# Patient Record
Sex: MALE | Race: BLACK OR AFRICAN AMERICAN | Employment: OTHER | URBAN - METROPOLITAN AREA
[De-identification: names, ages, dates, MRNs, and addresses within clinical notes are randomized per-mention and may not be internally consistent; named-entity substitution may affect disease eponyms.]

---

## 2017-03-30 ENCOUNTER — HOSPITAL ENCOUNTER (OUTPATIENT)
Dept: OTHER | Age: 62
Discharge: HOME | End: 2017-03-30
Attending: INTERNAL MEDICINE | Admitting: INTERNAL MEDICINE

## 2017-03-30 LAB
24HR URINE VOLUME (ML): 1550 ML
CREAT SERPL-MCNC: 2.1 MG/DL (ref 0.8–1.3)
CREATININE 24 HOUR URINE: 1.7 G/24HR (ref 0.6–2.5)
CREATININE CLEARANCE: 44 ML/MIN (ref 100–140)
Lab: 24 HR
PROTEIN 24 HOUR URINE: 2.39 G/24HR

## 2017-04-11 ENCOUNTER — HOSPITAL ENCOUNTER (OUTPATIENT)
Dept: OTHER | Age: 62
Discharge: HOME | End: 2017-04-11
Attending: INTERNAL MEDICINE | Admitting: INTERNAL MEDICINE

## 2017-04-11 LAB
ALBUMIN SERPL-MCNC: 3.7 G/DL (ref 3.4–5)
ANION GAP SERPL CALCULATED.3IONS-SCNC: 13 MMOL/L (ref 3–16)
BUN BLDV-MCNC: 24 MG/DL (ref 7–20)
C3 COMPLEMENT: 111 MG/DL (ref 90–180)
C4 COMPLEMENT: 28.1 MG/DL (ref 10–40)
CALCIUM SERPL-MCNC: 9 MG/DL (ref 8.3–10.6)
CHLORIDE BLD-SCNC: 105 MMOL/L (ref 99–110)
CO2: 25 MMOL/L (ref 21–32)
CREAT SERPL-MCNC: 1.9 MG/DL (ref 0.8–1.3)
CREATININE URINE: 88.2 MG/DL (ref 39–259)
FERRITIN: 225.3 NG/ML (ref 30–400)
GFR AFRICAN AMERICAN: 43
GFR NON-AFRICAN AMERICAN: 36
GLUCOSE BLD-MCNC: 133 MG/DL (ref 70–99)
HBV SURFACE AB TITR SER: 23.17 MUL/ML
HCT VFR BLD CALC: 32.5 % (ref 40.5–52.5)
HEMOGLOBIN: 10.4 G/DL (ref 13.5–17.5)
HEPATITIS C ANTIBODY INTERPRETATION: NORMAL
IRON SATURATION: 36 % (ref 20–50)
IRON: 104 UG/DL (ref 59–158)
MCH RBC QN AUTO: 29.7 PG (ref 26–34)
MCHC RBC AUTO-ENTMCNC: 31.8 G/DL (ref 31–36)
MCV RBC AUTO: 93.3 FL (ref 80–100)
PARATHYROID HORMONE INTACT: 165.1 PG/ML (ref 14–72)
PDW BLD-RTO: 13.2 % (ref 12.4–15.4)
PHOSPHORUS: 2.7 MG/DL (ref 2.5–4.9)
PLATELET # BLD: 246 K/UL (ref 135–450)
PMV BLD AUTO: 8.1 FL (ref 5–10.5)
POTASSIUM SERPL-SCNC: 4.4 MMOL/L (ref 3.5–5.1)
PROTEIN PROTEIN: 0.15 G/DL
PROTEIN PROTEIN: 148 MG/DL
RBC # BLD: 3.49 M/UL (ref 4.2–5.9)
SODIUM BLD-SCNC: 143 MMOL/L (ref 136–145)
TOTAL IRON BINDING CAPACITY: 286 UG/DL (ref 260–445)
VITAMIN D 25-HYDROXY: 11.9 NG/ML
WBC # BLD: 7.8 K/UL (ref 4–11)

## 2017-04-12 LAB
ALBUMIN SERPL-MCNC: 2.9 G/DL (ref 3.1–4.9)
ALPHA-1-GLOBULIN: 0.3 G/DL (ref 0.2–0.4)
ALPHA-2-GLOBULIN: 0.8 G/DL (ref 0.4–1.1)
ANA INTERPRETATION: NORMAL
ANTI-NUCLEAR ANTIBODY (ANA): NEGATIVE
BETA GLOBULIN: 1.3 G/DL (ref 0.9–1.6)
GAMMA GLOBULIN: 1.1 G/DL (ref 0.6–1.8)
HIV-1 AND HIV-2 ANTIBODIES: NORMAL
RPR: NORMAL
TOTAL PROTEIN: 6.3 G/DL (ref 6.4–8.2)

## 2017-04-13 LAB
ANCA IFA: NORMAL
BETA GLOBULIN: 0 AU/ML (ref 0–19)
SPE/IFE INTERPRETATION: NORMAL

## 2017-04-14 LAB
KAPPA, FREE LIGHT CHAINS, SERUM: 42.16 MG/L (ref 3.3–19.4)
KAPPA/LAMBDA RATIO: 1.77 (ref 0.26–1.65)
KAPPA/LAMBDA TEST COMMENT: ABNORMAL
LAMBDA, FREE LIGHT CHAINS, SERUM: 23.83 MG/L (ref 5.71–26.3)

## 2017-04-17 LAB — URINE ELECTROPHORESIS INTERP: NORMAL

## 2017-05-01 ENCOUNTER — HOSPITAL ENCOUNTER (OUTPATIENT)
Dept: OTHER | Age: 62
Discharge: HOME | End: 2017-05-01
Attending: INTERNAL MEDICINE | Admitting: INTERNAL MEDICINE

## 2017-05-01 LAB
ANION GAP SERPL CALCULATED.3IONS-SCNC: 12 MMOL/L (ref 3–16)
BUN BLDV-MCNC: 25 MG/DL (ref 7–20)
CALCIUM SERPL-MCNC: 9.1 MG/DL (ref 8.3–10.6)
CHLORIDE BLD-SCNC: 104 MMOL/L (ref 99–110)
CO2: 25 MMOL/L (ref 21–32)
CREAT SERPL-MCNC: 2.2 MG/DL (ref 0.8–1.3)
GFR AFRICAN AMERICAN: 37
GFR NON-AFRICAN AMERICAN: 30
GLUCOSE BLD-MCNC: 141 MG/DL (ref 70–99)
POTASSIUM SERPL-SCNC: 4.7 MMOL/L (ref 3.5–5.1)
SODIUM BLD-SCNC: 141 MMOL/L (ref 136–145)

## 2017-07-06 ENCOUNTER — HOSPITAL ENCOUNTER (OUTPATIENT)
Dept: OTHER | Age: 62
Discharge: HOME | End: 2017-07-06
Attending: INTERNAL MEDICINE | Admitting: INTERNAL MEDICINE

## 2017-07-06 LAB
ALBUMIN SERPL-MCNC: 3.6 G/DL (ref 3.4–5)
ANION GAP SERPL CALCULATED.3IONS-SCNC: 12 MMOL/L (ref 3–16)
BUN BLDV-MCNC: 32 MG/DL (ref 7–20)
CALCIUM SERPL-MCNC: 9.1 MG/DL (ref 8.3–10.6)
CHLORIDE BLD-SCNC: 106 MMOL/L (ref 99–110)
CO2: 25 MMOL/L (ref 21–32)
CREAT SERPL-MCNC: 2.4 MG/DL (ref 0.8–1.3)
CREATININE URINE: 115.3 MG/DL (ref 39–259)
GFR AFRICAN AMERICAN: 33
GFR NON-AFRICAN AMERICAN: 27
GLUCOSE BLD-MCNC: 136 MG/DL (ref 70–99)
HCT VFR BLD CALC: 33.5 % (ref 40.5–52.5)
HEMOGLOBIN: 10.8 G/DL (ref 13.5–17.5)
MCH RBC QN AUTO: 30.1 PG (ref 26–34)
MCHC RBC AUTO-ENTMCNC: 32.4 G/DL (ref 31–36)
MCV RBC AUTO: 93 FL (ref 80–100)
MICROALBUMIN UR-MCNC: 106.3 MG/DL
MICROALBUMIN/CREAT UR-RTO: 921.9 MG/G (ref 0–30)
PARATHYROID HORMONE INTACT: 244.4 PG/ML (ref 14–72)
PDW BLD-RTO: 13.1 % (ref 12.4–15.4)
PHOSPHORUS: 3.5 MG/DL (ref 2.5–4.9)
PLATELET # BLD: 246 K/UL (ref 135–450)
PMV BLD AUTO: 7.9 FL (ref 5–10.5)
POTASSIUM SERPL-SCNC: 4.6 MMOL/L (ref 3.5–5.1)
PROTEIN PROTEIN: 173 MG/DL
RBC # BLD: 3.6 M/UL (ref 4.2–5.9)
SODIUM BLD-SCNC: 143 MMOL/L (ref 136–145)
VITAMIN D 25-HYDROXY: 10.3 NG/ML
WBC # BLD: 8.2 K/UL (ref 4–11)

## 2017-07-21 ENCOUNTER — HOSPITAL ENCOUNTER (OUTPATIENT)
Dept: NON INVASIVE DIAGNOSTICS | Age: 62
Discharge: HOME | End: 2017-07-21
Attending: FAMILY MEDICINE | Admitting: FAMILY MEDICINE

## 2017-07-21 DIAGNOSIS — R01.1 CARDIAC MURMUR: ICD-10-CM

## 2017-07-21 LAB
LV EF: 55 %
LVEF MODALITY: NORMAL

## 2017-10-20 ENCOUNTER — HOSPITAL ENCOUNTER (OUTPATIENT)
Dept: OTHER | Age: 62
Discharge: HOME | End: 2017-10-20
Attending: INTERNAL MEDICINE | Admitting: INTERNAL MEDICINE

## 2017-10-20 ENCOUNTER — HOSPITAL ENCOUNTER (OUTPATIENT)
Dept: OTHER | Age: 62
Discharge: HOME | End: 2017-10-20
Attending: FAMILY MEDICINE | Admitting: FAMILY MEDICINE

## 2017-10-20 LAB
ANION GAP SERPL CALCULATED.3IONS-SCNC: 14 MMOL/L (ref 3–16)
BASOPHILS ABSOLUTE: 0.1 K/UL (ref 0–0.2)
BASOPHILS RELATIVE PERCENT: 0.7 %
BUN BLDV-MCNC: 24 MG/DL (ref 7–20)
CALCIUM SERPL-MCNC: 9 MG/DL (ref 8.3–10.6)
CHLORIDE BLD-SCNC: 101 MMOL/L (ref 99–110)
CHOLESTEROL, TOTAL: 117 MG/DL (ref 0–199)
CO2: 25 MMOL/L (ref 21–32)
CREAT SERPL-MCNC: 2.2 MG/DL (ref 0.8–1.3)
CREATININE URINE: 105.4 MG/DL (ref 39–259)
EOSINOPHILS ABSOLUTE: 0.3 K/UL (ref 0–0.6)
EOSINOPHILS RELATIVE PERCENT: 3.8 %
FERRITIN: 264.1 NG/ML (ref 30–400)
GFR AFRICAN AMERICAN: 37
GFR NON-AFRICAN AMERICAN: 30
GLUCOSE BLD-MCNC: 179 MG/DL (ref 70–99)
HCT VFR BLD CALC: 31.9 % (ref 40.5–52.5)
HDLC SERPL-MCNC: 31 MG/DL (ref 40–60)
HEMOGLOBIN: 10.2 G/DL (ref 13.5–17.5)
IRON SATURATION: 24 % (ref 20–50)
IRON: 64 UG/DL (ref 59–158)
LDL CHOLESTEROL CALCULATED: 63 MG/DL
LYMPHOCYTES ABSOLUTE: 2.1 K/UL (ref 1–5.1)
LYMPHOCYTES RELATIVE PERCENT: 23.1 %
MAGNESIUM: 2.3 MG/DL (ref 1.8–2.4)
MCH RBC QN AUTO: 29.7 PG (ref 26–34)
MCHC RBC AUTO-ENTMCNC: 31.9 G/DL (ref 31–36)
MCV RBC AUTO: 93.2 FL (ref 80–100)
MICROALBUMIN UR-MCNC: 123.7 MG/DL
MICROALBUMIN/CREAT UR-RTO: 1173.6 MG/G (ref 0–30)
MONOCYTES ABSOLUTE: 0.6 K/UL (ref 0–1.3)
MONOCYTES RELATIVE PERCENT: 7.1 %
NEUTROPHILS ABSOLUTE: 5.8 K/UL (ref 1.7–7.7)
NEUTROPHILS RELATIVE PERCENT: 65.3 %
PARATHYROID HORMONE INTACT: 215.7 PG/ML (ref 14–72)
PDW BLD-RTO: 12.7 % (ref 12.4–15.4)
PHOSPHORUS: 2.9 MG/DL (ref 2.5–4.9)
PLATELET # BLD: 287 K/UL (ref 135–450)
PMV BLD AUTO: 8.2 FL (ref 5–10.5)
POTASSIUM SERPL-SCNC: 4.4 MMOL/L (ref 3.5–5.1)
PROTEIN PROTEIN: 187 MG/DL
RBC # BLD: 3.42 M/UL (ref 4.2–5.9)
SODIUM BLD-SCNC: 140 MMOL/L (ref 136–145)
T3 FREE: 2.4 PG/ML (ref 2.3–4.2)
T4 FREE: 1.1 NG/DL (ref 0.9–1.8)
TOTAL IRON BINDING CAPACITY: 267 UG/DL (ref 260–445)
TRIGL SERPL-MCNC: 117 MG/DL (ref 0–150)
TSH SERPL DL<=0.05 MIU/L-ACNC: 5.66 UIU/ML (ref 0.27–4.2)
VITAMIN D 25-HYDROXY: 17 NG/ML
VLDLC SERPL CALC-MCNC: 23 MG/DL
WBC # BLD: 8.9 K/UL (ref 4–11)

## 2017-11-03 ENCOUNTER — HOSPITAL ENCOUNTER (OUTPATIENT)
Dept: OTHER | Age: 62
Discharge: HOME | End: 2017-11-03
Attending: INTERNAL MEDICINE | Admitting: INTERNAL MEDICINE

## 2017-11-03 LAB
ANION GAP SERPL CALCULATED.3IONS-SCNC: 13 MMOL/L (ref 3–16)
BUN BLDV-MCNC: 28 MG/DL (ref 7–20)
CALCIUM SERPL-MCNC: 9.5 MG/DL (ref 8.3–10.6)
CHLORIDE BLD-SCNC: 104 MMOL/L (ref 99–110)
CO2: 26 MMOL/L (ref 21–32)
CREAT SERPL-MCNC: 2.9 MG/DL (ref 0.8–1.3)
GFR AFRICAN AMERICAN: 27
GFR NON-AFRICAN AMERICAN: 22
GLUCOSE BLD-MCNC: 148 MG/DL (ref 70–99)
POTASSIUM SERPL-SCNC: 5 MMOL/L (ref 3.5–5.1)
SODIUM BLD-SCNC: 143 MMOL/L (ref 136–145)

## 2017-11-10 ENCOUNTER — HOSPITAL ENCOUNTER (OUTPATIENT)
Dept: OTHER | Age: 62
Discharge: HOME | End: 2017-11-10
Attending: INTERNAL MEDICINE | Admitting: INTERNAL MEDICINE

## 2017-11-10 LAB
ANION GAP SERPL CALCULATED.3IONS-SCNC: 17 MMOL/L (ref 3–16)
BUN BLDV-MCNC: 22 MG/DL (ref 7–20)
CALCIUM SERPL-MCNC: 9.8 MG/DL (ref 8.3–10.6)
CHLORIDE BLD-SCNC: 103 MMOL/L (ref 99–110)
CO2: 24 MMOL/L (ref 21–32)
CREAT SERPL-MCNC: 2.3 MG/DL (ref 0.8–1.3)
GFR AFRICAN AMERICAN: 35
GFR NON-AFRICAN AMERICAN: 29
GLUCOSE BLD-MCNC: 194 MG/DL (ref 70–99)
POTASSIUM SERPL-SCNC: 5.3 MMOL/L (ref 3.5–5.1)
SODIUM BLD-SCNC: 144 MMOL/L (ref 136–145)

## 2017-11-13 ENCOUNTER — HOSPITAL ENCOUNTER (OUTPATIENT)
Dept: OTHER | Age: 62
Discharge: HOME | End: 2017-11-13
Attending: INTERNAL MEDICINE | Admitting: INTERNAL MEDICINE

## 2017-11-13 LAB
ANION GAP SERPL CALCULATED.3IONS-SCNC: 15 MMOL/L (ref 3–16)
BUN BLDV-MCNC: 22 MG/DL (ref 7–20)
CALCIUM SERPL-MCNC: 9.4 MG/DL (ref 8.3–10.6)
CHLORIDE BLD-SCNC: 103 MMOL/L (ref 99–110)
CO2: 25 MMOL/L (ref 21–32)
CREAT SERPL-MCNC: 2 MG/DL (ref 0.8–1.3)
CREATININE URINE: 127.7 MG/DL (ref 39–259)
GFR AFRICAN AMERICAN: 41
GFR NON-AFRICAN AMERICAN: 34
GLUCOSE BLD-MCNC: 206 MG/DL (ref 70–99)
HCT VFR BLD CALC: 33.3 % (ref 40.5–52.5)
HEMOGLOBIN: 10.6 G/DL (ref 13.5–17.5)
MCH RBC QN AUTO: 29.8 PG (ref 26–34)
MCHC RBC AUTO-ENTMCNC: 32 G/DL (ref 31–36)
MCV RBC AUTO: 93.2 FL (ref 80–100)
MICROALBUMIN UR-MCNC: 183.9 MG/DL
MICROALBUMIN/CREAT UR-RTO: 1440.1 MG/G (ref 0–30)
PDW BLD-RTO: 13 % (ref 12.4–15.4)
PHOSPHORUS: 3 MG/DL (ref 2.5–4.9)
PLATELET # BLD: 241 K/UL (ref 135–450)
PMV BLD AUTO: 8.3 FL (ref 5–10.5)
POTASSIUM SERPL-SCNC: 5 MMOL/L (ref 3.5–5.1)
PROTEIN PROTEIN: 296 MG/DL
RBC # BLD: 3.57 M/UL (ref 4.2–5.9)
SODIUM BLD-SCNC: 143 MMOL/L (ref 136–145)
WBC # BLD: 8.6 K/UL (ref 4–11)

## 2018-01-17 ENCOUNTER — HOSPITAL ENCOUNTER (OUTPATIENT)
Dept: OTHER | Age: 63
Discharge: HOME | End: 2018-01-17
Attending: INTERNAL MEDICINE | Admitting: INTERNAL MEDICINE

## 2018-01-17 LAB
ALBUMIN SERPL-MCNC: 3.4 G/DL (ref 3.4–5)
ANION GAP SERPL CALCULATED.3IONS-SCNC: 14 MMOL/L (ref 3–16)
BUN BLDV-MCNC: 33 MG/DL (ref 7–20)
CALCIUM SERPL-MCNC: 9.1 MG/DL (ref 8.3–10.6)
CHLORIDE BLD-SCNC: 104 MMOL/L (ref 99–110)
CO2: 23 MMOL/L (ref 21–32)
CREAT SERPL-MCNC: 2.7 MG/DL (ref 0.8–1.3)
CREATININE URINE: 62.3 MG/DL (ref 39–259)
GFR AFRICAN AMERICAN: 29
GFR NON-AFRICAN AMERICAN: 24
GLUCOSE BLD-MCNC: 150 MG/DL (ref 70–99)
HCT VFR BLD CALC: 32.4 % (ref 40.5–52.5)
HEMOGLOBIN: 10.5 G/DL (ref 13.5–17.5)
MCH RBC QN AUTO: 29.9 PG (ref 26–34)
MCHC RBC AUTO-ENTMCNC: 32.4 G/DL (ref 31–36)
MCV RBC AUTO: 92.3 FL (ref 80–100)
MICROALBUMIN UR-MCNC: 71.8 MG/DL
MICROALBUMIN/CREAT UR-RTO: 1152.5 MG/G (ref 0–30)
PDW BLD-RTO: 12.8 % (ref 12.4–15.4)
PHOSPHORUS: 3.1 MG/DL (ref 2.5–4.9)
PLATELET # BLD: 271 K/UL (ref 135–450)
PMV BLD AUTO: 7.9 FL (ref 5–10.5)
POTASSIUM SERPL-SCNC: 4.6 MMOL/L (ref 3.5–5.1)
PROTEIN PROTEIN: 113 MG/DL
RBC # BLD: 3.52 M/UL (ref 4.2–5.9)
SODIUM BLD-SCNC: 141 MMOL/L (ref 136–145)
WBC # BLD: 9 K/UL (ref 4–11)

## 2018-03-21 LAB
24HR URINE VOLUME (ML): 3100 ML
ALBUMIN SERPL-MCNC: 4 G/DL (ref 3.4–5)
ANION GAP SERPL CALCULATED.3IONS-SCNC: 12 MMOL/L (ref 3–16)
BUN BLDV-MCNC: 37 MG/DL (ref 7–20)
CALCIUM SERPL-MCNC: 9.3 MG/DL (ref 8.3–10.6)
CHLORIDE BLD-SCNC: 95 MMOL/L (ref 99–110)
CO2: 26 MMOL/L (ref 21–32)
CREAT SERPL-MCNC: 2.4 MG/DL (ref 0.8–1.3)
CREAT SERPL-MCNC: 2.5 MG/DL (ref 0.8–1.3)
CREATININE 24 HOUR URINE: 1.6 G/24HR (ref 0.6–2.5)
CREATININE CLEARANCE: 38 ML/MIN (ref 100–140)
CREATININE URINE: 92.4 MG/DL (ref 39–259)
FERRITIN: 303.7 NG/ML (ref 30–400)
GFR AFRICAN AMERICAN: 32
GFR NON-AFRICAN AMERICAN: 26
GLUCOSE BLD-MCNC: 307 MG/DL (ref 70–99)
HCT VFR BLD CALC: 34.5 % (ref 40.5–52.5)
HEMOGLOBIN: 11.4 G/DL (ref 13.5–17.5)
IRON SATURATION: 36 % (ref 20–50)
IRON: 108 UG/DL (ref 59–158)
Lab: 24 HR
MCH RBC QN AUTO: 29.9 PG (ref 26–34)
MCHC RBC AUTO-ENTMCNC: 33.1 G/DL (ref 31–36)
MCV RBC AUTO: 90.3 FL (ref 80–100)
MICROALBUMIN UR-MCNC: 171.4 MG/DL
MICROALBUMIN/CREAT UR-RTO: 1855 MG/G (ref 0–30)
PARATHYROID HORMONE INTACT: 226 PG/ML (ref 14–72)
PDW BLD-RTO: 12.6 % (ref 12.4–15.4)
PHOSPHORUS: 3.4 MG/DL (ref 2.5–4.9)
PLATELET # BLD: 283 K/UL (ref 135–450)
PMV BLD AUTO: 8.6 FL (ref 5–10.5)
POTASSIUM SERPL-SCNC: 4.7 MMOL/L (ref 3.5–5.1)
PROTEIN 24 HOUR URINE: 3.6 G/24HR
PROTEIN PROTEIN: 279 MG/DL
RBC # BLD: 3.82 M/UL (ref 4.2–5.9)
SODIUM 24 HOUR URINE: 316 MMOL/24 HR (ref 40–220)
SODIUM BLD-SCNC: 133 MMOL/L (ref 136–145)
TOTAL IRON BINDING CAPACITY: 300 UG/DL (ref 260–445)
VITAMIN D 25-HYDROXY: 33.4 NG/ML
WBC # BLD: 10 K/UL (ref 4–11)

## 2018-03-26 ENCOUNTER — HOSPITAL ENCOUNTER (OUTPATIENT)
Dept: OTHER | Age: 63
Discharge: HOME | End: 2018-03-26
Attending: INTERNAL MEDICINE | Admitting: INTERNAL MEDICINE

## 2018-04-12 ENCOUNTER — HOSPITAL ENCOUNTER (OUTPATIENT)
Dept: ULTRASOUND IMAGING | Age: 63
Discharge: HOME | End: 2018-04-12
Attending: INTERNAL MEDICINE | Admitting: INTERNAL MEDICINE

## 2018-04-12 DIAGNOSIS — N18.30 CHRONIC KIDNEY DISEASE, STAGE III (MODERATE) (HCC): ICD-10-CM

## 2018-04-12 DIAGNOSIS — N18.30 STAGE 3 CHRONIC KIDNEY DISEASE (HCC): ICD-10-CM

## 2019-05-05 ENCOUNTER — APPOINTMENT (OUTPATIENT)
Dept: GENERAL RADIOLOGY | Age: 64
DRG: 629 | End: 2019-05-05
Payer: MEDICARE

## 2019-05-05 ENCOUNTER — HOSPITAL ENCOUNTER (INPATIENT)
Age: 64
LOS: 9 days | Discharge: HOME HEALTH | DRG: 629 | End: 2019-05-14
Attending: EMERGENCY MEDICINE | Admitting: INTERNAL MEDICINE
Payer: MEDICARE

## 2019-05-05 DIAGNOSIS — M86.9 OSTEOMYELITIS OF LEFT FOOT, UNSPECIFIED TYPE (HCC): ICD-10-CM

## 2019-05-05 DIAGNOSIS — L03.119 CELLULITIS OF FOOT: Primary | ICD-10-CM

## 2019-05-05 PROBLEM — E11.628 DIABETIC FOOT INFECTION (HCC): Status: ACTIVE | Noted: 2019-05-05

## 2019-05-05 PROBLEM — L08.9 DIABETIC FOOT INFECTION (HCC): Status: ACTIVE | Noted: 2019-05-05

## 2019-05-05 LAB
A/G RATIO: 0.9 (ref 1.1–2.2)
ALBUMIN SERPL-MCNC: 3.4 G/DL (ref 3.4–5)
ALP BLD-CCNC: 103 U/L (ref 40–129)
ALT SERPL-CCNC: <5 U/L (ref 10–40)
ANION GAP SERPL CALCULATED.3IONS-SCNC: 14 MMOL/L (ref 3–16)
AST SERPL-CCNC: 13 U/L (ref 15–37)
BASOPHILS ABSOLUTE: 0.1 K/UL (ref 0–0.2)
BASOPHILS RELATIVE PERCENT: 0.7 %
BILIRUB SERPL-MCNC: 0.5 MG/DL (ref 0–1)
BUN BLDV-MCNC: 38 MG/DL (ref 7–20)
CALCIUM SERPL-MCNC: 8.9 MG/DL (ref 8.3–10.6)
CHLORIDE BLD-SCNC: 98 MMOL/L (ref 99–110)
CO2: 23 MMOL/L (ref 21–32)
CREAT SERPL-MCNC: 4.2 MG/DL (ref 0.8–1.3)
EOSINOPHILS ABSOLUTE: 0.1 K/UL (ref 0–0.6)
EOSINOPHILS RELATIVE PERCENT: 0.6 %
GFR AFRICAN AMERICAN: 17
GFR NON-AFRICAN AMERICAN: 14
GLOBULIN: 3.6 G/DL
GLUCOSE BLD-MCNC: 188 MG/DL (ref 70–99)
HCT VFR BLD CALC: 27.1 % (ref 40.5–52.5)
HEMOGLOBIN: 8.8 G/DL (ref 13.5–17.5)
INR BLD: 1.2 (ref 0.86–1.14)
LACTIC ACID, SEPSIS: 1 MMOL/L (ref 0.4–1.9)
LYMPHOCYTES ABSOLUTE: 1.7 K/UL (ref 1–5.1)
LYMPHOCYTES RELATIVE PERCENT: 15 %
MCH RBC QN AUTO: 29.9 PG (ref 26–34)
MCHC RBC AUTO-ENTMCNC: 32.6 G/DL (ref 31–36)
MCV RBC AUTO: 91.7 FL (ref 80–100)
MONOCYTES ABSOLUTE: 0.7 K/UL (ref 0–1.3)
MONOCYTES RELATIVE PERCENT: 6 %
NEUTROPHILS ABSOLUTE: 8.8 K/UL (ref 1.7–7.7)
NEUTROPHILS RELATIVE PERCENT: 77.7 %
PDW BLD-RTO: 13.3 % (ref 12.4–15.4)
PLATELET # BLD: 296 K/UL (ref 135–450)
PMV BLD AUTO: 7.2 FL (ref 5–10.5)
POTASSIUM SERPL-SCNC: 4.3 MMOL/L (ref 3.5–5.1)
PROTHROMBIN TIME: 13.7 SEC (ref 9.8–13)
RBC # BLD: 2.95 M/UL (ref 4.2–5.9)
SEDIMENTATION RATE, ERYTHROCYTE: >130 MM/HR (ref 0–20)
SODIUM BLD-SCNC: 135 MMOL/L (ref 136–145)
TOTAL PROTEIN: 7 G/DL (ref 6.4–8.2)
WBC # BLD: 11.3 K/UL (ref 4–11)

## 2019-05-05 PROCEDURE — 87040 BLOOD CULTURE FOR BACTERIA: CPT

## 2019-05-05 PROCEDURE — 73660 X-RAY EXAM OF TOE(S): CPT

## 2019-05-05 PROCEDURE — 85610 PROTHROMBIN TIME: CPT

## 2019-05-05 PROCEDURE — 96360 HYDRATION IV INFUSION INIT: CPT

## 2019-05-05 PROCEDURE — 85025 COMPLETE CBC W/AUTO DIFF WBC: CPT

## 2019-05-05 PROCEDURE — 99285 EMERGENCY DEPT VISIT HI MDM: CPT

## 2019-05-05 PROCEDURE — 90471 IMMUNIZATION ADMIN: CPT | Performed by: EMERGENCY MEDICINE

## 2019-05-05 PROCEDURE — 1200000000 HC SEMI PRIVATE

## 2019-05-05 PROCEDURE — 6360000002 HC RX W HCPCS: Performed by: EMERGENCY MEDICINE

## 2019-05-05 PROCEDURE — 96372 THER/PROPH/DIAG INJ SC/IM: CPT

## 2019-05-05 PROCEDURE — 90715 TDAP VACCINE 7 YRS/> IM: CPT | Performed by: EMERGENCY MEDICINE

## 2019-05-05 PROCEDURE — 85652 RBC SED RATE AUTOMATED: CPT

## 2019-05-05 PROCEDURE — 2580000003 HC RX 258: Performed by: NURSE PRACTITIONER

## 2019-05-05 PROCEDURE — 6360000002 HC RX W HCPCS: Performed by: NURSE PRACTITIONER

## 2019-05-05 PROCEDURE — 80053 COMPREHEN METABOLIC PANEL: CPT

## 2019-05-05 PROCEDURE — 83605 ASSAY OF LACTIC ACID: CPT

## 2019-05-05 PROCEDURE — 86140 C-REACTIVE PROTEIN: CPT

## 2019-05-05 RX ORDER — 0.9 % SODIUM CHLORIDE 0.9 %
1000 INTRAVENOUS SOLUTION INTRAVENOUS ONCE
Status: COMPLETED | OUTPATIENT
Start: 2019-05-05 | End: 2019-05-05

## 2019-05-05 RX ADMIN — TAZOBACTAM SODIUM AND PIPERACILLIN SODIUM 4.5 G: 500; 4 INJECTION, SOLUTION INTRAVENOUS at 20:49

## 2019-05-05 RX ADMIN — SODIUM CHLORIDE 1000 ML: 9 INJECTION, SOLUTION INTRAVENOUS at 20:49

## 2019-05-05 RX ADMIN — TETANUS TOXOID, REDUCED DIPHTHERIA TOXOID AND ACELLULAR PERTUSSIS VACCINE, ADSORBED 0.5 ML: 5; 2.5; 8; 8; 2.5 SUSPENSION INTRAMUSCULAR at 22:29

## 2019-05-05 ASSESSMENT — PAIN DESCRIPTION - PAIN TYPE: TYPE: ACUTE PAIN

## 2019-05-05 ASSESSMENT — PAIN DESCRIPTION - ORIENTATION: ORIENTATION: LEFT

## 2019-05-05 ASSESSMENT — PAIN DESCRIPTION - LOCATION: LOCATION: FOOT

## 2019-05-05 ASSESSMENT — PAIN SCALES - GENERAL
PAINLEVEL_OUTOF10: 3
PAINLEVEL_OUTOF10: 0

## 2019-05-06 ENCOUNTER — APPOINTMENT (OUTPATIENT)
Dept: MRI IMAGING | Age: 64
DRG: 629 | End: 2019-05-06
Payer: MEDICARE

## 2019-05-06 LAB
C-REACTIVE PROTEIN: 96.6 MG/L (ref 0–5.1)
GLUCOSE BLD-MCNC: 107 MG/DL (ref 70–99)
GLUCOSE BLD-MCNC: 115 MG/DL (ref 70–99)
GLUCOSE BLD-MCNC: 116 MG/DL (ref 70–99)
GLUCOSE BLD-MCNC: 162 MG/DL (ref 70–99)
GLUCOSE BLD-MCNC: 192 MG/DL (ref 70–99)
PERFORMED ON: ABNORMAL

## 2019-05-06 PROCEDURE — 6360000002 HC RX W HCPCS: Performed by: INTERNAL MEDICINE

## 2019-05-06 PROCEDURE — 6370000000 HC RX 637 (ALT 250 FOR IP): Performed by: INTERNAL MEDICINE

## 2019-05-06 PROCEDURE — 1200000000 HC SEMI PRIVATE

## 2019-05-06 PROCEDURE — 2580000003 HC RX 258: Performed by: INTERNAL MEDICINE

## 2019-05-06 RX ORDER — LISINOPRIL 20 MG/1
20 TABLET ORAL DAILY
Status: DISCONTINUED | OUTPATIENT
Start: 2019-05-06 | End: 2019-05-08

## 2019-05-06 RX ORDER — ATORVASTATIN CALCIUM 20 MG/1
20 TABLET, FILM COATED ORAL NIGHTLY
Status: DISCONTINUED | OUTPATIENT
Start: 2019-05-06 | End: 2019-05-14 | Stop reason: HOSPADM

## 2019-05-06 RX ORDER — SODIUM CHLORIDE 0.9 % (FLUSH) 0.9 %
10 SYRINGE (ML) INJECTION PRN
Status: DISCONTINUED | OUTPATIENT
Start: 2019-05-06 | End: 2019-05-14 | Stop reason: HOSPADM

## 2019-05-06 RX ORDER — LEVOTHYROXINE SODIUM 0.1 MG/1
100 TABLET ORAL DAILY
Status: DISCONTINUED | OUTPATIENT
Start: 2019-05-06 | End: 2019-05-14 | Stop reason: HOSPADM

## 2019-05-06 RX ORDER — LINEZOLID 600 MG/1
600 TABLET, FILM COATED ORAL EVERY 12 HOURS SCHEDULED
Status: DISCONTINUED | OUTPATIENT
Start: 2019-05-06 | End: 2019-05-13

## 2019-05-06 RX ORDER — DEXTROSE MONOHYDRATE 50 MG/ML
100 INJECTION, SOLUTION INTRAVENOUS PRN
Status: DISCONTINUED | OUTPATIENT
Start: 2019-05-06 | End: 2019-05-14 | Stop reason: HOSPADM

## 2019-05-06 RX ORDER — DEXTROSE MONOHYDRATE 25 G/50ML
12.5 INJECTION, SOLUTION INTRAVENOUS PRN
Status: DISCONTINUED | OUTPATIENT
Start: 2019-05-06 | End: 2019-05-14 | Stop reason: HOSPADM

## 2019-05-06 RX ORDER — NICOTINE POLACRILEX 4 MG
15 LOZENGE BUCCAL PRN
Status: DISCONTINUED | OUTPATIENT
Start: 2019-05-06 | End: 2019-05-14 | Stop reason: HOSPADM

## 2019-05-06 RX ORDER — SODIUM CHLORIDE 0.9 % (FLUSH) 0.9 %
10 SYRINGE (ML) INJECTION EVERY 12 HOURS SCHEDULED
Status: DISCONTINUED | OUTPATIENT
Start: 2019-05-06 | End: 2019-05-14 | Stop reason: HOSPADM

## 2019-05-06 RX ORDER — ONDANSETRON 2 MG/ML
4 INJECTION INTRAMUSCULAR; INTRAVENOUS EVERY 6 HOURS PRN
Status: DISCONTINUED | OUTPATIENT
Start: 2019-05-06 | End: 2019-05-14 | Stop reason: HOSPADM

## 2019-05-06 RX ADMIN — Medication 10 ML: at 22:47

## 2019-05-06 RX ADMIN — LEVOTHYROXINE SODIUM 100 MCG: 100 TABLET ORAL at 07:58

## 2019-05-06 RX ADMIN — INSULIN GLARGINE 20 UNITS: 100 INJECTION, SOLUTION SUBCUTANEOUS at 22:47

## 2019-05-06 RX ADMIN — ENOXAPARIN SODIUM 30 MG: 30 INJECTION SUBCUTANEOUS at 08:18

## 2019-05-06 RX ADMIN — INSULIN LISPRO 12 UNITS: 100 INJECTION, SOLUTION INTRAVENOUS; SUBCUTANEOUS at 18:11

## 2019-05-06 RX ADMIN — ATORVASTATIN CALCIUM 20 MG: 20 TABLET, FILM COATED ORAL at 22:46

## 2019-05-06 RX ADMIN — MEROPENEM 500 MG: 500 INJECTION, POWDER, FOR SOLUTION INTRAVENOUS at 08:18

## 2019-05-06 RX ADMIN — LISINOPRIL 20 MG: 20 TABLET ORAL at 08:18

## 2019-05-06 RX ADMIN — LINEZOLID 600 MG: 600 TABLET, FILM COATED ORAL at 08:18

## 2019-05-06 RX ADMIN — LINEZOLID 600 MG: 600 TABLET, FILM COATED ORAL at 22:46

## 2019-05-06 RX ADMIN — MEROPENEM 500 MG: 500 INJECTION, POWDER, FOR SOLUTION INTRAVENOUS at 22:46

## 2019-05-06 RX ADMIN — Medication 10 ML: at 08:19

## 2019-05-06 ASSESSMENT — PAIN SCALES - GENERAL
PAINLEVEL_OUTOF10: 0
PAINLEVEL_OUTOF10: 0
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
## 2019-05-06 NOTE — CARE COORDINATION
Discharge Planning Assessment  RN/SW discharge planner met with patient/(and family member) to discuss reason for admission, current living situation, and potential needs at the time of discharge    Demographics/Insurance verified Yes    Current type of dwelling:private home    Living arrangements: w/daughter    Level of function/Support: independent w/ADL's    PCP: [facility]    Last Visit to PCP: stated about 6 mos ago    DME:stated none    Active with any community resources/agencies/skilled home care: stated none    Medication compliance issues:stated no issues    Financial issues that could impact healthcare:stated no issues    Transportation at the time of discharge:family    Tentative discharge plan:Sw following for any antibiotic needs on d/c-otherwise no d/c needs.     Electronically signed by ZHEN Daniels on 5/6/2019 at 1:21 PM

## 2019-05-06 NOTE — ED NOTES
Pt from home, states that he stepped on a nail approximately 1 week ago. Pt states that he thinks he has an infection in the wound. Small wound noted to bottom of left foot, great toe, small amount of white/ yellow drainage noted. Pt states that he normally can not feel the bottom of his feet r/t diabetes.       Eric Stern RN  05/05/19 8576

## 2019-05-06 NOTE — PROGRESS NOTES
Message sent via [employer]: \"Pt. creatinine 4.2 on admission; CrCl 20ml/min; pt is not dialysis pt. Do you still want to give Vanc? Also do you want swab of wound area for culture? Thanks. \"  See orders. Will continue to monitor.

## 2019-05-06 NOTE — PROGRESS NOTES
Per Tri Roger NP: Can restart home antihypertensive meds for tomorrow. No intervention needed for tonight. Will continue to monitor.

## 2019-05-06 NOTE — ED PROVIDER NOTES
Emergency Department Encounter  Location: [address] ONCOLOGY    Patient: Stu Gao  MRN: [id number]  : 1955  Date of evaluation: 2019  ED Provider: Shasha Bryant MD    11:11 PM  Stu Gao was checked out to me by Dr. Sukhwinder Portillo. Please see his/her initial documentation for details of the patient's initial ED presentation, physical exam and completed studies. In brief, Stu Gao is a 75 y.o. male that presented to the emergency department for critical cellulitis. There were no acute findings on x-ray and patient has been started on antibiotics. Patient signed out with abdomen pending. RADIOLOGY:   Non-plain filmimages such as CT, Ultrasound and MRI are read by the radiologist. 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              LABS:  Results for orders placed or performed during the hospital encounter of 19   CBC Auto Differential   Result Value Ref Range    WBC 11.3 (H) 4.0 - 11.0 K/uL    RBC 2.95 (L) 4.20 - 5.90 M/uL    Hemoglobin 8.8 (L) 13.5 - 17.5 g/dL    Hematocrit 27.1 (L) 40.5 - 52.5 %    MCV 91.7 80.0 - 100.0 fL    MCH 29.9 26.0 - 34.0 pg    MCHC 32.6 31.0 - 36.0 g/dL    RDW 13.3 12.4 - 15.4 %    Platelets 049 857 - 808 K/uL    MPV 7.2 5.0 - 10.5 fL    Neutrophils % 77.7 %    Lymphocytes % 15.0 %    Monocytes % 6.0 %    Eosinophils % 0.6 %    Basophils % 0.7 %    Neutrophils # 8.8 (H) 1.7 - 7.7 K/uL    Lymphocytes # 1.7 1.0 - 5.1 K/uL    Monocytes # 0.7 0.0 - 1.3 K/uL    Eosinophils # 0.1 0.0 - 0.6 K/uL    Basophils # 0.1 0.0 - 0.2 K/uL   Comprehensive metabolic panel   Result Value Ref Range    Sodium 135 (L) 136 - 145 mmol/L    Potassium 4.3 3.5 - 5.1 mmol/L    Chloride 98 (L) 99 - 110 mmol/L    CO2 23 21 - 32 mmol/L    Anion Gap 14 3 - 16    Glucose 188 (H) 70 - 99 mg/dL    BUN 38 (H) 7 - 20 mg/dL    CREATININE 4.2 (H) 0.8 - 1.3 mg/dL    GFR Non-African American 14 (A) >60    GFR  17 (A) >60    Calcium 8.9 8.3 - 10.6 mg/dL    Total Protein 7.0 6.4 - 8.2 g/dL    Alb 3.4 3.4 - 5.0 g/dL    Albumin/Globulin Ratio 0.9 (L) 1.1 - 2.2    Total Bilirubin 0.5 0.0 - 1.0 mg/dL    Alkaline Phosphatase 103 40 - 129 U/L    ALT <5 (L) 10 - 40 U/L    AST 13 (L) 15 - 37 U/L    Globulin 3.6 g/dL   Lactate, Sepsis   Result Value Ref Range    Lactic Acid, Sepsis 1.0 0.4 - 1.9 mmol/L   Sedimentation Rate   Result Value Ref Range    Sed Rate >130 (H) 0 - 20 mm/Hr   Protime-INR   Result Value Ref Range    Protime 13.7 (H) 9.8 - 13.0 sec    INR 1.20 (H) 0.86 - 1.14     Xr Toe Left (min 2 Views)    Result Date: 5/5/2019  EXAMINATION: 2 XRAY VIEWS OF THE LEFT TOE 5/5/2019 8:51 pm COMPARISON: None. HISTORY: ORDERING SYSTEM PROVIDED HISTORY: osteomylitis TECHNOLOGIST PROVIDED HISTORY: Reason for exam:->osteomylitis Ordering Physician Provided Reason for Exam: Osteomyelitis. Acuity: Acute Type of Exam: Initial FINDINGS: No acute fracture or dislocation. Moderate to severe degenerative changes 1st MTP joint. 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        Final ED Course and MDM:  Tetanus has been updated, patient was started on Zosyn, and admitting to add vanco.    ED Medication Orders (From admission, onward)    Start Ordered     Status Ordering Provider    05/08/19 0900 05/06/19 0057  vancomycin (VANCOCIN) intermittent dosing (placeholder)  RX Placeholder      Acknowledged GENO [facility]    05/06/19 0900 05/06/19 0036  lisinopril (PRINIVIL;ZESTRIL) tablet 20 mg  DAILY      Acknowledged DINH [facility]OR[facility]    05/06/19 0900 05/06/19 0052  enoxaparin (LOVENOX) injection 30 mg  DAILY      Acknowledged DIN[facility]ORA    05/06/19 0800 05/06/19 0036  insulin lispro (HUMALOG) injection pen 12 Units  3 TIMES DAILY WITH MEALS      Acknowledged GENO [facility] 05/06/19 0800 05/06/19 0036  meropenem (MERREM) 500 mg IVPB minibag  EVERY 12 HOURS      Acknowledged [facility]    05/06/19 0700 05/06/19 0036  levothyroxine (SYNTHROID) tablet 100 mcg  DAILY      Acknowledged [facility]    05/06/19 0300 05/06/19 0057  vancomycin (VANCOCIN) 1,250 mg in dextrose 5 % 250 mL IVPB  ONCE      Acknowledged [facility]    05/06/19 0100 05/06/19 0036  atorvastatin (LIPITOR) tablet 20 mg  NIGHTLY      Acknowledged [facility]    05/06/19 0036 05/06/19 0036  insulin glargine (LANTUS) injection pen 30 Units  NIGHTLY      Acknowledged [facility]    05/06/19 0036 05/06/19 0036  sodium chloride flush 0.9 % injection 10 mL  2 times per day      Acknowledged [facility]    05/06/19 0036 05/06/19 0036  sodium chloride flush 0.9 % injection 10 mL  PRN      Acknowledged [facility]    05/06/19 0036 05/06/19 0036  ondansetron (ZOFRAN) injection 4 mg  EVERY 6 HOURS PRN      Acknowledged [facility]    05/05/19 2200 05/05/19 2159  Tetanus-Diphth-Acell Pertussis (BOOSTRIX) injection 0.5 mL  ONCE      Last MAR action:  Given - by Reddy Montana on 05/05/19 at [facility]    05/05/19 2030 05/05/19 2025  piperacillin-tazobactam (ZOSYN) 4.5 g in dextrose 100 mL IVPB (premix)  ONCE      Last MAR action:  Stopped - by RUI SCHAEFFER on 05/05/19 at 2119 Leana Burgos    05/05/19 2030 05/05/19 2025  0.9 % sodium chloride bolus  ONCE      Last MAR action:  Stopped - by Kena SCHAEFFER on 05/05/19 at 2149 Leana Burgos          Final Impression      1.  Cellulitis of foot        DISPOSITION       (Please note that portions of this note may have been completed with a voice recognition program. Efforts were made to edit the dictations but occasionally words are mis-transcribed.)    MD Emmanuel Vail MD  05/06/19 6846

## 2019-05-06 NOTE — H&P
and swelling, and per the history of present  illness. All other systems have been reviewed and are negative except  for the history of present illness. PHYSICAL EXAMINATION:  VITAL SIGNS:  Temperature 98.5, respiratory rate 20, pulse 101, blood  pressure 158/94, and saturating 98%. CNS:  Alert, awake, and oriented to time, place, and person. PSYCHIATRIC:  Cooperative and pleasant, answering questions  appropriately. EYES:  Pupils are reactive to light. ENT:  Extraocular muscle movements are intact. RESPIRATORY SYSTEM:  No rales, rubs, or rhonchi. CVS:  S1 and S2 are heard. No murmurs, gallops, or rubs. ABDOMEN:  Soft. No guarding, rigidity, or rebound. MUSCULOSKELETAL:  The patient has got left great toe swelling with  cellulitis and induration extending in to the dorsum of the left foot. The patient has got an area over the plantar aspect of the left great  toe that looks like a puncture wound with yellowish crust over the area. SKIN:  Significant for the cellulitis changes over the left lower  extremity as described above. DIAGNOSTIC DATA:  X-ray of the left great toe shows first MTP  osteoarthrosis. Sodium 135, BUN 38, creatinine 4.2, chloride 98. GFR  is 17. INR 1.20. Lactic acid 1.0.  CBC showed a white count of 11.3,  hemoglobin 8.8, hematocrit 27.1, platelets of 259. Erythrocyte  sedimentation rate is greater than 130. REVIEW OF OLD RECORDS:  Shows:  1. An echo from 2017 that shows an LVEF of 55%. 2.  The patient's previous BUN and creatinine are 37 and 2.4  respectively on 03/21/2018. CONSULTATIONS:  I have requested a Podiatry consultation. ASSESSMENT:  1. Acute left great toe/foot diabetic infection with cellulitis. 2.  Acute-on-chronic kidney disease stage 3.  3.  Uncontrolled type 2 diabetes mellitus. 4.  Hypertension. 5.  Hypothyroidism. PLAN OF CARE:  The patient has been admitted to the Internal Medicine  Service.   We will continue the patient on oral Zyvox

## 2019-05-06 NOTE — PROGRESS NOTES
[facility] PROGRESS NOTE    5/6/2019 3:10 PM        Name: Stu Gao . Admitted: 5/5/2019  Primary Care Provider: Janie Farias DO (Tel: [phone])      Subjective:  No new Complaint. Still has pain in his left Toe        Reviewed interval ancillary notes    Current Medications    atorvastatin (LIPITOR) tablet 20 mg Nightly   insulin glargine (LANTUS) injection pen 30 Units Nightly   insulin lispro (HUMALOG) injection pen 12 Units TID WC   levothyroxine (SYNTHROID) tablet 100 mcg Daily   lisinopril (PRINIVIL;ZESTRIL) tablet 20 mg Daily   sodium chloride flush 0.9 % injection 10 mL 2 times per day   sodium chloride flush 0.9 % injection 10 mL PRN   ondansetron (ZOFRAN) injection 4 mg Q6H PRN   meropenem (MERREM) 500 mg IVPB minibag Q12H   enoxaparin (LOVENOX) injection 30 mg Daily   linezolid (ZYVOX) tablet 600 mg 2 times per day   glucose (GLUTOSE) 40 % oral gel 15 g PRN   dextrose 50 % solution 12.5 g PRN   glucagon (rDNA) injection 1 mg PRN   dextrose 5 % solution PRN       Objective:  BP (!) 168/87   Pulse 88   Temp 97.8 °F (36.6 °C) (Oral)   Resp 16   Ht 5' 10\" (1.778 m)   Wt 198 lb 3.2 oz (89.9 kg)   SpO2 95%   BMI 28.44 kg/m²     Intake/Output Summary (Last 24 hours) at 5/6/2019 1510  Last data filed at 5/5/2019 2149  Gross per 24 hour   Intake 1100 ml   Output --   Net 1100 ml    Wt Readings from Last 3 Encounters:   05/06/19 198 lb 3.2 oz (89.9 kg)   02/15/16 224 lb 9.6 oz (101.9 kg)   01/15/16 221 lb (100.2 kg)       General appearance:  Appears comfortable  Eyes: Sclera clear. Pupils equal.  ENT: Moist oral mucosa. Trachea midline, no adenopathy. Cardiovascular: Regular rhythm, normal S1, S2. No murmur. No edema in lower extremities  Respiratory: Not using accessory muscles. Good inspiratory effort. Clear to auscultation bilaterally, no wheeze or crackles.    GI: Abdomen soft, no

## 2019-05-06 NOTE — PROGRESS NOTES
Message sent via [employer]: \"Pt. /96. Pt. does not c/o headache or blurred vision. Other VSS. Pt. states he does take medication for BP at home. Please advise. Thanks! \"  See orders. Will continue to monitor.

## 2019-05-06 NOTE — ED PROVIDER NOTES
This patient was seen by the Mid-Level Provider. I have seen and examined the patient, agree with the workup, evaluation, management and diagnosis. Care plan has been discussed. My assessment reveals a 58-year-old male with a left great toe infection. This is a 58-year-old male diabetic who presents with a wound to his left great toe. On examination he has some erythema up to the base of the foot. An x-ray was obtained that shows no acute findings pending radiology review. There are no foreign objects noted. His workup is consistent with a diabetic infection of his left great toe and cellulitis. He will be admitted for further care and antibiotics. Examination: Cellulitis of the left great toe. Small ulcer on the plantar aspect. Disposition: The patient be admitted for further care.     Results for orders placed or performed during the hospital encounter of 05/05/19   CBC Auto Differential   Result Value Ref Range    WBC 11.3 (H) 4.0 - 11.0 K/uL    RBC 2.95 (L) 4.20 - 5.90 M/uL    Hemoglobin 8.8 (L) 13.5 - 17.5 g/dL    Hematocrit 27.1 (L) 40.5 - 52.5 %    MCV 91.7 80.0 - 100.0 fL    MCH 29.9 26.0 - 34.0 pg    MCHC 32.6 31.0 - 36.0 g/dL    RDW 13.3 12.4 - 15.4 %    Platelets 762 717 - 760 K/uL    MPV 7.2 5.0 - 10.5 fL    Neutrophils % 77.7 %    Lymphocytes % 15.0 %    Monocytes % 6.0 %    Eosinophils % 0.6 %    Basophils % 0.7 %    Neutrophils # 8.8 (H) 1.7 - 7.7 K/uL    Lymphocytes # 1.7 1.0 - 5.1 K/uL    Monocytes # 0.7 0.0 - 1.3 K/uL    Eosinophils # 0.1 0.0 - 0.6 K/uL    Basophils # 0.1 0.0 - 0.2 K/uL   Comprehensive metabolic panel   Result Value Ref Range    Sodium 135 (L) 136 - 145 mmol/L    Potassium 4.3 3.5 - 5.1 mmol/L    Chloride 98 (L) 99 - 110 mmol/L    CO2 23 21 - 32 mmol/L    Anion Gap 14 3 - 16    Glucose 188 (H) 70 - 99 mg/dL    BUN 38 (H) 7 - 20 mg/dL    CREATININE 4.2 (H) 0.8 - 1.3 mg/dL    GFR Non-African American 14 (A) >60    GFR  17 (A) >60    Calcium 8.9 8.3 - 10.6 mg/dL    Total Protein 7.0 6.4 - 8.2 g/dL    Alb 3.4 3.4 - 5.0 g/dL    Albumin/Globulin Ratio 0.9 (L) 1.1 - 2.2    Total Bilirubin 0.5 0.0 - 1.0 mg/dL    Alkaline Phosphatase 103 40 - 129 U/L    ALT <5 (L) 10 - 40 U/L    AST 13 (L) 15 - 37 U/L    Globulin 3.6 g/dL   Lactate, Sepsis   Result Value Ref Range    Lactic Acid, Sepsis 1.0 0.4 - 1.9 mmol/L   Protime-INR   Result Value Ref Range    Protime 13.7 (H) 9.8 - 13.0 sec    INR 1.20 (H) 0.86 - 1.14     Xr Toe Left (min 2 Views)    Result Date: 5/5/2019  EXAMINATION: 2 XRAY VIEWS OF THE LEFT TOE 5/5/2019 8:51 pm COMPARISON: None. HISTORY: ORDERING SYSTEM PROVIDED HISTORY: osteomylitis TECHNOLOGIST PROVIDED HISTORY: Reason for exam:->osteomylitis Ordering Physician Provided Reason for Exam: Osteomyelitis. Acuity: Acute Type of Exam: Initial FINDINGS: No acute fracture or dislocation. Moderate to severe degenerative changes 1st MTP joint. 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             Shahla Villarreal MD  05/05/19 0462

## 2019-05-06 NOTE — PROGRESS NOTES
H/p dictation id [id number]. Carol Blancas Date of service 05/05/2019. Left great toe diabetic foot infection. Acute on ckd III. Uncontrolled dm II. Htn. Hypothyroidism.

## 2019-05-06 NOTE — PROGRESS NOTES
Message sent via [employer] again to Aaron Valencia MD: \"Pt. creatinine 4.2 on admission; CrCl 20ml/min; pt is not dialysis pt. Do you still want to give Vanc? Also do you want swab of wound area for culture? Thanks. \"  See orders. Will continue to monitor.

## 2019-05-06 NOTE — PROGRESS NOTES
Pt. Oriented to room and call light. Pt. Welcome packet given. Pt. Admission completed - see flowsheets. Pt. Denies other needs at this time. Call light/table in reach. Will continue to monitor.

## 2019-05-06 NOTE — ED NOTES
Report called to MILAD Pickard. All questions answered, no concerns, agreeable to transfer at this time. Pt transported in wheelchair, with belongings, by RN. No distress noted.       Dawson Corbett RN  05/06/19 1985

## 2019-05-06 NOTE — PROGRESS NOTES
Per Daisy VICENTE: D/C Vancomycin; start Zyvox PO 600mg Twice a day; do not order swab of wound for culture. See orders. Will continue to monitor. If you are a smoker, it is important for your health to stop smoking. Please be aware that second hand smoke is also harmful.

## 2019-05-07 LAB
GLUCOSE BLD-MCNC: 125 MG/DL (ref 70–99)
GLUCOSE BLD-MCNC: 73 MG/DL (ref 70–99)
GLUCOSE BLD-MCNC: 78 MG/DL (ref 70–99)
GLUCOSE BLD-MCNC: 91 MG/DL (ref 70–99)
PERFORMED ON: ABNORMAL
PERFORMED ON: NORMAL
REASON FOR REJECTION: NORMAL
REJECTED TEST: NORMAL

## 2019-05-07 PROCEDURE — 99223 1ST HOSP IP/OBS HIGH 75: CPT | Performed by: INTERNAL MEDICINE

## 2019-05-07 PROCEDURE — 87641 MR-STAPH DNA AMP PROBE: CPT

## 2019-05-07 PROCEDURE — 1200000000 HC SEMI PRIVATE

## 2019-05-07 PROCEDURE — 2580000003 HC RX 258: Performed by: INTERNAL MEDICINE

## 2019-05-07 PROCEDURE — 6360000002 HC RX W HCPCS: Performed by: INTERNAL MEDICINE

## 2019-05-07 PROCEDURE — 6370000000 HC RX 637 (ALT 250 FOR IP): Performed by: INTERNAL MEDICINE

## 2019-05-07 RX ADMIN — ATORVASTATIN CALCIUM 20 MG: 20 TABLET, FILM COATED ORAL at 20:51

## 2019-05-07 RX ADMIN — MEROPENEM 500 MG: 500 INJECTION, POWDER, FOR SOLUTION INTRAVENOUS at 08:39

## 2019-05-07 RX ADMIN — LISINOPRIL 20 MG: 20 TABLET ORAL at 08:38

## 2019-05-07 RX ADMIN — INSULIN LISPRO 12 UNITS: 100 INJECTION, SOLUTION INTRAVENOUS; SUBCUTANEOUS at 09:01

## 2019-05-07 RX ADMIN — ENOXAPARIN SODIUM 30 MG: 30 INJECTION SUBCUTANEOUS at 08:38

## 2019-05-07 RX ADMIN — LEVOTHYROXINE SODIUM 100 MCG: 100 TABLET ORAL at 04:44

## 2019-05-07 RX ADMIN — Medication 10 ML: at 08:39

## 2019-05-07 RX ADMIN — LINEZOLID 600 MG: 600 TABLET, FILM COATED ORAL at 20:51

## 2019-05-07 RX ADMIN — INSULIN LISPRO 12 UNITS: 100 INJECTION, SOLUTION INTRAVENOUS; SUBCUTANEOUS at 12:49

## 2019-05-07 RX ADMIN — LINEZOLID 600 MG: 600 TABLET, FILM COATED ORAL at 08:38

## 2019-05-07 RX ADMIN — CEFEPIME HYDROCHLORIDE 1 G: 1 INJECTION, POWDER, FOR SOLUTION INTRAMUSCULAR; INTRAVENOUS at 16:27

## 2019-05-07 RX ADMIN — Medication 10 ML: at 20:51

## 2019-05-07 RX ADMIN — INSULIN LISPRO 12 UNITS: 100 INJECTION, SOLUTION INTRAVENOUS; SUBCUTANEOUS at 17:15

## 2019-05-07 ASSESSMENT — ENCOUNTER SYMPTOMS
EYE REDNESS: 0
NAUSEA: 0
TROUBLE SWALLOWING: 0
RHINORRHEA: 0
COUGH: 0
DIARRHEA: 0
BACK PAIN: 0
WHEEZING: 0
CONSTIPATION: 0
ABDOMINAL PAIN: 0
SHORTNESS OF BREATH: 0
SORE THROAT: 0
EYE DISCHARGE: 0
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
## 2019-05-07 NOTE — PROGRESS NOTES
Nutrition Assessment    Type and Reason for Visit: Positive Nutrition Screen(Pressure ulcer/wound)    Nutrition Recommendations:   1. Encourage protein intake with each meal  2. Monitor need for oral nutrition supplement     Nutrition Assessment: Pt is adequately nourished upon admission as evidenced by pt report of good appetite and PO intake PTA as well as PO intake % of meals during admission. Pt with diabetic foot infection with possible osteomyelitis; will need I&D or possible amputation per podiatry. Discussed importance of protein for wound healing; pt currently consumes protein with every meal. Will monitor need for oral nutrition supplement for additional protein based on PO intake going forward and plan of care per podiatry      Malnutrition Assessment:  · Malnutrition Status: No malnutrition    Nutrition Risk Level:  Moderate    Nutrient Needs:  · Estimated Daily Total Kcal: 0931-0291   · Estimated Daily Protein (g):  grams   · Estimated Daily Total Fluid (ml/day): 1 mL per kcal     Nutrition Diagnosis:   · Problem: Increased nutrient needs  · Etiology: related to Increased demand for energy/nutrients     Signs and symptoms:  as evidenced by Presence of wounds    Objective Information:  · Nutrition-Focused Physical Findings: +1 LLE edema   · Wound Type: Diabetic Ulcer  · Current Nutrition Therapies:  · Oral Diet Orders: Carb Control 4 Carbs/Meal   · Oral Diet intake: %  · Oral Nutrition Supplement (ONS) Orders: None  · Anthropometric Measures:  · Ht: 5' 10\" (177.8 cm)   · Current Body Wt: 198 lb (89.8 kg)  · Ideal Body Wt: 166 lb (75.3 kg)   · BMI Classification: BMI 25.0 - 29.9 Overweight    Nutrition Interventions:   Continue current diet  Continued Inpatient Monitoring, Education Not Indicated    Nutrition Evaluation:   · Evaluation: Goals set   · Goals: Pt will continue to consume at least 75% of meals     · Monitoring: Meal Intake, Diet Tolerance, Skin Integrity, Wound Healing, Pertinent Labs      Electronically signed by Natali Gomez RD, LD on 5/7/19 at 2:15 PM    Contact Number: [phone]

## 2019-05-07 NOTE — PROGRESS NOTES
supple  Neurology: CN 2-12 grossly intact. No speech or motor deficits  Psych: Normal affect. Alert and oriented in time, place and person  Skin: Warm, dry, normal turgor  Extremity exam shows Redness of the left toe    Labs and Tests:  CBC:   Recent Labs     05/05/19 2042   WBC 11.3*   HGB 8.8*        BMP:  Recent Labs     05/05/19 2042   *   K 4.3   CL 98*   CO2 23   BUN 38*   CREATININE 4.2*   GLUCOSE 188*     Hepatic: Recent Labs     05/05/19 2042   AST 13*   ALT <5*   BILITOT 0.5   ALKPHOS 103     XR TOE LEFT (MIN 2 VIEWS)   Final Result   1st MTP osteoarthrosis. NM WBC SPECT    (Results Pending)   NM BONE SCAN 3 PHASE    (Results Pending)         Problem List  Active Problems:    Diabetic foot infection ([address]  Resolved Problems:    * No resolved hospital problems. *       Assessment & Plan:   1.  Acute left great toe/foot diabetic infection with cellulitis. oral Zyvox and IV meropenem. Podiatry Consulted. Patient will most likely need incision and drainage or possible amputation depending on MRI results. MRI can not be done. Bone scan             2.  Acute-on-chronic kidney disease stage 3. Labs in am  3.  controlled type 2 diabetes mellitus. On lantus and sliding scale. 4.  Hypertension. Controlled  5.  Hypothyroidism. stable.              Diet: DIET CARB CONTROL; Carb Control: 4 carb choices (60 gms)/meal  Code:Full Code  DVT PPX Lovenox  Disposition Amputation of the caryl on Tuesday      Rose Begum MD   5/7/2019 1:52 PM

## 2019-05-07 NOTE — PROGRESS NOTES
VSS. Pt resting in bed. Call light within reach. Fresh ice water provided. Pt denies other needs at this time. Will continue to monitor.

## 2019-05-07 NOTE — PROGRESS NOTES
Spoke with RN Valorie Green about order for SPECT/CT. RN to confirm with Dr. Lilly Arshad if he is wanting a Bone SPECT/CT or Indium-111 WBC SPECT/CT.     Ferna Mortimer, [facility], RT(MR)  [phone]

## 2019-05-08 ENCOUNTER — APPOINTMENT (OUTPATIENT)
Dept: NUCLEAR MEDICINE | Age: 64
DRG: 629 | End: 2019-05-08
Payer: MEDICARE

## 2019-05-08 ENCOUNTER — APPOINTMENT (OUTPATIENT)
Dept: ULTRASOUND IMAGING | Age: 64
DRG: 629 | End: 2019-05-08
Payer: MEDICARE

## 2019-05-08 LAB
ANION GAP SERPL CALCULATED.3IONS-SCNC: 13 MMOL/L (ref 3–16)
BASOPHILS ABSOLUTE: 0.1 K/UL (ref 0–0.2)
BASOPHILS RELATIVE PERCENT: 0.6 %
BUN BLDV-MCNC: 35 MG/DL (ref 7–20)
CALCIUM SERPL-MCNC: 8.5 MG/DL (ref 8.3–10.6)
CHLORIDE BLD-SCNC: 105 MMOL/L (ref 99–110)
CO2: 22 MMOL/L (ref 21–32)
CREAT SERPL-MCNC: 3.6 MG/DL (ref 0.8–1.3)
EOSINOPHILS ABSOLUTE: 0.3 K/UL (ref 0–0.6)
EOSINOPHILS RELATIVE PERCENT: 3.8 %
GFR AFRICAN AMERICAN: 21
GFR NON-AFRICAN AMERICAN: 17
GLUCOSE BLD-MCNC: 106 MG/DL (ref 70–99)
GLUCOSE BLD-MCNC: 109 MG/DL (ref 70–99)
GLUCOSE BLD-MCNC: 116 MG/DL (ref 70–99)
GLUCOSE BLD-MCNC: 52 MG/DL (ref 70–99)
GLUCOSE BLD-MCNC: 57 MG/DL (ref 70–99)
GLUCOSE BLD-MCNC: 57 MG/DL (ref 70–99)
GLUCOSE BLD-MCNC: 58 MG/DL (ref 70–99)
GLUCOSE BLD-MCNC: 75 MG/DL (ref 70–99)
GLUCOSE BLD-MCNC: 83 MG/DL (ref 70–99)
GLUCOSE BLD-MCNC: 90 MG/DL (ref 70–99)
GLUCOSE BLD-MCNC: 91 MG/DL (ref 70–99)
HCT VFR BLD CALC: 25.1 % (ref 40.5–52.5)
HEMOGLOBIN: 8.1 G/DL (ref 13.5–17.5)
LYMPHOCYTES ABSOLUTE: 1.9 K/UL (ref 1–5.1)
LYMPHOCYTES RELATIVE PERCENT: 21 %
MCH RBC QN AUTO: 29.6 PG (ref 26–34)
MCHC RBC AUTO-ENTMCNC: 32.1 G/DL (ref 31–36)
MCV RBC AUTO: 92.2 FL (ref 80–100)
MONOCYTES ABSOLUTE: 0.7 K/UL (ref 0–1.3)
MONOCYTES RELATIVE PERCENT: 7.6 %
MRSA SCREEN RT-PCR: NORMAL
NEUTROPHILS ABSOLUTE: 6 K/UL (ref 1.7–7.7)
NEUTROPHILS RELATIVE PERCENT: 67 %
PDW BLD-RTO: 13.5 % (ref 12.4–15.4)
PERFORMED ON: ABNORMAL
PERFORMED ON: NORMAL
PLATELET # BLD: 303 K/UL (ref 135–450)
PMV BLD AUTO: 7 FL (ref 5–10.5)
POTASSIUM SERPL-SCNC: 3.7 MMOL/L (ref 3.5–5.1)
RBC # BLD: 2.72 M/UL (ref 4.2–5.9)
SODIUM BLD-SCNC: 140 MMOL/L (ref 136–145)
WBC # BLD: 8.9 K/UL (ref 4–11)

## 2019-05-08 PROCEDURE — 6370000000 HC RX 637 (ALT 250 FOR IP): Performed by: INTERNAL MEDICINE

## 2019-05-08 PROCEDURE — 85025 COMPLETE CBC W/AUTO DIFF WBC: CPT

## 2019-05-08 PROCEDURE — 6360000002 HC RX W HCPCS: Performed by: INTERNAL MEDICINE

## 2019-05-08 PROCEDURE — 2580000003 HC RX 258: Performed by: INTERNAL MEDICINE

## 2019-05-08 PROCEDURE — 1200000000 HC SEMI PRIVATE

## 2019-05-08 PROCEDURE — A9503 TC99M MEDRONATE: HCPCS | Performed by: PODIATRIST

## 2019-05-08 PROCEDURE — 78320 NM BONE SPECT: CPT

## 2019-05-08 PROCEDURE — 36415 COLL VENOUS BLD VENIPUNCTURE: CPT

## 2019-05-08 PROCEDURE — 80048 BASIC METABOLIC PNL TOTAL CA: CPT

## 2019-05-08 PROCEDURE — 78805 NM INFLAMMATORY WBC LIMITED W INDIUM 111: CPT

## 2019-05-08 PROCEDURE — 3430000000 HC RX DIAGNOSTIC RADIOPHARMACEUTICAL: Performed by: PODIATRIST

## 2019-05-08 PROCEDURE — 99233 SBSQ HOSP IP/OBS HIGH 50: CPT | Performed by: INTERNAL MEDICINE

## 2019-05-08 PROCEDURE — 78315 BONE IMAGING 3 PHASE: CPT

## 2019-05-08 PROCEDURE — 2580000003 HC RX 258: Performed by: PODIATRIST

## 2019-05-08 PROCEDURE — A9570 INDIUM IN-111 AUTO WBC: HCPCS | Performed by: PODIATRIST

## 2019-05-08 RX ORDER — SODIUM CHLORIDE 0.9 % (FLUSH) 0.9 %
10 SYRINGE (ML) INJECTION PRN
Status: DISCONTINUED | OUTPATIENT
Start: 2019-05-08 | End: 2019-05-14 | Stop reason: HOSPADM

## 2019-05-08 RX ORDER — AMLODIPINE BESYLATE 5 MG/1
10 TABLET ORAL DAILY
Status: DISCONTINUED | OUTPATIENT
Start: 2019-05-08 | End: 2019-05-14 | Stop reason: HOSPADM

## 2019-05-08 RX ORDER — TC 99M MEDRONATE 20 MG/10ML
21.1 INJECTION, POWDER, LYOPHILIZED, FOR SOLUTION INTRAVENOUS
Status: COMPLETED | OUTPATIENT
Start: 2019-05-08 | End: 2019-05-08

## 2019-05-08 RX ADMIN — Medication 10 ML: at 12:53

## 2019-05-08 RX ADMIN — ATORVASTATIN CALCIUM 20 MG: 20 TABLET, FILM COATED ORAL at 21:27

## 2019-05-08 RX ADMIN — Medication 21.1 MILLICURIE: at 12:52

## 2019-05-08 RX ADMIN — LEVOTHYROXINE SODIUM 100 MCG: 100 TABLET ORAL at 05:57

## 2019-05-08 RX ADMIN — Medication 476.3 MICRO CURIE: at 12:54

## 2019-05-08 RX ADMIN — Medication 10 ML: at 09:47

## 2019-05-08 RX ADMIN — LINEZOLID 600 MG: 600 TABLET, FILM COATED ORAL at 09:31

## 2019-05-08 RX ADMIN — LINEZOLID 600 MG: 600 TABLET, FILM COATED ORAL at 21:27

## 2019-05-08 RX ADMIN — LISINOPRIL 20 MG: 20 TABLET ORAL at 09:31

## 2019-05-08 RX ADMIN — Medication 10 ML: at 12:54

## 2019-05-08 RX ADMIN — DEXTROSE MONOHYDRATE 100 ML/HR: 50 INJECTION, SOLUTION INTRAVENOUS at 22:07

## 2019-05-08 RX ADMIN — DEXTROSE 15 G: 15 GEL ORAL at 22:00

## 2019-05-08 RX ADMIN — CEFEPIME HYDROCHLORIDE 2 G: 2 INJECTION, POWDER, FOR SOLUTION INTRAVENOUS at 14:40

## 2019-05-08 RX ADMIN — Medication 10 ML: at 21:28

## 2019-05-08 RX ADMIN — ENOXAPARIN SODIUM 30 MG: 30 INJECTION SUBCUTANEOUS at 09:31

## 2019-05-08 RX ADMIN — INSULIN LISPRO 12 UNITS: 100 INJECTION, SOLUTION INTRAVENOUS; SUBCUTANEOUS at 09:31

## 2019-05-08 RX ADMIN — AMLODIPINE BESYLATE 10 MG: 5 TABLET ORAL at 13:21

## 2019-05-08 RX ADMIN — INSULIN LISPRO 12 UNITS: 100 INJECTION, SOLUTION INTRAVENOUS; SUBCUTANEOUS at 13:22

## 2019-05-08 RX ADMIN — INSULIN LISPRO 12 UNITS: 100 INJECTION, SOLUTION INTRAVENOUS; SUBCUTANEOUS at 18:37

## 2019-05-08 ASSESSMENT — ENCOUNTER SYMPTOMS
COUGH: 0
ABDOMINAL PAIN: 0
WHEEZING: 0
CHEST TIGHTNESS: 0
BACK PAIN: 0
VOMITING: 0
NAUSEA: 0
EYE PAIN: 0
CONSTIPATION: 0
DIARRHEA: 0
TROUBLE SWALLOWING: 0
EYE REDNESS: 0
SHORTNESS OF BREATH: 0
RHINORRHEA: 0
SINUS PAIN: 0
EYE DISCHARGE: 0
COLOR CHANGE: 1
SORE THROAT: 0
ABDOMINAL DISTENTION: 0

## 2019-05-08 ASSESSMENT — PAIN SCALES - GENERAL: PAINLEVEL_OUTOF10: 0
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
## 2019-05-08 NOTE — PROGRESS NOTES
Shift assessment completed. VS taken, BP elevated. Scheduled BP medication given. Denies pain or needs. Will continue to monitor.

## 2019-05-08 NOTE — ED PROVIDER NOTES
[address]        Pt Name: Maite Johnson  MRN: [id number]  [place] 1955  Date of evaluation: 5/5/2019  Provider: TEE De La Rosa CNP  PCP: [id number] [place], DO    This patient was seen and evaluated by the attending physician Mohit Pruett, [address] [facility]       Chief Complaint   Patient presents with    Foot Injury     Pt from home, states he stepped on a nail x1 week ago, increasingly painful, drainage, hot, swollen. Pt is diabetic. PMS. HISTORY OF PRESENT ILLNESS   (Location/Symptom, Timing/Onset, Context/Setting, Quality, Duration, Modifying Factors, Severity)  Note limiting factors. Maite Johnson is a 75 y.o. male presents to the emergency department with discoloration, pain, swelling to the left great toe. Patient stepped on a nail about a week ago and has been walking on this nail in his shoe unknowingly. He is a diabetic with limited sensory. He does have an ulceration to the bottom of the foot and market swelling with erythema and lymphangitic streaking present. Denies fever but states that he has had body aches and chills. He has history of right great toe amputation from diabetic complications. Denies any headache, lightheadedness, dizziness, visual disturbances. No chest pain or pressure. No neck or back pain. No shortness of breath, cough, or congestion. No abdominal pain, nausea, vomiting, diarrhea, constipation, or dysuria. No rash. Nursing Notes were all reviewed and agreed with or any disagreements were addressed  in the HPI. REVIEW OF SYSTEMS    (2-9 systems for level 4, 10 or more for level 5)     Review of Systems   Constitutional: Negative for activity change, chills and fever. Respiratory: Negative for chest tightness and shortness of breath. Cardiovascular: Negative for chest pain. Gastrointestinal: Negative for abdominal pain, diarrhea, nausea and vomiting.    Genitourinary: MINI PEN NEEDLES 31G X 5 MM MISC USE FOUR TIMES DAILY AS DIRECTED. Qty: 100 each, Refills: 2      insulin glargine (LANTUS) 100 UNIT/ML injection pen Inject 30 Units into the skin nightly  Qty: 5 Pen, Refills: 1    Comments: Will  Need a follow up appointment for further refills      folic acid-pyridoxine-cyancobalamin (FOLBIC) 2.5-25-2 MG TABS Take 1 tablet by mouth 2 times daily  Qty: 60 tablet, Refills: 5      !! ACCU-CHEK VEE PLUS strip 1 each by In Vitro route 5 times daily As needed. Qty: 450 each, Refills: 3      !! ACCU-CHEK FASTCLIX LANCETS MISC Test blood sugars 4 times daily  Qty: 200 each, Refills: 3      !! ONE TOUCH LANCETS MISC 1 each by Does not apply route 5 times daily. Qty: 200 each, Refills: 5      !! glucose blood VI test strips (ONE TOUCH ULTRA TEST) strip 1 each by Does not apply route 5 times daily. 4 times daily. Qty: 150 each, Refills: 11       !! - Potential duplicate medications found. Please discuss with provider. ALLERGIES     Patient has no known allergies.     FAMILYHISTORY       Family History   Problem Relation Age of Onset    Cancer Mother     Diabetes Sister     Cancer Brother     Diabetes Brother     Diabetes Brother     Heart Disease Neg Hx     Stroke Neg Hx     Thyroid Disease Neg Hx           SOCIAL HISTORY       Social History     Socioeconomic History    Marital status:      Spouse name: Not on file    Number of children: Not on file    Years of education: Not on file    Highest education level: Not on file   Occupational History    Occupation: retired, was a    Social Needs    Financial resource strain: Not on file    Food insecurity:     Worry: Not on file     Inability: Not on file   [employer] needs:     Medical: Not on file     Non-medical: Not on file   Tobacco Use    Smoking status: Never Smoker    Smokeless tobacco: Never Used   Substance and Sexual Activity    Alcohol use: No    Drug use: No    Sexual activity: Not on file   Lifestyle    Physical activity:     Days per week: Not on file     Minutes per session: Not on file    Stress: Not on file   Relationships    Social connections:     Talks on phone: Not on file     Gets together: Not on file     Attends Christianity service: Not on file     Active member of club or organization: Not on file     Attends meetings of clubs or organizations: Not on file     Relationship status: Not on file    Intimate partner violence:     Fear of current or ex partner: Not on file     Emotionally abused: Not on file     Physically abused: Not on file     Forced sexual activity: Not on file   Other Topics Concern    Not on file   Social History Narrative    Not on file       SCREENINGS    Sebec Coma Scale  Eye Opening: Spontaneous  Best Verbal Response: Oriented  Best Motor Response: Obeys commands  Angelo Coma Scale Score: 15        PHYSICAL EXAM    (up to 7 for level 4, 8 or more for level 5)     ED Triage Vitals [05/05/19 2016]   BP Temp Temp Source Pulse Resp SpO2 Height Weight   (!) 158/94 98.5 °F (36.9 °C) Oral 101 20 98 % 5' 10\" (1.778 m) 193 lb (87.5 kg)       Physical Exam   Constitutional: He is oriented to person, place, and time. He appears well-developed and well-nourished. HENT:   Head: Normocephalic and atraumatic. Right Ear: External ear normal.   Left Ear: External ear normal.   Eyes: Right eye exhibits no discharge. Left eye exhibits no discharge. Neck: Normal range of motion. Neck supple. No JVD present. Cardiovascular: Normal rate and regular rhythm. Exam reveals no friction rub. No murmur heard. Pulmonary/Chest: Effort normal and breath sounds normal. No respiratory distress. Abdominal: Soft. He exhibits no mass. There is no tenderness. Musculoskeletal: Normal range of motion. Feet:   Left Foot:   Skin Integrity: Positive for ulcer, skin breakdown and erythema. Neurological: He is alert and oriented to person, place, and time.    Skin: Narrative:     Performed at:  [facility] [address] [facility], [address]   Phone [phone]    Narrative:     Cristian Tracey  SFF5T tel. [phone],  Rejected Test Name/Called to: Sherrie Hendrix, 05/07/2019 17:28, by Josie Gutierres  Performed at:  [facility] [address], [place], [address]   Phone [phone]   POCT GLUCOSE    Narrative:     Performed at:  [facility] [address], [place], [address] [employer]   Phone [phone]   POCT GLUCOSE    Narrative:     Performed at:  [facility] [address], [place], [address]   Phone [phone]   POCT GLUCOSE    Narrative:     Performed at:  [facility] [address], [place], [facility] [address]   Phone [phone]   POCT GLUCOSE    Narrative:     Performed at:  [facility] [address], [place], [address]   Phone [phone]   POCT GLUCOSE   POCT GLUCOSE   POCT GLUCOSE   POCT GLUCOSE   POCT GLUCOSE   POCT GLUCOSE   POCT GLUCOSE   POCT GLUCOSE   POCT GLUCOSE   POCT GLUCOSE       All other labs were within normal range or not returned as of this dictation. EKG: All EKG's are interpreted by the Emergency Department Physician who either signs orCo-signs this chart in the absence of a cardiologist.  Please see their note for interpretation of EKG. RADIOLOGY:   Non-plain film images such as CT, Ultrasound and MRI are read by the radiologist. 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          NM WBC SPECT    (Results Pending)   NM BONE SCAN 3 PHASE    (Results Pending)   NM INFLAMMATORY WBC LIMITED W INDIUM 111    (Results Pending)     No results found.        PROCEDURES   Unless otherwise noted below, none     Procedures    CRITICAL CARE TIME   N/A    CONSULTS:  IP CONSULT TO HOSPITALIST  IP CONSULT TO PODIATRY  IP CONSULT TO SOCIAL WORK  IP CONSULT TO INFECTIOUS DISEASES      EMERGENCY DEPARTMENT COURSE and DIFFERENTIALDIAGNOSIS/MDM:   Vitals:    Vitals:    05/07/19 2042 05/07/19 2351 05/08/19 0556 05/08/19 0935   BP: (!) 185/94 (!) 186/92 139/77 (!) 177/92   Pulse: 92 98 81 89   Resp: 16 16 16 16   Temp: 98.8 °F (37.1 °C) 98.5 °F (36.9 °C) 98.5 °F (36.9 °C) 98.6 °F (37 °C)   TempSrc: Oral Oral Oral Oral   SpO2: 96% 98% 95% 98%   Weight:       Height:           Patient was given thefollowing medications:  Medications   atorvastatin (LIPITOR) tablet 20 mg (20 mg Oral Given 5/7/19 2051)   insulin lispro (HUMALOG) injection pen 12 Units (12 Units Subcutaneous Given 5/8/19 0931)   levothyroxine (SYNTHROID) tablet 100 mcg (100 mcg Oral Given 5/8/19 0557)   lisinopril (PRINIVIL;ZESTRIL) tablet 20 mg (20 mg Oral Given 5/8/19 0931)   sodium chloride flush 0.9 % injection 10 mL (10 mLs Intravenous Given 5/8/19 0947)   sodium chloride flush 0.9 % injection 10 mL (has no administration in time range)   ondansetron (ZOFRAN) injection 4 mg (has no administration in time range)   enoxaparin (LOVENOX) injection 30 mg (30 mg Subcutaneous Given 5/8/19 0931)   linezolid (ZYVOX) tablet 600 mg (600 mg Oral Given 5/8/19 0931)   glucose (GLUTOSE) 40 % oral gel 15 g (has no administration in time range)   dextrose 50 % solution 12.5 g (has no administration in time range)   glucagon (rDNA) injection 1 mg (has no administration in time range)   dextrose 5 % solution (has no administration in time range)   insulin glargine (LANTUS) injection pen 13 Units (13 Units Subcutaneous Given 5/7/19 2051)   cefepime (MAXIPIME) 1 g IVPB minibag (0 g Intravenous Stopped 5/7/19 1656)   piperacillin-tazobactam (ZOSYN) 4.5 g in dextrose 100 mL IVPB (premix) (0 g Intravenous Stopped 5/5/19 2119)   0.9 % sodium chloride bolus (0 mLs Intravenous Stopped 5/5/19 2149)   Tetanus-Diphth-Acell Pertussis (BOOSTRIX) injection 0.5 mL (0.5 mLs Intramuscular Given 5/5/19 2229)       Briefly, this is a 75year old male that presents to the emergency department with discoloration, pain, swelling to the left great toe. Patient stepped on a nail about a week ago and has been walking on this nail in his shoe unknowingly. He is a diabetic with limited sensory. He does have an ulceration to the bottom of the foot and market swelling with erythema and lymphangitic streaking present. Denies fever but states that he has had body aches and chills. He has history of right great toe amputation from diabetic complications. CBC shows a leukocytosis white count of 11,300. Hemoglobin 8.8. Platelets 463. CMP shows BUN of 38, creatinine 4.2 and GFR 14. ESR >130. CRP 96.6. INR 1.20. 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 Patient was given fluids and zosyn in the ER. Plan to admit to medicine for management once everything is resulted. Patient is agreeable with plan of care. FINAL IMPRESSION      1.  Cellulitis of foot          DISPOSITION/PLAN   DISPOSITION Admitted 05/05/2019 11:52:10 PM      PATIENT REFERREDTO:  [id number],   [address]  [phone]            DISCHARGE MEDICATIONS:  Current Discharge Medication List          DISCONTINUED MEDICATIONS:  Current Discharge Medication List                 (Please note that portions ofthis note were completed with a voice recognition program.  Efforts were made to edit the dictations but occasionally words are mis-transcribed.)    TEE Pollard CNP (electronically signed)           TEE Pollard CNP  05/08/19 8700

## 2019-05-08 NOTE — CONSULTS
Full consult done. Note labeled as a progress note    . See progress note from today for the full ID consult note.       Whitley Morrison MD, MPH  5/7/2019, 9:59 PM  [facility] Infectious Disease   Office: [phone]  Fax: [phone]  Tuesday AM clinic:   [address] [address]  Thursday AM clinic: [address] [facility]

## 2019-05-08 NOTE — PROGRESS NOTES
[facility] PROGRESS NOTE    5/8/2019 12:52 PM        Name: Aurea Garcia . Admitted: 5/5/2019  Primary Care Provider: Isacc Guadalupe DO (Tel: [phone])      Subjective: Continues to have pain in the Left Great Toe. Scheduled for WBC scan today. Reviewed interval ancillary notes    Current Medications    amLODIPine (NORVASC) tablet 10 mg Daily   cefepime (MAXIPIME) 2 g IVPB minibag Q24H   technetium medronate (Tc-MDP) injection 22.7 millicurie ONCE PRN   sodium chloride flush 0.9 % injection 10 mL PRN   insulin glargine (LANTUS) injection pen 13 Units Nightly   atorvastatin (LIPITOR) tablet 20 mg Nightly   insulin lispro (HUMALOG) injection pen 12 Units TID WC   levothyroxine (SYNTHROID) tablet 100 mcg Daily   sodium chloride flush 0.9 % injection 10 mL 2 times per day   sodium chloride flush 0.9 % injection 10 mL PRN   ondansetron (ZOFRAN) injection 4 mg Q6H PRN   enoxaparin (LOVENOX) injection 30 mg Daily   linezolid (ZYVOX) tablet 600 mg 2 times per day   glucose (GLUTOSE) 40 % oral gel 15 g PRN   dextrose 50 % solution 12.5 g PRN   glucagon (rDNA) injection 1 mg PRN   dextrose 5 % solution PRN       Objective:  BP (!) 177/92   Pulse 89   Temp 98.6 °F (37 °C) (Oral)   Resp 16   Ht 5' 10\" (1.778 m)   Wt 198 lb 3.2 oz (89.9 kg)   SpO2 98%   BMI 28.44 kg/m²     Intake/Output Summary (Last 24 hours) at 5/8/2019 1252  Last data filed at 5/8/2019 0939  Gross per 24 hour   Intake 480 ml   Output --   Net 480 ml    Wt Readings from Last 3 Encounters:   05/06/19 198 lb 3.2 oz (89.9 kg)   02/15/16 224 lb 9.6 oz (101.9 kg)   01/15/16 221 lb (100.2 kg)       General appearance:  Appears comfortable  Eyes: Sclera clear. Pupils equal.  ENT: Moist oral mucosa. Trachea midline, no adenopathy. Cardiovascular: Regular rhythm, normal S1, S2. No murmur.  No edema in lower extremities  Respiratory: Not using accessory muscles. Good inspiratory effort. Clear to auscultation bilaterally, no wheeze or crackles. GI: Abdomen soft, no tenderness, not distended, normal bowel sounds  Musculoskeletal: No cyanosis in digits, neck supple  Neurology: CN 2-12 grossly intact. No speech or motor deficits  Psych: Normal affect. 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 NM WBC SPECT    (Results Pending)   NM BONE SCAN 3 PHASE    (Results Pending)   NM INFLAMMATORY WBC LIMITED W INDIUM 111    (Results Pending)       Problem List  Active Problems:    Type 2 diabetes mellitus with left diabetic foot infection ([facility] [address]    Diabetic foot infection (HCC)    Cellulitis of foot    Acute hematogenous osteomyelitis of left foot (HCC)    Elevated sed rate    Elevated C-reactive protein (CRP)    Acute kidney injury superimposed on chronic kidney disease (HCC)    Overweight    Diabetic polyneuropathy associated with type 2 diabetes mellitus (HCC)    History of methicillin resistant staphylococcus aureus (MRSA)    Essential hypertension  Resolved Problems:    * No resolved hospital problems. *       Assessment & Plan:   .  Acute left great toe/foot diabetic infection with cellulitis. oral Zyvox and IV meropenem. Podiatry Consulted. Patient will most likely need incision and drainage or possible amputation on Friday MRI can not be done. WBC Bone scan today ,.appreciate ID evlautaion . Imipenem stopped and Cefepeme started.  On Zyvox              2.  Acute-on-chronic kidney disease stage 3. Labs in am . Creatinine trending down. Creat 3.5. Baseline Creatinine Not known. [facility] kidney last year was Normal. Will Repeat  3.  controlled type 2 diabetes mellitus. On lantus and sliding scale.   4.  Hypertension. Controlled  5.  Hypothyroidism. stable. Diet: DIET CARB CONTROL; Carb Control: 4 carb choices (60 gms)/meal  Code:Full Code  DVT PPX Lovenox  Disposition Pending podiatry evaluation.        Sally Kendall MD   5/8/2019 12:52 PM
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
## 2019-05-09 ENCOUNTER — APPOINTMENT (OUTPATIENT)
Dept: NUCLEAR MEDICINE | Age: 64
DRG: 629 | End: 2019-05-09
Payer: MEDICARE

## 2019-05-09 ENCOUNTER — APPOINTMENT (OUTPATIENT)
Dept: ULTRASOUND IMAGING | Age: 64
DRG: 629 | End: 2019-05-09
Payer: MEDICARE

## 2019-05-09 LAB
A/G RATIO: 0.7 (ref 1.1–2.2)
ALBUMIN SERPL-MCNC: 2.9 G/DL (ref 3.4–5)
ALP BLD-CCNC: 87 U/L (ref 40–129)
ALT SERPL-CCNC: 6 U/L (ref 10–40)
ANION GAP SERPL CALCULATED.3IONS-SCNC: 11 MMOL/L (ref 3–16)
ANION GAP SERPL CALCULATED.3IONS-SCNC: 12 MMOL/L (ref 3–16)
AST SERPL-CCNC: 12 U/L (ref 15–37)
BASOPHILS ABSOLUTE: 0 K/UL (ref 0–0.2)
BASOPHILS RELATIVE PERCENT: 0.5 %
BILIRUB SERPL-MCNC: 0.5 MG/DL (ref 0–1)
BILIRUBIN URINE: NEGATIVE
BLOOD, URINE: ABNORMAL
BUN BLDV-MCNC: 33 MG/DL (ref 7–20)
BUN BLDV-MCNC: 34 MG/DL (ref 7–20)
CALCIUM SERPL-MCNC: 8.8 MG/DL (ref 8.3–10.6)
CALCIUM SERPL-MCNC: 9.1 MG/DL (ref 8.3–10.6)
CHLORIDE BLD-SCNC: 101 MMOL/L (ref 99–110)
CHLORIDE BLD-SCNC: 103 MMOL/L (ref 99–110)
CLARITY: CLEAR
CO2: 19 MMOL/L (ref 21–32)
CO2: 24 MMOL/L (ref 21–32)
COLOR: YELLOW
CREAT SERPL-MCNC: 3.1 MG/DL (ref 0.8–1.3)
CREAT SERPL-MCNC: 3.4 MG/DL (ref 0.8–1.3)
CREATININE URINE: 100 MG/DL (ref 39–259)
EOSINOPHILS ABSOLUTE: 0.4 K/UL (ref 0–0.6)
EOSINOPHILS RELATIVE PERCENT: 5.1 %
EPITHELIAL CELLS, UA: 0 /HPF (ref 0–5)
GFR AFRICAN AMERICAN: 22
GFR AFRICAN AMERICAN: 24
GFR NON-AFRICAN AMERICAN: 18
GFR NON-AFRICAN AMERICAN: 20
GLOBULIN: 4 G/DL
GLUCOSE BLD-MCNC: 109 MG/DL (ref 70–99)
GLUCOSE BLD-MCNC: 112 MG/DL (ref 70–99)
GLUCOSE BLD-MCNC: 163 MG/DL (ref 70–99)
GLUCOSE BLD-MCNC: 175 MG/DL (ref 70–99)
GLUCOSE BLD-MCNC: 183 MG/DL (ref 70–99)
GLUCOSE BLD-MCNC: 60 MG/DL (ref 70–99)
GLUCOSE BLD-MCNC: 70 MG/DL (ref 70–99)
GLUCOSE URINE: 100 MG/DL
HCT VFR BLD CALC: 25.2 % (ref 40.5–52.5)
HEMOGLOBIN: 8.1 G/DL (ref 13.5–17.5)
HYALINE CASTS: 0 /LPF (ref 0–8)
KETONES, URINE: NEGATIVE MG/DL
LEUKOCYTE ESTERASE, URINE: NEGATIVE
LYMPHOCYTES ABSOLUTE: 1.6 K/UL (ref 1–5.1)
LYMPHOCYTES RELATIVE PERCENT: 19.9 %
MCH RBC QN AUTO: 29.4 PG (ref 26–34)
MCHC RBC AUTO-ENTMCNC: 32 G/DL (ref 31–36)
MCV RBC AUTO: 91.9 FL (ref 80–100)
MICROSCOPIC EXAMINATION: YES
MONOCYTES ABSOLUTE: 0.6 K/UL (ref 0–1.3)
MONOCYTES RELATIVE PERCENT: 7.2 %
NEUTROPHILS ABSOLUTE: 5.5 K/UL (ref 1.7–7.7)
NEUTROPHILS RELATIVE PERCENT: 67.3 %
NITRITE, URINE: NEGATIVE
PDW BLD-RTO: 14.1 % (ref 12.4–15.4)
PERFORMED ON: ABNORMAL
PERFORMED ON: NORMAL
PH UA: 6 (ref 5–8)
PLATELET # BLD: 320 K/UL (ref 135–450)
PMV BLD AUTO: 6.7 FL (ref 5–10.5)
POTASSIUM SERPL-SCNC: 3.9 MMOL/L (ref 3.5–5.1)
POTASSIUM SERPL-SCNC: 4.1 MMOL/L (ref 3.5–5.1)
PROTEIN PROTEIN: 258 MG/DL
PROTEIN UA: >=300 MG/DL
RBC # BLD: 2.74 M/UL (ref 4.2–5.9)
RBC UA: 2 /HPF (ref 0–4)
SODIUM BLD-SCNC: 134 MMOL/L (ref 136–145)
SODIUM BLD-SCNC: 136 MMOL/L (ref 136–145)
SPECIFIC GRAVITY UA: 1.01 (ref 1–1.03)
TOTAL PROTEIN: 6.9 G/DL (ref 6.4–8.2)
URINE TYPE: ABNORMAL
UROBILINOGEN, URINE: 1 E.U./DL
WBC # BLD: 8.2 K/UL (ref 4–11)
WBC UA: 1 /HPF (ref 0–5)

## 2019-05-09 PROCEDURE — 2580000003 HC RX 258: Performed by: INTERNAL MEDICINE

## 2019-05-09 PROCEDURE — 80053 COMPREHEN METABOLIC PANEL: CPT

## 2019-05-09 PROCEDURE — 99232 SBSQ HOSP IP/OBS MODERATE 35: CPT | Performed by: INTERNAL MEDICINE

## 2019-05-09 PROCEDURE — 6370000000 HC RX 637 (ALT 250 FOR IP): Performed by: INTERNAL MEDICINE

## 2019-05-09 PROCEDURE — 81001 URINALYSIS AUTO W/SCOPE: CPT

## 2019-05-09 PROCEDURE — 6360000002 HC RX W HCPCS: Performed by: INTERNAL MEDICINE

## 2019-05-09 PROCEDURE — 85025 COMPLETE CBC W/AUTO DIFF WBC: CPT

## 2019-05-09 PROCEDURE — 82570 ASSAY OF URINE CREATININE: CPT

## 2019-05-09 PROCEDURE — 76770 US EXAM ABDO BACK WALL COMP: CPT

## 2019-05-09 PROCEDURE — 1200000000 HC SEMI PRIVATE

## 2019-05-09 PROCEDURE — 84156 ASSAY OF PROTEIN URINE: CPT

## 2019-05-09 PROCEDURE — 78807: CPT

## 2019-05-09 PROCEDURE — 36415 COLL VENOUS BLD VENIPUNCTURE: CPT

## 2019-05-09 RX ADMIN — Medication 10 ML: at 20:57

## 2019-05-09 RX ADMIN — ENOXAPARIN SODIUM 30 MG: 30 INJECTION SUBCUTANEOUS at 08:26

## 2019-05-09 RX ADMIN — INSULIN LISPRO 12 UNITS: 100 INJECTION, SOLUTION INTRAVENOUS; SUBCUTANEOUS at 09:20

## 2019-05-09 RX ADMIN — LEVOTHYROXINE SODIUM 100 MCG: 100 TABLET ORAL at 06:04

## 2019-05-09 RX ADMIN — AMLODIPINE BESYLATE 10 MG: 5 TABLET ORAL at 08:26

## 2019-05-09 RX ADMIN — Medication 10 ML: at 08:27

## 2019-05-09 RX ADMIN — LINEZOLID 600 MG: 600 TABLET, FILM COATED ORAL at 09:03

## 2019-05-09 RX ADMIN — CEFEPIME HYDROCHLORIDE 2 G: 2 INJECTION, POWDER, FOR SOLUTION INTRAVENOUS at 14:32

## 2019-05-09 RX ADMIN — ATORVASTATIN CALCIUM 20 MG: 20 TABLET, FILM COATED ORAL at 20:57

## 2019-05-09 RX ADMIN — INSULIN LISPRO 12 UNITS: 100 INJECTION, SOLUTION INTRAVENOUS; SUBCUTANEOUS at 12:41

## 2019-05-09 RX ADMIN — LINEZOLID 600 MG: 600 TABLET, FILM COATED ORAL at 20:57

## 2019-05-09 ASSESSMENT — ENCOUNTER SYMPTOMS
TROUBLE SWALLOWING: 0
EYE REDNESS: 0
WHEEZING: 0
EYE DISCHARGE: 0
CONSTIPATION: 0
SHORTNESS OF BREATH: 0
ABDOMINAL PAIN: 0
DIARRHEA: 0
SORE THROAT: 0
NAUSEA: 0
COUGH: 0
RHINORRHEA: 0
BACK PAIN: 0
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
## 2019-05-09 NOTE — PROGRESS NOTES
1630  Last data filed at 5/8/2019 2121  Gross per 24 hour   Intake --   Output 380 ml   Net -380 ml       LOWER EXTREMITY:  Vascular: Vascular status Impaired  palpable pedal pulses, right DP2/4 and PT2/4, left DP2/4 and PT2/4. CFT 3 seconds digits 1 to 5 bilateral.  Hair growthAbsent  both lower extremities and feet. Skin temperature is warm to warm from pretibial area to distal digits bilateral.  Exam is negative for rubor, pallor, cyanosis or signs of acute vascular compromise bilaterally. Exam is positive for edema bilateral lower extremity. Varicosities Absent  bilateral lower extremity. Neuro: Neurologic status diminished bilateral with epicritic Absent  , proprioceptive Absent , vibratory sensation Absent  and protopathic Absent . DTRs Present bilateral Achilles. There were no reproducible neuritic symptoms on exam bilateral feet/ankles. Derm: Ulceration to left great toe sub-first MTPJ with white purulence. Wound probes 1.5 cm. Wound measures 0.4 cm x 0.3 cm  Ecchymosis Absent  bilateral feet/foot. Musculoskeletal: No pain with palpation or probing of the wound. 5/5 muscle strength in/eversion and dorsi/plantarflexion bilateral feet. No gross instability noted. Assessment:     Active Problems:    Poorly controlled type 2 diabetes mellitus (HCC)    Diabetic foot infection (HCC)    Cellulitis of foot    Acute hematogenous osteomyelitis of left foot (HCC)    Elevated sed rate    Elevated C-reactive protein (CRP)    Acute kidney injury superimposed on chronic kidney disease (HCC)    Overweight    Diabetic polyneuropathy associated with type 2 diabetes mellitus (HCC)    History of methicillin resistant staphylococcus aureus (MRSA)    Essential hypertension  Resolved Problems:    * No resolved hospital problems.  *      Plan:   -  Dressing : May leave open  -  WBAT  -  Continue antibiotics Per infectious disease  - SPEC  indium scan  positive for osteomyelitis plan for surgical I&D tomorrow 12:30 p.m., discussed long-term osteomyelitis treatment including IV antibiotics and hyperbaric oxygen treatment. -  He will need I&D later this week. - Discussed with pt that diabetic foot infections are a staged process of surgical procedures that allows the tissues to demarcate and then assess the extent of healthy tissues vs. Devitalized tissues. Explained that the Proximal phalanx of the halluxAnd soft tissues are suggestive of infectious involvement via X-ray and SPECT CT and that its not certain until surgical intervention commences to truly assess the extent of the infectious process. Explained to the patient that it is the goal of His primary service is to save and salvage as much of His foot as possible while still providing a functional/stable pedal structure post surgical.  - All potential risks, benefits, and complications were discussed with the patient prior to the scheduling of surgery. No guarantees or promises where made to what the outcomes will be. The patient wished to proceed with surgery, and informed written consent was obtained.      Disposition plan for I&D tomorrow, nothing by mouth after midnight    Katarina Hardin DPM  Cell:[phone]

## 2019-05-09 NOTE — PROGRESS NOTES
[facility] PROGRESS NOTE    5/9/2019 1:43 PM        Name: Gennaro Rae . Admitted: 5/5/2019  Primary Care Provider: Beto Keenan DO (Tel: [phone])      Subjective:  No pain in the toes. Had wbc scan today. No Other Complaint. Tentative surgery Today      Reviewed interval ancillary notes    Current Medications    amLODIPine (NORVASC) tablet 10 mg Daily   cefepime (MAXIPIME) 2 g IVPB minibag Q24H   sodium chloride flush 0.9 % injection 10 mL PRN   sodium chloride flush 0.9 % injection 10 mL PRN   insulin glargine (LANTUS) injection pen 13 Units Nightly   atorvastatin (LIPITOR) tablet 20 mg Nightly   insulin lispro (HUMALOG) injection pen 12 Units TID WC   levothyroxine (SYNTHROID) tablet 100 mcg Daily   sodium chloride flush 0.9 % injection 10 mL 2 times per day   sodium chloride flush 0.9 % injection 10 mL PRN   ondansetron (ZOFRAN) injection 4 mg Q6H PRN   enoxaparin (LOVENOX) injection 30 mg Daily   linezolid (ZYVOX) tablet 600 mg 2 times per day   glucose (GLUTOSE) 40 % oral gel 15 g PRN   dextrose 50 % solution 12.5 g PRN   glucagon (rDNA) injection 1 mg PRN   dextrose 5 % solution PRN       Objective:  BP (!) 155/81   Pulse 78   Temp 97.7 °F (36.5 °C) (Oral)   Resp 16   Ht 5' 10\" (1.778 m)   Wt 198 lb 3.2 oz (89.9 kg)   SpO2 99%   BMI 28.44 kg/m²     Intake/Output Summary (Last 24 hours) at 5/9/2019 1343  Last data filed at 5/8/2019 2121  Gross per 24 hour   Intake 480 ml   Output 380 ml   Net 100 ml    Wt Readings from Last 3 Encounters:   05/06/19 198 lb 3.2 oz (89.9 kg)   02/15/16 224 lb 9.6 oz (101.9 kg)   01/15/16 221 lb (100.2 kg)       General appearance:  Appears comfortable  Eyes: Sclera clear. Pupils equal.  ENT: Moist oral mucosa. Trachea midline, no adenopathy. Cardiovascular: Regular rhythm, normal S1, S2. No murmur.  No edema in lower extremities  Respiratory: Not using accessory muscles. Good inspiratory effort. Clear to auscultation bilaterally, no wheeze or crackles. GI: Abdomen soft, no tenderness, not distended, normal bowel sounds  Musculoskeletal: No cyanosis in digits, neck supple  Neurology: CN 2-12 grossly intact. No speech or motor deficits  Psych: Normal affect. Alert and oriented in time, place and person  Skin: Warm, dry, normal turgor  Extremity exam shows brisk capillary refill. Left Great Toe  swollen    Labs and Tests:  CBC:   Recent Labs     05/08/19  0545 05/09/19  0625   WBC 8.9 8.2   HGB 8.1* 8.1*    320     BMP:  Recent Labs     05/08/19  0545 05/09/19  0625    136   K 3.7 3.9    101   CO2 22 24   BUN 35* 34*   CREATININE 3.6* 3.1*   GLUCOSE 83 175*     Hepatic: No results for input(s): AST, ALT, ALB, BILITOT, ALKPHOS in the last 72 hours. XR TOE LEFT (MIN 2 VIEWS)   Final Result   1st MTP osteoarthrosis. NM WBC SPECT    (Results Pending)   NM BONE SCAN 3 PHASE    (Results Pending)   NM INFLAMMATORY WBC LIMITED W INDIUM 111    (Results Pending)   US RENAL COMPLETE    (Results Pending)   NM BONE SPECT    (Results Pending)     Problem List  Active Problems:    Poorly controlled type 2 diabetes mellitus (HCC)    Diabetic foot infection ([facility] [address]    Cellulitis of foot    Acute hematogenous osteomyelitis of left foot (HCC)    Elevated sed rate    Elevated C-reactive protein (CRP)    Acute kidney injury superimposed on chronic kidney disease (HCC)    Overweight    Diabetic polyneuropathy associated with type 2 diabetes mellitus (HCC)    History of methicillin resistant staphylococcus aureus (MRSA)    Essential hypertension  Resolved Problems:    * No resolved hospital problems. *            Assessment & Plan:   Acute left great toe/foot diabetic infection with cellulitis. oral Zyvox and IV cefepeme. Podiatry Consulted. Patient will most likely need incision and drainage or possible amputation on Friday MRI can not be done.  WBC Bone

## 2019-05-09 NOTE — PROGRESS NOTES
Pt's FSBS 58. A total of 16 oz of orange juice given, 8 oz of milk, 1 tube of oral glutose and pt started on D5 fluids. After a series of rechecks pt's FSBS 90 at this time. Scheduled 13 units of Lantus held at this time per pt request. Team page, awaiting response. Will continue to monitor.

## 2019-05-09 NOTE — PROGRESS NOTES
Nephrology Progress Note  [phone]  [phone]   [email or web address]    Patient:  Sander Vargas   : 1955    CC: DILIP on CKD    Brief HPI    The patient is a 75 y.o.male with significant past medical history of CKD III,  Hypertension, Hyperlipidemia, DM II, Diabetic retinopathy, PVD,  Hypothyroidism is admitted for left diabetic foot infection and is on antibiotics per ID. He was seen by Podiatry and there is a possibility of I&D vs amputation. We are consulted for DILIP on CKD  He is followed by Dr. Dariana Velásquez in the office. He was last seen in 2018. His CKD is secondary to biopsy proven Diabetic Nephropathy. No recent base-line available, but his creatinine in 2018 was in low 2s. His creatinine on admission was 4.2 and has improved to 3.6 today.       Subjective:  Pt seen and examined  Creatinine continuing to improve  Awaiting WBC scan today  No complaints  HTN uncontrolled      Review of Systems   Pain left toe    SHx:  No visitors at the bed-side    Meds:  Scheduled Meds:   amLODIPine  10 mg Oral Daily    cefepime  2 g Intravenous Q24H    insulin glargine  13 Units Subcutaneous Nightly    atorvastatin  20 mg Oral Nightly    insulin lispro  12 Units Subcutaneous TID WC    levothyroxine  100 mcg Oral Daily    sodium chloride flush  10 mL Intravenous 2 times per day    enoxaparin  30 mg Subcutaneous Daily    linezolid  600 mg Oral 2 times per day     Continuous Infusions:   dextrose 100 mL/hr (19 2207)     PRN Meds:.sodium chloride flush, sodium chloride flush, sodium chloride flush, ondansetron, glucose, dextrose, glucagon (rDNA), dextrose      Vitals:  BP (!) 155/81   Pulse 78   Temp 97.7 °F (36.5 °C) (Oral)   Resp 16   Ht 5' 10\" (1.778 m)   Wt 198 lb 3.2 oz (89.9 kg)   SpO2 99%   BMI 28.44 kg/m²       Physical Exam  General : AAOx3, not in pain or respiratory distress, resting in bed  HEENT : mucosa moist. PERRL  CVS: S1 S2 normal, regular rhythm, no murmurs or rubs.  Lungs: Clear, no wheezing or crackles. Abd: Soft, bowel sounds normal, non-tender. Ext: No edema, no cyanosis  Skin: Warm. No rashes appreciated. : bladder non-distended, no tenderness over the bladder  Skin : left toe with erythema, ulceration      Labs:  CBC with Differential:    Lab Results   Component Value Date    WBC 8.2 05/09/2019    RBC 2.74 05/09/2019    HGB 8.1 05/09/2019    HCT 25.2 05/09/2019     05/09/2019    MCV 91.9 05/09/2019    MCH 29.4 05/09/2019    MCHC 32.0 05/09/2019    RDW 14.1 05/09/2019    SEGSPCT 79.3 07/06/2012    LYMPHOPCT 19.9 05/09/2019    MONOPCT 7.2 05/09/2019    EOSPCT 1.2 11/04/2011    BASOPCT 0.5 05/09/2019    MONOSABS 0.6 05/09/2019    LYMPHSABS 1.6 05/09/2019    EOSABS 0.4 05/09/2019    BASOSABS 0.0 05/09/2019    DIFFTYPE Auto 07/06/2012     BMP:    Lab Results   Component Value Date     05/09/2019    K 3.9 05/09/2019     05/09/2019    CO2 24 05/09/2019    BUN 34 05/09/2019    LABALBU 3.4 05/05/2019    CREATININE 3.1 05/09/2019    CALCIUM 8.8 05/09/2019    GFRAA 24 05/09/2019    GFRAA 53 07/06/2012    LABGLOM 20 05/09/2019    GLUCOSE 175 05/09/2019     Ionized Calcium:  No results found for: IONCA  Magnesium:    Lab Results   Component Value Date    MG 2.30 10/20/2017     Phosphorus:    Lab Results   Component Value Date    PHOS 3.4 03/21/2018       Assessment/Plan:    1. DILIP on CKD III. Improving, likely pre-renal,  no recent base-line available. Hold lisinopril until acute issues resolve. No recent base-line available, but his creatinine in arch 2018 was in low 2s.   2. Diabetic foot infection left. Podiatry on board, Abx per ID  1. Hypertension not very well controlled, agree with starting Amlodipine, hold  lisinopril for now. Otherwise, Needs LUIS ALFREDO blockade in the long term for proteinuria. 2. CKD III biopsy proven Diabetic nephropathy. Other risk factors include vascular disease, age and NSAID usage per history.   3. Proteinuria secondary to diabetic nephropathy. UPC 2.5  4. Anemia in CKD/chronic disease. Check Iron studies  5. CKD-MBD check iPTH, Vit D, phos  6. DM II HBA1Cs upto 14 in the past.       Lili Browne MD.  5/9/2019  Office Phone : [phone]    Thank you for allowing us to participate in the care of this pt. I willcontinue to follow along. Please call with questions or concerns.

## 2019-05-10 ENCOUNTER — APPOINTMENT (OUTPATIENT)
Dept: GENERAL RADIOLOGY | Age: 64
DRG: 629 | End: 2019-05-10
Payer: MEDICARE

## 2019-05-10 ENCOUNTER — ANESTHESIA (OUTPATIENT)
Dept: OPERATING ROOM | Age: 64
DRG: 629 | End: 2019-05-10
Payer: MEDICARE

## 2019-05-10 ENCOUNTER — ANESTHESIA EVENT (OUTPATIENT)
Dept: OPERATING ROOM | Age: 64
DRG: 629 | End: 2019-05-10
Payer: MEDICARE

## 2019-05-10 VITALS
RESPIRATION RATE: 2 BRPM | OXYGEN SATURATION: 98 % | SYSTOLIC BLOOD PRESSURE: 109 MMHG | DIASTOLIC BLOOD PRESSURE: 61 MMHG

## 2019-05-10 LAB
ANAEROBIC CULTURE: NORMAL
ANAEROBIC CULTURE: NORMAL
ANION GAP SERPL CALCULATED.3IONS-SCNC: 12 MMOL/L (ref 3–16)
BASOPHILS ABSOLUTE: 0 K/UL (ref 0–0.2)
BASOPHILS RELATIVE PERCENT: 0.7 %
BLOOD CULTURE, ROUTINE: NORMAL
BUN BLDV-MCNC: 36 MG/DL (ref 7–20)
C3 COMPLEMENT: 121.5 MG/DL (ref 90–180)
C4 COMPLEMENT: 25.4 MG/DL (ref 10–40)
CALCIUM SERPL-MCNC: 8.8 MG/DL (ref 8.3–10.6)
CHLORIDE BLD-SCNC: 104 MMOL/L (ref 99–110)
CO2: 23 MMOL/L (ref 21–32)
CREAT SERPL-MCNC: 3.6 MG/DL (ref 0.8–1.3)
CULTURE, BLOOD 2: NORMAL
EOSINOPHILS ABSOLUTE: 0.4 K/UL (ref 0–0.6)
EOSINOPHILS RELATIVE PERCENT: 6 %
FERRITIN: 450.2 NG/ML (ref 30–400)
GFR AFRICAN AMERICAN: 21
GFR NON-AFRICAN AMERICAN: 17
GLUCOSE BLD-MCNC: 103 MG/DL (ref 70–99)
GLUCOSE BLD-MCNC: 147 MG/DL (ref 70–99)
GLUCOSE BLD-MCNC: 166 MG/DL (ref 70–99)
GLUCOSE BLD-MCNC: 219 MG/DL (ref 70–99)
GLUCOSE BLD-MCNC: 89 MG/DL (ref 70–99)
GLUCOSE BLD-MCNC: 92 MG/DL (ref 70–99)
GLUCOSE BLD-MCNC: 93 MG/DL (ref 70–99)
HCT VFR BLD CALC: 24.9 % (ref 40.5–52.5)
HEMOGLOBIN: 7.9 G/DL (ref 13.5–17.5)
IRON SATURATION: 36 % (ref 20–50)
IRON: 67 UG/DL (ref 59–158)
LYMPHOCYTES ABSOLUTE: 1.4 K/UL (ref 1–5.1)
LYMPHOCYTES RELATIVE PERCENT: 20.7 %
MCH RBC QN AUTO: 29.1 PG (ref 26–34)
MCHC RBC AUTO-ENTMCNC: 31.8 G/DL (ref 31–36)
MCV RBC AUTO: 91.5 FL (ref 80–100)
MONOCYTES ABSOLUTE: 0.5 K/UL (ref 0–1.3)
MONOCYTES RELATIVE PERCENT: 7.8 %
NEUTROPHILS ABSOLUTE: 4.4 K/UL (ref 1.7–7.7)
NEUTROPHILS RELATIVE PERCENT: 64.8 %
PARATHYROID HORMONE INTACT: 338.4 PG/ML (ref 14–72)
PDW BLD-RTO: 13.6 % (ref 12.4–15.4)
PERFORMED ON: ABNORMAL
PERFORMED ON: NORMAL
PHOSPHORUS: 3.8 MG/DL (ref 2.5–4.9)
PLATELET # BLD: 339 K/UL (ref 135–450)
PMV BLD AUTO: 6.4 FL (ref 5–10.5)
POTASSIUM SERPL-SCNC: 4.1 MMOL/L (ref 3.5–5.1)
RBC # BLD: 2.72 M/UL (ref 4.2–5.9)
SODIUM BLD-SCNC: 139 MMOL/L (ref 136–145)
TOTAL IRON BINDING CAPACITY: 184 UG/DL (ref 260–445)
VITAMIN D 25-HYDROXY: 13.2 NG/ML
WBC # BLD: 6.7 K/UL (ref 4–11)
WOUND/ABSCESS: NORMAL
WOUND/ABSCESS: NORMAL

## 2019-05-10 PROCEDURE — 87205 SMEAR GRAM STAIN: CPT

## 2019-05-10 PROCEDURE — 7100000000 HC PACU RECOVERY - FIRST 15 MIN: Performed by: PODIATRIST

## 2019-05-10 PROCEDURE — 99232 SBSQ HOSP IP/OBS MODERATE 35: CPT | Performed by: INTERNAL MEDICINE

## 2019-05-10 PROCEDURE — 83970 ASSAY OF PARATHORMONE: CPT

## 2019-05-10 PROCEDURE — 3700000000 HC ANESTHESIA ATTENDED CARE: Performed by: PODIATRIST

## 2019-05-10 PROCEDURE — 7100000001 HC PACU RECOVERY - ADDTL 15 MIN: Performed by: PODIATRIST

## 2019-05-10 PROCEDURE — 6370000000 HC RX 637 (ALT 250 FOR IP): Performed by: PODIATRIST

## 2019-05-10 PROCEDURE — 3700000001 HC ADD 15 MINUTES (ANESTHESIA): Performed by: PODIATRIST

## 2019-05-10 PROCEDURE — 83550 IRON BINDING TEST: CPT

## 2019-05-10 PROCEDURE — 36415 COLL VENOUS BLD VENIPUNCTURE: CPT

## 2019-05-10 PROCEDURE — 6360000002 HC RX W HCPCS: Performed by: NURSE ANESTHETIST, CERTIFIED REGISTERED

## 2019-05-10 PROCEDURE — 73630 X-RAY EXAM OF FOOT: CPT

## 2019-05-10 PROCEDURE — 87186 SC STD MICRODIL/AGAR DIL: CPT

## 2019-05-10 PROCEDURE — 86160 COMPLEMENT ANTIGEN: CPT

## 2019-05-10 PROCEDURE — 2580000003 HC RX 258: Performed by: INTERNAL MEDICINE

## 2019-05-10 PROCEDURE — 3600000012 HC SURGERY LEVEL 2 ADDTL 15MIN: Performed by: PODIATRIST

## 2019-05-10 PROCEDURE — 1200000000 HC SEMI PRIVATE

## 2019-05-10 PROCEDURE — 2709999900 HC NON-CHARGEABLE SUPPLY: Performed by: PODIATRIST

## 2019-05-10 PROCEDURE — 84100 ASSAY OF PHOSPHORUS: CPT

## 2019-05-10 PROCEDURE — 87015 SPECIMEN INFECT AGNT CONCNTJ: CPT

## 2019-05-10 PROCEDURE — 87206 SMEAR FLUORESCENT/ACID STAI: CPT

## 2019-05-10 PROCEDURE — 83540 ASSAY OF IRON: CPT

## 2019-05-10 PROCEDURE — 85025 COMPLETE CBC W/AUTO DIFF WBC: CPT

## 2019-05-10 PROCEDURE — 2500000003 HC RX 250 WO HCPCS: Performed by: NURSE ANESTHETIST, CERTIFIED REGISTERED

## 2019-05-10 PROCEDURE — 0QBR0ZZ EXCISION OF LEFT TOE PHALANX, OPEN APPROACH: ICD-10-PCS | Performed by: PODIATRIST

## 2019-05-10 PROCEDURE — 6370000000 HC RX 637 (ALT 250 FOR IP): Performed by: INTERNAL MEDICINE

## 2019-05-10 PROCEDURE — 87075 CULTR BACTERIA EXCEPT BLOOD: CPT

## 2019-05-10 PROCEDURE — 2580000003 HC RX 258: Performed by: PODIATRIST

## 2019-05-10 PROCEDURE — 87077 CULTURE AEROBIC IDENTIFY: CPT

## 2019-05-10 PROCEDURE — 87070 CULTURE OTHR SPECIMN AEROBIC: CPT

## 2019-05-10 PROCEDURE — 3600000002 HC SURGERY LEVEL 2 BASE: Performed by: PODIATRIST

## 2019-05-10 PROCEDURE — 82306 VITAMIN D 25 HYDROXY: CPT

## 2019-05-10 PROCEDURE — 87116 MYCOBACTERIA CULTURE: CPT

## 2019-05-10 PROCEDURE — 80048 BASIC METABOLIC PNL TOTAL CA: CPT

## 2019-05-10 PROCEDURE — 2500000003 HC RX 250 WO HCPCS: Performed by: PODIATRIST

## 2019-05-10 PROCEDURE — 82728 ASSAY OF FERRITIN: CPT

## 2019-05-10 PROCEDURE — 2580000003 HC RX 258: Performed by: ANESTHESIOLOGY

## 2019-05-10 PROCEDURE — 87102 FUNGUS ISOLATION CULTURE: CPT

## 2019-05-10 RX ORDER — PROPOFOL 10 MG/ML
INJECTION, EMULSION INTRAVENOUS CONTINUOUS PRN
Status: DISCONTINUED | OUTPATIENT
Start: 2019-05-10 | End: 2019-05-10 | Stop reason: SDUPTHER

## 2019-05-10 RX ORDER — SODIUM CHLORIDE 9 MG/ML
INJECTION, SOLUTION INTRAVENOUS CONTINUOUS
Status: DISCONTINUED | OUTPATIENT
Start: 2019-05-10 | End: 2019-05-10

## 2019-05-10 RX ORDER — HYDROMORPHONE HCL 110MG/55ML
0.5 PATIENT CONTROLLED ANALGESIA SYRINGE INTRAVENOUS EVERY 5 MIN PRN
Status: DISCONTINUED | OUTPATIENT
Start: 2019-05-10 | End: 2019-05-10

## 2019-05-10 RX ORDER — HYDROMORPHONE HCL 110MG/55ML
0.25 PATIENT CONTROLLED ANALGESIA SYRINGE INTRAVENOUS EVERY 5 MIN PRN
Status: DISCONTINUED | OUTPATIENT
Start: 2019-05-10 | End: 2019-05-10

## 2019-05-10 RX ORDER — HYDROCODONE BITARTRATE AND ACETAMINOPHEN 5; 325 MG/1; MG/1
1 TABLET ORAL
Status: DISCONTINUED | OUTPATIENT
Start: 2019-05-10 | End: 2019-05-10

## 2019-05-10 RX ORDER — CARVEDILOL 6.25 MG/1
6.25 TABLET ORAL 2 TIMES DAILY WITH MEALS
Status: DISCONTINUED | OUTPATIENT
Start: 2019-05-10 | End: 2019-05-12

## 2019-05-10 RX ORDER — ONDANSETRON 2 MG/ML
4 INJECTION INTRAMUSCULAR; INTRAVENOUS
Status: DISCONTINUED | OUTPATIENT
Start: 2019-05-10 | End: 2019-05-10

## 2019-05-10 RX ORDER — SODIUM CHLORIDE 9 MG/ML
INJECTION, SOLUTION INTRAVENOUS CONTINUOUS
Status: DISCONTINUED | OUTPATIENT
Start: 2019-05-10 | End: 2019-05-12

## 2019-05-10 RX ORDER — LIDOCAINE HYDROCHLORIDE 10 MG/ML
1 INJECTION, SOLUTION EPIDURAL; INFILTRATION; INTRACAUDAL; PERINEURAL
Status: DISCONTINUED | OUTPATIENT
Start: 2019-05-10 | End: 2019-05-10

## 2019-05-10 RX ORDER — CIPROFLOXACIN 500 MG/1
500 TABLET, FILM COATED ORAL
Status: DISCONTINUED | OUTPATIENT
Start: 2019-05-10 | End: 2019-05-13

## 2019-05-10 RX ORDER — PROPOFOL 10 MG/ML
INJECTION, EMULSION INTRAVENOUS PRN
Status: DISCONTINUED | OUTPATIENT
Start: 2019-05-10 | End: 2019-05-10 | Stop reason: SDUPTHER

## 2019-05-10 RX ORDER — LIDOCAINE HYDROCHLORIDE 20 MG/ML
INJECTION, SOLUTION EPIDURAL; INFILTRATION; INTRACAUDAL; PERINEURAL PRN
Status: DISCONTINUED | OUTPATIENT
Start: 2019-05-10 | End: 2019-05-10 | Stop reason: SDUPTHER

## 2019-05-10 RX ADMIN — SODIUM CHLORIDE: 9 INJECTION, SOLUTION INTRAVENOUS at 15:16

## 2019-05-10 RX ADMIN — Medication 10 ML: at 21:45

## 2019-05-10 RX ADMIN — Medication 10 ML: at 08:42

## 2019-05-10 RX ADMIN — LIDOCAINE HYDROCHLORIDE 60 MG: 20 INJECTION, SOLUTION EPIDURAL; INFILTRATION; INTRACAUDAL; PERINEURAL at 12:51

## 2019-05-10 RX ADMIN — CARVEDILOL 6.25 MG: 6.25 TABLET, FILM COATED ORAL at 14:06

## 2019-05-10 RX ADMIN — ATORVASTATIN CALCIUM 20 MG: 20 TABLET, FILM COATED ORAL at 21:45

## 2019-05-10 RX ADMIN — PROPOFOL 120 MG: 10 INJECTION, EMULSION INTRAVENOUS at 12:51

## 2019-05-10 RX ADMIN — LINEZOLID 600 MG: 600 TABLET, FILM COATED ORAL at 21:45

## 2019-05-10 RX ADMIN — PROPOFOL 75 MCG/KG/MIN: 10 INJECTION, EMULSION INTRAVENOUS at 12:51

## 2019-05-10 RX ADMIN — CIPROFLOXACIN HYDROCHLORIDE 500 MG: 500 TABLET, FILM COATED ORAL at 14:06

## 2019-05-10 RX ADMIN — SODIUM CHLORIDE: 9 INJECTION, SOLUTION INTRAVENOUS at 12:34

## 2019-05-10 RX ADMIN — INSULIN LISPRO 12 UNITS: 100 INJECTION, SOLUTION INTRAVENOUS; SUBCUTANEOUS at 17:10

## 2019-05-10 RX ADMIN — CARVEDILOL 6.25 MG: 6.25 TABLET, FILM COATED ORAL at 17:10

## 2019-05-10 ASSESSMENT — ENCOUNTER SYMPTOMS
SORE THROAT: 0
DIARRHEA: 0
SHORTNESS OF BREATH: 0
EYE REDNESS: 0
ABDOMINAL PAIN: 0
EYE DISCHARGE: 0
NAUSEA: 0
TROUBLE SWALLOWING: 0
COUGH: 0
BACK PAIN: 0
WHEEZING: 0
RHINORRHEA: 0
CONSTIPATION: 0

## 2019-05-10 ASSESSMENT — PULMONARY FUNCTION TESTS
PIF_VALUE: 1
PIF_VALUE: 1
PIF_VALUE: 0
PIF_VALUE: 1
PIF_VALUE: 0
PIF_VALUE: 1
PIF_VALUE: 1
PIF_VALUE: 16
PIF_VALUE: 1
PIF_VALUE: 0
PIF_VALUE: 1
PIF_VALUE: 2
PIF_VALUE: 1
PIF_VALUE: 1
PIF_VALUE: 15
PIF_VALUE: 1
PIF_VALUE: 27
PIF_VALUE: 1

## 2019-05-10 ASSESSMENT — PAIN - FUNCTIONAL ASSESSMENT: PAIN_FUNCTIONAL_ASSESSMENT: 0-10

## 2019-05-10 ASSESSMENT — LIFESTYLE VARIABLES: SMOKING_STATUS: 0

## 2019-05-10 NOTE — ANESTHESIA PRE PROCEDURE
Department of Anesthesiology  Preprocedure Note       Name:  Jm Arnold   Age:  75 y.o.  :  1955                                          MRN:  [id number]         Date:  5/10/2019      Surgeon: Willy Peace):  Severiano Pears, DPM    Procedure: LEFT FOOT INCISION AND DRAINAGE (Left )    Medications prior to admission:   Prior to Admission medications    Medication Sig Start Date End Date Taking? Authorizing Provider   levothyroxine (SYNTHROID) 100 MCG tablet Take 1 tablet by mouth Daily 16  Yes TEE Mcmullen CNP   insulin lispro (HUMALOG) 100 UNIT/ML pen Inject 12 Units into the skin 3 times daily (with meals) Plus correction 1:25 BS>140 for daily total of 72 units 16  Yes TEE Mcmullen CNP   atorvastatin (LIPITOR) 20 MG tablet Take 1 tablet by mouth daily 16  Yes TEE Mcmullen CNP   lisinopril (PRINIVIL;ZESTRIL) 20 MG tablet TAKE ONE TABLET BY MOUTH DAILY 16  Yes TEE Mcmullen CNP   vitamin D (ERGOCALCIFEROL) 28945 UNITS CAPS capsule Take 2 capsules PO weekly 2/15/16  Yes TEE Mcmullen CNP   furosemide (LASIX) 20 MG tablet TAKE ONE TABLET BY MOUTH DAILY 1/15/16  Yes Michelle Rosas MD   HYDROcodone-acetaminophen (NORCO) 5-325 MG per tablet Take 1 tablet by mouth every 6 hours as needed for Pain   Yes Historical Provider, MD LARA UF III MINI PEN NEEDLES 31G X 5 MM MISC USE FOUR TIMES DAILY AS DIRECTED. 16   TEE Mcmullen CNP   insulin glargine (LANTUS) 100 UNIT/ML injection pen Inject 30 Units into the skin nightly 16   TEE Mcmullen CNP   folic acid-pyridoxine-cyancobalamin (FOLBIC) 2.5-25-2 MG TABS Take 1 tablet by mouth 2 times daily 9/8/16 10/8/16  TEE Mcmullen CNP   ACCU-CHEK VEE PLUS strip 1 each by In Vitro route 5 times daily As needed.  3/31/16   TEE Mcmullen CNP   ACCU-CHEK FASTCLIX LANCETS MISC Test blood sugars 4 times daily 16   Kam Jaquez APRN - CNP   [facility] [address]    Anesthesia Evaluation  Patient summary reviewed and Nursing notes reviewed no history of anesthetic complications:   Airway: Mallampati: II  TM distance: >3 FB   Neck ROM: full  Mouth opening: > = 3 FB Dental:    (+) partials and caps      Pulmonary:Negative Pulmonary ROS and normal exam  breath sounds clear to auscultation      (-) COPD, asthma, sleep apnea and not a current smoker                           Cardiovascular:    (+) hypertension:, hyperlipidemia    (-) past MI, CAD, CABG/stent, dysrhythmias,  angina and  CHF (echo 2017 EF 55, no RWMA)    ECG reviewed  Rhythm: regular  Rate: normal                    Neuro/Psych:   (+) neuromuscular disease (diabetic neuropathy):,    (-) seizures, TIA and CVA           GI/Hepatic/Renal:   (+) renal disease: CRI,      (-) GERD and liver disease       Endo/Other:    (+) DiabetesType II DM, poorly controlled, , hypothyroidism::., .                 Abdominal:           Vascular:                                        Anesthesia Plan      MAC     ASA 3       Induction: intravenous. Anesthetic plan and risks discussed with patient. Plan discussed with CRNA.                   Ray Us MD   5/10/2019
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
## 2019-05-10 NOTE — CARE COORDINATION
Aware that pt may need long course of IV antibiotics. Left message for [facility] for benefits check.   Electronically signed by ZHEN Hanley on 5/10/2019 at 4:37 PM

## 2019-05-10 NOTE — BRIEF OP NOTE
Brief Postoperative Note  ______________________________________________________________    Patient: Cristian Reid  YOB: 1955  MRN: [id number]  Date of Procedure: 5/10/2019    Pre-Op Diagnosis: DIABETIC FOOT INFECTION WITH OSTEOMYELITIS    Post-Op Diagnosis: Same       Procedure(s):  LEFT FOOT INCISION AND DRAINAGE    Anesthesia: Monitor Anesthesia Care    Surgeon(s):  Katarina Hardin DPM    Assistant:     Estimated Blood Loss (mL): less than 50     Complications: None    Specimens:   * No specimens in log *  Bone and swabs to culture  Implants:  * No implants in log *      Drains: * No LDAs found *    Findings: Hard proximal phalanx    Katarina Hardin DPM  Date: 5/10/2019  Time: 1:18 PM

## 2019-05-10 NOTE — ANESTHESIA POSTPROCEDURE EVALUATION
Department of Anesthesiology  Postprocedure Note    Patient: Paulo Snider  MRN: [id number]  YOB: 1955  Date of evaluation: 5/10/2019  Time:  3:54 PM     Procedure Summary     Date:  05/10/19 Room / Location:  [facility] ASC OR [address] [place] [address] ASC OR    Anesthesia Start:  1243 Anesthesia Stop:  6721    Procedure:  LEFT FOOT INCISION AND DRAINAGE (Left ) Diagnosis:  (DIABETIC FOOT INFECTION WITH OSTEOMYELITIS)    Surgeon:  Yong Ramos DPM Responsible Provider:  Kevin Pratt MD    Anesthesia Type:  MAC ASA Status:  3          Anesthesia Type: MAC    David Phase I: David Score: 9    David Phase II:      Last vitals: Reviewed and per EMR flowsheets.        Anesthesia Post Evaluation    Patient location during evaluation: PACU  Patient participation: complete - patient participated  Level of consciousness: awake  Airway patency: patent  Nausea & Vomiting: no vomiting  Complications: no  Cardiovascular status: hemodynamically stable  Respiratory status: acceptable  Hydration status: euvolemic

## 2019-05-10 NOTE — OP NOTE
Patient: Stu Gao  YOB: 1955  MRN: [id number]  Date of Procedure: 5/10/2019    Pre-Op Diagnosis: DIABETIC FOOT INFECTION WITH OSTEOMYELITIS    Post-Op Diagnosis: Same       Procedure(s):  LEFT FOOT INCISION AND DRAINAGE    Anesthesia: Monitor Anesthesia Care    Surgeon(s):  Olamide Lennon DPM    Assistant:     Estimated Blood Loss (mL): less than 50     Complications: None    Specimens:   * No specimens in log *  Bone and swabs to culture  Implants:  * No implants in log *      Drains: * No LDAs found *    Findings: Hard proximal phalanx  Indication for Procedure: This is a 75year old Male here for an non-elective procedure due to a foot infection of the left foot. The anticipated procedure, expected post-operative course and all benefits, risks, and complications were explained to the patient in detail. The patient's questions were answered to their satisfaction. Informed and written consent were obtained and placed in the chart. Procedure:  Under mild sedation the patient was brought back to the OR and placed on the operating table in the supine position. Following the induction of IV anesthesia a local anesthetic block was then infiltrated proximal to and circumferentially around the planned operative area. The left extremity was then scrubbed prepped and draped. Details of procedure #1: Incision and drainage to sites left foot  At this time, attention was directed to the plantar aspect of the patients left foot. Using a #15 blade, a 4 cm incision was made at the site of the puncture wound. Using sharp and blunt dissection the incision was deepened through the subcutaneous layer with care being taken to identify and retract all vital neurovascular structures. All venous tributaries were electrocoagulated as encountered. Sharp and blunt dissection was continued in to the infected tissue. Proximally 1 mL of white purulence was noted in the deep tissues.   The site probe to bone.  Next using a Jamshidi biopsy needle the bone was biopsied and sent for culture for evaluation of aerobic anaerobic acid-fast and fungal elements. Following this attention was directed to the dorsal aspect of the great toe. An incision was made 4 cm in length. Using sharp and blunt dissection the incision was deepened down through the subcutaneous layer with care being taken to identify and retract all vital neurovascular structures all venous tributaries were electrocoagulated as encountered. The dissection was continued down to the IPJ joint and the metatarsal phalangeal joint. These were inspected and noted to be free of infection. The bone of the proximal phalanx was noted to be hard and all areas tested. The sites were then pulse lavaged and packed open with iodoform packing. Next the site was dressed with 4 x 4's, Kerlix and Ace bandage. The patient tolerated the procedure and anesthesia well and was transported from the operating room to the PACU with vital signs stable and vascular status intact to all digits  of the patient's left lower extremity. Following a short period of post-operative monitoring, the patient will be readmitted to the hospital.  He will need delayed primary closure before he is discharged from the hospital early next week.

## 2019-05-10 NOTE — PROGRESS NOTES
Nephrology Progress Note  [phone]  [phone]   [email or web address]    Patient:  Mariama Murray   : 1955    CC: DILIP on CKD    Brief HPI    The patient is a 75 y.o.male with significant past medical history of CKD III,  Hypertension, Hyperlipidemia, DM II, Diabetic retinopathy, PVD,  Hypothyroidism is admitted for left diabetic foot infection and is on antibiotics per ID. He was seen by Podiatry and there is a possibility of I&D vs amputation. We are consulted for DILIP on CKD  He is followed by Dr. Swetha Montemayor in the office. He was last seen in 2018. His CKD is secondary to biopsy proven Diabetic Nephropathy. No recent base-line available, but his creatinine in 2018 was in low 2s. His creatinine on admission was 4.2 and has improved to 3.6 today.       Subjective:  -pt seen and examined  -PMSHx and meds reviewed  -No family at bedside    BP is elevated  Scr trending up  UO  recorded  S/p I&D     Review of Systems   Pain left toe    SHx:  No visitors at the bed-side    Meds:  Scheduled Meds:   [START ON 2019] enoxaparin  30 mg Subcutaneous Daily    amLODIPine  10 mg Oral Daily    cefepime  2 g Intravenous Q24H    insulin glargine  13 Units Subcutaneous Nightly    atorvastatin  20 mg Oral Nightly    insulin lispro  12 Units Subcutaneous TID WC    levothyroxine  100 mcg Oral Daily    sodium chloride flush  10 mL Intravenous 2 times per day    linezolid  600 mg Oral 2 times per day     Continuous Infusions:   dextrose 100 mL/hr (19 2207)     PRN Meds:.sodium chloride flush, sodium chloride flush, sodium chloride flush, ondansetron, glucose, dextrose, glucagon (rDNA), dextrose      Vitals:  BP (!) 153/78   Pulse 82   Temp 98 °F (36.7 °C) (Oral)   Resp 16   Ht 5' 10\" (1.778 m)   Wt 198 lb 3.2 oz (89.9 kg)   SpO2 96%   BMI 28.44 kg/m²       Physical Exam  General : AAOx3, not in pain or respiratory distress, resting in bed  HEENT : mucosa moist. PERRL  CVS: S1 S2 normal, regular rhythm, no murmurs or rubs. Lungs: Clear, no wheezing or crackles. Abd: Soft, bowel sounds normal, non-tender. Ext: No edema RLE, LLE wrapped in ACE, no cyanosis  Skin: Warm. No rashes appreciated.   : bladder non-distended, no tenderness over the bladder        Labs:  CBC with Differential:    Lab Results   Component Value Date    WBC 6.7 05/10/2019    RBC 2.72 05/10/2019    HGB 7.9 05/10/2019    HCT 24.9 05/10/2019     05/10/2019    MCV 91.5 05/10/2019    MCH 29.1 05/10/2019    MCHC 31.8 05/10/2019    RDW 13.6 05/10/2019    SEGSPCT 79.3 07/06/2012    LYMPHOPCT 20.7 05/10/2019    MONOPCT 7.8 05/10/2019    EOSPCT 1.2 11/04/2011    BASOPCT 0.7 05/10/2019    MONOSABS 0.5 05/10/2019    LYMPHSABS 1.4 05/10/2019    EOSABS 0.4 05/10/2019    BASOSABS 0.0 05/10/2019    DIFFTYPE Auto 07/06/2012     BMP:    Lab Results   Component Value Date     05/10/2019    K 4.1 05/10/2019     05/10/2019    CO2 23 05/10/2019    BUN 36 05/10/2019    LABALBU 2.9 05/09/2019    CREATININE 3.6 05/10/2019    CALCIUM 8.8 05/10/2019    GFRAA 21 05/10/2019    GFRAA 53 07/06/2012    LABGLOM 17 05/10/2019    GLUCOSE 103 05/10/2019     Ionized Calcium:  No results found for: IONCA  Magnesium:    Lab Results   Component Value Date    MG 2.30 10/20/2017     Phosphorus:    Lab Results   Component Value Date    PHOS 3.4 03/21/2018         The right kidney measures 9.6 x 6.1 x 5.6 cm with cortical thickness 1.6 cm.       Left kidney measures 10.8 x 5.5 x 4 cm with cortical thickness 1.3 cm.       Evaluation the kidneys is slightly limited due to body habitus.  An area of   scarring is again seen within the left kidney.  Kidneys demonstrate normal   cortical echogenicity.  No evidence of hydronephrosis or intrarenal stones.           Bladder:       Unremarkable appearance of the bladder.  No significant post void residual.           Assessment/Plan:    DILIP on CKD 3b/4 vs progression of CKD: baseline Scr 2017/2018 low 2 range, adm SCr 4.2-Scr trending back up after initial improvement no recent base-line available. Hold lisinopril until acute issues resolve. Renal US is negative for HN. UA does not suggest PGIN or AIN, proteinuria is likely due to DN-in the past TAI/ANCA negative, Hep B immuned, Hep C negative.    -check bladder scan-SC if >250  -continue BP control  -will start gentle IVH for 24hrs     Diabetic foot infection left. Podiatry on board, Abx per ID- on linexlaid and cefepime    HTN: add carvedilol, continue amlodipine, hold ACEI    CKD III biopsy proven Diabetic nephropathy. Other risk factors include vascular disease, age and NSAID usage per history. Proteinuria secondary to diabetic nephropathy. UPC 2.5    Anemia in CKD/chronic disease. Check Iron studies    CKD-MBD check iPTH, Vit D, phos    DM II HBA1Cs upto 14 in the past.       Bj Young MD.  5/10/2019  Office Phone : [phone]  .

## 2019-05-10 NOTE — PROGRESS NOTES
murmur. No edema in lower extremities  Respiratory: Not using accessory muscles. Good inspiratory effort. Clear to auscultation bilaterally, no wheeze or crackles. GI: Abdomen soft, no tenderness, not distended, normal bowel sounds  Musculoskeletal: No cyanosis in digits, neck supple  Neurology: CN 2-12 grossly intact. No speech or motor deficits  Psych: Normal affect. Alert and oriented in time, place and person  Skin: Warm, dry, normal turgor  Extremity exam shows Dressing on the left toe    Labs and Tests:  CBC:   Recent Labs     05/08/19  0545 05/09/19  0625 05/10/19  0607   WBC 8.9 8.2 6.7   HGB 8.1* 8.1* 7.9*    320 339     BMP:  Recent Labs     05/09/19  0625 05/09/19  1258 05/10/19  0607    134* 139   K 3.9 4.1 4.1    103 104   CO2 24 19* 23   BUN 34* 33* 36*   CREATININE 3.1* 3.4* 3.6*   GLUCOSE 175* 60* 103*     Hepatic: Recent Labs     05/09/19  1258   AST 12*   ALT 6*   BILITOT 0.5   ALKPHOS 87     US RENAL COMPLETE   Final Result   Unremarkable ultrasound of the kidneys and urinary bladder. NM WBC SPECT   Final Result   Three-phase bone scan activity is seen at the left great toe, with active   inflammation and concordant white blood cell aggregation, concerning for   osteomyelitis given history of wound at the site. NM INFLAMMATORY WBC LIMITED W INDIUM 111   Final Result   Three-phase bone scan activity is seen at the left great toe, with active   inflammation and concordant white blood cell aggregation, concerning for   osteomyelitis given history of wound at the site. NM BONE SCAN 3 PHASE   Final Result   Three-phase bone scan activity is seen at the left great toe, with active   inflammation and concordant white blood cell aggregation, concerning for   osteomyelitis given history of wound at the site.          NM BONE SPECT   Final Result   Three-phase bone scan activity is seen at the left great toe, with active   inflammation and concordant white blood cell aggregation, concerning for   osteomyelitis given history of wound at the site. XR TOE LEFT (MIN 2 VIEWS)   Final Result   1st MTP osteoarthrosis. XR FOOT LEFT (MIN 3 VIEWS)    (Results Pending)         Problem List  Active Problems:    Poorly controlled type 2 diabetes mellitus (HCC)    Diabetic foot infection ([address]    Cellulitis of foot    Acute hematogenous osteomyelitis of left foot (HCC)    Elevated sed rate    Elevated C-reactive protein (CRP)    Acute kidney injury superimposed on chronic kidney disease (HCC)    Overweight    Diabetic polyneuropathy associated with type 2 diabetes mellitus (HCC)    History of methicillin resistant staphylococcus aureus (MRSA)    Essential hypertension  Resolved Problems:    * No resolved hospital problems. *       Assessment & Plan:   Acute left great toe/foot diabetic infection with cellulitis. oral Zyvox and IV cefepeme. Podiatry Consulted. Patient had I and d of the left Great Toe. MRI can not be done. WBC Bone scan  consistent with Osteomyelitis ,.appreciate ID evlautaion . Needs Prolonged antibiotics. Conservative Treatment. No plans fof amputation               2.  Acute-on-chronic kidney disease stage 3. Labs in am . Creatinine trending down. Creat 3.6. Baseline Creatinine Not known. USG kidney last year was Normal. Will Repeat  3.  controlled type 2 diabetes mellitus. On lantus and sliding scale.   4.  Hypertension. Controlled  5.  Hypothyroidism. stable. May need PICC line if Prolonged antibiotics are planned.    Diet: DIET CARB CONTROL; Carb Control: 3 carb choices (45 gms)/meal  Code:Full Code  DVT PPX Lovenox  Disposition Home in 1-2 days      Windy Holter, MD   5/10/2019 2:05 PM

## 2019-05-11 LAB
ANION GAP SERPL CALCULATED.3IONS-SCNC: 9 MMOL/L (ref 3–16)
BASOPHILS ABSOLUTE: 0.1 K/UL (ref 0–0.2)
BASOPHILS RELATIVE PERCENT: 0.7 %
BUN BLDV-MCNC: 40 MG/DL (ref 7–20)
CALCIUM SERPL-MCNC: 8.8 MG/DL (ref 8.3–10.6)
CHLORIDE BLD-SCNC: 104 MMOL/L (ref 99–110)
CO2: 25 MMOL/L (ref 21–32)
CREAT SERPL-MCNC: 3.5 MG/DL (ref 0.8–1.3)
EOSINOPHILS ABSOLUTE: 0.3 K/UL (ref 0–0.6)
EOSINOPHILS RELATIVE PERCENT: 4.6 %
GFR AFRICAN AMERICAN: 21
GFR NON-AFRICAN AMERICAN: 18
GLUCOSE BLD-MCNC: 111 MG/DL (ref 70–99)
GLUCOSE BLD-MCNC: 119 MG/DL (ref 70–99)
GLUCOSE BLD-MCNC: 120 MG/DL (ref 70–99)
GLUCOSE BLD-MCNC: 171 MG/DL (ref 70–99)
GLUCOSE BLD-MCNC: 79 MG/DL (ref 70–99)
GLUCOSE BLD-MCNC: 82 MG/DL (ref 70–99)
GLUCOSE BLD-MCNC: 84 MG/DL (ref 70–99)
HCT VFR BLD CALC: 24.5 % (ref 40.5–52.5)
HEMOGLOBIN: 7.8 G/DL (ref 13.5–17.5)
LYMPHOCYTES ABSOLUTE: 1.7 K/UL (ref 1–5.1)
LYMPHOCYTES RELATIVE PERCENT: 22.8 %
MCH RBC QN AUTO: 29.3 PG (ref 26–34)
MCHC RBC AUTO-ENTMCNC: 31.9 G/DL (ref 31–36)
MCV RBC AUTO: 91.9 FL (ref 80–100)
MONOCYTES ABSOLUTE: 0.5 K/UL (ref 0–1.3)
MONOCYTES RELATIVE PERCENT: 6 %
NEUTROPHILS ABSOLUTE: 5 K/UL (ref 1.7–7.7)
NEUTROPHILS RELATIVE PERCENT: 65.9 %
PDW BLD-RTO: 13.8 % (ref 12.4–15.4)
PERFORMED ON: ABNORMAL
PERFORMED ON: NORMAL
PLATELET # BLD: 339 K/UL (ref 135–450)
PMV BLD AUTO: 6.5 FL (ref 5–10.5)
POTASSIUM SERPL-SCNC: 4.4 MMOL/L (ref 3.5–5.1)
RBC # BLD: 2.66 M/UL (ref 4.2–5.9)
SODIUM BLD-SCNC: 138 MMOL/L (ref 136–145)
WBC # BLD: 7.6 K/UL (ref 4–11)

## 2019-05-11 PROCEDURE — 6370000000 HC RX 637 (ALT 250 FOR IP): Performed by: PODIATRIST

## 2019-05-11 PROCEDURE — 6370000000 HC RX 637 (ALT 250 FOR IP): Performed by: INTERNAL MEDICINE

## 2019-05-11 PROCEDURE — 80048 BASIC METABOLIC PNL TOTAL CA: CPT

## 2019-05-11 PROCEDURE — 36415 COLL VENOUS BLD VENIPUNCTURE: CPT

## 2019-05-11 PROCEDURE — 1200000000 HC SEMI PRIVATE

## 2019-05-11 PROCEDURE — 2580000003 HC RX 258: Performed by: PODIATRIST

## 2019-05-11 PROCEDURE — 51798 US URINE CAPACITY MEASURE: CPT

## 2019-05-11 PROCEDURE — 99232 SBSQ HOSP IP/OBS MODERATE 35: CPT | Performed by: INTERNAL MEDICINE

## 2019-05-11 PROCEDURE — 85025 COMPLETE CBC W/AUTO DIFF WBC: CPT

## 2019-05-11 PROCEDURE — 6360000002 HC RX W HCPCS: Performed by: PODIATRIST

## 2019-05-11 RX ADMIN — ENOXAPARIN SODIUM 30 MG: 30 INJECTION SUBCUTANEOUS at 07:25

## 2019-05-11 RX ADMIN — Medication 10 ML: at 07:26

## 2019-05-11 RX ADMIN — Medication 10 ML: at 21:54

## 2019-05-11 RX ADMIN — LEVOTHYROXINE SODIUM 100 MCG: 100 TABLET ORAL at 05:33

## 2019-05-11 RX ADMIN — LINEZOLID 600 MG: 600 TABLET, FILM COATED ORAL at 21:54

## 2019-05-11 RX ADMIN — CARVEDILOL 6.25 MG: 6.25 TABLET, FILM COATED ORAL at 07:26

## 2019-05-11 RX ADMIN — AMLODIPINE BESYLATE 10 MG: 5 TABLET ORAL at 07:25

## 2019-05-11 RX ADMIN — LINEZOLID 600 MG: 600 TABLET, FILM COATED ORAL at 07:26

## 2019-05-11 RX ADMIN — CIPROFLOXACIN HYDROCHLORIDE 500 MG: 500 TABLET, FILM COATED ORAL at 07:25

## 2019-05-11 RX ADMIN — VITAMIN D, TAB 1000IU (100/BT) 2000 UNITS: 25 TAB at 16:52

## 2019-05-11 RX ADMIN — INSULIN LISPRO 12 UNITS: 100 INJECTION, SOLUTION INTRAVENOUS; SUBCUTANEOUS at 13:01

## 2019-05-11 RX ADMIN — INSULIN LISPRO 12 UNITS: 100 INJECTION, SOLUTION INTRAVENOUS; SUBCUTANEOUS at 17:22

## 2019-05-11 RX ADMIN — ONDANSETRON 4 MG: 2 INJECTION INTRAMUSCULAR; INTRAVENOUS at 09:03

## 2019-05-11 RX ADMIN — ATORVASTATIN CALCIUM 20 MG: 20 TABLET, FILM COATED ORAL at 21:54

## 2019-05-11 RX ADMIN — CARVEDILOL 6.25 MG: 6.25 TABLET, FILM COATED ORAL at 16:52

## 2019-05-11 ASSESSMENT — PAIN SCALES - GENERAL: PAINLEVEL_OUTOF10: 0

## 2019-05-11 NOTE — PROGRESS NOTES
mucosa moist. PERRL  CVS: S1 S2 normal, regular rhythm, no murmurs or rubs. Lungs: Clear, no wheezing or crackles. Abd: Soft, bowel sounds normal, non-tender. Ext: No edema RLE, LLE wrapped in ACE, no cyanosis  Skin: Warm. No rashes appreciated.   : bladder non-distended, no tenderness over the bladder        Labs:  CBC with Differential:    Lab Results   Component Value Date    WBC 7.6 05/11/2019    RBC 2.66 05/11/2019    HGB 7.8 05/11/2019    HCT 24.5 05/11/2019     05/11/2019    MCV 91.9 05/11/2019    MCH 29.3 05/11/2019    MCHC 31.9 05/11/2019    RDW 13.8 05/11/2019    SEGSPCT 79.3 07/06/2012    LYMPHOPCT 22.8 05/11/2019    MONOPCT 6.0 05/11/2019    EOSPCT 1.2 11/04/2011    BASOPCT 0.7 05/11/2019    MONOSABS 0.5 05/11/2019    LYMPHSABS 1.7 05/11/2019    EOSABS 0.3 05/11/2019    BASOSABS 0.1 05/11/2019    DIFFTYPE Auto 07/06/2012     BMP:    Lab Results   Component Value Date     05/11/2019    K 4.4 05/11/2019     05/11/2019    CO2 25 05/11/2019    BUN 40 05/11/2019    LABALBU 2.9 05/09/2019    CREATININE 3.5 05/11/2019    CALCIUM 8.8 05/11/2019    GFRAA 21 05/11/2019    GFRAA 53 07/06/2012    LABGLOM 18 05/11/2019    GLUCOSE 111 05/11/2019     Ionized Calcium:  No results found for: IONCA  Magnesium:    Lab Results   Component Value Date    MG 2.30 10/20/2017     Phosphorus:    Lab Results   Component Value Date    PHOS 3.8 05/10/2019         The right kidney measures 9.6 x 6.1 x 5.6 cm with cortical thickness 1.6 cm.       Left kidney measures 10.8 x 5.5 x 4 cm with cortical thickness 1.3 cm.       Evaluation the kidneys is slightly limited due to body habitus.  An area of   scarring is again seen within the left kidney.  Kidneys demonstrate normal   cortical echogenicity.  No evidence of hydronephrosis or intrarenal stones.           Bladder:       Unremarkable appearance of the bladder.  No significant post void residual.           Assessment/Plan:    DILIP on CKD 3b/4 vs progression of CKD: baseline Scr 2017/2018 low 2 range, adm SCr 4.2-Scr trending back up after initial improvement no recent base-line available. Hold lisinopril until acute issues resolve. Renal US is negative for HN. UA does not suggest PGIN or AIN, proteinuria is likely due to DN-in the past TAI/ANCA negative, Hep B immuned, Hep C negative.    -continue BP control  -continue IVF  - I suspect that there may have been some progression and this maybe baseline     Diabetic foot infection left. Podiatry on board, Abx per ID- on linezolid and cefepime    HTN: add carvedilol, continue amlodipine, hold ACEI    CKD III biopsy proven Diabetic nephropathy. Other risk factors include vascular disease, age and NSAID usage per history. Proteinuria secondary to diabetic nephropathy. UPC 2.5    Anemia in CKD/chronic disease. Iron stores adequate    CKD-MBD ipth elevated with low D25. Supplement     DM II HBA1Cs upto 14 in the past.       Ernestine Goodrich MD.  5/11/2019  Office Phone : [phone]  .

## 2019-05-11 NOTE — PROGRESS NOTES
Patient called this RN into room. Per patient, feels like BS is low. BS 84. Orange jucie provided. C/o nausea, prn zofran given. Will monitor.

## 2019-05-11 NOTE — PROGRESS NOTES
[facility] PROGRESS NOTE    5/11/2019 4:23 PM        Name: Joo Garcia . Admitted: 5/5/2019  Primary Care Provider: Petros Coats DO (Tel: [phone])      Subjective:   Hsd I and d of the left great Toe today. Had One episode of vomiting. No pain in the foot. Plan for delayed primary closure Monday evening .  Oral antibiotics initiated            Reviewed interval ancillary notes    Current Medications    vitamin D (CHOLECALCIFEROL) tablet 2,000 Units Daily   carvedilol (COREG) tablet 6.25 mg BID WC   ciprofloxacin (CIPRO) tablet 500 mg Daily   0.9 % sodium chloride infusion Continuous   enoxaparin (LOVENOX) injection 30 mg Daily   amLODIPine (NORVASC) tablet 10 mg Daily   sodium chloride flush 0.9 % injection 10 mL PRN   sodium chloride flush 0.9 % injection 10 mL PRN   insulin glargine (LANTUS) injection pen 13 Units Nightly   atorvastatin (LIPITOR) tablet 20 mg Nightly   insulin lispro (HUMALOG) injection pen 12 Units TID WC   levothyroxine (SYNTHROID) tablet 100 mcg Daily   sodium chloride flush 0.9 % injection 10 mL 2 times per day   sodium chloride flush 0.9 % injection 10 mL PRN   ondansetron (ZOFRAN) injection 4 mg Q6H PRN   linezolid (ZYVOX) tablet 600 mg 2 times per day   glucose (GLUTOSE) 40 % oral gel 15 g PRN   dextrose 50 % solution 12.5 g PRN   glucagon (rDNA) injection 1 mg PRN   dextrose 5 % solution PRN       Objective:  /70   Pulse 79   Temp 98.2 °F (36.8 °C) (Oral)   Resp 18   Ht 5' 10\" (1.778 m)   Wt 198 lb 3.2 oz (89.9 kg)   SpO2 96%   BMI 28.44 kg/m²     Intake/Output Summary (Last 24 hours) at 5/11/2019 1623  Last data filed at 5/11/2019 0921  Gross per 24 hour   Intake 805 ml   Output --   Net 805 ml    Wt Readings from Last 3 Encounters:   05/06/19 198 lb 3.2 oz (89.9 kg)   02/15/16 224 lb 9.6 oz (101.9 kg)   01/15/16 221 lb (100.2 kg)       General appearance: Appears comfortable  Eyes: Sclera clear. Pupils equal.  ENT: Moist oral mucosa. Trachea midline, no adenopathy. Cardiovascular: Regular rhythm, normal S1, S2. No murmur. No edema in lower extremities  Respiratory: Not using accessory muscles. Good inspiratory effort. Clear to auscultation bilaterally, no wheeze or crackles. GI: Abdomen soft, no tenderness, not distended, normal bowel sounds  Musculoskeletal: No cyanosis in digits, neck supple  Neurology: CN 2-12 grossly intact. No speech or motor deficits  Psych: Normal affect. Alert and oriented in time, place and person  Skin: Warm, dry, normal turgor  Extremity exam Dressing On the left Toe  Labs and Tests:  CBC:   Recent Labs     05/09/19  0625 05/10/19  0607 05/11/19  0608   WBC 8.2 6.7 7.6   HGB 8.1* 7.9* 7.8*    339 339     BMP:  Recent Labs     05/09/19  1258 05/10/19  0607 05/11/19  0608   * 139 138   K 4.1 4.1 4.4    104 104   CO2 19* 23 25   BUN 33* 36* 40*   CREATININE 3.4* 3.6* 3.5*   GLUCOSE 60* 103* 111*     Hepatic: Recent Labs     05/09/19  1258   AST 12*   ALT 6*   BILITOT 0.5   ALKPHOS 87     XR FOOT LEFT (MIN 3 VIEWS)   Final Result   No evidence of postoperative complication. No evidence of osteomyelitis. US RENAL COMPLETE   Final Result   Unremarkable ultrasound of the kidneys and urinary bladder. NM WBC SPECT   Final Result   Three-phase bone scan activity is seen at the left great toe, with active   inflammation and concordant white blood cell aggregation, concerning for   osteomyelitis given history of wound at the site. NM INFLAMMATORY WBC LIMITED W INDIUM 111   Final Result   Three-phase bone scan activity is seen at the left great toe, with active   inflammation and concordant white blood cell aggregation, concerning for   osteomyelitis given history of wound at the site.          NM BONE SCAN 3 PHASE   Final Result   Three-phase bone scan activity is seen at the left great toe, with active Naty Baldwin MD   5/11/2019 4:23 PM
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
## 2019-05-11 NOTE — PROGRESS NOTES
Pt assessment completed. Pt VS taken and are WNL except for elevated BP of 144/72; will continue to monitor. Pt denies pain. Pt given scheduled medications per order. Pt blood sugar 147 and Lantus given per order; will continue to monitor. Pt given fresh ice water. No further needs at this time. Call light within reach.   .Electronically signed by Donna Willard RN on 5/10/2019 at 11:46 PM

## 2019-05-11 NOTE — PROGRESS NOTES
Shift assessment completed. VSS. Denies pain. Scheduled medications given. Lungs clear. Bowel sounds active. Dressing to RLE, clean dry and intact. Toes warm and appropriate color. Denies additional needs. Will continue to monitor.

## 2019-05-11 NOTE — PROGRESS NOTES
Podiatric Surgery      Subjective:     Patient seen at bedside this morning. He has no pain. Admits some resent nausea after getting medication.        Objective:   Scheduled Meds:   carvedilol  6.25 mg Oral BID WC    ciprofloxacin  500 mg Oral Daily    enoxaparin  30 mg Subcutaneous Daily    amLODIPine  10 mg Oral Daily    insulin glargine  13 Units Subcutaneous Nightly    atorvastatin  20 mg Oral Nightly    insulin lispro  12 Units Subcutaneous TID WC    levothyroxine  100 mcg Oral Daily    sodium chloride flush  10 mL Intravenous 2 times per day    linezolid  600 mg Oral 2 times per day     Continuous Infusions:   sodium chloride 50 mL/hr at 05/10/19 1516    dextrose 100 mL/hr (05/08/19 2207)     PRN Meds:.sodium chloride flush, sodium chloride flush, sodium chloride flush, ondansetron, glucose, dextrose, glucagon (rDNA), dextrose    CBC with Differential:    Lab Results   Component Value Date    WBC 7.6 05/11/2019    RBC 2.66 05/11/2019    HGB 7.8 05/11/2019    HCT 24.5 05/11/2019     05/11/2019    MCV 91.9 05/11/2019    MCH 29.3 05/11/2019    MCHC 31.9 05/11/2019    RDW 13.8 05/11/2019    SEGSPCT 79.3 07/06/2012    LYMPHOPCT 22.8 05/11/2019    MONOPCT 6.0 05/11/2019    EOSPCT 1.2 11/04/2011    BASOPCT 0.7 05/11/2019    MONOSABS 0.5 05/11/2019    LYMPHSABS 1.7 05/11/2019    EOSABS 0.3 05/11/2019    BASOSABS 0.1 05/11/2019    DIFFTYPE Auto 07/06/2012       VITALS:  BP (!) 155/81   Pulse 86   Temp 98.3 °F (36.8 °C) (Oral)   Resp 18   Ht 5' 10\" (1.778 m)   Wt 198 lb 3.2 oz (89.9 kg)   SpO2 96%   BMI 28.44 kg/m²   24HR INTAKE/OUTPUT:    Intake/Output Summary (Last 24 hours) at 5/11/2019 0912  Last data filed at 5/10/2019 2150  Gross per 24 hour   Intake 725 ml   Output --   Net 725 ml       Bloody drainage inner dressing, no purulence, sites appear clean      Assessment:     Active Problems:    Poorly controlled type 2 diabetes mellitus ([address]    Diabetic foot infection (HCC)    Cellulitis of foot    Acute hematogenous osteomyelitis of left foot (HCC)    Elevated sed rate    Elevated C-reactive protein (CRP)    Acute kidney injury superimposed on chronic kidney disease (HCC)    Overweight    Diabetic polyneuropathy associated with type 2 diabetes mellitus (HCC)    History of methicillin resistant staphylococcus aureus (MRSA)    Essential hypertension  Resolved Problems:    * No resolved hospital problems. *      Plan:   -  Dressing : Iodoform packing and DSD  -  PWB heel only left foot  -  Continue antibiotics per ID, He will need 6 weeks  - Discussed with pt that diabetic foot infections are a staged process of surgical procedures that allows the tissues to demarcate and then assess the extent of healthy tissues vs. Devitalized tissues. Explained that the proximal phalanx is suggestive of infectious involvement via spect WCB scan. Explained to the patient that it is the goal of His primary service is to save and salvage as much of His foot as possible while still providing a functional/stable pedal structure post surgical.  - All potential risks, benefits, and complications were discussed with the patient prior to the scheduling of surgery. No guarantees or promises where made to what the outcomes will be. The patient wished to proceed with surgery on Monday.  NPO monday  -  Plan for delayed primary closure Monday evening         Megan Vivas DPM  Cell:[phone]

## 2019-05-12 LAB
ANION GAP SERPL CALCULATED.3IONS-SCNC: 10 MMOL/L (ref 3–16)
BASOPHILS ABSOLUTE: 0 K/UL (ref 0–0.2)
BASOPHILS RELATIVE PERCENT: 0.7 %
BUN BLDV-MCNC: 40 MG/DL (ref 7–20)
CALCIUM SERPL-MCNC: 8.8 MG/DL (ref 8.3–10.6)
CHLORIDE BLD-SCNC: 106 MMOL/L (ref 99–110)
CO2: 24 MMOL/L (ref 21–32)
CREAT SERPL-MCNC: 3.7 MG/DL (ref 0.8–1.3)
EOSINOPHILS ABSOLUTE: 0.5 K/UL (ref 0–0.6)
EOSINOPHILS RELATIVE PERCENT: 7 %
GFR AFRICAN AMERICAN: 20
GFR NON-AFRICAN AMERICAN: 17
GLUCOSE BLD-MCNC: 125 MG/DL (ref 70–99)
GLUCOSE BLD-MCNC: 140 MG/DL (ref 70–99)
GLUCOSE BLD-MCNC: 189 MG/DL (ref 70–99)
GLUCOSE BLD-MCNC: 69 MG/DL (ref 70–99)
GLUCOSE BLD-MCNC: 83 MG/DL (ref 70–99)
GLUCOSE BLD-MCNC: 85 MG/DL (ref 70–99)
GLUCOSE BLD-MCNC: 89 MG/DL (ref 70–99)
HCT VFR BLD CALC: 23.1 % (ref 40.5–52.5)
HEMOGLOBIN: 7.4 G/DL (ref 13.5–17.5)
LYMPHOCYTES ABSOLUTE: 1.8 K/UL (ref 1–5.1)
LYMPHOCYTES RELATIVE PERCENT: 27.1 %
MCH RBC QN AUTO: 29.8 PG (ref 26–34)
MCHC RBC AUTO-ENTMCNC: 32.1 G/DL (ref 31–36)
MCV RBC AUTO: 92.9 FL (ref 80–100)
MONOCYTES ABSOLUTE: 0.4 K/UL (ref 0–1.3)
MONOCYTES RELATIVE PERCENT: 6.5 %
NEUTROPHILS ABSOLUTE: 3.9 K/UL (ref 1.7–7.7)
NEUTROPHILS RELATIVE PERCENT: 58.7 %
PDW BLD-RTO: 13.8 % (ref 12.4–15.4)
PERFORMED ON: ABNORMAL
PERFORMED ON: NORMAL
PLATELET # BLD: 308 K/UL (ref 135–450)
PMV BLD AUTO: 6.6 FL (ref 5–10.5)
POTASSIUM SERPL-SCNC: 4.4 MMOL/L (ref 3.5–5.1)
RBC # BLD: 2.48 M/UL (ref 4.2–5.9)
SODIUM BLD-SCNC: 140 MMOL/L (ref 136–145)
WBC # BLD: 6.6 K/UL (ref 4–11)

## 2019-05-12 PROCEDURE — 2580000003 HC RX 258: Performed by: INTERNAL MEDICINE

## 2019-05-12 PROCEDURE — 6370000000 HC RX 637 (ALT 250 FOR IP): Performed by: INTERNAL MEDICINE

## 2019-05-12 PROCEDURE — 6360000002 HC RX W HCPCS: Performed by: PODIATRIST

## 2019-05-12 PROCEDURE — 80048 BASIC METABOLIC PNL TOTAL CA: CPT

## 2019-05-12 PROCEDURE — 6370000000 HC RX 637 (ALT 250 FOR IP): Performed by: PODIATRIST

## 2019-05-12 PROCEDURE — 1200000000 HC SEMI PRIVATE

## 2019-05-12 PROCEDURE — 36415 COLL VENOUS BLD VENIPUNCTURE: CPT

## 2019-05-12 PROCEDURE — 2580000003 HC RX 258: Performed by: PODIATRIST

## 2019-05-12 PROCEDURE — 85025 COMPLETE CBC W/AUTO DIFF WBC: CPT

## 2019-05-12 RX ORDER — CARVEDILOL 6.25 MG/1
12.5 TABLET ORAL 2 TIMES DAILY WITH MEALS
Status: DISCONTINUED | OUTPATIENT
Start: 2019-05-12 | End: 2019-05-14 | Stop reason: HOSPADM

## 2019-05-12 RX ORDER — SODIUM CHLORIDE 9 MG/ML
INJECTION, SOLUTION INTRAVENOUS CONTINUOUS
Status: DISCONTINUED | OUTPATIENT
Start: 2019-05-12 | End: 2019-05-14

## 2019-05-12 RX ADMIN — ENOXAPARIN SODIUM 30 MG: 30 INJECTION SUBCUTANEOUS at 08:31

## 2019-05-12 RX ADMIN — LINEZOLID 600 MG: 600 TABLET, FILM COATED ORAL at 23:49

## 2019-05-12 RX ADMIN — INSULIN LISPRO 12 UNITS: 100 INJECTION, SOLUTION INTRAVENOUS; SUBCUTANEOUS at 13:12

## 2019-05-12 RX ADMIN — ONDANSETRON 4 MG: 2 INJECTION INTRAMUSCULAR; INTRAVENOUS at 08:31

## 2019-05-12 RX ADMIN — VITAMIN D, TAB 1000IU (100/BT) 2000 UNITS: 25 TAB at 08:31

## 2019-05-12 RX ADMIN — CARVEDILOL 12.5 MG: 6.25 TABLET, FILM COATED ORAL at 17:51

## 2019-05-12 RX ADMIN — SODIUM CHLORIDE: 9 INJECTION, SOLUTION INTRAVENOUS at 09:18

## 2019-05-12 RX ADMIN — Medication 10 ML: at 08:35

## 2019-05-12 RX ADMIN — ATORVASTATIN CALCIUM 20 MG: 20 TABLET, FILM COATED ORAL at 23:49

## 2019-05-12 RX ADMIN — Medication 10 ML: at 23:50

## 2019-05-12 RX ADMIN — AMLODIPINE BESYLATE 10 MG: 5 TABLET ORAL at 08:31

## 2019-05-12 RX ADMIN — INSULIN LISPRO 12 UNITS: 100 INJECTION, SOLUTION INTRAVENOUS; SUBCUTANEOUS at 17:51

## 2019-05-12 RX ADMIN — INSULIN LISPRO 12 UNITS: 100 INJECTION, SOLUTION INTRAVENOUS; SUBCUTANEOUS at 08:31

## 2019-05-12 RX ADMIN — SODIUM CHLORIDE: 9 INJECTION, SOLUTION INTRAVENOUS at 23:50

## 2019-05-12 RX ADMIN — CARVEDILOL 6.25 MG: 6.25 TABLET, FILM COATED ORAL at 08:31

## 2019-05-12 RX ADMIN — CIPROFLOXACIN HYDROCHLORIDE 500 MG: 500 TABLET, FILM COATED ORAL at 08:31

## 2019-05-12 RX ADMIN — LINEZOLID 600 MG: 600 TABLET, FILM COATED ORAL at 08:31

## 2019-05-12 RX ADMIN — LEVOTHYROXINE SODIUM 100 MCG: 100 TABLET ORAL at 06:39

## 2019-05-12 ASSESSMENT — PAIN SCALES - GENERAL: PAINLEVEL_OUTOF10: 0

## 2019-05-12 NOTE — PROGRESS NOTES
Pt assessment completed. Pt VS taken and are WNL except for elevated BP of 165/87; will continue to monitor. Pt denies pain. Pt given scheduled medications per order. Pt blood sugar 79; pt given snack. Message to hospitalist regarding Lantus. No further needs at this time. Call light within reach.   .Electronically signed by Joy Armendariz RN on 5/11/2019 at 11:18 PM

## 2019-05-12 NOTE — PROGRESS NOTES
Bedside report received from Nikki Kim, [facility]. Pt sitting up in chair with no s/s pain or distress. No needs at this time. Call light within reach.   .Electronically signed by Nataliia Bear RN on 5/11/2019 at 9:26 PM

## 2019-05-12 NOTE — PROGRESS NOTES
PERRL  CVS: S1 S2 normal, regular rhythm, no murmurs or rubs. Lungs: Clear, no wheezing or crackles. Abd: Soft, bowel sounds normal, non-tender. Ext: No edema RLE, LLE wrapped in ACE, no cyanosis  Skin: Warm. No rashes appreciated.   : bladder non-distended, no tenderness over the bladder        Labs:  CBC with Differential:    Lab Results   Component Value Date    WBC 6.6 05/12/2019    RBC 2.48 05/12/2019    HGB 7.4 05/12/2019    HCT 23.1 05/12/2019     05/12/2019    MCV 92.9 05/12/2019    MCH 29.8 05/12/2019    MCHC 32.1 05/12/2019    RDW 13.8 05/12/2019    SEGSPCT 79.3 07/06/2012    LYMPHOPCT 27.1 05/12/2019    MONOPCT 6.5 05/12/2019    EOSPCT 1.2 11/04/2011    BASOPCT 0.7 05/12/2019    MONOSABS 0.4 05/12/2019    LYMPHSABS 1.8 05/12/2019    EOSABS 0.5 05/12/2019    BASOSABS 0.0 05/12/2019    DIFFTYPE Auto 07/06/2012     BMP:    Lab Results   Component Value Date     05/12/2019    K 4.4 05/12/2019     05/12/2019    CO2 24 05/12/2019    BUN 40 05/12/2019    LABALBU 2.9 05/09/2019    CREATININE 3.7 05/12/2019    CALCIUM 8.8 05/12/2019    GFRAA 20 05/12/2019    GFRAA 53 07/06/2012    LABGLOM 17 05/12/2019    GLUCOSE 140 05/12/2019     Ionized Calcium:  No results found for: IONCA  Magnesium:    Lab Results   Component Value Date    MG 2.30 10/20/2017     Phosphorus:    Lab Results   Component Value Date    PHOS 3.8 05/10/2019         The right kidney measures 9.6 x 6.1 x 5.6 cm with cortical thickness 1.6 cm.       Left kidney measures 10.8 x 5.5 x 4 cm with cortical thickness 1.3 cm.       Evaluation the kidneys is slightly limited due to body habitus.  An area of   scarring is again seen within the left kidney.  Kidneys demonstrate normal   cortical echogenicity.  No evidence of hydronephrosis or intrarenal stones.           Bladder:       Unremarkable appearance of the bladder.  No significant post void residual.           Assessment/Plan:    DILIP on CKD 3b/4 vs progression of CKD: baseline Scr 2017/2018 low 2 range, adm SCr 4.2-Scr trending back up after initial improvement no recent base-line available. Hold lisinopril until acute issues resolve. Renal US is negative for HN. UA does not suggest PGIN or AIN, proteinuria is likely due to DN-in the past TAI/ANCA negative, Hep B immuned, Hep C negative. - I suspect that there may have been some progression and this maybe baseline   -restart IVF tonight with NPO status     Diabetic foot infection left. Podiatry on board, Abx per ID- on linezolid and cefepime    HTN: add carvedilol, continue amlodipine, hold ACEI. Increase Coreg    CKD III biopsy proven Diabetic nephropathy. Other risk factors include vascular disease, age and NSAID usage per history. Proteinuria secondary to diabetic nephropathy. UPC 2.5    Anemia in CKD/chronic disease. Iron stores adequate    CKD-MBD ipth elevated with low D25. Supplemented     DM II HBA1Cs upto 14 in the past.       Orlando Hendrix MD.  5/12/2019  Office Phone : [phone]  .

## 2019-05-12 NOTE — PROGRESS NOTES
Message sent to hospitalist:  Pt blood sugar this evening is 79. He is scheduled to get 13 units of Lantus. His blood sugar last night was 147, he got 13 units last night and was 82 in the am.  Is it ok to hold the Lantus? Thank you. Freya Marquez Electronically signed by Krystin Fleming RN on 5/11/2019 at 10:21 PM

## 2019-05-12 NOTE — PROGRESS NOTES
[facility] PROGRESS NOTE    5/12/2019 2:18 PM        Name: Josie Lucero . Admitted: 5/5/2019  Primary Care Provider: Danita Clement DO (Tel: [phone])      Subjective:  Feels Much better. No vomiting. Tolerating diet. No pain in the legs        Reviewed interval ancillary notes    Current Medications    vitamin D (CHOLECALCIFEROL) tablet 2,000 Units Daily   carvedilol (COREG) tablet 6.25 mg BID WC   ciprofloxacin (CIPRO) tablet 500 mg Daily   enoxaparin (LOVENOX) injection 30 mg Daily   amLODIPine (NORVASC) tablet 10 mg Daily   sodium chloride flush 0.9 % injection 10 mL PRN   sodium chloride flush 0.9 % injection 10 mL PRN   insulin glargine (LANTUS) injection pen 13 Units Nightly   atorvastatin (LIPITOR) tablet 20 mg Nightly   insulin lispro (HUMALOG) injection pen 12 Units TID WC   levothyroxine (SYNTHROID) tablet 100 mcg Daily   sodium chloride flush 0.9 % injection 10 mL 2 times per day   sodium chloride flush 0.9 % injection 10 mL PRN   ondansetron (ZOFRAN) injection 4 mg Q6H PRN   linezolid (ZYVOX) tablet 600 mg 2 times per day   glucose (GLUTOSE) 40 % oral gel 15 g PRN   dextrose 50 % solution 12.5 g PRN   glucagon (rDNA) injection 1 mg PRN   dextrose 5 % solution PRN       Objective:  BP (!) 143/74   Pulse 82   Temp 98.2 °F (36.8 °C) (Oral)   Resp 16   Ht 5' 10\" (1.778 m)   Wt 198 lb 3.2 oz (89.9 kg)   SpO2 96%   BMI 28.44 kg/m²     Intake/Output Summary (Last 24 hours) at 5/12/2019 1418  Last data filed at 5/11/2019 1825  Gross per 24 hour   Intake 1380 ml   Output --   Net 1380 ml    Wt Readings from Last 3 Encounters:   05/06/19 198 lb 3.2 oz (89.9 kg)   02/15/16 224 lb 9.6 oz (101.9 kg)   01/15/16 221 lb (100.2 kg)       General appearance:  Appears comfortable  Eyes: Sclera clear. Pupils equal.  ENT: Moist oral mucosa. Trachea midline, no adenopathy.   Cardiovascular: Regular rhythm, normal S1, S2. No murmur. No edema in lower extremities  Respiratory: Not using accessory muscles. Good inspiratory effort. Clear to auscultation bilaterally, no wheeze or crackles. GI: Abdomen soft, no tenderness, not distended, normal bowel sounds  Musculoskeletal: No cyanosis in digits, neck supple  Neurology: CN 2-12 grossly intact. No speech or motor deficits  Psych: Normal affect. Alert and oriented in time, place and person  Skin: Warm, dry, normal turgor  Extremity exam Dressing On the left Foot    Labs and Tests:  CBC:   Recent Labs     05/10/19  0607 05/11/19  0608 05/12/19  0629   WBC 6.7 7.6 6.6   HGB 7.9* 7.8* 7.4*    339 308     BMP:  Recent Labs     05/10/19  0607 05/11/19  0608 05/12/19  0629    138 140   K 4.1 4.4 4.4    104 106   CO2 23 25 24   BUN 36* 40* 40*   CREATININE 3.6* 3.5* 3.7*   GLUCOSE 103* 111* 140*     Hepatic: No results for input(s): AST, ALT, ALB, BILITOT, ALKPHOS in the last 72 hours. XR FOOT LEFT (MIN 3 VIEWS)   Final Result   No evidence of postoperative complication. No evidence of osteomyelitis. US RENAL COMPLETE   Final Result   Unremarkable ultrasound of the kidneys and urinary bladder. NM WBC SPECT   Final Result   Three-phase bone scan activity is seen at the left great toe, with active   inflammation and concordant white blood cell aggregation, concerning for   osteomyelitis given history of wound at the site. NM INFLAMMATORY WBC LIMITED W INDIUM 111   Final Result   Three-phase bone scan activity is seen at the left great toe, with active   inflammation and concordant white blood cell aggregation, concerning for   osteomyelitis given history of wound at the site. NM BONE SCAN 3 PHASE   Final Result   Three-phase bone scan activity is seen at the left great toe, with active   inflammation and concordant white blood cell aggregation, concerning for   osteomyelitis given history of wound at the site.          NM BONE SPECT   Final Result   Three-phase bone scan activity is seen at the left great toe, with active   inflammation and concordant white blood cell aggregation, concerning for   osteomyelitis given history of wound at the site. XR TOE LEFT (MIN 2 VIEWS)   Final Result   1st MTP osteoarthrosis. Problem List  Active Problems:    Poorly controlled type 2 diabetes mellitus (HCC)    Diabetic foot infection ([address]    Cellulitis of foot    Acute hematogenous osteomyelitis of left foot (HCC)    Elevated sed rate    Elevated C-reactive protein (CRP)    Acute kidney injury superimposed on chronic kidney disease (HCC)    Overweight    Diabetic polyneuropathy associated with type 2 diabetes mellitus (HCC)    History of methicillin resistant staphylococcus aureus (MRSA)    Essential hypertension  Resolved Problems:    * No resolved hospital problems. *       Assessment & Plan:     Acute left great toe/foot diabetic infection with cellulitis. oral Zyvox and IV cefepeme. Podiatry Consulted. Patient had I and d of the left Great Toe. MRI can not be done. WBC Bone scan  consistent with Osteomyelitis ,.appreciate ID evlautaion . Needs Prolonged antibiotics. Conservative Treatment. No plans fof amputation . Plan for delayed primary closure Monday evening . Oral antibiotics initiated. Patient started on zyvox and Ciprofloxacin              2.  Acute-on-chronic kidney disease stage 3. Labs in am . Creatinine trending down. Creat 3.7. USG kidney last year was Normal.   3.  controlled type 2 diabetes mellitus. On lantus and sliding scale.   4.  Hypertension. NotControlled. Stop IV fluids  5.  Hypothyroidism. stable     Appreciate ID evaluation    Diet: DIET CARB CONTROL; Carb Control: 3 carb choices (45 gms)/meal  Code:Full Code  DVT PPX Lovenox  Disposition Home I 1-2 days      Karla Dunbar MD   5/12/2019 2:18 PM

## 2019-05-12 NOTE — PROGRESS NOTES
Shift assessment completed. VSS. Scheduled medications given. Dressing changed to Left foot at this time. Denies pain or additional needs.

## 2019-05-13 ENCOUNTER — ANESTHESIA (OUTPATIENT)
Dept: OPERATING ROOM | Age: 64
DRG: 629 | End: 2019-05-13
Payer: MEDICARE

## 2019-05-13 ENCOUNTER — ANESTHESIA EVENT (OUTPATIENT)
Dept: OPERATING ROOM | Age: 64
DRG: 629 | End: 2019-05-13
Payer: MEDICARE

## 2019-05-13 VITALS
DIASTOLIC BLOOD PRESSURE: 77 MMHG | OXYGEN SATURATION: 100 % | RESPIRATION RATE: 1 BRPM | SYSTOLIC BLOOD PRESSURE: 139 MMHG

## 2019-05-13 LAB
ANION GAP SERPL CALCULATED.3IONS-SCNC: 12 MMOL/L (ref 3–16)
BASOPHILS ABSOLUTE: 0.1 K/UL (ref 0–0.2)
BASOPHILS RELATIVE PERCENT: 0.8 %
BUN BLDV-MCNC: 37 MG/DL (ref 7–20)
CALCIUM SERPL-MCNC: 8.9 MG/DL (ref 8.3–10.6)
CHLORIDE BLD-SCNC: 104 MMOL/L (ref 99–110)
CO2: 23 MMOL/L (ref 21–32)
CREAT SERPL-MCNC: 3.7 MG/DL (ref 0.8–1.3)
EOSINOPHILS ABSOLUTE: 0.4 K/UL (ref 0–0.6)
EOSINOPHILS RELATIVE PERCENT: 5.3 %
GFR AFRICAN AMERICAN: 20
GFR NON-AFRICAN AMERICAN: 17
GLUCOSE BLD-MCNC: 101 MG/DL (ref 70–99)
GLUCOSE BLD-MCNC: 111 MG/DL (ref 70–99)
GLUCOSE BLD-MCNC: 116 MG/DL (ref 70–99)
GLUCOSE BLD-MCNC: 129 MG/DL (ref 70–99)
GLUCOSE BLD-MCNC: 135 MG/DL (ref 70–99)
GLUCOSE BLD-MCNC: 244 MG/DL (ref 70–99)
HCT VFR BLD CALC: 24.8 % (ref 40.5–52.5)
HEMOGLOBIN: 8 G/DL (ref 13.5–17.5)
LYMPHOCYTES ABSOLUTE: 1.9 K/UL (ref 1–5.1)
LYMPHOCYTES RELATIVE PERCENT: 26.5 %
MCH RBC QN AUTO: 29.8 PG (ref 26–34)
MCHC RBC AUTO-ENTMCNC: 32.2 G/DL (ref 31–36)
MCV RBC AUTO: 92.6 FL (ref 80–100)
MONOCYTES ABSOLUTE: 0.4 K/UL (ref 0–1.3)
MONOCYTES RELATIVE PERCENT: 5.5 %
NEUTROPHILS ABSOLUTE: 4.3 K/UL (ref 1.7–7.7)
NEUTROPHILS RELATIVE PERCENT: 61.9 %
PDW BLD-RTO: 13.7 % (ref 12.4–15.4)
PERFORMED ON: ABNORMAL
PLATELET # BLD: 316 K/UL (ref 135–450)
PMV BLD AUTO: 6.2 FL (ref 5–10.5)
POTASSIUM SERPL-SCNC: 4.4 MMOL/L (ref 3.5–5.1)
RBC # BLD: 2.68 M/UL (ref 4.2–5.9)
SODIUM BLD-SCNC: 139 MMOL/L (ref 136–145)
WBC # BLD: 7 K/UL (ref 4–11)

## 2019-05-13 PROCEDURE — 3700000000 HC ANESTHESIA ATTENDED CARE: Performed by: PODIATRIST

## 2019-05-13 PROCEDURE — 80048 BASIC METABOLIC PNL TOTAL CA: CPT

## 2019-05-13 PROCEDURE — 36415 COLL VENOUS BLD VENIPUNCTURE: CPT

## 2019-05-13 PROCEDURE — 6370000000 HC RX 637 (ALT 250 FOR IP): Performed by: PODIATRIST

## 2019-05-13 PROCEDURE — 3600000012 HC SURGERY LEVEL 2 ADDTL 15MIN: Performed by: PODIATRIST

## 2019-05-13 PROCEDURE — 87102 FUNGUS ISOLATION CULTURE: CPT

## 2019-05-13 PROCEDURE — 6360000002 HC RX W HCPCS: Performed by: PODIATRIST

## 2019-05-13 PROCEDURE — 3600000002 HC SURGERY LEVEL 2 BASE: Performed by: PODIATRIST

## 2019-05-13 PROCEDURE — 6370000000 HC RX 637 (ALT 250 FOR IP): Performed by: INTERNAL MEDICINE

## 2019-05-13 PROCEDURE — 6360000002 HC RX W HCPCS: Performed by: NURSE ANESTHETIST, CERTIFIED REGISTERED

## 2019-05-13 PROCEDURE — 0JH63XZ INSERTION OF TUNNELED VASCULAR ACCESS DEVICE INTO CHEST SUBCUTANEOUS TISSUE AND FASCIA, PERCUTANEOUS APPROACH: ICD-10-PCS | Performed by: INTERNAL MEDICINE

## 2019-05-13 PROCEDURE — 6360000002 HC RX W HCPCS: Performed by: INTERNAL MEDICINE

## 2019-05-13 PROCEDURE — 87206 SMEAR FLUORESCENT/ACID STAI: CPT

## 2019-05-13 PROCEDURE — 2580000003 HC RX 258: Performed by: NURSE ANESTHETIST, CERTIFIED REGISTERED

## 2019-05-13 PROCEDURE — 2580000003 HC RX 258: Performed by: PODIATRIST

## 2019-05-13 PROCEDURE — 7100000001 HC PACU RECOVERY - ADDTL 15 MIN: Performed by: PODIATRIST

## 2019-05-13 PROCEDURE — 85025 COMPLETE CBC W/AUTO DIFF WBC: CPT

## 2019-05-13 PROCEDURE — 2500000003 HC RX 250 WO HCPCS: Performed by: PODIATRIST

## 2019-05-13 PROCEDURE — 87075 CULTR BACTERIA EXCEPT BLOOD: CPT

## 2019-05-13 PROCEDURE — 2709999900 HC NON-CHARGEABLE SUPPLY: Performed by: PODIATRIST

## 2019-05-13 PROCEDURE — 3700000001 HC ADD 15 MINUTES (ANESTHESIA): Performed by: PODIATRIST

## 2019-05-13 PROCEDURE — 1200000000 HC SEMI PRIVATE

## 2019-05-13 PROCEDURE — 87070 CULTURE OTHR SPECIMN AEROBIC: CPT

## 2019-05-13 PROCEDURE — 87116 MYCOBACTERIA CULTURE: CPT

## 2019-05-13 PROCEDURE — 87015 SPECIMEN INFECT AGNT CONCNTJ: CPT

## 2019-05-13 PROCEDURE — 7100000000 HC PACU RECOVERY - FIRST 15 MIN: Performed by: PODIATRIST

## 2019-05-13 PROCEDURE — 99233 SBSQ HOSP IP/OBS HIGH 50: CPT | Performed by: INTERNAL MEDICINE

## 2019-05-13 PROCEDURE — 87205 SMEAR GRAM STAIN: CPT

## 2019-05-13 RX ORDER — SODIUM CHLORIDE 0.9 % (FLUSH) 0.9 %
10 SYRINGE (ML) INJECTION PRN
Status: DISCONTINUED | OUTPATIENT
Start: 2019-05-13 | End: 2019-05-14 | Stop reason: HOSPADM

## 2019-05-13 RX ORDER — BUPIVACAINE HYDROCHLORIDE 5 MG/ML
INJECTION, SOLUTION EPIDURAL; INTRACAUDAL
Status: COMPLETED | OUTPATIENT
Start: 2019-05-13 | End: 2019-05-13

## 2019-05-13 RX ORDER — PROPOFOL 10 MG/ML
INJECTION, EMULSION INTRAVENOUS CONTINUOUS PRN
Status: DISCONTINUED | OUTPATIENT
Start: 2019-05-13 | End: 2019-05-13 | Stop reason: SDUPTHER

## 2019-05-13 RX ORDER — LIDOCAINE HYDROCHLORIDE 10 MG/ML
INJECTION, SOLUTION EPIDURAL; INFILTRATION; INTRACAUDAL; PERINEURAL
Status: COMPLETED | OUTPATIENT
Start: 2019-05-13 | End: 2019-05-13

## 2019-05-13 RX ORDER — SODIUM CHLORIDE 0.9 % (FLUSH) 0.9 %
10 SYRINGE (ML) INJECTION EVERY 12 HOURS SCHEDULED
Status: DISCONTINUED | OUTPATIENT
Start: 2019-05-13 | End: 2019-05-14 | Stop reason: HOSPADM

## 2019-05-13 RX ORDER — PROPOFOL 10 MG/ML
INJECTION, EMULSION INTRAVENOUS PRN
Status: DISCONTINUED | OUTPATIENT
Start: 2019-05-13 | End: 2019-05-13 | Stop reason: SDUPTHER

## 2019-05-13 RX ORDER — SODIUM CHLORIDE 9 MG/ML
INJECTION, SOLUTION INTRAVENOUS CONTINUOUS PRN
Status: DISCONTINUED | OUTPATIENT
Start: 2019-05-13 | End: 2019-05-13 | Stop reason: SDUPTHER

## 2019-05-13 RX ORDER — CEFAZOLIN SODIUM 2 G/100ML
2 INJECTION, SOLUTION INTRAVENOUS EVERY 8 HOURS
Status: DISCONTINUED | OUTPATIENT
Start: 2019-05-13 | End: 2019-05-14 | Stop reason: HOSPADM

## 2019-05-13 RX ORDER — LIDOCAINE HYDROCHLORIDE 10 MG/ML
5 INJECTION, SOLUTION EPIDURAL; INFILTRATION; INTRACAUDAL; PERINEURAL ONCE
Status: DISCONTINUED | OUTPATIENT
Start: 2019-05-13 | End: 2019-05-14 | Stop reason: HOSPADM

## 2019-05-13 RX ORDER — MAGNESIUM HYDROXIDE 1200 MG/15ML
LIQUID ORAL CONTINUOUS PRN
Status: COMPLETED | OUTPATIENT
Start: 2019-05-13 | End: 2019-05-13

## 2019-05-13 RX ADMIN — PROPOFOL 100 MCG/KG/MIN: 10 INJECTION, EMULSION INTRAVENOUS at 17:27

## 2019-05-13 RX ADMIN — AMLODIPINE BESYLATE 10 MG: 5 TABLET ORAL at 08:59

## 2019-05-13 RX ADMIN — LINEZOLID 600 MG: 600 TABLET, FILM COATED ORAL at 08:59

## 2019-05-13 RX ADMIN — CIPROFLOXACIN HYDROCHLORIDE 500 MG: 500 TABLET, FILM COATED ORAL at 08:59

## 2019-05-13 RX ADMIN — CEFAZOLIN SODIUM 2 G: 2 INJECTION, SOLUTION INTRAVENOUS at 12:52

## 2019-05-13 RX ADMIN — CARVEDILOL 12.5 MG: 6.25 TABLET, FILM COATED ORAL at 18:44

## 2019-05-13 RX ADMIN — CARVEDILOL 12.5 MG: 6.25 TABLET, FILM COATED ORAL at 08:59

## 2019-05-13 RX ADMIN — PROPOFOL 40 MG: 10 INJECTION, EMULSION INTRAVENOUS at 17:27

## 2019-05-13 RX ADMIN — PROPOFOL 20 MG: 10 INJECTION, EMULSION INTRAVENOUS at 17:30

## 2019-05-13 RX ADMIN — ATORVASTATIN CALCIUM 20 MG: 20 TABLET, FILM COATED ORAL at 21:54

## 2019-05-13 RX ADMIN — SODIUM CHLORIDE: 9 INJECTION, SOLUTION INTRAVENOUS at 17:24

## 2019-05-13 RX ADMIN — Medication 10 ML: at 09:00

## 2019-05-13 RX ADMIN — CEFAZOLIN SODIUM 2 G: 2 INJECTION, SOLUTION INTRAVENOUS at 21:54

## 2019-05-13 ASSESSMENT — PULMONARY FUNCTION TESTS
PIF_VALUE: 1
PIF_VALUE: 0
PIF_VALUE: 1
PIF_VALUE: 0
PIF_VALUE: 1
PIF_VALUE: 0
PIF_VALUE: 1
PIF_VALUE: 0
PIF_VALUE: 1

## 2019-05-13 ASSESSMENT — ENCOUNTER SYMPTOMS
CONSTIPATION: 0
TROUBLE SWALLOWING: 0
DIARRHEA: 0
COUGH: 0
BACK PAIN: 0
EYE REDNESS: 0
SORE THROAT: 0
ABDOMINAL PAIN: 0
WHEEZING: 0
SHORTNESS OF BREATH: 0
EYE DISCHARGE: 0
NAUSEA: 0
RHINORRHEA: 0

## 2019-05-13 ASSESSMENT — PAIN SCALES - GENERAL: PAINLEVEL_OUTOF10: 0

## 2019-05-13 ASSESSMENT — LIFESTYLE VARIABLES: SMOKING_STATUS: 0

## 2019-05-13 NOTE — PROGRESS NOTES
New orders from Kaylyn Silva NP to hold lantus this evening. Will continue to monitor.   Lanette Cole
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
## 2019-05-13 NOTE — PROGRESS NOTES
Received a call from  to request iv benefits for iv home infusion thru [employer],however not able to run benefits due to patient only on oral abt at this time. Discharge planner notified.

## 2019-05-13 NOTE — PROGRESS NOTES
Nutrition Assessment (Low Risk)    Type and Reason for Visit: Reassess    Nutrition Recommendations:   Continue to encourage protein intake with each meal     Nutrition Assessment:  Patient's nutrition status is stable as evidenced by continued PO intake % of meals on [facility] diet. Pt s/p L foot I&D 5/10; to have delayed primary closure this evening. Pt continues to consume protein at every meal to promote wound healing. Deemed to be at low risk at this time. Will continue to monitor for changes in status.      Malnutrition Assessment:  · Malnutrition Status: No malnutrition    Nutrition Risk Level   Risk Level: Low    Nutrition Diagnosis:   · Problem: Increased nutrient needs  · Etiology: Increased demand for energy/nutrients    Signs and symptoms: Presence of wounds    Nutrition Intervention:  Food and/or Delivery: Continue current diet  Nutrition Education/Counseling/Coordination of Care:  Continued Inpatient Monitoring, Education Not Indicated      Electronically signed by Rukhsana Haque RD, LD on 5/13/19 at 11:47 AM    Contact Number: [phone]

## 2019-05-13 NOTE — PROGRESS NOTES
Perfect serve message sent to Dr. Pernell Vila to ask about pt's Lantus. Will continue to monitor.   Nettie Greenwoodar

## 2019-05-13 NOTE — PLAN OF CARE
Problem: Falls - Risk of:  Goal: Will remain free from falls  Description  Will remain free from falls  Outcome: Ongoing  Goal: Absence of physical injury  Description  Absence of physical injury  Outcome: Ongoing     Problem: Nutrition  Goal: Optimal nutrition therapy  Outcome: Ongoing

## 2019-05-13 NOTE — PROGRESS NOTES
[facility] PROGRESS NOTE    5/13/2019 11:11 AM        Name: Paulo Snider . Admitted: 5/5/2019  Primary Care Provider: Cristhian Piña DO (Tel: [phone])      Subjective: No new Complaint. Tolerating diet. No Vomiting. Reviewed interval ancillary notes    Current Medications    carvedilol (COREG) tablet 12.5 mg BID WC   0.9 % sodium chloride infusion Continuous   vitamin D (CHOLECALCIFEROL) tablet 2,000 Units Daily   ciprofloxacin (CIPRO) tablet 500 mg Daily   enoxaparin (LOVENOX) injection 30 mg Daily   amLODIPine (NORVASC) tablet 10 mg Daily   sodium chloride flush 0.9 % injection 10 mL PRN   sodium chloride flush 0.9 % injection 10 mL PRN   insulin glargine (LANTUS) injection pen 13 Units Nightly   atorvastatin (LIPITOR) tablet 20 mg Nightly   insulin lispro (HUMALOG) injection pen 12 Units TID WC   levothyroxine (SYNTHROID) tablet 100 mcg Daily   sodium chloride flush 0.9 % injection 10 mL 2 times per day   sodium chloride flush 0.9 % injection 10 mL PRN   ondansetron (ZOFRAN) injection 4 mg Q6H PRN   linezolid (ZYVOX) tablet 600 mg 2 times per day   glucose (GLUTOSE) 40 % oral gel 15 g PRN   dextrose 50 % solution 12.5 g PRN   glucagon (rDNA) injection 1 mg PRN   dextrose 5 % solution PRN       Objective:  /62   Pulse 82   Temp 98 °F (36.7 °C) (Oral)   Resp 14   Ht 5' 10\" (1.778 m)   Wt 198 lb 3.2 oz (89.9 kg)   SpO2 95%   BMI 28.44 kg/m²     Intake/Output Summary (Last 24 hours) at 5/13/2019 1111  Last data filed at 5/13/2019 0858  Gross per 24 hour   Intake 463 ml   Output --   Net 463 ml    Wt Readings from Last 3 Encounters:   05/06/19 198 lb 3.2 oz (89.9 kg)   02/15/16 224 lb 9.6 oz (101.9 kg)   01/15/16 221 lb (100.2 kg)       General appearance:  Appears comfortable  Eyes: Sclera clear. Pupils equal.  ENT: Moist oral mucosa. Trachea midline, no adenopathy.   Cardiovascular: NM BONE SPECT   Final Result   Three-phase bone scan activity is seen at the left great toe, with active   inflammation and concordant white blood cell aggregation, concerning for   osteomyelitis given history of wound at the site. XR TOE LEFT (MIN 2 VIEWS)   Final Result   1st MTP osteoarthrosis. Problem List  Active Problems:    Poorly controlled type 2 diabetes mellitus (HCC)    Diabetic foot infection ([address]    Cellulitis of foot    Acute hematogenous osteomyelitis of left foot (HCC)    Elevated sed rate    Elevated C-reactive protein (CRP)    Acute kidney injury superimposed on chronic kidney disease (HCC)    Overweight    Diabetic polyneuropathy associated with type 2 diabetes mellitus (HCC)    History of methicillin resistant staphylococcus aureus (MRSA)    Essential hypertension  Resolved Problems:    * No resolved hospital problems. *       Assessment & Plan:     1 Acute left great toe/foot diabetic infection with cellulitis. oral Zyvox and IV cefepeme. Podiatry Consulted. Patient had I and d of the left Great Toe. MRI can not be done. WBC Bone scan  consistent with Osteomyelitis ,.appreciate ID evlautaion . Needs Prolonged antibiotics. Conservative Treatment. No plans fof amputation . Plan for delayed primary closure Monday evening . Oral antibiotics initiated. Patient started on zyvox and Ciprofloxacin. Will need For 6 weeks               2.  Acute-on-chronic kidney disease stage 3. Labs in am . Creatinine trending down. Creat 3.7. USG kidney last year was Normal.     3.  controlled type 2 diabetes mellitus. On lantus and sliding scale.     4.  Hypertension. Controlled. IV fluids     5.  Hypothyroidism. stable         Diet: Diet NPO, After Midnight  Code:Full Code  DVT PPX Lovenox  Disposition  Home [address], [place]   5/13/2019 11:11 AM

## 2019-05-13 NOTE — PROGRESS NOTES
Observed Danny's note regarding conversation with nephrology and preference for a tunneled IJ CVC ( 5/13 @0851).  PICC referral completed

## 2019-05-13 NOTE — PROGRESS NOTES
Upon reviewing chart it is noted that Chele Franks has asked for Nephrology Clearance for PICC placement, as per [facility] protocol. Please call PICC team when clearance and consent are obtained.

## 2019-05-13 NOTE — PROGRESS NOTES
Nephrology Progress Note  [phone]  [phone]   [email or web address]    Patient:  Mariama Murray   : 1955    CC: DILIP on CKD    Brief HPI    The patient is a 75 y.o.male with significant past medical history of CKD III,  Hypertension, Hyperlipidemia, DM II, Diabetic retinopathy, PVD,  Hypothyroidism is admitted for left diabetic foot infection and is on antibiotics per ID. He was seen by Podiatry and there is a possibility of I&D vs amputation. We are consulted for DILIP on CKD  He is followed by Dr. Swetha Montemayor in the office. He was last seen in 2018. His CKD is secondary to biopsy proven Diabetic Nephropathy. No recent base-line available, but his creatinine in 2018 was in low 2s. His creatinine on admission was 4.2 and has improved to 3.6 today.       Subjective:  -pt seen and examined  Creatinine stable  Awaiting surgery today  No fevers/chills      Review of Systems   Pain left toe persistent    SHx:  No Visitors at the bed-side    Meds:  Scheduled Meds:   carvedilol  12.5 mg Oral BID WC    vitamin D  2,000 Units Oral Daily    ciprofloxacin  500 mg Oral Daily    enoxaparin  30 mg Subcutaneous Daily    amLODIPine  10 mg Oral Daily    insulin glargine  13 Units Subcutaneous Nightly    atorvastatin  20 mg Oral Nightly    insulin lispro  12 Units Subcutaneous TID WC    levothyroxine  100 mcg Oral Daily    sodium chloride flush  10 mL Intravenous 2 times per day    linezolid  600 mg Oral 2 times per day     Continuous Infusions:   sodium chloride 50 mL/hr at 19 2350    dextrose 100 mL/hr (19 2207)     PRN Meds:.sodium chloride flush, sodium chloride flush, sodium chloride flush, ondansetron, glucose, dextrose, glucagon (rDNA), dextrose      Vitals:  /62   Pulse 82   Temp 98 °F (36.7 °C) (Oral)   Resp 14   Ht 5' 10\" (1.778 m)   Wt 198 lb 3.2 oz (89.9 kg)   SpO2 95%   BMI 28.44 kg/m²       Physical Exam  General : AAOx3, not in pain or respiratory distress, resting in bed  HEENT : mucosa moist. PERRL  CVS: S1 S2 normal, regular rhythm, no murmurs or rubs. Lungs: Clear, no wheezing or crackles. Abd: Soft, bowel sounds normal, non-tender. Ext: No edema RLE, LLE wrapped in ACE, no cyanosis  Skin: Warm. No rashes appreciated.       Labs:  CBC with Differential:    Lab Results   Component Value Date    WBC 7.0 05/13/2019    RBC 2.68 05/13/2019    HGB 8.0 05/13/2019    HCT 24.8 05/13/2019     05/13/2019    MCV 92.6 05/13/2019    MCH 29.8 05/13/2019    MCHC 32.2 05/13/2019    RDW 13.7 05/13/2019    SEGSPCT 79.3 07/06/2012    LYMPHOPCT 26.5 05/13/2019    MONOPCT 5.5 05/13/2019    EOSPCT 1.2 11/04/2011    BASOPCT 0.8 05/13/2019    MONOSABS 0.4 05/13/2019    LYMPHSABS 1.9 05/13/2019    EOSABS 0.4 05/13/2019    BASOSABS 0.1 05/13/2019    DIFFTYPE Auto 07/06/2012     BMP:    Lab Results   Component Value Date     05/13/2019    K 4.4 05/13/2019     05/13/2019    CO2 23 05/13/2019    BUN 37 05/13/2019    LABALBU 2.9 05/09/2019    CREATININE 3.7 05/13/2019    CALCIUM 8.9 05/13/2019    GFRAA 20 05/13/2019    GFRAA 53 07/06/2012    LABGLOM 17 05/13/2019    GLUCOSE 129 05/13/2019     Ionized Calcium:  No results found for: IONCA  Magnesium:    Lab Results   Component Value Date    MG 2.30 10/20/2017     Phosphorus:    Lab Results   Component Value Date    PHOS 3.8 05/10/2019         The right kidney measures 9.6 x 6.1 x 5.6 cm with cortical thickness 1.6 cm.       Left kidney measures 10.8 x 5.5 x 4 cm with cortical thickness 1.3 cm.       Evaluation the kidneys is slightly limited due to body habitus.  An area of   scarring is again seen within the left kidney.  Kidneys demonstrate normal   cortical echogenicity.  No evidence of hydronephrosis or intrarenal stones.           Bladder:       Unremarkable appearance of the bladder.  No significant post void residual.           Assessment/Plan:    DILIP on CKD 3b/4 vs progression of CKD: baseline Scr 2017/2018 low 2 range, adm SCr 4.2-Scr trending back up after initial improvement no recent base-line available. Hold lisinopril until acute issues resolve. Renal US is negative for HN. UA does not suggest PGIN or AIN, proteinuria is likely due to DN-in the past TAI/ANCA negative, Hep B immuned, Hep C negative. - I suspect that there may have been some progression and this maybe baseline   Continue IVF while NPO    Diabetic foot infection left. Podiatry on board, Abx per ID- on linezolid and cefepime  Would prefer to have a tunneled PICC in his IJ by interventional radiology    HTN better controlled  Continue same meds    CKD III biopsy proven Diabetic nephropathy. Other risk factors include vascular disease, age and NSAID usage per history. Proteinuria secondary to diabetic nephropathy. UPC 2.5    Anemia in CKD/chronic disease. Iron stores adequate    CKD-MBD ipth elevated with low D25. Supplemented     DM II HBA1Cs upto 14 in the past.       Manjinder Antonio MD.  5/13/2019  Office Phone : [phone]  .

## 2019-05-13 NOTE — BRIEF OP NOTE
Brief Postoperative Note  ______________________________________________________________    Patient: Joe Hall  YOB: 1955  MRN: [id number]  Date of Procedure: 5/13/2019    Pre-Op Diagnosis: Diabetic Foot Infection of Left Foot    Post-Op Diagnosis: Same       Procedure(s):  INCISION, DRAINAGE, AND DEBRIDEMENT OF LEFT FOOT WITH DELAYED PRIMARY CLOSURE    Anesthesia: Monitor Anesthesia Care    Surgeon(s):  Jaclyn Kendall DPM    Assistant:     Estimated Blood Loss (mL): less than 10     Complications: None    Specimens:   ID Type Source Tests Collected by Time Destination   1 : 1) LEFT FOOT AEROBIC, ANAEROBIC, ACID FAST, FUNGAL, GRAM STAIN Tissue Tissue FUNGUS CULTURE, TISSUE CULTURE, ACID FAST CULTURE WITH SMEAR Terese Stallworth 5/13/2019 1743        Implants:  * No implants in log *      Drains: * No LDAs found *    Findings: Surgical sites clean no purulence    Jaclyn Kendall DPM  Date: 5/13/2019  Time: 5:52 PM

## 2019-05-13 NOTE — PROGRESS NOTES
Pt arrived back to 5561 awake and alert. VSS except BP slightly elevated. Scheduled coreg administered. Dietary in to take dinner order. Continuous fluids restarted. Pt ambulated to bathroom and back to bed without difficulty. Dressing to left foot C/D/I. Pt states no current needs. Call light in reach.

## 2019-05-13 NOTE — PROGRESS NOTES
No response from Dr. Eric Singh about pt's Lantus. Perfect serve message sent to Bridget Haern NP to review case. Will continue to monitor.   Contreras Damico

## 2019-05-13 NOTE — PROGRESS NOTES
Shift assessment completed, see doc flowsheets. Pt currently resting in bed, asking for snack before midnight. Pt has been hypoglycemic and will be NPO after midnight. Will check with MD before administering Lantus for this evening. Call light within reach, will continue to monitor.   Zach Hoskins

## 2019-05-13 NOTE — PROGRESS NOTES
Pt admitted to PACU, VSS, pt awake and oriented, L foot drnsg CD&I, pt denies pain, weaned to RA, will monitor

## 2019-05-13 NOTE — PROGRESS NOTES
Bedside handoff complete with Garrett Gruber RN. Whiteboard updated. Pt resting comfortably in bed. No current needs noted. Call light in reach.
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
## 2019-05-13 NOTE — PROGRESS NOTES
Spoke to Dr. Jennie Mustafa with nephrology. She would prefer pt have a tunneled PICC line in the jugular vein placed by IR.  Will notify Dr. Ada Mcmahon.

## 2019-05-13 NOTE — PROGRESS NOTES
Shift assessment complete. VSS. Scheduled meds administered with small sip of water. POC for shift reviewed and agreed on. Pt states no current needs. Call light in reach.

## 2019-05-13 NOTE — OP NOTE
Patient: Atif Wolfe  YOB: 1955  MRN: [id number]  Date of Procedure: 5/13/2019    Pre-Op Diagnosis: Diabetic Foot Infection of Left Foot    Post-Op Diagnosis: Same       Procedure(s):  INCISION, DRAINAGE, AND DEBRIDEMENT OF LEFT FOOT WITH DELAYED PRIMARY CLOSURE    Anesthesia: Monitor Anesthesia Care    Surgeon(s):  Paulo Esquivel DPM    Assistant:     Estimated Blood Loss (mL): less than 10     Complications: None    Specimens:   ID Type Source Tests Collected by Time Destination   1 : 1) LEFT FOOT AEROBIC, ANAEROBIC, ACID FAST, FUNGAL, GRAM STAIN Tissue Tissue FUNGUS CULTURE, TISSUE CULTURE, ACID FAST CULTURE WITH SMEAR Hue Subramanian 26 5/13/2019 1743        Implants:  * No implants in log *      Drains: * No LDAs found *    Findings: Surgical sites clean no purulence    Indication for Procedure: This is a 75year old Male here for an non-elective procedure due to a foot infection of the left foot. The anticipated procedure, expected post-operative course and all benefits, risks, and complications were explained to the patient in detail. The patient's questions were answered to their satisfaction. Informed and written consent were obtained and placed in the chart. Procedure:  Under mild sedation the patient was brought back to the OR and placed on the operating table in the supine position. Following the induction of IV anesthesia a local anesthetic block was then infiltrated proximal to and circumferentially around the planned operative area. The left extremity was then scrubbed prepped and draped. Details of procedure #1: Debridement of wound with delayed primary closure left foot 2 locations  At this time, attention was directed to the previous surgical sites both dorsal and plantar at the level of the distal medial left foot. At this time the surgical sites were inspected and any nonviable tissue was identified and sharply debrided using a 15 blade.   The dorsal site was free of any purulence or necrosis. The plantar surgical site had approximately 50% granular tissue 10% necrotic tissue and 40% fibrous tissue. The fibrous and necrotic tissue was excisionally debrided. Following this the sites were pulse lavaged using 3000 mL of normal saline. Next this sites were assessed for delayed primary closure. Both sides appear to be able to adequately coapted. Using 2-0 and 3-0 nylon the sites were closed in a simple and double simple fashion. The central area of the plantar wound was still open due to tissue loss. Next the sites were dressed with Betadine ointment 4 x 4's, Kerlix, ABDs and Ace bandage. The patient tolerated the procedure and anesthesia well and was transported from the operating room to the PACU with vital signs stable and vascular status intact to all digits  of the patient's left lower extremity. Following a short period of post-operative monitoring, the patient will be readmitted to the hospital. The patient is to follow-up with  Dr. Carlos Adrian in the wound care center within 5-7 days after discharge. The patient is to keep dressing clean, dry and intact at all times. Should the patient develop any post-op complications or have any questions prior to the first post-operative visit they can contact  at the number provided in the chart.

## 2019-05-13 NOTE — CARE COORDINATION
Aware that ID plan is for home on orals. [facility] cancelling referral for benefits check.   Electronically signed by ZHEN Maria on 5/13/2019 at 11:29 AM

## 2019-05-14 ENCOUNTER — APPOINTMENT (OUTPATIENT)
Dept: INTERVENTIONAL RADIOLOGY/VASCULAR | Age: 64
DRG: 629 | End: 2019-05-14
Payer: MEDICARE

## 2019-05-14 VITALS
BODY MASS INDEX: 28.37 KG/M2 | OXYGEN SATURATION: 96 % | HEART RATE: 104 BPM | HEIGHT: 70 IN | SYSTOLIC BLOOD PRESSURE: 135 MMHG | DIASTOLIC BLOOD PRESSURE: 72 MMHG | WEIGHT: 198.2 LBS | TEMPERATURE: 97.8 F | RESPIRATION RATE: 16 BRPM

## 2019-05-14 LAB
ANION GAP SERPL CALCULATED.3IONS-SCNC: 9 MMOL/L (ref 3–16)
APTT: 32.4 SEC (ref 26–36)
BASOPHILS ABSOLUTE: 0.1 K/UL (ref 0–0.2)
BASOPHILS RELATIVE PERCENT: 0.8 %
BUN BLDV-MCNC: 35 MG/DL (ref 7–20)
CALCIUM SERPL-MCNC: 8.9 MG/DL (ref 8.3–10.6)
CHLORIDE BLD-SCNC: 105 MMOL/L (ref 99–110)
CO2: 24 MMOL/L (ref 21–32)
CREAT SERPL-MCNC: 3.7 MG/DL (ref 0.8–1.3)
EOSINOPHILS ABSOLUTE: 0.4 K/UL (ref 0–0.6)
EOSINOPHILS RELATIVE PERCENT: 6.3 %
GFR AFRICAN AMERICAN: 20
GFR NON-AFRICAN AMERICAN: 17
GLUCOSE BLD-MCNC: 111 MG/DL (ref 70–99)
GLUCOSE BLD-MCNC: 116 MG/DL (ref 70–99)
GLUCOSE BLD-MCNC: 84 MG/DL (ref 70–99)
GLUCOSE BLD-MCNC: 93 MG/DL (ref 70–99)
HCT VFR BLD CALC: 23.6 % (ref 40.5–52.5)
HEMOGLOBIN: 7.5 G/DL (ref 13.5–17.5)
INR BLD: 1.18 (ref 0.86–1.14)
LYMPHOCYTES ABSOLUTE: 1.6 K/UL (ref 1–5.1)
LYMPHOCYTES RELATIVE PERCENT: 24.8 %
MCH RBC QN AUTO: 29.1 PG (ref 26–34)
MCHC RBC AUTO-ENTMCNC: 31.7 G/DL (ref 31–36)
MCV RBC AUTO: 92 FL (ref 80–100)
MONOCYTES ABSOLUTE: 0.5 K/UL (ref 0–1.3)
MONOCYTES RELATIVE PERCENT: 7.1 %
NEUTROPHILS ABSOLUTE: 3.9 K/UL (ref 1.7–7.7)
NEUTROPHILS RELATIVE PERCENT: 61 %
PDW BLD-RTO: 13.9 % (ref 12.4–15.4)
PERFORMED ON: ABNORMAL
PERFORMED ON: NORMAL
PERFORMED ON: NORMAL
PLATELET # BLD: 305 K/UL (ref 135–450)
PMV BLD AUTO: 6.7 FL (ref 5–10.5)
POTASSIUM SERPL-SCNC: 4.9 MMOL/L (ref 3.5–5.1)
PROTHROMBIN TIME: 13.4 SEC (ref 9.8–13)
RBC # BLD: 2.57 M/UL (ref 4.2–5.9)
SODIUM BLD-SCNC: 138 MMOL/L (ref 136–145)
WBC # BLD: 6.4 K/UL (ref 4–11)

## 2019-05-14 PROCEDURE — 2500000003 HC RX 250 WO HCPCS: Performed by: FAMILY MEDICINE

## 2019-05-14 PROCEDURE — 6360000002 HC RX W HCPCS: Performed by: PODIATRIST

## 2019-05-14 PROCEDURE — 99232 SBSQ HOSP IP/OBS MODERATE 35: CPT | Performed by: INTERNAL MEDICINE

## 2019-05-14 PROCEDURE — 80048 BASIC METABOLIC PNL TOTAL CA: CPT

## 2019-05-14 PROCEDURE — 6360000002 HC RX W HCPCS: Performed by: FAMILY MEDICINE

## 2019-05-14 PROCEDURE — 6370000000 HC RX 637 (ALT 250 FOR IP): Performed by: INTERNAL MEDICINE

## 2019-05-14 PROCEDURE — 6370000000 HC RX 637 (ALT 250 FOR IP): Performed by: PODIATRIST

## 2019-05-14 PROCEDURE — 76937 US GUIDE VASCULAR ACCESS: CPT

## 2019-05-14 PROCEDURE — 85730 THROMBOPLASTIN TIME PARTIAL: CPT

## 2019-05-14 PROCEDURE — 77001 FLUOROGUIDE FOR VEIN DEVICE: CPT

## 2019-05-14 PROCEDURE — 2580000003 HC RX 258: Performed by: INTERNAL MEDICINE

## 2019-05-14 PROCEDURE — 6360000002 HC RX W HCPCS: Performed by: INTERNAL MEDICINE

## 2019-05-14 PROCEDURE — 36558 INSERT TUNNELED CV CATH: CPT

## 2019-05-14 PROCEDURE — 6370000000 HC RX 637 (ALT 250 FOR IP): Performed by: FAMILY MEDICINE

## 2019-05-14 PROCEDURE — 85025 COMPLETE CBC W/AUTO DIFF WBC: CPT

## 2019-05-14 PROCEDURE — C1751 CATH, INF, PER/CENT/MIDLINE: HCPCS

## 2019-05-14 PROCEDURE — 36415 COLL VENOUS BLD VENIPUNCTURE: CPT

## 2019-05-14 PROCEDURE — 85610 PROTHROMBIN TIME: CPT

## 2019-05-14 RX ORDER — LIDOCAINE HYDROCHLORIDE AND EPINEPHRINE BITARTRATE 20; .01 MG/ML; MG/ML
20 INJECTION, SOLUTION SUBCUTANEOUS ONCE
Status: COMPLETED | OUTPATIENT
Start: 2019-05-14 | End: 2019-05-14

## 2019-05-14 RX ORDER — AMLODIPINE BESYLATE 10 MG/1
10 TABLET ORAL DAILY
Qty: 30 TABLET | Refills: 0 | Status: SHIPPED | OUTPATIENT
Start: 2019-05-15 | End: 2019-12-18 | Stop reason: ALTCHOICE

## 2019-05-14 RX ORDER — CARVEDILOL 12.5 MG/1
12.5 TABLET ORAL 2 TIMES DAILY WITH MEALS
Qty: 60 TABLET | Refills: 0 | Status: SHIPPED | OUTPATIENT
Start: 2019-05-14 | End: 2019-12-18 | Stop reason: ALTCHOICE

## 2019-05-14 RX ORDER — HEPARIN SODIUM 200 [USP'U]/100ML
30 INJECTION, SOLUTION INTRAVENOUS CONTINUOUS
Status: ACTIVE | OUTPATIENT
Start: 2019-05-14 | End: 2019-05-14

## 2019-05-14 RX ORDER — LIDOCAINE HYDROCHLORIDE 10 MG/ML
10 INJECTION, SOLUTION EPIDURAL; INFILTRATION; INTRACAUDAL; PERINEURAL ONCE
Status: COMPLETED | OUTPATIENT
Start: 2019-05-14 | End: 2019-05-14

## 2019-05-14 RX ADMIN — HEPARIN SODIUM 30 ML/HR: 200 INJECTION, SOLUTION INTRAVENOUS at 09:48

## 2019-05-14 RX ADMIN — CEFAZOLIN SODIUM 2 G: 2 INJECTION, SOLUTION INTRAVENOUS at 05:44

## 2019-05-14 RX ADMIN — ONDANSETRON 4 MG: 2 INJECTION INTRAMUSCULAR; INTRAVENOUS at 08:27

## 2019-05-14 RX ADMIN — Medication: at 09:49

## 2019-05-14 RX ADMIN — AMLODIPINE BESYLATE 10 MG: 5 TABLET ORAL at 13:39

## 2019-05-14 RX ADMIN — CARVEDILOL 12.5 MG: 6.25 TABLET, FILM COATED ORAL at 17:56

## 2019-05-14 RX ADMIN — INSULIN LISPRO 12 UNITS: 100 INJECTION, SOLUTION INTRAVENOUS; SUBCUTANEOUS at 13:39

## 2019-05-14 RX ADMIN — LIDOCAINE HYDROCHLORIDE 10 ML: 10 INJECTION, SOLUTION EPIDURAL; INFILTRATION; INTRACAUDAL; PERINEURAL at 09:49

## 2019-05-14 RX ADMIN — Medication 10 ML: at 08:27

## 2019-05-14 RX ADMIN — CEFAZOLIN SODIUM 2 G: 2 INJECTION, SOLUTION INTRAVENOUS at 13:39

## 2019-05-14 RX ADMIN — INSULIN LISPRO 12 UNITS: 100 INJECTION, SOLUTION INTRAVENOUS; SUBCUTANEOUS at 17:56

## 2019-05-14 RX ADMIN — LIDOCAINE HYDROCHLORIDE AND EPINEPHRINE 10 ML: 20; 10 INJECTION, SOLUTION INFILTRATION; PERINEURAL at 09:48

## 2019-05-14 RX ADMIN — LEVOTHYROXINE SODIUM 100 MCG: 100 TABLET ORAL at 05:44

## 2019-05-14 ASSESSMENT — ENCOUNTER SYMPTOMS
BACK PAIN: 0
EYE DISCHARGE: 0
CONSTIPATION: 0
EYE REDNESS: 0
SHORTNESS OF BREATH: 0
ABDOMINAL PAIN: 0
WHEEZING: 0
RHINORRHEA: 0
NAUSEA: 0
TROUBLE SWALLOWING: 0
COUGH: 0
SORE THROAT: 0
DIARRHEA: 0

## 2019-05-14 ASSESSMENT — PAIN SCALES - GENERAL: PAINLEVEL_OUTOF10: 2

## 2019-05-14 NOTE — PROGRESS NOTES
Discharge instructions given to pt. All questions and concerns addressed. PIV removed. Will be taken to lobby via wheelchair with RN.

## 2019-05-14 NOTE — PROGRESS NOTES
Shift assessment complete. VSS, BP slightly high but normal per pt. Pt denies pain but c/o nausea, zofran given. Pt states he had a BM and it was loose, educated on s/e of antibiotic use. Holding PO medication for IR for tunneled CVC placed. Consent signed and in chart. IVFs held per IR, will resume once back to floor. Transport placed. No further needs at this time. Will monitor.

## 2019-05-14 NOTE — PROGRESS NOTES
Podiatric Surgery      Subjective:   Postop day #1  Patient seen at bedside this Afternoon. He has no pain complaints.       Objective:   Scheduled Meds:   ceFAZolin  2 g Intravenous Q8H    lidocaine 1 % injection  5 mL Intradermal Once    sodium chloride flush  10 mL Intravenous 2 times per day    carvedilol  12.5 mg Oral BID WC    vitamin D  2,000 Units Oral Daily    enoxaparin  30 mg Subcutaneous Daily    amLODIPine  10 mg Oral Daily    insulin glargine  13 Units Subcutaneous Nightly    atorvastatin  20 mg Oral Nightly    insulin lispro  12 Units Subcutaneous TID WC    levothyroxine  100 mcg Oral Daily    sodium chloride flush  10 mL Intravenous 2 times per day     Continuous Infusions:   dextrose 100 mL/hr (05/08/19 2207)     PRN Meds:.sodium chloride flush, sodium chloride flush, sodium chloride flush, sodium chloride flush, ondansetron, glucose, dextrose, glucagon (rDNA), dextrose    CBC with Differential:    Lab Results   Component Value Date    WBC 6.4 05/14/2019    RBC 2.57 05/14/2019    HGB 7.5 05/14/2019    HCT 23.6 05/14/2019     05/14/2019    MCV 92.0 05/14/2019    MCH 29.1 05/14/2019    MCHC 31.7 05/14/2019    RDW 13.9 05/14/2019    SEGSPCT 79.3 07/06/2012    LYMPHOPCT 24.8 05/14/2019    MONOPCT 7.1 05/14/2019    EOSPCT 1.2 11/04/2011    BASOPCT 0.8 05/14/2019    MONOSABS 0.5 05/14/2019    LYMPHSABS 1.6 05/14/2019    EOSABS 0.4 05/14/2019    BASOSABS 0.1 05/14/2019    DIFFTYPE Auto 07/06/2012       VITALS:  /71   Pulse 95   Temp 97.7 °F (36.5 °C) (Oral)   Resp 16   Ht 5' 10\" (1.778 m)   Wt 198 lb 3.2 oz (89.9 kg)   SpO2 97%   BMI 28.44 kg/m²   24HR INTAKE/OUTPUT:      Intake/Output Summary (Last 24 hours) at 5/14/2019 1357  Last data filed at 5/14/2019 0830  Gross per 24 hour   Intake 313 ml   Output 5 ml   Net 308 ml       Dressing left intact      Assessment:     Active Problems:    Poorly controlled type 2 diabetes mellitus ([address]    Diabetic foot infection ([address]

## 2019-05-14 NOTE — CARE COORDINATION
Aware that pt was switched from oral IV meds to IV infusions for antibiotics. Referral made to [facility] for home infusions. [facility] and [facility] are OON w/pt's ins. Referral sent to [facility] for home infusions. Met w/pt and he confirmed that he is comfortable going home w/IV infusions-lives w/daughter. Awaiting benefits check and home care agency assignment at this time. Pt confirmed demographics and phone number on file as correct.   Electronically signed by ZHEN Hinojosa on 5/14/2019 at 2:47 PM

## 2019-05-14 NOTE — DISCHARGE SUMMARY
Hospital Discharge Summary    Patient's PCP: [id number] Meyer, DO  Admit Date: 5/5/2019   Discharge Date: 5/14/2019    Admitting Physician: Dr. Raj Bryson MD  Discharge Physician: Dr. Iwona Esquivel:   IP CONSULT TO HOSPITALIST  IP CONSULT TO PODIATRY  IP CONSULT TO SOCIAL WORK  IP CONSULT TO INFECTIOUS DISEASES  IP CONSULT TO NEPHROLOGY  IP CONSULT TO HOME CARE NEEDS    Brief HPI:   The patient is a 57-year-old  -American male presenting to the hospital with chief complaints  of two to three days subacute onset of gradually progressive left lower  extremity throbbing pain and swelling that started in the left great toe  two to three days ago, associated with redness and increasing difficulty  in walking without any nausea or vomiting, fevers or chills, but with  feeling of tiredness. The patient notes that he did not have any pain  initially. He looked at his left foot and noted that he had what looks  like possibly stepped on a nail, but he could not feel any pain at that  point in time. He started feeling pain two or three days later.           Brief hospital course:     1 Acute left great toe/foot diabetic infection with cellulitis. oral Zyvox and IV cefepeme. Podiatry Consulted. Patient had I and d of the left Great Toe. MRI can not be done. WBC Bone scan  consistent with Osteomyelitis ,.appreciate ID evlautaion . Needs Prolonged antibiotics. Conservative Treatment. No plans fof amputation . HAd delayed primary closure Monday evening 5/13/19. Oral antibiotics initiated. Patient started on zyvox and Ciprofloxacin. Will need For 6 weeks               2.  Acute-on-chronic kidney disease stage 3. Labs in am . Creatinine trending down. Creat 3.7.  [facility] kidney last year was Normal.      3.  controlled type 2 diabetes mellitus. On lantus and sliding scale.      4.  Hypertension. Controlled. IV fluids      5.  Hypothyroidism. stable          Invasive procedures:  Patient: Dayana Bridges  YOB: 1955  MRN: [id number]  Date of Procedure: 5/13/2019     Pre-Op Diagnosis: Diabetic Foot Infection of Left Foot     Post-Op Diagnosis: Same       Procedure(s):  INCISION, DRAINAGE, AND DEBRIDEMENT OF LEFT FOOT WITH DELAYED PRIMARY CLOSURE     Anesthesia: Monitor Anesthesia Care     Surgeon(s):  Chio West DPM     Assistant:      Estimated Blood Loss (mL): less than 10      Complications: None     Specimens:   ID Type Source Tests Collected by Time Destination   1 : 1) LEFT FOOT AEROBIC, ANAEROBIC, ACID FAST, FUNGAL, GRAM STAIN Tissue Tissue FUNGUS CULTURE, TISSUE CULTURE, ACID FAST CULTURE WITH SMEAR [facility] 5/13/2019 1054           Implants:  * No implants in log *      Drains: * No LDAs found *     Findings: Surgical sites clean no purulence     Indication for Procedure: This is a 75year old Male here for an non-elective procedure due to a foot infection of the left foot. The anticipated procedure, expected post-operative course and all benefits, risks, and complications were explained to the patient in detail. The patient's questions were answered to their satisfaction. Informed and written consent were obtained and placed in the chart.     Procedure:  Under mild sedation the patient was brought back to the OR and placed on the operating table in the supine position. Following the induction of IV anesthesia a pneumatic tourniquet was placed around the patient's well padded left lower extremity. A local anesthetic block was then infiltrated proximal to and circumferentially around the planned operative area. The left extremity was then scrubbed prepped and draped.     Details of procedure #1:  At this time, attention was directed to the  aspect of the patients left foot. Using a #15 blade, a  cm curvilinear incision was made over the .   Using sharp and blunt dissection the incision was deepened through the subcutaneous layer with care being taken to identify and retract all vital neurovascular structures. All venous tributaries were electrocoagulated as encountered. Sharp and blunt dissection was continued in to the infected tissue. At this point all nonviable soft tissue  and bone were removed using       Details of procedure #2:  Details of procedure #3:     A rasp was utilized to smooth all bony prominences. The wound was then flushed with copious amounts of sterile normal saline and pulse lavaged. The wound was and the skin was closed in a  fashion using silk suture. The incision was then dressed with sterile Adaptic, gauze, Kristen, Kerlix, and Coban. The pneumatic ankle tourniquet was deflated with a prompt hyperemic response noted to all digits of the left foot.       The patient tolerated the procedure and anesthesia well and was transported from the operating room to the PACU with vital signs stable and vascular status intact to all digits  of the patient's left lower extremity. Following a short period of post-operative monitoring, the patient will be readmitted to the hospital. The patient is to follow-up with  Dr. Kolby Palma in their private office within 5-7 days after discharge. The patient is to keep dressing clean, dry and intact at all times.   Should the patient develop any post-op complications or have any questions prior to the first post-operative visit they can contact  at the number provided in the chart.         Patient: Kevin Lovelace  YOB: 1955  MRN: [id number]  Date of Procedure: 5/10/2019     Pre-Op Diagnosis: DIABETIC FOOT INFECTION WITH OSTEOMYELITIS     Post-Op Diagnosis: Same       Procedure(s):  LEFT FOOT INCISION AND DRAINAGE     Anesthesia: Monitor Anesthesia Care     Surgeon(s):  Orlando Hook DPM     Assistant:      Estimated Blood Loss (mL): less than 50      Complications: None     Specimens:   * No specimens in log *  Bone and swabs to culture  Implants:  * No implants in log *      Drains: * No LDAs found *     Findings: Hard proximal phalanx  Indication for Procedure: This is a 75year old Male here for an non-elective procedure due to a foot infection of the left foot. The anticipated procedure, expected post-operative course and all benefits, risks, and complications were explained to the patient in detail. The patient's questions were answered to their satisfaction. Informed and written consent were obtained and placed in the chart.     Procedure:  Under mild sedation the patient was brought back to the OR and placed on the operating table in the supine position. Following the induction of IV anesthesia a local anesthetic block was then infiltrated proximal to and circumferentially around the planned operative area. The left extremity was then scrubbed prepped and draped.     Details of procedure #1: Incision and drainage to sites left foot  At this time, attention was directed to the plantar aspect of the patients left foot. Using a #15 blade, a 4 cm incision was made at the site of the puncture wound. Using sharp and blunt dissection the incision was deepened through the subcutaneous layer with care being taken to identify and retract all vital neurovascular structures. All venous tributaries were electrocoagulated as encountered. Sharp and blunt dissection was continued in to the infected tissue. Proximally 1 mL of white purulence was noted in the deep tissues. The site probe to bone. Next using a Jamshidi biopsy needle the bone was biopsied and sent for culture for evaluation of aerobic anaerobic acid-fast and fungal elements. Following this attention was directed to the dorsal aspect of the great toe. An incision was made 4 cm in length.   Using sharp and blunt dissection the incision was deepened down through the subcutaneous layer with care being taken to identify and retract all vital neurovascular structures all venous tributaries were electrocoagulated as encountered. The dissection was continued down to the IPJ joint and the metatarsal phalangeal joint. These were inspected and noted to be free of infection. The bone of the proximal phalanx was noted to be hard and all areas tested. The sites were then pulse lavaged and packed open with iodoform packing. Next the site was dressed with 4 x 4's, Kerlix and Ace bandage.     The patient tolerated the procedure and anesthesia well and was transported from the operating room to the PACU with vital signs stable and vascular status intact to all digits  of the patient's left lower extremity. Following a short period of post-operative monitoring, the patient will be readmitted to the hospital.  He will need delayed primary closure before he is discharged from the hospital early next week. Discharge Diagnoses: Active Problems:    Poorly controlled type 2 diabetes mellitus (HCC)    Diabetic foot infection (HCC)    Cellulitis of foot    Acute hematogenous osteomyelitis of left foot (HCC)    Elevated sed rate    Elevated C-reactive protein (CRP)    Acute kidney injury superimposed on chronic kidney disease (HCC)    Overweight    Diabetic polyneuropathy associated with type 2 diabetes mellitus (HCC)    History of methicillin resistant staphylococcus aureus (MRSA)    Essential hypertension  Resolved Problems:    * No resolved hospital problems. *      Physical Exam: /71   Pulse 95   Temp 97.7 °F (36.5 °C) (Oral)   Resp 16   Ht 5' 10\" (1.778 m)   Wt 198 lb 3.2 oz (89.9 kg)   SpO2 97%   BMI 28.44 kg/m²   General appearance:  Appears comfortable  Eyes: Sclera clear. Pupils equal.  ENT: Moist oral mucosa. Trachea midline, no adenopathy. Cardiovascular: Regular rhythm, normal S1, S2. No murmur. No edema in lower extremities  Respiratory: Not using accessory muscles. Good inspiratory effort. Clear to auscultation bilaterally, no wheeze or crackles.    GI: Abdomen soft, no tenderness, not distended, normal bowel sounds  Musculoskeletal: No cyanosis in digits, neck supple  Neurology: CN 2-12 grossly intact. No speech or motor deficits  Psych: Normal affect. Alert and oriented in time, place and person  Skin: Warm, dry, normal turgor  Extremity bandage On the Left foot.                Significant diagnostic studies that may require follow up:  Xr Foot Left (min 3 Views)    Result Date: 5/10/2019  EXAMINATION: 3 XRAY VIEWS OF THE LEFT FOOT 5/10/2019 1:36 pm COMPARISON: None. HISTORY: ORDERING SYSTEM PROVIDED HISTORY: post op TECHNOLOGIST PROVIDED HISTORY: Reason for exam:->post op Ordering Physician Provided Reason for Exam: Post op Acuity: Unknown Type of Exam: Initial FINDINGS: Advanced degenerative joint disease changes of the 1st MTP joint are noted. No radiographic evidence of osteomyelitis. No fracture or dislocation. No evidence of postoperative complication. No evidence of osteomyelitis. Nm Bone Scan 3 Phase    Result Date: 5/9/2019  EXAMINATION: THREE PHASE BONE SCAN 5/8/2019 12:26 pm; 5/8/2019 2:54 pm; 5/8/2019 9:50 am; 5/9/2019 10:35 am TECHNIQUE: The patient was injected intravenously with 21.1 mCi of 99 mTc MDP. Initial blood flow and pool images of the feet bilaterally were acquired. After 3 hours, delayed bone images were acquired. 794.1 millicuries of indium 356 tagged white blood cells were administered. Routine planar imaging was performed followed by SPECT imaging. COMPARISON: Patient did not have previous imaging studies for comparison.  HISTORY: ORDERING SYSTEM PROVIDED HISTORY: OSTEOMYELITIS TECHNOLOGIST PROVIDED HISTORY: Ordering Physician Provided Reason for Exam: Osteomyelitis Acuity: Unknown Type of Exam: Ongoing; ORDERING SYSTEM PROVIDED HISTORY: Osteomyelitis of left foot, unspecified type [facility]) TECHNOLOGIST PROVIDED HISTORY: Ordering Physician Provided Reason for Exam: Osteomyelitis Acuity: Unknown Type of Exam: Unknown; ORDERING SYSTEM PROVIDED HISTORY: OSTEOMYELITIS, FOOT TECHNOLOGIST PROVIDED HISTORY: Ordering Physician Provided Reason for Exam: Osteomyelitis Acuity: Unknown Type of Exam: Ongoing; ORDERING SYSTEM PROVIDED HISTORY: L foot wound, L great toe TECHNOLOGIST PROVIDED HISTORY: Reason for exam:->L foot wound, L great toe Ordering Physician Provided Reason for Exam: Osteomylitis of left great toe Acuity: Unknown Type of Exam: Ongoing FINDINGS: Bone scan: In the flow phase, greater activity is demonstrated at the left lower leg and foot relative to the right. In the blood pool phase, asymmetric activity is demonstrated at the left great toe. In the delayed phase, focal asymmetric activity is demonstrated at the left 1st digit relative to the right. Mild activity is seen at the mid feet and ankles, left greater than right, typically arthropathic. Tagged white blood cell: 4 and 24 hour images were obtained demonstrating asymmetric focal activity at the left great toe. SPECT imaging demonstrates this activity at the left 1st proximal and to lesser extent distal phalanx. Moderate spurring is seen at the 1st MTP joint. Three-phase bone scan activity is seen at the left great toe, with active inflammation and concordant white blood cell aggregation, concerning for osteomyelitis given history of wound at the site. Us Renal Complete    Result Date: 5/9/2019  EXAMINATION: RETROPERITONEAL ULTRASOUND OF THE KIDNEYS AND URINARY BLADDER 5/9/2019 COMPARISON: None HISTORY: ORDERING SYSTEM PROVIDED HISTORY: DILIP FINDINGS: Kidneys: The right kidney measures 9.6 x 6.1 x 5.6 cm with cortical thickness 1.6 cm. Left kidney measures 10.8 x 5.5 x 4 cm with cortical thickness 1.3 cm. Evaluation the kidneys is slightly limited due to body habitus. An area of scarring is again seen within the left kidney. Kidneys demonstrate normal cortical echogenicity. No evidence of hydronephrosis or intrarenal stones. Bladder: Unremarkable appearance of the bladder.   No significant post void residual.     Unremarkable ultrasound of the kidneys and urinary bladder. Xr Toe Left (min 2 Views)    Result Date: 5/5/2019  EXAMINATION: 2 XRAY VIEWS OF THE LEFT TOE 5/5/2019 8:51 pm COMPARISON: None. HISTORY: ORDERING SYSTEM PROVIDED HISTORY: osteomylitis TECHNOLOGIST PROVIDED HISTORY: Reason for exam:->osteomylitis Ordering Physician Provided Reason for Exam: Osteomyelitis. Acuity: Acute Type of Exam: Initial FINDINGS: No acute fracture or dislocation. Moderate to severe degenerative changes 1st MTP joint. 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 Nm Inflammatory Wbc Limited W Indium 111    Result Date: 5/9/2019  EXAMINATION: THREE PHASE BONE SCAN 5/8/2019 12:26 pm; 5/8/2019 2:54 pm; 5/8/2019 9:50 am; 5/9/2019 10:35 am TECHNIQUE: The patient was injected intravenously with 21.1 mCi of 99 mTc MDP. Initial blood flow and pool images of the feet bilaterally were acquired. After 3 hours, delayed bone images were acquired. 438.2 millicuries of indium 898 tagged white blood cells were administered. Routine planar imaging was performed followed by SPECT imaging. COMPARISON: Patient did not have previous imaging studies for comparison.  HISTORY: ORDERING SYSTEM PROVIDED HISTORY: OSTEOMYELITIS TECHNOLOGIST PROVIDED HISTORY: Ordering Physician Provided Reason for Exam: Osteomyelitis Acuity: Unknown Type of Exam: Ongoing; ORDERING SYSTEM PROVIDED HISTORY: Osteomyelitis of left foot, unspecified type [facility]) TECHNOLOGIST PROVIDED HISTORY: Ordering Physician Provided Reason for Exam: Osteomyelitis Acuity: Unknown Type of Exam: Unknown; ORDERING SYSTEM PROVIDED HISTORY: OSTEOMYELITIS, FOOT TECHNOLOGIST PROVIDED HISTORY: Ordering Physician Provided Reason for Exam: Osteomyelitis Acuity: Unknown Type of Exam: Ongoing; ORDERING SYSTEM PROVIDED HISTORY: L foot wound, L great toe TECHNOLOGIST PROVIDED HISTORY: Reason for exam:->L foot wound, L great toe Ordering Physician Provided Reason for Exam: Osteomylitis of left great toe Acuity: Unknown Type of Exam: Ongoing FINDINGS: Bone scan: In the flow phase, greater activity is demonstrated at the left lower leg and foot relative to the right. In the blood pool phase, asymmetric activity is demonstrated at the left great toe. In the delayed phase, focal asymmetric activity is demonstrated at the left 1st digit relative to the right. Mild activity is seen at the mid feet and ankles, left greater than right, typically arthropathic. Tagged white blood cell: 4 and 24 hour images were obtained demonstrating asymmetric focal activity at the left great toe. SPECT imaging demonstrates this activity at the left 1st proximal and to lesser extent distal phalanx. Moderate spurring is seen at the 1st MTP joint. Three-phase bone scan activity is seen at the left great toe, with active inflammation and concordant white blood cell aggregation, concerning for osteomyelitis given history of wound at the site. Ir Tunneled Cvc Place Wo Sq Port/pump > 5 Years    Result Date: 5/14/2019  PROCEDURE: ULTRASOUND GUIDED VASCULAR ACCESS. FLUOROSCOPY GUIDED PLACEMENT OF A RIGHT IJ TUNNELED CATHETER. 5/14/2019. HISTORY: ORDERING SYSTEM PROVIDED HISTORY: IV antibiotics TECHNOLOGIST PROVIDED HISTORY: Reason for exam:->IV antibiotics How many lumens are being requested?->2 What side should this line be placed? ->Either What site is the preferred site?->Other (Comment) SEDATION: Local anesthesia with 1% lidocaine. FLUOROSCOPY DOSE AND TYPE OR TIME AND EXPOSURES: 0.8 minutes of fluoroscopy with 1 exposure. TECHNIQUE: Informed consent was obtained after a detailed explanation of the procedure including risks, benefits, and alternatives. Universal protocol was observed. The right neck and chest were prepped and draped in sterile fashion using maximum sterile barrier technique. Local anesthesia was achieved with lidocaine. A micropuncture needle was used to access the right internal jugular vein using ultrasound guidance. An ultrasound image demonstrating patency of the vein with needle tip located within it. An image was obtained and stored in PACs. A 0.035 guidewire was used to place a peel-away sheath. A subcutaneous tunnel was created to the infraclavicular region and a tunneled 6 Croatian 22.5 cm catheter was pulled through the subcutaneous tunnel to the venotomy site and advanced through the peel-away sheath under fluoroscopic guidance to the right atrium. The catheter flushed easily and there was a good blood return. The catheter was sutured to the skin. The catheter was locked with heparinized saline. The patient tolerated the procedure well and there were no immediate complications. FINDINGS: Fluoroscopic image demonstrates the tip of the catheter in the right atrium. Successful ultrasound and fluoroscopy guided 6 Croatian dual lumen tunneled central venous catheter placement via right IJ access, as described above. Nm Wbc Spect    Result Date: 5/9/2019  EXAMINATION: THREE PHASE BONE SCAN 5/8/2019 12:26 pm; 5/8/2019 2:54 pm; 5/8/2019 9:50 am; 5/9/2019 10:35 am TECHNIQUE: The patient was injected intravenously with 21.1 mCi of 99 mTc MDP. Initial blood flow and pool images of the feet bilaterally were acquired. After 3 hours, delayed bone images were acquired. 370.1 millicuries of indium 973 tagged white blood cells were administered. Routine planar imaging was performed followed by SPECT imaging. COMPARISON: Patient did not have previous imaging studies for comparison.  HISTORY: ORDERING SYSTEM PROVIDED HISTORY: OSTEOMYELITIS TECHNOLOGIST PROVIDED HISTORY: Ordering Physician Provided Reason for Exam: Osteomyelitis Acuity: Unknown Type of Exam: Ongoing; ORDERING SYSTEM PROVIDED HISTORY: Osteomyelitis of left foot, unspecified type [facility]) TECHNOLOGIST PROVIDED HISTORY: Ordering Physician Provided Reason for Exam: Osteomyelitis Acuity: Unknown Type of Exam: Unknown; ORDERING SYSTEM PROVIDED HISTORY: OSTEOMYELITIS, FOOT TECHNOLOGIST PROVIDED HISTORY: Ordering Physician Provided Reason for Exam: Osteomyelitis Acuity: Unknown Type of Exam: Ongoing; ORDERING SYSTEM PROVIDED HISTORY: L foot wound, L great toe TECHNOLOGIST PROVIDED HISTORY: Reason for exam:->L foot wound, L great toe Ordering Physician Provided Reason for Exam: Osteomylitis of left great toe Acuity: Unknown Type of Exam: Ongoing FINDINGS: Bone scan: In the flow phase, greater activity is demonstrated at the left lower leg and foot relative to the right. In the blood pool phase, asymmetric activity is demonstrated at the left great toe. In the delayed phase, focal asymmetric activity is demonstrated at the left 1st digit relative to the right. Mild activity is seen at the mid feet and ankles, left greater than right, typically arthropathic. Tagged white blood cell: 4 and 24 hour images were obtained demonstrating asymmetric focal activity at the left great toe. SPECT imaging demonstrates this activity at the left 1st proximal and to lesser extent distal phalanx. Moderate spurring is seen at the 1st MTP joint. Three-phase bone scan activity is seen at the left great toe, with active inflammation and concordant white blood cell aggregation, concerning for osteomyelitis given history of wound at the site. Nm Bone Spect    Result Date: 5/9/2019  EXAMINATION: THREE PHASE BONE SCAN 5/8/2019 12:26 pm; 5/8/2019 2:54 pm; 5/8/2019 9:50 am; 5/9/2019 10:35 am TECHNIQUE: The patient was injected intravenously with 21.1 mCi of 99 mTc MDP. Initial blood flow and pool images of the feet bilaterally were acquired. After 3 hours, delayed bone images were acquired. 819.5 millicuries of indium 695 tagged white blood cells were administered. Routine planar imaging was performed followed by SPECT imaging. COMPARISON: Patient did not have previous imaging studies for comparison.  HISTORY: ORDERING SYSTEM PROVIDED HISTORY: OSTEOMYELITIS TECHNOLOGIST PROVIDED HISTORY: Ordering Physician Provided Reason for Exam: Osteomyelitis Acuity: Unknown Type of Exam: Ongoing; ORDERING SYSTEM PROVIDED HISTORY: Osteomyelitis of left foot, unspecified type [facility]) TECHNOLOGIST PROVIDED HISTORY: Ordering Physician Provided Reason for Exam: Osteomyelitis Acuity: Unknown Type of Exam: Unknown; ORDERING SYSTEM PROVIDED HISTORY: OSTEOMYELITIS, FOOT TECHNOLOGIST PROVIDED HISTORY: Ordering Physician Provided Reason for Exam: Osteomyelitis Acuity: Unknown Type of Exam: Ongoing; ORDERING SYSTEM PROVIDED HISTORY: L foot wound, L great toe TECHNOLOGIST PROVIDED HISTORY: Reason for exam:->L foot wound, L great toe Ordering Physician Provided Reason for Exam: Osteomylitis of left great toe Acuity: Unknown Type of Exam: Ongoing FINDINGS: Bone scan: In the flow phase, greater activity is demonstrated at the left lower leg and foot relative to the right. In the blood pool phase, asymmetric activity is demonstrated at the left great toe. In the delayed phase, focal asymmetric activity is demonstrated at the left 1st digit relative to the right. Mild activity is seen at the mid feet and ankles, left greater than right, typically arthropathic. Tagged white blood cell: 4 and 24 hour images were obtained demonstrating asymmetric focal activity at the left great toe. SPECT imaging demonstrates this activity at the left 1st proximal and to lesser extent distal phalanx. Moderate spurring is seen at the 1st MTP joint. Three-phase bone scan activity is seen at the left great toe, with active inflammation and concordant white blood cell aggregation, concerning for osteomyelitis given history of wound at the site. Treatments: As above.       Discharge Medications:     Medication List      START taking these medications    amLODIPine 10 MG tablet  Commonly known as:  NORVASC  Take 1 tablet by mouth daily  Start taking on:  5/15/2019     carvedilol 12.5 MG tablet  Commonly known as:  COREG  Take 1 tablet by mouth 2 Medications      These medications were sent to [facility] , [address]    Hours:  24-hours Phone:  [phone]   · amLODIPine 10 MG tablet  · carvedilol 12.5 MG tablet  · insulin glargine 100 UNIT/ML injection pen  · vitamin D 1000 UNIT Tabs tablet         Activity: activity as tolerated  Diet: DIET CARB CONTROL;      Disposition: home  Discharged Condition: Stable  Follow Up:   [address] [place], [address] [facility] [address]  [phone]              Code status:  Full Code         Total time spent on discharge, finalizing medications, referrals and arranging outpatient follow up was more than 1 hour      Thank you  [address] [place],  for the opportunity to be involved in this patients care.
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
## 2019-05-14 NOTE — CARE COORDINATION
Patient discharged to home w/[facility] and [facility] for Rn for home IV infusions 05/14/19. All discharge needs met per case management.   Electronically signed by ZHEN Lutz on 5/14/2019 at 4:06 PM

## 2019-05-14 NOTE — PROGRESS NOTES
Assessment complete Denies pain or SOB Left foot dressing CD&I Call light in reach will continue to monitor

## 2019-05-15 LAB
ANAEROBIC CULTURE: ABNORMAL
ANAEROBIC CULTURE: ABNORMAL
CULTURE SURGICAL: ABNORMAL
GRAM STAIN RESULT: ABNORMAL
GRAM STAIN RESULT: ABNORMAL
ORGANISM: ABNORMAL
ORGANISM: ABNORMAL
WOUND/ABSCESS: ABNORMAL

## 2019-05-17 ENCOUNTER — HOSPITAL ENCOUNTER (OUTPATIENT)
Dept: WOUND CARE | Age: 64
Discharge: HOME | End: 2019-05-17
Payer: MEDICARE

## 2019-05-17 ENCOUNTER — HOSPITAL ENCOUNTER (OUTPATIENT)
Age: 64
Discharge: HOME | End: 2019-05-17
Payer: MEDICARE

## 2019-05-17 VITALS — HEART RATE: 97 BPM | SYSTOLIC BLOOD PRESSURE: 161 MMHG | DIASTOLIC BLOOD PRESSURE: 88 MMHG | RESPIRATION RATE: 16 BRPM

## 2019-05-17 DIAGNOSIS — L08.9 DIABETIC FOOT INFECTION (HCC): Primary | ICD-10-CM

## 2019-05-17 DIAGNOSIS — E11.628 DIABETIC FOOT INFECTION (HCC): ICD-10-CM

## 2019-05-17 DIAGNOSIS — E11.628 DIABETIC FOOT INFECTION (HCC): Primary | ICD-10-CM

## 2019-05-17 DIAGNOSIS — L08.9 DIABETIC FOOT INFECTION (HCC): ICD-10-CM

## 2019-05-17 PROCEDURE — 11043 DBRDMT MUSC&/FSCA 1ST 20/<: CPT

## 2019-05-17 PROCEDURE — 36415 COLL VENOUS BLD VENIPUNCTURE: CPT

## 2019-05-17 PROCEDURE — 99213 OFFICE O/P EST LOW 20 MIN: CPT

## 2019-05-17 PROCEDURE — 83036 HEMOGLOBIN GLYCOSYLATED A1C: CPT
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
## 2019-05-17 NOTE — PLAN OF CARE
Discharge instructions given. Patient verbalized understanding. Return to [facility] in 1 week.   Called/faxed orders to [employer] work up [employer]

## 2019-05-18 LAB
ANAEROBIC CULTURE: ABNORMAL
CULTURE SURGICAL: ABNORMAL
ESTIMATED AVERAGE GLUCOSE: 162.8 MG/DL
GRAM STAIN RESULT: ABNORMAL
HBA1C MFR BLD: 7.3 %
ORGANISM: ABNORMAL

## 2019-05-20 LAB
ALBUMIN SERPL-MCNC: 3 G/DL
ALP BLD-CCNC: 82 U/L
ALT SERPL-CCNC: 7 U/L
ANION GAP SERPL CALCULATED.3IONS-SCNC: 10 MMOL/L
AST SERPL-CCNC: 18 U/L
BILIRUB SERPL-MCNC: 0.2 MG/DL (ref 0.1–1.4)
BUN BLDV-MCNC: 32 MG/DL
CALCIUM SERPL-MCNC: 8.5 MG/DL
CHLORIDE BLD-SCNC: 104 MMOL/L
CO2: 25 MMOL/L
CREAT SERPL-MCNC: 3.79 MG/DL
GFR CALCULATED: 15
GLUCOSE BLD-MCNC: 122 MG/DL
POTASSIUM SERPL-SCNC: 4 MMOL/L
SODIUM BLD-SCNC: 139 MMOL/L
TOTAL PROTEIN: 6.2

## 2019-05-21 ENCOUNTER — TELEPHONE (OUTPATIENT)
Dept: INFECTIOUS DISEASES | Age: 64
End: 2019-05-21

## 2019-05-21 NOTE — TELEPHONE ENCOUNTER
This is not new. His creatinine has been high.       Please make sure the patient is following up with his nephrologist.  No changes in antibiotics needed

## 2019-05-24 ENCOUNTER — HOSPITAL ENCOUNTER (OUTPATIENT)
Dept: WOUND CARE | Age: 64
Discharge: HOME | End: 2019-05-24
Payer: MEDICARE

## 2019-05-24 VITALS
WEIGHT: 198 LBS | SYSTOLIC BLOOD PRESSURE: 163 MMHG | HEART RATE: 85 BPM | TEMPERATURE: 97.1 F | BODY MASS INDEX: 28.41 KG/M2 | DIASTOLIC BLOOD PRESSURE: 85 MMHG

## 2019-05-24 PROCEDURE — 11043 DBRDMT MUSC&/FSCA 1ST 20/<: CPT

## 2019-05-24 RX ORDER — LIDOCAINE 40 MG/G
CREAM TOPICAL PRN
Status: DISCONTINUED | OUTPATIENT
Start: 2019-05-24 | End: 2019-05-25 | Stop reason: HOSPADM
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
## 2019-05-28 LAB
ALBUMIN SERPL-MCNC: 2.9 G/DL
ALP BLD-CCNC: 81 U/L
ALT SERPL-CCNC: 10 U/L
ANION GAP SERPL CALCULATED.3IONS-SCNC: 11 MMOL/L
AST SERPL-CCNC: 14 U/L
BASOPHILS ABSOLUTE: 0.1 /ΜL
BASOPHILS RELATIVE PERCENT: 1 %
BILIRUB SERPL-MCNC: 0.4 MG/DL (ref 0.1–1.4)
BUN BLDV-MCNC: 27 MG/DL
C-REACTIVE PROTEIN: 2.2
CALCIUM SERPL-MCNC: 8.2 MG/DL
CHLORIDE BLD-SCNC: 101 MMOL/L
CO2: 25 MMOL/L
CREAT SERPL-MCNC: 3.32 MG/DL
EOSINOPHILS ABSOLUTE: 0.3 /ΜL
EOSINOPHILS RELATIVE PERCENT: 5 %
GFR CALCULATED: 18
GLUCOSE BLD-MCNC: 129 MG/DL
HCT VFR BLD CALC: 24.7 % (ref 41–53)
HEMOGLOBIN: 7.8 G/DL (ref 13.5–17.5)
LYMPHOCYTES ABSOLUTE: 2.1 /ΜL
LYMPHOCYTES RELATIVE PERCENT: 33 %
MCH RBC QN AUTO: 29.4 PG
MCHC RBC AUTO-ENTMCNC: 31.6 G/DL
MCV RBC AUTO: 93.3 FL
MONOCYTES ABSOLUTE: 0.4 /ΜL
MONOCYTES RELATIVE PERCENT: 7 %
NEUTROPHILS ABSOLUTE: 3.4 /ΜL
NEUTROPHILS RELATIVE PERCENT: ABNORMAL %
PLATELET # BLD: 391 K/ΜL
PMV BLD AUTO: 7.4 FL
POTASSIUM SERPL-SCNC: 3.7 MMOL/L
RBC # BLD: 2.65 10^6/ΜL
SEDIMENTATION RATE, ERYTHROCYTE: 61
SODIUM BLD-SCNC: 137 MMOL/L
TOTAL PROTEIN: 6.1
WBC # BLD: 6.3 10^3/ML

## 2019-05-31 ENCOUNTER — HOSPITAL ENCOUNTER (OUTPATIENT)
Dept: WOUND CARE | Age: 64
Discharge: HOME | End: 2019-05-31
Payer: MEDICARE

## 2019-05-31 VITALS — DIASTOLIC BLOOD PRESSURE: 94 MMHG | RESPIRATION RATE: 16 BRPM | SYSTOLIC BLOOD PRESSURE: 179 MMHG | HEART RATE: 82 BPM

## 2019-05-31 PROCEDURE — 11043 DBRDMT MUSC&/FSCA 1ST 20/<: CPT

## 2019-05-31 RX ORDER — LIDOCAINE 40 MG/G
CREAM TOPICAL PRN
Status: DISCONTINUED | OUTPATIENT
Start: 2019-05-31 | End: 2019-06-01 | Stop reason: HOSPADM
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
## 2019-06-03 LAB
ALBUMIN SERPL-MCNC: 3.1 G/DL
ALP BLD-CCNC: 82 U/L
ALT SERPL-CCNC: 4 U/L
ANION GAP SERPL CALCULATED.3IONS-SCNC: 9 MMOL/L
AST SERPL-CCNC: 16 U/L
BILIRUB SERPL-MCNC: 0.6 MG/DL (ref 0.1–1.4)
BUN BLDV-MCNC: 27 MG/DL
C-REACTIVE PROTEIN: 2.1
CALCIUM SERPL-MCNC: 8.6 MG/DL
CHLORIDE BLD-SCNC: 104 MMOL/L
CO2: 27 MMOL/L
CREAT SERPL-MCNC: 3.47 MG/DL
GFR CALCULATED: NORMAL
GLUCOSE BLD-MCNC: 118 MG/DL
HCT VFR BLD CALC: 27.4 % (ref 41–53)
HEMOGLOBIN: 8.7 G/DL (ref 13.5–17.5)
PLATELET # BLD: 333 K/ΜL
POTASSIUM SERPL-SCNC: 3.9 MMOL/L
SEDIMENTATION RATE, ERYTHROCYTE: 62
SODIUM BLD-SCNC: 140 MMOL/L
TOTAL PROTEIN: 6.5
WBC # BLD: 6.5 10^3/ML

## 2019-06-06 ENCOUNTER — HOSPITAL ENCOUNTER (OUTPATIENT)
Dept: WOUND CARE | Age: 64
Discharge: HOME | End: 2019-06-06
Payer: MEDICARE

## 2019-06-06 VITALS — RESPIRATION RATE: 16 BRPM | HEART RATE: 90 BPM | SYSTOLIC BLOOD PRESSURE: 156 MMHG | DIASTOLIC BLOOD PRESSURE: 81 MMHG

## 2019-06-06 PROCEDURE — 99213 OFFICE O/P EST LOW 20 MIN: CPT

## 2019-06-06 PROCEDURE — 99203 OFFICE O/P NEW LOW 30 MIN: CPT | Performed by: INTERNAL MEDICINE

## 2019-06-06 RX ORDER — LIDOCAINE 40 MG/G
CREAM TOPICAL ONCE
Status: DISCONTINUED | OUTPATIENT
Start: 2019-06-06 | End: 2019-06-07 | Stop reason: HOSPADM

## 2019-06-06 RX ORDER — OXYMETAZOLINE HYDROCHLORIDE 0.05 G/100ML
SPRAY NASAL
Qty: 1 BOTTLE | Refills: 0 | Status: SHIPPED | OUTPATIENT
Start: 2019-06-06 | End: 2019-07-06

## 2019-06-06 NOTE — PLAN OF CARE
Discharge instructions given. Patient verbalized understanding. Return to [facility] in 1 week. Cleared for HBO by Dr Brigid Zuniga 6/6/19 pending clearance from [facility] d/t to bleeding behind the eyes.   Will get HBO orientation today  Plan to start HBO 6/11/19 at 0900 if cleared by [facility]

## 2019-06-06 NOTE — PROGRESS NOTES
[facility])   Hyperbaric Oxygen Therapy Consultation  History and Physical    NAME: Riccardo Bermeo  MEDICAL RECORD NUMBER:  [id number]  AGE: 75 y.o. GENDER: male  : 1955  EPISODE DATE:  2019    Subjective:     Riccardo Bermeo is a 75 y.o. male who has a chief complaint of a diabetic wound/ulcer located on the LEFT LOWER EXTREMITY: foot without radiation. Dr. South Levin requested a consultation regarding the possible utility of hyperbaric oxygen therapy for his diagnosis of Left DFU 3 with refractory osteomyelitis . HISTORY of PRESENT ILLNESS HPI  History of Wound/Ulcer Context: Initial wound care visit with Dr. South Levin 19 following May hospitalization with I&D , IV antibiotics. Continued wound care. Right chest power PICC, ID following. Contributing Factors: diabetes  Wound/Ulcer Pain Timing/Severity: none  Quality of pain: N/A  Severity:  0 / 10   Modifying Factors: None  Associated Signs/Symptoms: edema and numbness      Additional wound(s)/ulcer(s) noted? No     Mr. Frank Starr has a past medical history of Diabetes mellitus ([address] Foot ulcer ([address] Hypercholesteremia, Hypertension, MRSA infection, Thyroid disease, and Type II or unspecified type diabetes mellitus with neurological manifestations, uncontrolled(250.62). He has a past surgical history that includes Cataract removal (); Abdomen surgery; Toe amputation (Right, 7/17/15); Foot Debridement (Left, 5/10/2019); and Foot Debridement (Left, 5/10/2019). His family history includes Cancer in his brother and mother; Diabetes in his brother, brother, and sister. Mr. Frank Starr reports that he has never smoked. He has never used smokeless tobacco. He reports that he does not drink alcohol or use drugs.     His current medication list consists of     Current Outpatient Medications on File Prior to Encounter   Medication Sig Dispense Refill    insulin glargine (LANTUS) 100 UNIT/ML injection pen Inject 13 Units into the skin Assessment:     Patient Active Problem List   Diagnosis Code    ARF (acute renal failure) ([facility]) N17.9    Diabetic foot ulcer ([facility] E11.621, L97.509    Poorly controlled type 2 diabetes mellitus ([facility] E11.65    Type 1 diabetes mellitus with stage 3 chronic kidney disease ([facility]) E10.22, N18.3    Mixed hyperlipidemia E78.2    Diabetic foot infection ([facility] E11.628, L08.9    Cellulitis of foot L03.119    Acute hematogenous osteomyelitis of left foot ([facility]) M86.072    Elevated sed rate R70.0    Elevated C-reactive protein (CRP) R79.82    Acute kidney injury superimposed on chronic kidney disease ([facility]) N17.9, N18.9    Overweight E66.3    Diabetic polyneuropathy associated with type 2 diabetes mellitus ([facility]) E11.42    History of methicillin resistant staphylococcus aureus (MRSA) Z86.14    Essential hypertension I10         We also discussed the inherent risks of HBOT, including confinement anxiety, otic-dental-sinus barotrauma, hypoglycemia in diabetes, seizure, progressive but usually reversible myopia, round-window or oval-window blowout during a vigorous Valsalva maneuver, ulnar nerve paresthesias, pulmonary oxygen toxicity if inter-treatment FIO2 is > 40%, gas embolism, respiratory arrest in CO2 retainers, pneumothorax, cardiac arrest, and fire & explosion. Plan:   I believe HBOT is indicated as an important adjunctive therapy in this case because of Mr. Green's refractory condition, to speed healing and to decrease the chance of serious complications. Due to the potential adverse effects of HBO therapy, I reviewed some particular aspects of Mr. Green's medical history.  There is no history of idiopathic epilepsy, secondary seizures, stroke, CNS surgery or bleeding, recent head trauma, frequent hypoglycemia, diabetic retinopathy or retinal detachment surgery, untreated cataracts, optic neuritis, bleomycin-associated pulmonary fibrosis or recent bleomycin use, recent pulmonary lobectomy
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
## 2019-06-06 NOTE — PROGRESS NOTES
communication system allows you to speak with the , family members or the physician. You will also be able to watch and listen to television during treatment. The monoplace chamber administers 100% oxygen at a physician prescribed pressure. Because you will be in an oxygen environment, a detailed list of safety precautions and guidelines will be strictly enforced for your safety. TREATMENT SCHEDULE  You will need to arrange for your own transportation. If there is difficulty, we will try to assist in making the necessary arrangements with an appropriate agency. Hyperbaric treatments are given daily, Monday through Friday. Each treatment will last almost two (2) hours. Be prepared to spend approximately three (3) hours at our facility. This allows time for preparing you for your treatment dive and the actual time in the chamber. It is essential that you try to make every scheduled treatment dive possible so that the effects of the hyperbaric oxygen will continue. Any long interruption could cause the healing enhancement to slow or even stop. If at any time you have chills, fever, nausea, vomiting, diarrhea or any problem with your glucose levels, please inform the physician or the nurse. You will be assessed to see if you should be in the chamber that day. You will receive a complete briefing by a staff member. Necessary forms and informed consents (permits) will need to be signed before treatments begin. You will also be given a tour of the area . VISITORS  Visitors are welcome and we encourage patients to bring their families. We ask that once your family has seen the facility that they remain in the waiting room to ensure your privacy, and the privacy of the other patients. Visitors are not allowed in the treatment area unless their presence is needed by the physician. Again, we welcome you to our facility.   Please let us know if there is anything that we can do to chamber. To help prevent pain or injury to your lungs, please make sure to notify a staff member if you have any upper respiratory infection and/or congestion. It is very important not to hold your breath during your treatment \"dive\", just breath normally. PATIENTS WITH DIABETES ONLY Yes  If you have diabetes your blood sugar level will be tested before each and every treatment. If your blood sugar level is too low, we will attempt to help you raise it by providing a diabetic nutritional shake. We will retest your blood sugar shortly thereafter. If your blood sugar level is still low, we may not be able to provide a treatment \"dive\" that day. If your blood sugar levels is too high, we also may not be able to provide a treatment \"dive\" that day. If your blood sugar level continues to be too high or too low, not allowing you to continue with the hyperbaric treatments, you will need to contact your primary physician to help manage your blood sugar more effectively. ABOUT YOUR TREATMENT INFORMATION REVIEWED: Yes   If you are feeling nervous or anxious about these treatments, please let a staff member know. We are here to help you. Before each and every treatment, please let us know of any changes regarding:    your medication, especially cancer medication  any possibility of being pregnant  any new implants or devices    Temporary vision changes may occur during hyperbaric oxygen therapy. Therefore, it is recommended that you not change eyeglass prescriptions during therapy. Changes that occur during therapy should return to pre-treatment baseline within several weeks of the conclusion of therapy.  In the rare instance that vision has not returned to the pre-treatment baseline; it is recommended that you schedule an eye exam with your Opthalmalogist.    It is very important to make the clinical staff aware if you have ever had a collapsed lung    ITEMS TO AVOID INFORMATION REVIEWED: Yes Smoking has a negative effect on treatment and may diminish the benefits you may receive. Smoking within 2 hours prior to your treatment poses additional risk and should be avoided completely. Drinking alcohol or using illicit drugs may also have a negative effect on your treatment and should be avoided. Drinking carbonated beverages such as soda pop within 1 hour of your treatment could cause pains in the stomach during the treatment. Caffeinated beverages or foods containing caffeine should be avoided before your treatment. Please let us know if we can assist you with seeking help to stop smoking, drinking or using recreational drugs. PROHIBITED ITEMS INFORMATION REVIEWED: Yes   No wigs, hair spray, hair oils, hair ornaments, creams, lotions, make-up, after shave, perfumes, colognes, chap stick, lip balms, mustache waxes, petroleum products (e.g. Vaseline), baby oil, deodorant or ointments  No nail polish    No synthetic clothing  No nylon hose, underwear, panty hose or bra  No food, gum or candy  No jewelry  No smoking materials such as lighter, cigarettes or matches  No reading material  No hearing aids, non-fixed dentures  No Hard (non-gas permeable) contact lenses  Most dressings are approved to wear into the hyperbaric chamber. Please advise the hyperbaric staff of any new dressings applied to your wound to ensure the new dressings are compatible with hyperbaric oxygen treatments. No alcohol preps  No heat packs  No street clothes or shoes  No cell phones or other electronics  If it was not a part of you when you were born into this world, if it was not provided by the chamber , then it cannot go into the chamber with you.         Electronically signed by João Martin LPN on 9/4/5162 at 42:62 AM

## 2019-06-07 ENCOUNTER — HOSPITAL ENCOUNTER (OUTPATIENT)
Dept: WOUND CARE | Age: 64
Discharge: HOME | End: 2019-06-07

## 2019-06-07 DIAGNOSIS — N18.30 ANEMIA DUE TO STAGE 3 CHRONIC KIDNEY DISEASE (HCC): ICD-10-CM

## 2019-06-07 DIAGNOSIS — N18.30 CHRONIC KIDNEY DISEASE, STAGE III (MODERATE) (HCC): ICD-10-CM

## 2019-06-07 DIAGNOSIS — D63.1 ANEMIA DUE TO STAGE 3 CHRONIC KIDNEY DISEASE (HCC): ICD-10-CM

## 2019-06-10 ENCOUNTER — TELEPHONE (OUTPATIENT)
Dept: INFECTIOUS DISEASES | Age: 64
End: 2019-06-10

## 2019-06-10 LAB
ALBUMIN SERPL-MCNC: 3.2 G/DL
ALP BLD-CCNC: 75 U/L
ALT SERPL-CCNC: 6 U/L
ANION GAP SERPL CALCULATED.3IONS-SCNC: 7 MMOL/L
AST SERPL-CCNC: 17 U/L
BILIRUB SERPL-MCNC: 0.5 MG/DL (ref 0.1–1.4)
BUN BLDV-MCNC: 34 MG/DL
C-REACTIVE PROTEIN: 1.9
CALCIUM SERPL-MCNC: 8.7 MG/DL
CHLORIDE BLD-SCNC: 105 MMOL/L
CO2: 25 MMOL/L
CREAT SERPL-MCNC: 3.54 MG/DL
FUNGUS (MYCOLOGY) CULTURE: NORMAL
FUNGUS (MYCOLOGY) CULTURE: NORMAL
FUNGUS STAIN: NORMAL
FUNGUS STAIN: NORMAL
GFR CALCULATED: NORMAL
GLUCOSE BLD-MCNC: 118 MG/DL
HCT VFR BLD CALC: 28.9 % (ref 41–53)
HEMOGLOBIN: 9.3 G/DL (ref 13.5–17.5)
PLATELET # BLD: 279 K/ΜL
POTASSIUM SERPL-SCNC: 3.7 MMOL/L
SODIUM BLD-SCNC: 137 MMOL/L
TOTAL PROTEIN: 6.8
WBC # BLD: 6.2 10^3/ML

## 2019-06-12 ENCOUNTER — HOSPITAL ENCOUNTER (OUTPATIENT)
Dept: WOUND CARE | Age: 64
Discharge: HOME | End: 2019-06-12
Payer: MEDICARE

## 2019-06-12 VITALS
WEIGHT: 198 LBS | DIASTOLIC BLOOD PRESSURE: 90 MMHG | TEMPERATURE: 97.5 F | BODY MASS INDEX: 28.41 KG/M2 | SYSTOLIC BLOOD PRESSURE: 159 MMHG | HEART RATE: 92 BPM

## 2019-06-12 PROCEDURE — 11043 DBRDMT MUSC&/FSCA 1ST 20/<: CPT

## 2019-06-12 NOTE — PLAN OF CARE
Discharge instructions given. Patient verbalized understanding. Return to [facility] in 1 week.   Awaiting clearance for ey doctor to start HBO

## 2019-06-12 NOTE — PROGRESS NOTES
HISTORY    Family History   Problem Relation Age of Onset    Cancer Mother     Diabetes Sister     Cancer Brother     Diabetes Brother     Diabetes Brother     Heart Disease Neg Hx     Stroke Neg Hx     Thyroid Disease Neg Hx        SOCIAL HISTORY    Social History     Tobacco Use    Smoking status: Never Smoker    Smokeless tobacco: Never Used   Substance Use Topics    Alcohol use: No    Drug use: No       ALLERGIES    No Known Allergies    MEDICATIONS    Current Outpatient Medications on File Prior to Encounter   Medication Sig Dispense Refill    oxymetazoline (AFRIN NASAL SPRAY) 0.05 % nasal spray Use 2 sprays in each nostril 30 minutes before treatment. Do not exceed 5 days of use. Obtain from pharmacy of choice and use as directed. 1 Bottle 0    insulin glargine (LANTUS) 100 UNIT/ML injection pen Inject 13 Units into the skin nightly 5 pen 1    carvedilol (COREG) 12.5 MG tablet Take 1 tablet by mouth 2 times daily (with meals) 60 tablet 0    amLODIPine (NORVASC) 10 MG tablet Take 1 tablet by mouth daily 30 tablet 0    vitamin D (CHOLECALCIFEROL) 1000 UNIT TABS tablet Take 2 tablets by mouth daily 60 tablet 0    B-D UF III MINI PEN NEEDLES 31G X 5 MM MISC USE FOUR TIMES DAILY AS DIRECTED. 100 each 2    levothyroxine (SYNTHROID) 100 MCG tablet Take 1 tablet by mouth Daily 30 tablet 1    insulin lispro (HUMALOG) 100 UNIT/ML pen Inject 12 Units into the skin 3 times daily (with meals) Plus correction 1:25 BS>140 for daily total of 72 units 10 Pen 1    atorvastatin (LIPITOR) 20 MG tablet Take 1 tablet by mouth daily 30 tablet 5    ACCU-CHEK VEE PLUS strip 1 each by In Vitro route 5 times daily As needed. 450 each 3    ACCU-CHEK FASTCLIX LANCETS MISC Test blood sugars 4 times daily 200 each 3    ONE TOUCH LANCETS MISC 1 each by Does not apply route 5 times daily. 200 each 5    glucose blood VI test strips (ONE TOUCH ULTRA TEST) strip 1 each by Does not apply route 5 times daily.  4 times daily. 150 each 11     No current facility-administered medications on file prior to encounter. REVIEW OF SYSTEMS    Pertinent items are noted in HPI. Objective:      BP (!) 159/90   Pulse 92   Temp 97.5 °F (36.4 °C) (Oral)   Wt 198 lb (89.8 kg)   BMI 28.41 kg/m²     PHYSICAL EXAM    Palpable pedal pulses 1/4 PT and DP bilaterally  Edema bilaterally +2  Epicritic and protopathic sensations absent bilaterally  Puncture wound still open at surgical site with mild maceration surgical sites well coapted. Central granulation noted. Still probes deep capsule. Wound with some slough      Assessment:     Patient Active Problem List   Diagnosis    [facility] failure) ([facility])    [facility]    Poorly controlled type 2 diabetes mellitus ([facility]    Type 1 diabetes mellitus with stage 3 chronic kidney disease ([facility]    Mixed hyperlipidemia    Diabetic foot infection ([facility]    Cellulitis of foot    Acute hematogenous osteomyelitis of left foot ([facility])    Elevated sed rate    Elevated C-reactive protein (CRP)    Acute kidney injury superimposed on chronic kidney disease ([facility]    Overweight    Diabetic polyneuropathy associated with type 2 diabetes mellitus ([address]    History of methicillin resistant staphylococcus aureus (MRSA)    Essential hypertension    Anemia due to stage 3 chronic kidney disease (HCC)    Chronic kidney disease, stage III (moderate) ([facility])       Procedure Note    Performed by: Prerna Fuentes DPM    Consent obtained: Yes    Time out taken:  Yes    Pain Control: Anesthetic  Anesthetic: Other (comment)(4% lidocaine ceam )     Debridement:Excisional Debridement    Using curette the wound was sharply debrided    down through and including the removal of epidermis, dermis, subcutaneous tissue and muscle/fascia.         Devitalized Tissue Debrided:  fibrin, slough and exudate    Pre Debridement Measurements:  Are located in the Wound Documentation Flow Sheet    Wound #: 1   Wound Care Documentation:  Diabetic Ulcer 07/16/15 Foot redness, swelling (Active)   Number of days: 1427       Wound 05/24/19 Toe (Comment  which one) Left;Plantar #2 Greater (Active)   Wound Diabetic 6/12/2019  1:49 PM   Dressing/Treatment Dry Dressing 5/24/2019 12:25 PM   Wound Cleansed Rinsed/Irrigated with saline 6/12/2019  1:49 PM   Wound Length (cm) 0.3 cm 6/12/2019  1:49 PM   Wound Width (cm) 0.4 cm 6/12/2019  1:49 PM   Wound Depth (cm) 0.2 cm 6/12/2019  1:49 PM   Wound Surface Area (cm^2) 0.12 cm^2 6/12/2019  1:49 PM   Change in Wound Size % (l*w) 50 6/12/2019  1:49 PM   Wound Volume (cm^3) 0.02 cm^3 6/12/2019  1:49 PM   Wound Healing % 86 6/12/2019  1:49 PM   Post-Procedure Length (cm) 0.3 cm 6/12/2019  2:15 PM   Post-Procedure Width (cm) 0.4 cm 6/12/2019  2:15 PM   Post-Procedure Depth (cm) 0.5 cm 6/12/2019  2:15 PM   Post-Procedure Surface Area (cm^2) 0.12 cm^2 6/12/2019  2:15 PM   Post-Procedure Volume (cm^3) 0.06 cm^3 6/12/2019  2:15 PM   Wound Assessment Bleeding 6/12/2019  2:15 PM   Drainage Amount None 6/12/2019  1:49 PM   Drainage Description Yellow 6/12/2019  1:49 PM   Odor None 6/12/2019  1:49 PM   Kinza-wound Assessment Calloused 6/12/2019  1:49 PM   [place]%Wound Bed 0 6/12/2019  1:49 PM   Red%Wound Bed 0 6/12/2019  1:49 PM   Yellow%Wound Bed 0 6/12/2019  1:49 PM   Black%Wound Bed 0 6/12/2019  1:49 PM   Purple%Wound Bed 0 6/12/2019  1:49 PM   Other%Wound Bed 100 6/12/2019  1:49 PM   Number of days: 19       Total Surface Area Debrided:  0.20 sq cm     Percentage of wound debrided 100%    Bleeding:  Minimal    Hemostasis Achieved:  by pressure    Procedural Pain:  0  / 10     Post Procedural Pain:  0 / 10     Response to treatment:  Well tolerated by patient.        Diabetic foot infection with osteomyelitis, peripheral vascular disease    Plan:   Patient examined and evaluated  Wound debrided without incident  Continue IV antibiotics for osteomyelitis per infectious disease  Patient would benefit from hyperbaric oxygen treatment for his chronic osteomyelitis. His insurance has approved the treatment however he is awaiting clearance from his ophthalmologist he has reported previous bleeding behind the eye. All questions answered the patient's satisfaction  Daily dressing changes with Betadine ointment  Follow-up in 1 week for debridement and reevaluation  The nature of the patient's condition was explained in depth.  The patient was informed that their compliance to the treatment plan is paramount to successful healing and prevention of further ulceration and/or infection     Discharge Treatment      Written Patient Discharge Instructions Given            Electronically signed by Ricco Nuno DPM on 6/12/2019 at 5:19 PM

## 2019-06-13 ENCOUNTER — OFFICE VISIT (OUTPATIENT)
Dept: INFECTIOUS DISEASES | Age: 64
End: 2019-06-13
Payer: MEDICARE

## 2019-06-13 VITALS
DIASTOLIC BLOOD PRESSURE: 94 MMHG | HEIGHT: 70 IN | SYSTOLIC BLOOD PRESSURE: 142 MMHG | HEART RATE: 68 BPM | TEMPERATURE: 98 F | WEIGHT: 198 LBS | RESPIRATION RATE: 16 BRPM | OXYGEN SATURATION: 98 % | BODY MASS INDEX: 28.35 KG/M2

## 2019-06-13 DIAGNOSIS — I10 ESSENTIAL HYPERTENSION: ICD-10-CM

## 2019-06-13 DIAGNOSIS — A49.1 STREPTOCOCCUS VIRIDANS INFECTION: ICD-10-CM

## 2019-06-13 DIAGNOSIS — Z45.2 PERIPHERALLY INSERTED CENTRAL CATHETER (PICC) IN PLACE: ICD-10-CM

## 2019-06-13 DIAGNOSIS — Z71.3 WEIGHT LOSS COUNSELING, ENCOUNTER FOR: ICD-10-CM

## 2019-06-13 DIAGNOSIS — M86.072 ACUTE HEMATOGENOUS OSTEOMYELITIS OF LEFT FOOT (HCC): Primary | ICD-10-CM

## 2019-06-13 DIAGNOSIS — E78.2 MIXED HYPERLIPIDEMIA: ICD-10-CM

## 2019-06-13 DIAGNOSIS — A49.01 MSSA (METHICILLIN SUSCEPTIBLE STAPHYLOCOCCUS AUREUS) INFECTION: ICD-10-CM

## 2019-06-13 DIAGNOSIS — Z71.89 DIABETES EDUCATION, ENCOUNTER FOR: ICD-10-CM

## 2019-06-13 DIAGNOSIS — Z79.2 RECEIVING INTRAVENOUS ANTIBIOTIC TREATMENT AS OUTPATIENT: ICD-10-CM

## 2019-06-13 DIAGNOSIS — N18.30 CHRONIC KIDNEY DISEASE, STAGE III (MODERATE) (HCC): ICD-10-CM

## 2019-06-13 DIAGNOSIS — E66.3 OVERWEIGHT: ICD-10-CM

## 2019-06-13 PROCEDURE — 99214 OFFICE O/P EST MOD 30 MIN: CPT | Performed by: INTERNAL MEDICINE

## 2019-06-13 ASSESSMENT — ENCOUNTER SYMPTOMS
NAUSEA: 0
EYE REDNESS: 0
ABDOMINAL PAIN: 0
WHEEZING: 0
DIARRHEA: 0
CONSTIPATION: 0
SHORTNESS OF BREATH: 0
EYE DISCHARGE: 0
SORE THROAT: 0
BACK PAIN: 0
RHINORRHEA: 0
TROUBLE SWALLOWING: 0
COUGH: 0
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
## 2019-06-16 LAB
GRAM STAIN RESULT: NORMAL
WOUND/ABSCESS: NORMAL

## 2019-06-17 ENCOUNTER — TELEPHONE (OUTPATIENT)
Dept: INFECTIOUS DISEASES | Age: 64
End: 2019-06-17

## 2019-06-17 LAB
ALBUMIN SERPL-MCNC: 3 G/DL (ref 3.5–5)
ALP BLD-CCNC: 79 IU/L (ref 35–135)
ALT SERPL-CCNC: <5 IU/L (ref 10–60)
ANION GAP SERPL CALCULATED.3IONS-SCNC: 8 MMOL/L (ref 6–18)
AST SERPL-CCNC: 14 IU/L (ref 10–40)
BASOPHILS ABSOLUTE: 0.1 THOU/MCL (ref 0–0.2)
BASOPHILS ABSOLUTE: 1 %
BILIRUB SERPL-MCNC: 0.4 MG/DL (ref 0–1.2)
BUN BLDV-MCNC: 27 MG/DL (ref 8–26)
C-REACTIVE PROTEIN WIDE RANGE: 2.3 MG/L
CALCIUM SERPL-MCNC: 8.4 MG/DL (ref 8.5–10.5)
CHLORIDE BLD-SCNC: 102 MEQ/L (ref 101–111)
CO2: 26 MMOL/L (ref 24–36)
CREAT SERPL-MCNC: 3.31 MG/DL (ref 0.64–1.27)
EOSINOPHILS ABSOLUTE: 0.3 THOU/MCL (ref 0.03–0.45)
EOSINOPHILS RELATIVE PERCENT: 5 %
FUNGUS (MYCOLOGY) CULTURE: NORMAL
FUNGUS STAIN: NORMAL
GFR AFRICAN AMERICAN: 21 ML/MIN/1.73 M2
GFR NON-AFRICAN AMERICAN: 18 ML/MIN/1.73 M2
GLUCOSE BLD-MCNC: 167 MG/DL (ref 70–99)
HCT VFR BLD CALC: 28.5 % (ref 40–50)
HEMOGLOBIN: 9.2 G/DL (ref 13.5–16.5)
LYMPHOCYTES ABSOLUTE: 2.2 THOU/MCL (ref 1–4)
LYMPHOCYTES RELATIVE PERCENT: 31 %
MCH RBC QN AUTO: 30.4 PG (ref 27–33)
MCHC RBC AUTO-ENTMCNC: 32.3 G/DL (ref 32–36)
MCV RBC AUTO: 94 FL (ref 82–97)
MONOCYTES # BLD: 6 %
MONOCYTES ABSOLUTE: 0.4 THOU/MCL (ref 0.2–0.9)
NEUTROPHILS ABSOLUTE: 4.1 THOU/MCL (ref 1.8–7.7)
PDW BLD-RTO: 14 %
PLATELET # BLD: 272 THOU/MCL (ref 140–375)
PMV BLD AUTO: 7.7 FL (ref 7.4–11.5)
POTASSIUM SERPL-SCNC: 3.9 MEQ/L (ref 3.6–5.1)
RBC # BLD: 3.03 MIL/MCL (ref 4.4–5.8)
SEDIMENTATION RATE, ERYTHROCYTE: 36 MM/HR (ref 0–20)
SEG NEUTROPHILS: 57 %
SODIUM BLD-SCNC: 136 MEQ/L (ref 135–145)
TOTAL PROTEIN: 6.1 G/DL (ref 6–8)
WBC # BLD: 7.1 THOU/MCL (ref 3.6–10.5)

## 2019-06-18 NOTE — TELEPHONE ENCOUNTER
Call placed to [facility], Dr Bela Salazar [phone], for clearance for HBO treatment.

## 2019-06-19 ENCOUNTER — HOSPITAL ENCOUNTER (OUTPATIENT)
Dept: WOUND CARE | Age: 64
Discharge: HOME | End: 2019-06-19
Payer: MEDICARE

## 2019-06-19 VITALS — DIASTOLIC BLOOD PRESSURE: 102 MMHG | HEART RATE: 88 BPM | RESPIRATION RATE: 16 BRPM | SYSTOLIC BLOOD PRESSURE: 174 MMHG

## 2019-06-19 PROCEDURE — 11043 DBRDMT MUSC&/FSCA 1ST 20/<: CPT

## 2019-06-19 RX ORDER — LIDOCAINE 40 MG/G
CREAM TOPICAL ONCE
Status: DISCONTINUED | OUTPATIENT
Start: 2019-06-19 | End: 2019-06-20 | Stop reason: HOSPADM

## 2019-06-19 NOTE — PLAN OF CARE
Discharge instructions given. Patient verbalized understanding. Return to [address] [place] [address],[address] in 1 week.   Waiting for approval for HBO from [facility]
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
## 2019-06-24 LAB
A/G RATIO: 1.2 (ref 1.1–2.2)
ALBUMIN SERPL-MCNC: 3.4 G/DL (ref 3.4–5)
ALP BLD-CCNC: 82 U/L (ref 40–129)
ALT SERPL-CCNC: <5 U/L (ref 10–40)
ANION GAP SERPL CALCULATED.3IONS-SCNC: 14 MMOL/L (ref 3–16)
AST SERPL-CCNC: 11 U/L (ref 15–37)
BASOPHILS ABSOLUTE: 0 K/UL (ref 0–0.2)
BASOPHILS RELATIVE PERCENT: 0.5 %
BILIRUB SERPL-MCNC: <0.2 MG/DL (ref 0–1)
BUN BLDV-MCNC: 30 MG/DL (ref 7–20)
C-REACTIVE PROTEIN: 1.4 MG/L (ref 0–5.1)
CALCIUM SERPL-MCNC: 8.7 MG/DL (ref 8.3–10.6)
CHLORIDE BLD-SCNC: 104 MMOL/L (ref 99–110)
CO2: 25 MMOL/L (ref 21–32)
CREAT SERPL-MCNC: 3.8 MG/DL (ref 0.8–1.3)
EOSINOPHILS ABSOLUTE: 0.3 K/UL (ref 0–0.6)
EOSINOPHILS RELATIVE PERCENT: 4.8 %
GFR AFRICAN AMERICAN: 19
GFR NON-AFRICAN AMERICAN: 16
GLOBULIN: 2.9 G/DL
GLUCOSE BLD-MCNC: 115 MG/DL (ref 70–99)
HCT VFR BLD CALC: 28.9 % (ref 40.5–52.5)
HEMOGLOBIN: 9.4 G/DL (ref 13.5–17.5)
LYMPHOCYTES ABSOLUTE: 1.9 K/UL (ref 1–5.1)
LYMPHOCYTES RELATIVE PERCENT: 32.1 %
MCH RBC QN AUTO: 30.9 PG (ref 26–34)
MCHC RBC AUTO-ENTMCNC: 32.5 G/DL (ref 31–36)
MCV RBC AUTO: 94.9 FL (ref 80–100)
MONOCYTES ABSOLUTE: 0.6 K/UL (ref 0–1.3)
MONOCYTES RELATIVE PERCENT: 9.4 %
NEUTROPHILS ABSOLUTE: 3.1 K/UL (ref 1.7–7.7)
NEUTROPHILS RELATIVE PERCENT: 53.2 %
PDW BLD-RTO: 14.6 % (ref 12.4–15.4)
PLATELET # BLD: 238 K/UL (ref 135–450)
PMV BLD AUTO: 7.8 FL (ref 5–10.5)
POTASSIUM SERPL-SCNC: 3.9 MMOL/L (ref 3.5–5.1)
RBC # BLD: 3.04 M/UL (ref 4.2–5.9)
SEDIMENTATION RATE, ERYTHROCYTE: 30 MM/HR (ref 0–20)
SODIUM BLD-SCNC: 143 MMOL/L (ref 136–145)
TOTAL PROTEIN: 6.3 G/DL (ref 6.4–8.2)
WBC # BLD: 5.9 K/UL (ref 4–11)

## 2019-06-24 NOTE — PROGRESS NOTES
Message left for Dr Vickie Delgado [facility]) regarding patient's eye condition and Hyperbarics.

## 2019-06-24 NOTE — PROGRESS NOTES
Spoke with Cathrine Siemens. From Dr Arneta Rinne office [facility]), who says that Dr Taco Choi does not recommend Hyperbarics at this time.

## 2019-06-25 LAB
AFB CULTURE (MYCOBACTERIA): NORMAL
AFB CULTURE (MYCOBACTERIA): NORMAL
AFB SMEAR: NORMAL
AFB SMEAR: NORMAL

## 2019-06-25 NOTE — PROGRESS NOTES
Spoke with another nurse from Dr Madalyn Alford office who stated that Dr Calin Berkowitz is giving approval for HBOT. This nurse asked for the office to fax a statement regarding his approval for HBOT. Waiting on response.

## 2019-06-26 ENCOUNTER — HOSPITAL ENCOUNTER (OUTPATIENT)
Dept: WOUND CARE | Age: 64
Discharge: HOME | End: 2019-06-26
Payer: MEDICARE

## 2019-06-26 VITALS
RESPIRATION RATE: 16 BRPM | DIASTOLIC BLOOD PRESSURE: 98 MMHG | SYSTOLIC BLOOD PRESSURE: 160 MMHG | TEMPERATURE: 98.2 F | HEART RATE: 83 BPM

## 2019-06-26 PROCEDURE — 11043 DBRDMT MUSC&/FSCA 1ST 20/<: CPT

## 2019-06-26 RX ORDER — LIDOCAINE 40 MG/G
CREAM TOPICAL PRN
Status: DISCONTINUED | OUTPATIENT
Start: 2019-06-26 | End: 2019-06-27 | Stop reason: HOSPADM

## 2019-06-26 NOTE — PLAN OF CARE
Discharge instructions given. Patient verbalized understanding. Return to [facility] in 1 week.   Awaiting OK from Eye doctor for HBO

## 2019-06-26 NOTE — PROGRESS NOTES
route 5 times daily. 200 each 5    glucose blood VI test strips (ONE TOUCH ULTRA TEST) strip 1 each by Does not apply route 5 times daily. 4 times daily. 150 each 11     No current facility-administered medications on file prior to encounter. REVIEW OF SYSTEMS    Pertinent items are noted in HPI. Objective:      BP (!) 160/98   Pulse 83   Temp 98.2 °F (36.8 °C) (Oral)   Resp 16     PHYSICAL EXAM    Palpable pedal pulses 1/4 PT and DP bilaterally  Edema bilaterally +2  Epicritic and protopathic sensations absent bilaterally  Puncture wound still open at surgical site with mild maceration surgical sites remains healed proximal and distal to the wound. Central granulation noted. Still probes deep capsule/bone. Wound with some slough      Assessment:     Patient Active Problem List   Diagnosis    [facility] failure) [facility])    Diabetic foot ulcer ([address]    Poorly controlled type 2 diabetes mellitus ([address]    Type 1 diabetes mellitus with stage 3 chronic kidney disease ([address]    Mixed hyperlipidemia    Diabetic foot infection ([address]    Cellulitis of foot    Acute hematogenous osteomyelitis of left foot ([facility])    Elevated sed rate    Elevated C-reactive protein (CRP)    Acute kidney injury superimposed on chronic kidney disease ([address]    Overweight    Diabetic polyneuropathy associated with type 2 diabetes mellitus ([address]    History of methicillin resistant staphylococcus aureus (MRSA)    Essential hypertension    Anemia due to stage 3 chronic kidney disease (HCC)    Chronic kidney disease, stage III (moderate) ([facility])       Procedure Note    Performed by: Chio West DPM    Consent obtained: Yes    Time out taken:  Yes    Pain Control: Anesthetic  Anesthetic: Other (comment)(4% lidocane)     Debridement:Excisional Debridement    Using curette the wound was sharply debrided    down through and including the removal of epidermis, dermis, subcutaneous tissue and muscle/fascia. Devitalized Tissue Debrided:  fibrin, slough and exudate    Pre Debridement Measurements:  Are located in the Wound Documentation Flow Sheet    Wound #: 1   Wound Care Documentation:  Diabetic Ulcer 07/16/15 Foot redness, swelling (Active)   Number of days: 1441       Wound 05/24/19 Toe (Comment  which one) Left;Plantar #2 Greater (Active)   Wound Diabetic 6/26/2019  3:02 PM   Dressing/Treatment Dry Dressing 5/24/2019 12:25 PM   Wound Cleansed Rinsed/Irrigated with saline 6/26/2019  3:02 PM   Wound Length (cm) 0 cm 6/26/2019  3:02 PM   Wound Width (cm) 0 cm 6/26/2019  3:02 PM   Wound Depth (cm) 0 cm 6/26/2019  3:02 PM   Wound Surface Area (cm^2) 0 cm^2 6/26/2019  3:02 PM   Change in Wound Size % (l*w) 100 6/26/2019  3:02 PM   Wound Volume (cm^3) 0 cm^3 6/26/2019  3:02 PM   Wound Healing % 100 6/26/2019  3:02 PM   Post-Procedure Length (cm) 0.3 cm 6/26/2019  3:24 PM   Post-Procedure Width (cm) 0.3 cm 6/26/2019  3:24 PM   Post-Procedure Depth (cm) 1 cm 6/26/2019  3:24 PM   Post-Procedure Surface Area (cm^2) 0.09 cm^2 6/26/2019  3:24 PM   Post-Procedure Volume (cm^3) 0.09 cm^3 6/26/2019  3:24 PM   Wound Assessment Bleeding 6/26/2019  3:24 PM   Drainage Amount Scant 6/26/2019  3:02 PM   Drainage Description Serosanguinous 6/26/2019  3:02 PM   Odor None 6/26/2019  3:02 PM   Kinza-wound Assessment Calloused 6/26/2019  3:02 PM   [place]%Wound Bed 0 6/26/2019  3:02 PM   Red%Wound Bed 0 6/26/2019  3:02 PM   Yellow%Wound Bed 0 6/26/2019  3:02 PM   Black%Wound Bed 0 6/26/2019  3:02 PM   Purple%Wound Bed 0 6/26/2019  3:02 PM   Other%Wound Bed 100 6/26/2019  3:02 PM   Number of days: 33           Total Surface Area Debrided:  0.09 sq cm     Percentage of wound debrided 100%    Bleeding:  Minimal    Hemostasis Achieved:  by pressure    Procedural Pain:  0  / 10     Post Procedural Pain:  0 / 10     Response to treatment:  Well tolerated by patient.        Diabetic foot infection with osteomyelitis, peripheral vascular disease    Plan:   Patient examined and evaluated  Wound debrided without incident  Continue IV antibiotics for osteomyelitis per infectious disease  Patient would benefit from hyperbaric oxygen treatment for his chronic osteomyelitis. His insurance has approved the treatment but he is still awaiting clearance from his ophthalmologist because of previous bleeding behind the eye. All questions answered the patient's satisfaction  Follow-up in 1 week for debridement and reevaluation  The nature of the patient's condition was explained in depth.  The patient was informed that their compliance to the treatment plan is paramount to successful healing and prevention of further ulceration and/or infection     Discharge Treatment  Daily dressing changes with Betadine ointment    Written Patient Discharge Instructions Given            Electronically signed by Paulo Esquivel DPM on 6/26/2019 at 5:10 PM

## 2019-07-01 ENCOUNTER — HOSPITAL ENCOUNTER (OUTPATIENT)
Age: 64
Setting detail: SPECIMEN
Discharge: HOME | End: 2019-07-01
Payer: MEDICARE

## 2019-07-01 LAB
A/G RATIO: 1.4 (ref 1.1–2.2)
ALBUMIN SERPL-MCNC: 3.7 G/DL (ref 3.4–5)
ALP BLD-CCNC: 84 U/L (ref 40–129)
ALT SERPL-CCNC: <5 U/L (ref 10–40)
ANION GAP SERPL CALCULATED.3IONS-SCNC: 13 MMOL/L (ref 3–16)
AST SERPL-CCNC: 14 U/L (ref 15–37)
BASOPHILS ABSOLUTE: 0.1 K/UL (ref 0–0.2)
BASOPHILS RELATIVE PERCENT: 1.1 %
BILIRUB SERPL-MCNC: <0.2 MG/DL (ref 0–1)
BUN BLDV-MCNC: 30 MG/DL (ref 7–20)
C-REACTIVE PROTEIN: 2 MG/L (ref 0–5.1)
CALCIUM SERPL-MCNC: 8.7 MG/DL (ref 8.3–10.6)
CHLORIDE BLD-SCNC: 102 MMOL/L (ref 99–110)
CO2: 24 MMOL/L (ref 21–32)
CREAT SERPL-MCNC: 3.8 MG/DL (ref 0.8–1.3)
EOSINOPHILS ABSOLUTE: 0.2 K/UL (ref 0–0.6)
EOSINOPHILS RELATIVE PERCENT: 3.5 %
GFR AFRICAN AMERICAN: 19
GFR NON-AFRICAN AMERICAN: 16
GLOBULIN: 2.7 G/DL
GLUCOSE BLD-MCNC: 121 MG/DL (ref 70–99)
HCT VFR BLD CALC: 31.2 % (ref 40.5–52.5)
HEMOGLOBIN: 9.9 G/DL (ref 13.5–17.5)
LYMPHOCYTES ABSOLUTE: 1.9 K/UL (ref 1–5.1)
LYMPHOCYTES RELATIVE PERCENT: 30.8 %
MCH RBC QN AUTO: 29.9 PG (ref 26–34)
MCHC RBC AUTO-ENTMCNC: 31.8 G/DL (ref 31–36)
MCV RBC AUTO: 94.2 FL (ref 80–100)
MONOCYTES ABSOLUTE: 0.4 K/UL (ref 0–1.3)
MONOCYTES RELATIVE PERCENT: 6.3 %
NEUTROPHILS ABSOLUTE: 3.6 K/UL (ref 1.7–7.7)
NEUTROPHILS RELATIVE PERCENT: 58.3 %
PDW BLD-RTO: 13.9 % (ref 12.4–15.4)
PLATELET # BLD: 282 K/UL (ref 135–450)
PMV BLD AUTO: 7.6 FL (ref 5–10.5)
POTASSIUM SERPL-SCNC: 4.2 MMOL/L (ref 3.5–5.1)
RBC # BLD: 3.31 M/UL (ref 4.2–5.9)
SEDIMENTATION RATE, ERYTHROCYTE: 124 MM/HR (ref 0–20)
SODIUM BLD-SCNC: 139 MMOL/L (ref 136–145)
TOTAL PROTEIN: 6.4 G/DL (ref 6.4–8.2)
WBC # BLD: 6.2 K/UL (ref 4–11)

## 2019-07-01 PROCEDURE — 85025 COMPLETE CBC W/AUTO DIFF WBC: CPT

## 2019-07-01 PROCEDURE — 80053 COMPREHEN METABOLIC PANEL: CPT

## 2019-07-01 PROCEDURE — 85652 RBC SED RATE AUTOMATED: CPT

## 2019-07-01 PROCEDURE — 36415 COLL VENOUS BLD VENIPUNCTURE: CPT

## 2019-07-01 PROCEDURE — 86140 C-REACTIVE PROTEIN: CPT

## 2019-07-02 DIAGNOSIS — A49.01 MSSA (METHICILLIN SUSCEPTIBLE STAPHYLOCOCCUS AUREUS) INFECTION: Primary | ICD-10-CM

## 2019-07-02 LAB
AFB CULTURE (MYCOBACTERIA): NORMAL
AFB SMEAR: NORMAL

## 2019-07-02 RX ORDER — DOXYCYCLINE HYCLATE 100 MG
100 TABLET ORAL 2 TIMES DAILY
Qty: 60 TABLET | Refills: 0 | Status: SHIPPED | OUTPATIENT
Start: 2019-07-02 | End: 2019-08-01

## 2019-07-03 ENCOUNTER — HOSPITAL ENCOUNTER (OUTPATIENT)
Dept: WOUND CARE | Age: 64
Discharge: HOME | End: 2019-07-03
Payer: MEDICARE

## 2019-07-03 DIAGNOSIS — E11.628 DIABETIC FOOT INFECTION (HCC): Primary | ICD-10-CM

## 2019-07-03 DIAGNOSIS — L08.9 DIABETIC FOOT INFECTION (HCC): Primary | ICD-10-CM

## 2019-07-03 PROCEDURE — 11043 DBRDMT MUSC&/FSCA 1ST 20/<: CPT

## 2019-07-03 RX ORDER — LIDOCAINE 50 MG/G
OINTMENT TOPICAL PRN
Status: DISCONTINUED | OUTPATIENT
Start: 2019-07-03 | End: 2019-07-04 | Stop reason: HOSPADM

## 2019-07-05 ENCOUNTER — TELEPHONE (OUTPATIENT)
Dept: INFECTIOUS DISEASES | Age: 64
End: 2019-07-05

## 2019-07-05 NOTE — TELEPHONE ENCOUNTER
Spoke with Bita Whitten, pharmacist at [facility]. Made him aware of pt appointment date to maintain flushes until tunneled line is removed.

## 2019-07-05 NOTE — TELEPHONE ENCOUNTER
Left a message with pt that he has an appointment at [facility] to get tunneled line removed. Appt is 7/9/19 at 8:45 a.m. - npo after midnight.
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
## 2019-07-08 ENCOUNTER — HOSPITAL ENCOUNTER (OUTPATIENT)
Age: 64
Setting detail: SPECIMEN
Discharge: HOME | End: 2019-07-08
Payer: MEDICARE

## 2019-07-08 LAB
A/G RATIO: 1.3 (ref 1.1–2.2)
ALBUMIN SERPL-MCNC: 3.7 G/DL (ref 3.4–5)
ALP BLD-CCNC: 86 U/L (ref 40–129)
ALT SERPL-CCNC: <5 U/L (ref 10–40)
ANION GAP SERPL CALCULATED.3IONS-SCNC: 13 MMOL/L (ref 3–16)
AST SERPL-CCNC: 15 U/L (ref 15–37)
BASOPHILS ABSOLUTE: 0.1 K/UL (ref 0–0.2)
BASOPHILS RELATIVE PERCENT: 1.2 %
BILIRUB SERPL-MCNC: <0.2 MG/DL (ref 0–1)
BUN BLDV-MCNC: 34 MG/DL (ref 7–20)
C-REACTIVE PROTEIN: 2.6 MG/L (ref 0–5.1)
CALCIUM SERPL-MCNC: 8.7 MG/DL (ref 8.3–10.6)
CHLORIDE BLD-SCNC: 105 MMOL/L (ref 99–110)
CO2: 24 MMOL/L (ref 21–32)
CREAT SERPL-MCNC: 4.2 MG/DL (ref 0.8–1.3)
EOSINOPHILS ABSOLUTE: 0.3 K/UL (ref 0–0.6)
EOSINOPHILS RELATIVE PERCENT: 4.4 %
GFR AFRICAN AMERICAN: 17
GFR NON-AFRICAN AMERICAN: 14
GLOBULIN: 2.8 G/DL
GLUCOSE BLD-MCNC: 140 MG/DL (ref 70–99)
HCT VFR BLD CALC: 32 % (ref 40.5–52.5)
HEMOGLOBIN: 10.1 G/DL (ref 13.5–17.5)
LYMPHOCYTES ABSOLUTE: 2.3 K/UL (ref 1–5.1)
LYMPHOCYTES RELATIVE PERCENT: 37.3 %
MCH RBC QN AUTO: 29.8 PG (ref 26–34)
MCHC RBC AUTO-ENTMCNC: 31.4 G/DL (ref 31–36)
MCV RBC AUTO: 94.7 FL (ref 80–100)
MONOCYTES ABSOLUTE: 0.4 K/UL (ref 0–1.3)
MONOCYTES RELATIVE PERCENT: 6.5 %
NEUTROPHILS ABSOLUTE: 3.1 K/UL (ref 1.7–7.7)
NEUTROPHILS RELATIVE PERCENT: 50.6 %
PDW BLD-RTO: 13.7 % (ref 12.4–15.4)
PLATELET # BLD: 248 K/UL (ref 135–450)
PMV BLD AUTO: 8.1 FL (ref 5–10.5)
POTASSIUM SERPL-SCNC: 4 MMOL/L (ref 3.5–5.1)
RBC # BLD: 3.38 M/UL (ref 4.2–5.9)
SEDIMENTATION RATE, ERYTHROCYTE: 36 MM/HR (ref 0–20)
SODIUM BLD-SCNC: 142 MMOL/L (ref 136–145)
TOTAL PROTEIN: 6.5 G/DL (ref 6.4–8.2)
WBC # BLD: 6.2 K/UL (ref 4–11)

## 2019-07-08 PROCEDURE — 85652 RBC SED RATE AUTOMATED: CPT

## 2019-07-08 PROCEDURE — 36415 COLL VENOUS BLD VENIPUNCTURE: CPT

## 2019-07-08 PROCEDURE — 80053 COMPREHEN METABOLIC PANEL: CPT

## 2019-07-08 PROCEDURE — 85025 COMPLETE CBC W/AUTO DIFF WBC: CPT

## 2019-07-08 PROCEDURE — 86140 C-REACTIVE PROTEIN: CPT

## 2019-07-09 ENCOUNTER — HOSPITAL ENCOUNTER (OUTPATIENT)
Age: 64
Discharge: HOME | End: 2019-07-09
Payer: MEDICARE

## 2019-07-09 ENCOUNTER — HOSPITAL ENCOUNTER (OUTPATIENT)
Dept: INTERVENTIONAL RADIOLOGY/VASCULAR | Age: 64
Discharge: HOME | End: 2019-07-09
Payer: MEDICARE

## 2019-07-09 ENCOUNTER — HOSPITAL ENCOUNTER (OUTPATIENT)
Dept: GENERAL RADIOLOGY | Age: 64
Discharge: HOME | End: 2019-07-09
Payer: MEDICARE

## 2019-07-09 DIAGNOSIS — L08.9 DIABETIC FOOT INFECTION (HCC): ICD-10-CM

## 2019-07-09 DIAGNOSIS — E11.628 DIABETIC FOOT INFECTION (HCC): ICD-10-CM

## 2019-07-09 DIAGNOSIS — B99.9 INFECTION: ICD-10-CM

## 2019-07-09 PROCEDURE — 73630 X-RAY EXAM OF FOOT: CPT

## 2019-07-09 PROCEDURE — 36589 REMOVAL TUNNELED CV CATH: CPT

## 2019-07-09 PROCEDURE — 77001 FLUOROGUIDE FOR VEIN DEVICE: CPT

## 2019-07-10 ENCOUNTER — HOSPITAL ENCOUNTER (OUTPATIENT)
Dept: WOUND CARE | Age: 64
Discharge: HOME | End: 2019-07-10
Payer: MEDICARE

## 2019-07-10 ENCOUNTER — HOSPITAL ENCOUNTER (OUTPATIENT)
Dept: HYPERBARIC MEDICINE | Age: 64
Discharge: HOME | End: 2019-07-10
Payer: MEDICARE

## 2019-07-10 VITALS
RESPIRATION RATE: 16 BRPM | HEART RATE: 73 BPM | TEMPERATURE: 97.8 F | SYSTOLIC BLOOD PRESSURE: 185 MMHG | DIASTOLIC BLOOD PRESSURE: 104 MMHG

## 2019-07-10 LAB
GLUCOSE BLD-MCNC: 112 MG/DL (ref 70–99)
GLUCOSE BLD-MCNC: 122 MG/DL (ref 70–99)
GLUCOSE BLD-MCNC: 170 MG/DL (ref 70–99)
PERFORMED ON: ABNORMAL

## 2019-07-10 PROCEDURE — G0277 HBOT, FULL BODY CHAMBER, 30M: HCPCS

## 2019-07-10 PROCEDURE — 99183 HYPERBARIC OXYGEN THERAPY: CPT | Performed by: SURGERY

## 2019-07-10 PROCEDURE — 11043 DBRDMT MUSC&/FSCA 1ST 20/<: CPT

## 2019-07-10 RX ORDER — LIDOCAINE 40 MG/G
CREAM TOPICAL PRN
Status: DISCONTINUED | OUTPATIENT
Start: 2019-07-10 | End: 2019-07-11 | Stop reason: HOSPADM

## 2019-07-10 NOTE — PROGRESS NOTES
05/24/19 Toe (Comment  which one) Left;Plantar #2 Greater (Active)   Wound Diabetic 7/10/2019  1:14 PM   Dressing/Treatment Dry Dressing; Ace Wrap 7/10/2019  2:21 PM   Wound Cleansed Rinsed/Irrigated with saline 7/10/2019  1:14 PM   Wound Length (cm) 0 cm 7/10/2019  1:14 PM   Wound Width (cm) 0 cm 7/10/2019  1:14 PM   Wound Depth (cm) 0 cm 7/10/2019  1:14 PM   Wound Surface Area (cm^2) 0 cm^2 7/10/2019  1:14 PM   Change in Wound Size % (l*w) 100 7/10/2019  1:14 PM   Wound Volume (cm^3) 0 cm^3 7/10/2019  1:14 PM   Wound Healing % 100 7/10/2019  1:14 PM   Post-Procedure Length (cm) 0.3 cm 7/10/2019  1:40 PM   Post-Procedure Width (cm) 0.3 cm 7/10/2019  1:40 PM   Post-Procedure Depth (cm) 0.8 cm 7/10/2019  1:40 PM   Post-Procedure Surface Area (cm^2) 0.09 cm^2 7/10/2019  1:40 PM   Post-Procedure Volume (cm^3) 0.07 cm^3 7/10/2019  1:40 PM   Wound Assessment Bleeding 7/10/2019  1:40 PM   Drainage Amount Scant 7/10/2019  1:14 PM   Drainage Description Serosanguinous 7/10/2019  1:14 PM   Odor None 7/10/2019  1:14 PM   Kinza-wound Assessment Calloused 7/10/2019  1:14 PM   [place]%Wound Bed 0 7/3/2019  2:05 PM   Red%Wound Bed 0 7/3/2019  2:05 PM   Yellow%Wound Bed 0 7/3/2019  2:05 PM   Black%Wound Bed 0 7/3/2019  2:05 PM   Purple%Wound Bed 0 7/3/2019  2:05 PM   Other%Wound Bed 100 7/3/2019  2:05 PM   Number of days: 47       Total Surface Area Debrided:  0.09 sq cm     Percentage of wound debrided 100%    Bleeding:  Minimal    Hemostasis Achieved:  by pressure    Procedural Pain:  0  / 10     Post Procedural Pain:  0 / 10     Response to treatment:  Well tolerated by patient.        Diabetic foot infection with osteomyelitis, peripheral vascular disease    Plan:   Patient examined and evaluated  Wound debrided without incident  Wound with less depth today  Continue IV antibiotics for osteomyelitis per infectious disease  Continue hyperbaric oxygen treatment  Follow-up in 1 week for debridement and reevaluation  The nature of the

## 2019-07-11 ENCOUNTER — HOSPITAL ENCOUNTER (OUTPATIENT)
Dept: HYPERBARIC MEDICINE | Age: 64
Discharge: HOME | End: 2019-07-11
Payer: MEDICARE

## 2019-07-11 ENCOUNTER — TELEPHONE (OUTPATIENT)
Dept: INFUSION THERAPY | Age: 64
End: 2019-07-11

## 2019-07-11 VITALS
DIASTOLIC BLOOD PRESSURE: 108 MMHG | SYSTOLIC BLOOD PRESSURE: 172 MMHG | RESPIRATION RATE: 16 BRPM | TEMPERATURE: 98.5 F | HEART RATE: 86 BPM

## 2019-07-11 LAB
GLUCOSE BLD-MCNC: 122 MG/DL (ref 70–99)
GLUCOSE BLD-MCNC: 142 MG/DL (ref 70–99)
GLUCOSE BLD-MCNC: 96 MG/DL (ref 70–99)
PERFORMED ON: ABNORMAL
PERFORMED ON: ABNORMAL
PERFORMED ON: NORMAL

## 2019-07-11 PROCEDURE — 99183 HYPERBARIC OXYGEN THERAPY: CPT | Performed by: INTERNAL MEDICINE

## 2019-07-11 PROCEDURE — G0277 HBOT, FULL BODY CHAMBER, 30M: HCPCS

## 2019-07-12 ENCOUNTER — HOSPITAL ENCOUNTER (OUTPATIENT)
Dept: HYPERBARIC MEDICINE | Age: 64
Discharge: HOME | End: 2019-07-12
Payer: MEDICARE

## 2019-07-12 VITALS
SYSTOLIC BLOOD PRESSURE: 192 MMHG | HEART RATE: 76 BPM | DIASTOLIC BLOOD PRESSURE: 103 MMHG | TEMPERATURE: 98.3 F | RESPIRATION RATE: 16 BRPM

## 2019-07-12 LAB
GLUCOSE BLD-MCNC: 111 MG/DL (ref 70–99)
GLUCOSE BLD-MCNC: 181 MG/DL (ref 70–99)
GLUCOSE BLD-MCNC: 79 MG/DL (ref 70–99)
GLUCOSE BLD-MCNC: 85 MG/DL (ref 70–99)
PERFORMED ON: ABNORMAL
PERFORMED ON: ABNORMAL
PERFORMED ON: NORMAL
PERFORMED ON: NORMAL

## 2019-07-12 PROCEDURE — G0277 HBOT, FULL BODY CHAMBER, 30M: HCPCS

## 2019-07-12 PROCEDURE — 99183 HYPERBARIC OXYGEN THERAPY: CPT | Performed by: INTERNAL MEDICINE

## 2019-07-16 ENCOUNTER — HOSPITAL ENCOUNTER (OUTPATIENT)
Dept: HYPERBARIC MEDICINE | Age: 64
Discharge: HOME | End: 2019-07-16
Payer: MEDICARE

## 2019-07-16 VITALS
RESPIRATION RATE: 16 BRPM | HEART RATE: 80 BPM | TEMPERATURE: 98.3 F | DIASTOLIC BLOOD PRESSURE: 98 MMHG | SYSTOLIC BLOOD PRESSURE: 197 MMHG

## 2019-07-16 PROCEDURE — 99183 HYPERBARIC OXYGEN THERAPY: CPT | Performed by: INTERNAL MEDICINE

## 2019-07-16 PROCEDURE — G0277 HBOT, FULL BODY CHAMBER, 30M: HCPCS

## 2019-07-17 ENCOUNTER — HOSPITAL ENCOUNTER (OUTPATIENT)
Dept: HYPERBARIC MEDICINE | Age: 64
Discharge: HOME | End: 2019-07-17
Payer: MEDICARE

## 2019-07-17 ENCOUNTER — HOSPITAL ENCOUNTER (OUTPATIENT)
Dept: WOUND CARE | Age: 64
Discharge: HOME | End: 2019-07-17
Payer: MEDICARE

## 2019-07-17 VITALS
HEART RATE: 72 BPM | DIASTOLIC BLOOD PRESSURE: 94 MMHG | TEMPERATURE: 97.8 F | RESPIRATION RATE: 16 BRPM | SYSTOLIC BLOOD PRESSURE: 167 MMHG

## 2019-07-17 VITALS
DIASTOLIC BLOOD PRESSURE: 81 MMHG | HEART RATE: 75 BPM | SYSTOLIC BLOOD PRESSURE: 150 MMHG | TEMPERATURE: 98.1 F | RESPIRATION RATE: 16 BRPM

## 2019-07-17 LAB
GLUCOSE BLD-MCNC: 121 MG/DL (ref 70–99)
GLUCOSE BLD-MCNC: 131 MG/DL (ref 70–99)
GLUCOSE BLD-MCNC: 167 MG/DL (ref 70–99)
GLUCOSE BLD-MCNC: 199 MG/DL (ref 70–99)
PERFORMED ON: ABNORMAL

## 2019-07-17 PROCEDURE — 11043 DBRDMT MUSC&/FSCA 1ST 20/<: CPT

## 2019-07-17 PROCEDURE — G0277 HBOT, FULL BODY CHAMBER, 30M: HCPCS

## 2019-07-17 PROCEDURE — 99183 HYPERBARIC OXYGEN THERAPY: CPT | Performed by: SURGERY

## 2019-07-17 RX ORDER — LIDOCAINE 40 MG/G
CREAM TOPICAL PRN
Status: DISCONTINUED | OUTPATIENT
Start: 2019-07-17 | End: 2019-07-18 | Stop reason: HOSPADM

## 2019-07-17 NOTE — PROGRESS NOTES
facility-administered medications on file prior to encounter. REVIEW OF SYSTEMS    Pertinent items are noted in HPI. Objective:      BP (!) 150/81   Pulse 75   Temp 98.1 °F (36.7 °C) (Oral)   Resp 16     PHYSICAL EXAM    Palpable pedal pulses 1/4 PT and DP bilaterally  Edema bilaterally +2  Epicritic and protopathic sensations absent bilaterally  Puncture wound still open at surgical site with mild maceration surgical sites remains healed proximal and distal to the wound. Central granulation noted. Still probes deep but improved from last week. Wound with slough      Assessment:     Patient Active Problem List   Diagnosis    [facility]) ([facility])    [facility]    Poorly controlled type 2 diabetes mellitus [facility]    Type 1 diabetes mellitus with stage 3 chronic kidney disease ([facility]    Mixed hyperlipidemia    Diabetic foot infection [facility]    Cellulitis of foot    Acute hematogenous osteomyelitis of left foot ([facility])    Elevated sed rate    Elevated C-reactive protein (CRP)    Acute kidney injury superimposed on chronic kidney disease ([facility]    Overweight    Diabetic polyneuropathy associated with type 2 diabetes mellitus ([facility]    History of methicillin resistant staphylococcus aureus (MRSA)    Essential hypertension    Anemia due to stage 3 chronic kidney disease (HCC)    Chronic kidney disease, stage III (moderate) ([facility])       Procedure Note    Performed by: Juan Goodrich DPM    Consent obtained: Yes    Time out taken:  Yes    Pain Control: Anesthetic  Anesthetic: Other (comment)(4% cream)     Debridement:Excisional Debridement    Using curette the wound was sharply debrided    down through and including the removal of epidermis, dermis, subcutaneous tissue and muscle/fascia.         Devitalized Tissue Debrided:  fibrin, slough and exudate    Pre Debridement Measurements:  Are located in the Wound Documentation Flow Sheet    Wound #: 1   Wound Care
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
## 2019-07-18 ENCOUNTER — HOSPITAL ENCOUNTER (OUTPATIENT)
Dept: HYPERBARIC MEDICINE | Age: 64
Discharge: HOME | End: 2019-07-18
Payer: MEDICARE

## 2019-07-18 VITALS
HEART RATE: 73 BPM | RESPIRATION RATE: 16 BRPM | SYSTOLIC BLOOD PRESSURE: 182 MMHG | DIASTOLIC BLOOD PRESSURE: 97 MMHG | TEMPERATURE: 98 F

## 2019-07-18 DIAGNOSIS — M86.072 ACUTE HEMATOGENOUS OSTEOMYELITIS OF LEFT FOOT (HCC): Primary | ICD-10-CM

## 2019-07-18 PROCEDURE — 99183 HYPERBARIC OXYGEN THERAPY: CPT | Performed by: NURSE PRACTITIONER

## 2019-07-18 PROCEDURE — G0277 HBOT, FULL BODY CHAMBER, 30M: HCPCS

## 2019-07-18 NOTE — PROGRESS NOTES
[address] [facility],4Th Floor  Hyperbaric Oxygen Therapy   Progress Note      NAME: Alejandra Fowler   MEDICAL RECORD NUMBER:  [id number]  AGE: 75 y.o. GENDER: male  : 1955  EPISODE DATE:  2019     Subjective     HBO Treatment Number: 5 out of Total Treatments: 30    HBO Diagnosis:     Indications: Lower Extremity Diabetic Wound ___(site)(left foot)  Elder RutherfordLawrance Fleischer 3     Safety checks performed prior to treatment. See doc flowsheets for documentation. Objective           Recent Labs     19  1234 19  1436   POCGLU 131* 199*                 Pre treatment Vital Signs       Temp: 98.4 °F (36.9 °C)     Pulse: 83     Resp: 16   BP: (!) 162/90       Post treatment Vital Signs  Temp: 98 °F (36.7 °C)  Pulse: 73  Resp: 16  BP: (!) 182/97      Assessment        Physical Exam:  General Appearance:  alert and oriented to person, place and time, well-developed and well-nourished, in no acute distress    Pre Tympanic Membrane Assessment:  tympanic membranes intact bilaterally, normal color, normal light reflex bilaterally. Unable to visualize left tympanic membrane due to scar tissue. No pain or difficulty clearing left ear. Post Tympanic Membrane Assessment:          Pulmonary/Chest:  clear to auscultation bilaterally- no wheezes, rales or rhonchi, normal air movement, no respiratory distress    Cardiovascular:  normal, regular rate and rhythm, no murmurs rubs or gallops    Chamber #: [id number]       Treatment Start Time: 1236     Pressure Reached Time: 5440  WILMAN : 2  Number of Air Breaks:  Treatment Status: No Air break      Decompression Time: 1415   Treatment End Time: 1423    Symptoms Noted During Treatment: None    Adverse Event: no      I was present on these premises and immediately available to furnish assistance & direction throughout the procedure.        Plan          Alejandra Fowler is a 75 y.o. male  did successfully complete today's hyperbaric oxygen treatment at [facility]

## 2019-07-19 ENCOUNTER — HOSPITAL ENCOUNTER (OUTPATIENT)
Dept: HYPERBARIC MEDICINE | Age: 64
Discharge: HOME | End: 2019-07-19
Payer: MEDICARE

## 2019-07-19 VITALS
DIASTOLIC BLOOD PRESSURE: 98 MMHG | TEMPERATURE: 97.7 F | SYSTOLIC BLOOD PRESSURE: 176 MMHG | HEART RATE: 78 BPM | RESPIRATION RATE: 16 BRPM

## 2019-07-19 LAB
GLUCOSE BLD-MCNC: 121 MG/DL (ref 70–99)
GLUCOSE BLD-MCNC: 124 MG/DL (ref 70–99)
GLUCOSE BLD-MCNC: 133 MG/DL (ref 70–99)
PERFORMED ON: ABNORMAL

## 2019-07-19 PROCEDURE — 99183 HYPERBARIC OXYGEN THERAPY: CPT | Performed by: SURGERY

## 2019-07-19 PROCEDURE — G0277 HBOT, FULL BODY CHAMBER, 30M: HCPCS

## 2019-07-22 ENCOUNTER — HOSPITAL ENCOUNTER (OUTPATIENT)
Dept: HYPERBARIC MEDICINE | Age: 64
Discharge: HOME | End: 2019-07-22
Payer: MEDICARE

## 2019-07-22 VITALS
DIASTOLIC BLOOD PRESSURE: 116 MMHG | TEMPERATURE: 97.8 F | SYSTOLIC BLOOD PRESSURE: 196 MMHG | HEART RATE: 83 BPM | RESPIRATION RATE: 16 BRPM

## 2019-07-22 LAB
GLUCOSE BLD-MCNC: 108 MG/DL (ref 70–99)
GLUCOSE BLD-MCNC: 111 MG/DL (ref 70–99)
GLUCOSE BLD-MCNC: 154 MG/DL (ref 70–99)
PERFORMED ON: ABNORMAL

## 2019-07-22 PROCEDURE — G0277 HBOT, FULL BODY CHAMBER, 30M: HCPCS

## 2019-07-22 PROCEDURE — 99183 HYPERBARIC OXYGEN THERAPY: CPT | Performed by: INTERNAL MEDICINE

## 2019-07-22 NOTE — PROGRESS NOTES
[facility]  Hyperbaric Oxygen Therapy   Progress Note      NAME: Balaji Moraes  MEDICAL RECORD NUMBER:  [id number]  AGE: 75 y.o. GENDER: male  : 1955  EPISODE DATE:  2019     Subjective       HBO Treatment Number: 7 out of Total Treatments: 30     HBO Diagnosis:          Indications: Lower Extremity Diabetic Wound ___(site)(Left foot)        Objective           Recent Labs     19  1200   POCGLU 154*       Pre and Post treatment Vital Signs in chart           Assessment          Adverse Event: no      I was present on these premises and immediately available to furnish assistance & direction throughout the procedure. Plan          Balaji Moraes is a 75 y.o. male  did successfully complete today's hyperbaric oxygen treatment at [facility]. Supervision of Hyperbaric Oxygen Therapy provided. Continue HBO treatment as outlined in the treatment plan. Hyperbaric Oxygen: Balaji Moraes tolerated Treatment Number: 7 well today without complications. Discharge Instructions were explained and given to Mr. Nathen Serna       Thank you for allowing me to participate in the care of your patient. Please do not hesitate to call.      Edilma Alexandre D.O., [facility]  Interventional Cardiology     o: [phone]  [address] [place], [place] [address], [place] [address], [address] [facility]

## 2019-07-23 ENCOUNTER — HOSPITAL ENCOUNTER (OUTPATIENT)
Age: 64
Discharge: HOME | End: 2019-07-23
Payer: MEDICARE

## 2019-07-23 ENCOUNTER — HOSPITAL ENCOUNTER (OUTPATIENT)
Dept: HYPERBARIC MEDICINE | Age: 64
Discharge: HOME | End: 2019-07-23
Payer: MEDICARE

## 2019-07-23 VITALS
HEART RATE: 85 BPM | DIASTOLIC BLOOD PRESSURE: 105 MMHG | RESPIRATION RATE: 16 BRPM | SYSTOLIC BLOOD PRESSURE: 200 MMHG | TEMPERATURE: 97.8 F

## 2019-07-23 LAB
ALBUMIN SERPL-MCNC: 3.9 G/DL (ref 3.4–5)
ANION GAP SERPL CALCULATED.3IONS-SCNC: 13 MMOL/L (ref 3–16)
BUN BLDV-MCNC: 42 MG/DL (ref 7–20)
CALCIUM SERPL-MCNC: 9.5 MG/DL (ref 8.3–10.6)
CHLORIDE BLD-SCNC: 102 MMOL/L (ref 99–110)
CO2: 24 MMOL/L (ref 21–32)
CREAT SERPL-MCNC: 3.7 MG/DL (ref 0.8–1.3)
CREATININE URINE: 72.3 MG/DL (ref 39–259)
FERRITIN: 199.3 NG/ML (ref 30–400)
GFR AFRICAN AMERICAN: 20
GFR NON-AFRICAN AMERICAN: 16
GLUCOSE BLD-MCNC: 108 MG/DL (ref 70–99)
GLUCOSE BLD-MCNC: 148 MG/DL (ref 70–99)
GLUCOSE BLD-MCNC: 152 MG/DL (ref 70–99)
GLUCOSE BLD-MCNC: 165 MG/DL (ref 70–99)
HCT VFR BLD CALC: 34 % (ref 40.5–52.5)
HEMOGLOBIN: 10.9 G/DL (ref 13.5–17.5)
IRON SATURATION: 28 % (ref 20–50)
IRON: 73 UG/DL (ref 59–158)
MCH RBC QN AUTO: 29.8 PG (ref 26–34)
MCHC RBC AUTO-ENTMCNC: 32.1 G/DL (ref 31–36)
MCV RBC AUTO: 92.9 FL (ref 80–100)
PARATHYROID HORMONE INTACT: 444.6 PG/ML (ref 14–72)
PDW BLD-RTO: 12.8 % (ref 12.4–15.4)
PERFORMED ON: ABNORMAL
PHOSPHORUS: 3.8 MG/DL (ref 2.5–4.9)
PLATELET # BLD: 238 K/UL (ref 135–450)
PMV BLD AUTO: 8.3 FL (ref 5–10.5)
POTASSIUM SERPL-SCNC: 5.1 MMOL/L (ref 3.5–5.1)
PROTEIN PROTEIN: 275 MG/DL
RBC # BLD: 3.66 M/UL (ref 4.2–5.9)
SODIUM BLD-SCNC: 139 MMOL/L (ref 136–145)
TOTAL IRON BINDING CAPACITY: 258 UG/DL (ref 260–445)
VITAMIN D 25-HYDROXY: 17.6 NG/ML
WBC # BLD: 6.2 K/UL (ref 4–11)

## 2019-07-23 PROCEDURE — 36415 COLL VENOUS BLD VENIPUNCTURE: CPT

## 2019-07-23 PROCEDURE — 85027 COMPLETE CBC AUTOMATED: CPT

## 2019-07-23 PROCEDURE — 83550 IRON BINDING TEST: CPT

## 2019-07-23 PROCEDURE — 82570 ASSAY OF URINE CREATININE: CPT

## 2019-07-23 PROCEDURE — 83970 ASSAY OF PARATHORMONE: CPT

## 2019-07-23 PROCEDURE — 82728 ASSAY OF FERRITIN: CPT

## 2019-07-23 PROCEDURE — 82306 VITAMIN D 25 HYDROXY: CPT

## 2019-07-23 PROCEDURE — 80048 BASIC METABOLIC PNL TOTAL CA: CPT

## 2019-07-23 PROCEDURE — 82040 ASSAY OF SERUM ALBUMIN: CPT

## 2019-07-23 PROCEDURE — 84156 ASSAY OF PROTEIN URINE: CPT

## 2019-07-23 PROCEDURE — G0277 HBOT, FULL BODY CHAMBER, 30M: HCPCS

## 2019-07-23 PROCEDURE — 84100 ASSAY OF PHOSPHORUS: CPT

## 2019-07-23 PROCEDURE — 99183 HYPERBARIC OXYGEN THERAPY: CPT | Performed by: INTERNAL MEDICINE

## 2019-07-23 PROCEDURE — 83540 ASSAY OF IRON: CPT

## 2019-07-23 NOTE — PROGRESS NOTES
[facility]  Hyperbaric Oxygen Therapy   Progress Note      NAME: Alexander Veras  MEDICAL RECORD NUMBER:  [id number]  AGE: 75 y.o. GENDER: male  : 1955  EPISODE DATE:  2019     Subjective       HBO Treatment Number: 8 out of Total Treatments: 30     HBO Diagnosis:          Indications: Lower Extremity Diabetic Wound ___(site)(Left foot)        Objective           Recent Labs     19  1213 19  1239   POCGLU 108* 148*       Pre and Post treatment Vital Signs in chart           Assessment          Adverse Event: no      I was present on these premises and immediately available to furnish assistance & direction throughout the procedure. Plan          Alexander Veras is a 75 y.o. male  did successfully complete today's hyperbaric oxygen treatment at [facility]. Supervision of Hyperbaric Oxygen Therapy provided. Continue HBO treatment as outlined in the treatment plan. Hyperbaric Oxygen: Alexander Veras tolerated Treatment Number: 8 well today without complications. Discharge Instructions were explained and given to . Emmanuel Katz       Thank you for allowing me to participate in the care of your patient. Please do not hesitate to call.      Mare Pearl D.O., [facility]  Interventional Cardiology     o: [phone]  [facility] [address], [address] [facility]

## 2019-07-24 ENCOUNTER — HOSPITAL ENCOUNTER (OUTPATIENT)
Dept: HYPERBARIC MEDICINE | Age: 64
Discharge: HOME | End: 2019-07-24
Payer: MEDICARE

## 2019-07-24 ENCOUNTER — HOSPITAL ENCOUNTER (OUTPATIENT)
Dept: WOUND CARE | Age: 64
Discharge: HOME | End: 2019-07-24
Payer: MEDICARE

## 2019-07-24 VITALS
TEMPERATURE: 98.4 F | HEART RATE: 75 BPM | SYSTOLIC BLOOD PRESSURE: 167 MMHG | DIASTOLIC BLOOD PRESSURE: 90 MMHG | RESPIRATION RATE: 16 BRPM

## 2019-07-24 LAB
GLUCOSE BLD-MCNC: 117 MG/DL (ref 70–99)
GLUCOSE BLD-MCNC: 128 MG/DL (ref 70–99)
GLUCOSE BLD-MCNC: 159 MG/DL (ref 70–99)
PERFORMED ON: ABNORMAL

## 2019-07-24 PROCEDURE — 99183 HYPERBARIC OXYGEN THERAPY: CPT | Performed by: INTERNAL MEDICINE

## 2019-07-24 PROCEDURE — G0277 HBOT, FULL BODY CHAMBER, 30M: HCPCS

## 2019-07-24 PROCEDURE — 11043 DBRDMT MUSC&/FSCA 1ST 20/<: CPT

## 2019-07-24 NOTE — PROGRESS NOTES
[facility]  Hyperbaric Oxygen Therapy   Progress Note      NAME: Radha Lee  MEDICAL RECORD NUMBER:  [id number]  AGE: 75 y.o. GENDER: male  : 1955  EPISODE DATE:  2019     Subjective       HBO Treatment Number: 9 out of Total Treatments: 30     HBO Diagnosis:          Indications: Lower Extremity Diabetic Wound ___(site)(Left foot)        Objective           Recent Labs     19  1200 19  1231   POCGLU 117* 128*       Pre and Post treatment Vital Signs in chart           Assessment          Adverse Event: no      I was present on these premises and immediately available to furnish assistance & direction throughout the procedure. Plan          Radha Lee is a 75 y.o. male  did successfully complete today's hyperbaric oxygen treatment at [facility]. Supervision of Hyperbaric Oxygen Therapy provided. Continue HBO treatment as outlined in the treatment plan. Hyperbaric Oxygen: Radha Lee tolerated Treatment Number: 9 well today without complications. Discharge Instructions were explained and given to . Nati Casillas       Thank you for allowing me to participate in the care of your patient. Please do not hesitate to call.      Nick Nguyen D.O., [facility]  Interventional Cardiology     o: [phone]  [address], [address], 800 [facility]

## 2019-07-24 NOTE — PROGRESS NOTES
[facility] OR    TOE AMPUTATION Right 7/17/15    great toe       FAMILY HISTORY    Family History   Problem Relation Age of Onset    Cancer Mother     Diabetes Sister     Cancer Brother     Diabetes Brother     Diabetes Brother     Heart Disease Neg Hx     Stroke Neg Hx     Thyroid Disease Neg Hx        SOCIAL HISTORY    Social History     Tobacco Use    Smoking status: Never Smoker    Smokeless tobacco: Never Used   Substance Use Topics    Alcohol use: No    Drug use: No       ALLERGIES    No Known Allergies    MEDICATIONS    Current Outpatient Medications on File Prior to Encounter   Medication Sig Dispense Refill    doxycycline hyclate (VIBRA-TABS) 100 MG tablet Take 1 tablet by mouth 2 times daily 60 tablet 0    insulin glargine (LANTUS) 100 UNIT/ML injection pen Inject 13 Units into the skin nightly 5 pen 1    carvedilol (COREG) 12.5 MG tablet Take 1 tablet by mouth 2 times daily (with meals) 60 tablet 0    amLODIPine (NORVASC) 10 MG tablet Take 1 tablet by mouth daily 30 tablet 0    vitamin D (CHOLECALCIFEROL) 1000 UNIT TABS tablet Take 2 tablets by mouth daily 60 tablet 0    B-D UF III MINI PEN NEEDLES 31G X 5 MM MISC USE FOUR TIMES DAILY AS DIRECTED. 100 each 2    levothyroxine (SYNTHROID) 100 MCG tablet Take 1 tablet by mouth Daily 30 tablet 1    insulin lispro (HUMALOG) 100 UNIT/ML pen Inject 12 Units into the skin 3 times daily (with meals) Plus correction 1:25 BS>140 for daily total of 72 units 10 Pen 1    atorvastatin (LIPITOR) 20 MG tablet Take 1 tablet by mouth daily 30 tablet 5    ACCU-CHEK VEE PLUS strip 1 each by In Vitro route 5 times daily As needed. 450 each 3    ACCU-CHEK FASTCLIX LANCETS MISC Test blood sugars 4 times daily 200 each 3    ONE TOUCH LANCETS MISC 1 each by Does not apply route 5 times daily. 200 each 5    glucose blood VI test strips (ONE TOUCH ULTRA TEST) strip 1 each by Does not apply route 5 times daily. 4 times daily.  150 each 11     No current follow-up with his primary care within the next week to evaluate for hypertension  The nature of the patient's condition was explained in depth.  The patient was informed that their compliance to the treatment plan is paramount to successful healing and prevention of further ulceration and/or infection     Discharge Treatment Wound 05/24/19 Toe (Comment  which one) Left;Plantar Great #2 -Dressing/Treatment: Nela Kelly, Dry Dressing(betadine ointment, 2 tbg)Daily dressing changes with Betadine ointment    Written Patient Discharge Instructions Given            Electronically signed by Vickie Ortiz DPM on 7/24/2019 at 4:56 PM

## 2019-07-25 ENCOUNTER — HOSPITAL ENCOUNTER (OUTPATIENT)
Dept: HYPERBARIC MEDICINE | Age: 64
Discharge: HOME | End: 2019-07-25
Payer: MEDICARE

## 2019-07-25 VITALS
RESPIRATION RATE: 16 BRPM | HEART RATE: 77 BPM | TEMPERATURE: 97.7 F | DIASTOLIC BLOOD PRESSURE: 93 MMHG | SYSTOLIC BLOOD PRESSURE: 176 MMHG

## 2019-07-25 DIAGNOSIS — M86.072 ACUTE HEMATOGENOUS OSTEOMYELITIS OF LEFT FOOT (HCC): ICD-10-CM

## 2019-07-25 DIAGNOSIS — E11.628 DIABETIC FOOT INFECTION (HCC): Primary | ICD-10-CM

## 2019-07-25 DIAGNOSIS — L08.9 DIABETIC FOOT INFECTION (HCC): Primary | ICD-10-CM

## 2019-07-25 LAB
GLUCOSE BLD-MCNC: 113 MG/DL (ref 70–99)
GLUCOSE BLD-MCNC: 133 MG/DL (ref 70–99)
GLUCOSE BLD-MCNC: 142 MG/DL (ref 70–99)
PERFORMED ON: ABNORMAL

## 2019-07-25 PROCEDURE — G0277 HBOT, FULL BODY CHAMBER, 30M: HCPCS

## 2019-07-25 PROCEDURE — 99183 HYPERBARIC OXYGEN THERAPY: CPT | Performed by: NURSE PRACTITIONER

## 2019-07-26 ENCOUNTER — HOSPITAL ENCOUNTER (OUTPATIENT)
Dept: HYPERBARIC MEDICINE | Age: 64
Discharge: HOME | End: 2019-07-26
Payer: MEDICARE

## 2019-07-26 ENCOUNTER — HOSPITAL ENCOUNTER (OUTPATIENT)
Dept: INFUSION THERAPY | Age: 64
Setting detail: INFUSION SERIES
Discharge: HOME | End: 2019-07-26
Payer: MEDICARE

## 2019-07-26 VITALS
TEMPERATURE: 97.5 F | SYSTOLIC BLOOD PRESSURE: 157 MMHG | HEART RATE: 71 BPM | DIASTOLIC BLOOD PRESSURE: 85 MMHG | RESPIRATION RATE: 16 BRPM

## 2019-07-26 VITALS
SYSTOLIC BLOOD PRESSURE: 146 MMHG | RESPIRATION RATE: 16 BRPM | OXYGEN SATURATION: 98 % | DIASTOLIC BLOOD PRESSURE: 79 MMHG | TEMPERATURE: 98.1 F | HEART RATE: 70 BPM

## 2019-07-26 DIAGNOSIS — N18.30 CHRONIC KIDNEY DISEASE, STAGE III (MODERATE) (HCC): ICD-10-CM

## 2019-07-26 DIAGNOSIS — D63.1 ANEMIA DUE TO STAGE 3 CHRONIC KIDNEY DISEASE (HCC): Primary | ICD-10-CM

## 2019-07-26 DIAGNOSIS — N18.30 ANEMIA DUE TO STAGE 3 CHRONIC KIDNEY DISEASE (HCC): Primary | ICD-10-CM

## 2019-07-26 LAB
BASOPHILS ABSOLUTE: 0.1 K/UL (ref 0–0.2)
BASOPHILS RELATIVE PERCENT: 0.9 %
EOSINOPHILS ABSOLUTE: 0.2 K/UL (ref 0–0.6)
EOSINOPHILS RELATIVE PERCENT: 3.8 %
FERRITIN: 200.9 NG/ML (ref 30–400)
FOLATE: 9.86 NG/ML (ref 4.78–24.2)
GLUCOSE BLD-MCNC: 110 MG/DL (ref 70–99)
GLUCOSE BLD-MCNC: 111 MG/DL (ref 70–99)
GLUCOSE BLD-MCNC: 133 MG/DL (ref 70–99)
HCT VFR BLD CALC: 29.7 % (ref 40.5–52.5)
HEMOGLOBIN: 9.7 G/DL (ref 13.5–17.5)
IRON SATURATION: 34 % (ref 20–50)
IRON: 86 UG/DL (ref 59–158)
LYMPHOCYTES ABSOLUTE: 1.5 K/UL (ref 1–5.1)
LYMPHOCYTES RELATIVE PERCENT: 25 %
MCH RBC QN AUTO: 30.4 PG (ref 26–34)
MCHC RBC AUTO-ENTMCNC: 32.8 G/DL (ref 31–36)
MCV RBC AUTO: 92.6 FL (ref 80–100)
MONOCYTES ABSOLUTE: 0.3 K/UL (ref 0–1.3)
MONOCYTES RELATIVE PERCENT: 5.4 %
NEUTROPHILS ABSOLUTE: 3.9 K/UL (ref 1.7–7.7)
NEUTROPHILS RELATIVE PERCENT: 64.9 %
PDW BLD-RTO: 13.1 % (ref 12.4–15.4)
PERFORMED ON: ABNORMAL
PLATELET # BLD: 219 K/UL (ref 135–450)
PMV BLD AUTO: 7.5 FL (ref 5–10.5)
RBC # BLD: 3.21 M/UL (ref 4.2–5.9)
TOTAL IRON BINDING CAPACITY: 252 UG/DL (ref 260–445)
VITAMIN B-12: 716 PG/ML (ref 211–911)
WBC # BLD: 6.1 K/UL (ref 4–11)

## 2019-07-26 PROCEDURE — G0277 HBOT, FULL BODY CHAMBER, 30M: HCPCS

## 2019-07-26 PROCEDURE — 82746 ASSAY OF FOLIC ACID SERUM: CPT

## 2019-07-26 PROCEDURE — 85025 COMPLETE CBC W/AUTO DIFF WBC: CPT

## 2019-07-26 PROCEDURE — 82728 ASSAY OF FERRITIN: CPT

## 2019-07-26 PROCEDURE — 83550 IRON BINDING TEST: CPT

## 2019-07-26 PROCEDURE — 83540 ASSAY OF IRON: CPT

## 2019-07-26 PROCEDURE — 82607 VITAMIN B-12: CPT

## 2019-07-26 PROCEDURE — 6360000002 HC RX W HCPCS: Performed by: INTERNAL MEDICINE

## 2019-07-26 PROCEDURE — 96372 THER/PROPH/DIAG INJ SC/IM: CPT

## 2019-07-26 PROCEDURE — 99183 HYPERBARIC OXYGEN THERAPY: CPT | Performed by: INTERNAL MEDICINE

## 2019-07-26 RX ORDER — ASPIRIN 81 MG/1
1 TABLET, CHEWABLE ORAL DAILY
Status: ON HOLD | COMMUNITY
End: 2021-02-16 | Stop reason: HOSPADM

## 2019-07-26 RX ORDER — CIPROFLOXACIN 500 MG/1
500 TABLET, FILM COATED ORAL 2 TIMES DAILY
COMMUNITY
End: 2019-09-25 | Stop reason: HOSPADM

## 2019-07-26 RX ORDER — AMPICILLIN 500 MG/1
500 CAPSULE ORAL 2 TIMES DAILY
COMMUNITY
End: 2019-09-25 | Stop reason: HOSPADM

## 2019-07-26 RX ADMIN — DARBEPOETIN ALFA 60 MCG: 60 SOLUTION INTRAVENOUS; SUBCUTANEOUS at 16:13

## 2019-07-26 NOTE — PROGRESS NOTES
[address] [facility]    Peripheral Lab Draw    NAME:  Antoinette Patel  YOB: 1955  MEDICAL RECORD NUMBER:  [id number]  Episode Date:  7/26/2019    Blood Draw Site: Location:right arm  Site cleansed with Chloroprep Scrub for 30 seconds: Yes  Site cleansed with Alcohol pads: Yes  Labs drawn with: 23 gauge             [x] Butterfly    [] Needle  Labs Obtained: Yes  Number of attempts: 1    Lab Test(s) Ordered: CBC, B12, Folate, Ferritin, Iron, TIBC    Lab Draw Site:  Redness: No  Bruising: No   Edema: No  Pain: No     Response to treatment:  Well tolerated by patient. Electronically signed by Yane Booker RN on 7/26/2019 at 4:39 PM                                                       Lab orders for Epogen / Aranesp:   Initial labs: CBC, FE, TIBC, Fe%, Ferritin, Vit B12, Folate   F/U Labs:  Hgb prior to ARMEN,  Check Fe, TIBC, Fe%, Ferritin every 3 months. Outpatient [id number] [facility] Visit    NAME:  Antoinette Patel  YOB: 1955  MEDICAL RECORD NUMBER:  [id number]  EPISODE DATE:  7/26/2019    Patient arrived to [place] [address]   [] per wheelchair   [x] ambulatory    Alert and oriented X4, states has symptoms of fatigue, dizziness and shortness of breath since anemia diagnosis. Currently under treatment for open wound left foot and great toe. Receives hyperbaric oxygen treatments daily Monday-Friday at [facility]. States he stepped on a screw with his shoe and unaware the screw was injuring his foot everyday he wore the shoe. Denies any pain in foot. Cast shoe in place. States was unaware he had numbness of feet. Continues on oral antibiotics.      Is the patient experiencing any:  Fatigue:   []   None  [x]   Increase over baseline but not altering normal activities  []   Moderate of causing difficulty performing some activities  []   Severe or loss of ability to perform some activities  [] Bedridden or disabling    Dizziness or Lightheadedness:   []   None  [x]   No Interference  []   Interferes with functioning but not activities of daily living  []   Interferes with daily activies  []   Bedridden or disabling    Shortness of Breath:   []   None   [x]   Dyspneic on exertion   []   Dyspnea with normal activities  []   Dyspnea at rest    Edema: none Location of edema: nothing     Tachycardia: No    Heart Palpitations: No      Chest Pain: No     BP (!) 146/79   Pulse 70   Temp 98.1 °F (36.7 °C) (Oral)   Resp 16   SpO2 98%     Hold ARMEN dose if B/P is greater than: 180 mm/Hg     If Hgb remains less than 10 Increase dose by 25%. Do not increase dose more frequently than once every 4 weeks. If Hgb 10-10.5 g/dl  Continue same dose  If Hgb 10.6-11 g/dl Decrease dose by 25%. A decrease in dose can be done as frequently as every week. If Hgb is greater than 11 g/dl Hold Aranesp. May resume ARMEN when Hgb is less than 10 with a 25% reduction in the dose from the last administered dose or decrease with a 25% reduction in the dose from the last administered dose or decrease  frequency. Last visit date: N/A     Last Hgb: g/dl   Last dose: N/A    Patient states HGB was \"low around 8\" a few months ago and he started on oral iron    Current Lab Data:  Hemoglobin/Hematocrit:    Lab Results   Component Value Date    HGB 9.7 07/26/2019    HCT 29.7 07/26/2019     Dose will be given as ordered, first dose      Aranesp dosage administered: 60 mcg was administered slowly subcutaneously into the right upper arm. Dose verified by:  Kian Morales RN    Response to treatment:  Well tolerated by patient. Education:    Verbalized understanding    Scheduled to return for next dose of Aranesp on August 2, 2019.      Electronically signed by Jennifer Rowland RN on 7/26/2019 at 4:39 PM

## 2019-07-29 ENCOUNTER — HOSPITAL ENCOUNTER (OUTPATIENT)
Dept: HYPERBARIC MEDICINE | Age: 64
Discharge: HOME | End: 2019-07-29
Payer: MEDICARE

## 2019-07-29 VITALS
SYSTOLIC BLOOD PRESSURE: 161 MMHG | RESPIRATION RATE: 16 BRPM | TEMPERATURE: 97.7 F | HEART RATE: 73 BPM | DIASTOLIC BLOOD PRESSURE: 92 MMHG

## 2019-07-29 LAB
GLUCOSE BLD-MCNC: 127 MG/DL (ref 70–99)
GLUCOSE BLD-MCNC: 137 MG/DL (ref 70–99)
PERFORMED ON: ABNORMAL
PERFORMED ON: ABNORMAL

## 2019-07-29 PROCEDURE — G0277 HBOT, FULL BODY CHAMBER, 30M: HCPCS

## 2019-07-29 NOTE — PROGRESS NOTES
[facility]  Hyperbaric Oxygen Therapy   Progress Note        Ellen Guardado  MEDICAL RECORD NUMBER: [id number]  AGE: 75 y.o.      GENDER: male    : 1955  TODAY'S DATE: 2019     Subjective     HBO Treatment Number: 12 out of Total Treatments: 30    HBO Diagnosis:     Indications: Lower Extremity Diabetic Wound ___(site)(Left foot)  Eric Fuller: Eric Fuller 3     This is treatment Treatment Number: 12 out of Total Treatments: 30 WILMAN : 2  Number of Air Breaks:  Treatment Status: No Air break      Objective       Recent Labs     19  1149 19  1409   POCGLU 137* 127*       Pre treatment Vital Signs       Temp: 98.5 °F (36.9 °C)     Pulse: 73     Resp: 18     BP: 124/77       Post treatment Vital Signs  Temp: 97.7 °F (36.5 °C)  Pulse: 73  Resp: 16  BP: (!) 161/92    Assessment       Patient Active Problem List   Diagnosis Code    ARF (acute renal failure) ([facility]) N17.9    Diabetic foot ulcer ([facility]) E11.621, L97.509    Poorly controlled type 2 diabetes mellitus ([facility]) E11.65    Type 1 diabetes mellitus with stage 3 chronic kidney disease ([facility]) E10.22, N18.3    Mixed hyperlipidemia E78.2    Diabetic foot infection ([facility]) E11.628, L08.9    Cellulitis of foot L03.119    Acute hematogenous osteomyelitis of left foot ([facility]) M86.072    Elevated sed rate R70.0    Elevated C-reactive protein (CRP) R79.82    Acute kidney injury superimposed on chronic kidney disease (HCC) N17.9, N18.9    Overweight E66.3    Diabetic polyneuropathy associated with type 2 diabetes mellitus ([facility]) E11.42    History of methicillin resistant staphylococcus aureus (MRSA) Z86.14    Essential hypertension I10    Anemia due to stage 3 chronic kidney disease (HCC) N18.3, D63.1    Chronic kidney disease, stage III (moderate) ([facility]) N18.3       HBO timing chart:   Date: 2019  Treatment Start   Pressure Reached Time: 1225  Start compression                                               WILMAN : 2  Air Break

## 2019-07-30 ENCOUNTER — HOSPITAL ENCOUNTER (OUTPATIENT)
Dept: HYPERBARIC MEDICINE | Age: 64
Discharge: HOME | End: 2019-07-30
Payer: MEDICARE

## 2019-07-30 VITALS
SYSTOLIC BLOOD PRESSURE: 175 MMHG | TEMPERATURE: 97.6 F | DIASTOLIC BLOOD PRESSURE: 91 MMHG | HEART RATE: 77 BPM | RESPIRATION RATE: 16 BRPM

## 2019-07-30 LAB
GLUCOSE BLD-MCNC: 128 MG/DL (ref 70–99)
GLUCOSE BLD-MCNC: 144 MG/DL (ref 70–99)
PERFORMED ON: ABNORMAL
PERFORMED ON: ABNORMAL

## 2019-07-30 PROCEDURE — 99183 HYPERBARIC OXYGEN THERAPY: CPT | Performed by: INTERNAL MEDICINE

## 2019-07-30 PROCEDURE — G0277 HBOT, FULL BODY CHAMBER, 30M: HCPCS

## 2019-07-31 ENCOUNTER — HOSPITAL ENCOUNTER (OUTPATIENT)
Dept: HYPERBARIC MEDICINE | Age: 64
Discharge: HOME | End: 2019-07-31
Payer: MEDICARE

## 2019-07-31 ENCOUNTER — HOSPITAL ENCOUNTER (OUTPATIENT)
Dept: WOUND CARE | Age: 64
Discharge: HOME | End: 2019-07-31
Payer: MEDICARE

## 2019-07-31 VITALS
TEMPERATURE: 97.9 F | HEART RATE: 72 BPM | DIASTOLIC BLOOD PRESSURE: 82 MMHG | SYSTOLIC BLOOD PRESSURE: 161 MMHG | RESPIRATION RATE: 16 BRPM

## 2019-07-31 LAB
GLUCOSE BLD-MCNC: 135 MG/DL (ref 70–99)
GLUCOSE BLD-MCNC: 157 MG/DL (ref 70–99)
PERFORMED ON: ABNORMAL
PERFORMED ON: ABNORMAL

## 2019-07-31 PROCEDURE — 11043 DBRDMT MUSC&/FSCA 1ST 20/<: CPT

## 2019-07-31 PROCEDURE — 99183 HYPERBARIC OXYGEN THERAPY: CPT | Performed by: INTERNAL MEDICINE

## 2019-07-31 PROCEDURE — G0277 HBOT, FULL BODY CHAMBER, 30M: HCPCS
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
## 2019-08-01 ENCOUNTER — HOSPITAL ENCOUNTER (OUTPATIENT)
Dept: HYPERBARIC MEDICINE | Age: 64
Discharge: HOME | End: 2019-08-01
Payer: MEDICARE

## 2019-08-01 VITALS
RESPIRATION RATE: 16 BRPM | HEART RATE: 79 BPM | SYSTOLIC BLOOD PRESSURE: 165 MMHG | TEMPERATURE: 97.9 F | DIASTOLIC BLOOD PRESSURE: 86 MMHG

## 2019-08-01 DIAGNOSIS — M86.072 ACUTE HEMATOGENOUS OSTEOMYELITIS OF LEFT ANKLE (HCC): ICD-10-CM

## 2019-08-01 PROBLEM — L97.529 ULCER OF LEFT FOOT (HCC): Status: ACTIVE | Noted: 2019-08-01

## 2019-08-01 LAB
GLUCOSE BLD-MCNC: 109 MG/DL (ref 70–99)
GLUCOSE BLD-MCNC: 133 MG/DL (ref 70–99)
GLUCOSE BLD-MCNC: 150 MG/DL (ref 70–99)
PERFORMED ON: ABNORMAL

## 2019-08-01 PROCEDURE — 99183 HYPERBARIC OXYGEN THERAPY: CPT | Performed by: NURSE PRACTITIONER

## 2019-08-01 PROCEDURE — G0277 HBOT, FULL BODY CHAMBER, 30M: HCPCS

## 2019-08-01 NOTE — PROGRESS NOTES
[facility]  Hyperbaric Oxygen Therapy   Progress Note        Lety Aparicio  MEDICAL RECORD NUMBER: [id number]  AGE: 75 y.o.      GENDER: male    : 1955  TODAY'S DATE: 2019     Subjective     HBO Treatment Number: 15 out of Total Treatments: 30    HBO Diagnosis:     Indications: Lower Extremity Diabetic Wound ___(site)(Right foot)  Stanley Richeyry: Angel Secaucus 3     This is treatment Treatment Number: 15 out of Total Treatments: 30 WILMAN : 2  Number of Air Breaks:  Treatment Status: No Air break      Objective       Recent Labs     19  1211 19  1238   POCGLU 109* 133*       Pre treatment Vital Signs       Temp: 98.3 °F (36.8 °C)     Pulse: 86     Resp: 16     BP: (!) 151/79       Post treatment Vital Signs  Temp: 98.3 °F (36.8 °C)  Pulse: 86  Resp: 16  BP: (!) 151/79    Assessment       Patient Active Problem List   Diagnosis Code    ARF (acute renal failure) ([facility]) N17.9    Diabetic foot ulcer ([facility]) E11.621, L97.509    Diabetic foot ulcer with osteomyelitis ([facility]) E11.621, E11.69, L97.509, M86.9    Type 1 diabetes mellitus with stage 3 chronic kidney disease ([facility]) E10.22, N18.3    Mixed hyperlipidemia E78.2    Diabetic foot infection ([facility]) E11.628, L08.9    Cellulitis of foot L03.119    Acute hematogenous osteomyelitis of left foot ([facility]) M86.072    Elevated sed rate R70.0    Elevated C-reactive protein (CRP) R79.82    Acute kidney injury superimposed on chronic kidney disease ([facility]) N17.9, N18.9    Overweight E66.3    Diabetic polyneuropathy associated with type 2 diabetes mellitus ([facility]) E11.42    History of methicillin resistant staphylococcus aureus (MRSA) Z86.14    Essential hypertension I10    Anemia due to stage 3 chronic kidney disease (HCC) N18.3, D63.1    Chronic kidney disease, stage III (moderate) ([facility]) N18.3    Ulcer of left foot ([address] L97.529    Acute hematogenous osteomyelitis of left ankle ([address] M86.072       HBO timing chart:   Date: 2019  Treatment

## 2019-08-02 ENCOUNTER — HOSPITAL ENCOUNTER (OUTPATIENT)
Dept: HYPERBARIC MEDICINE | Age: 64
Discharge: HOME | End: 2019-08-02
Payer: MEDICARE

## 2019-08-02 ENCOUNTER — HOSPITAL ENCOUNTER (OUTPATIENT)
Dept: INFUSION THERAPY | Age: 64
Setting detail: INFUSION SERIES
Discharge: HOME | End: 2019-08-02
Payer: MEDICARE

## 2019-08-02 VITALS
SYSTOLIC BLOOD PRESSURE: 162 MMHG | HEART RATE: 71 BPM | TEMPERATURE: 97.8 F | DIASTOLIC BLOOD PRESSURE: 86 MMHG | RESPIRATION RATE: 16 BRPM

## 2019-08-02 VITALS
HEART RATE: 75 BPM | SYSTOLIC BLOOD PRESSURE: 157 MMHG | RESPIRATION RATE: 16 BRPM | TEMPERATURE: 97.9 F | DIASTOLIC BLOOD PRESSURE: 82 MMHG | OXYGEN SATURATION: 98 %

## 2019-08-02 DIAGNOSIS — N18.30 ANEMIA DUE TO STAGE 3 CHRONIC KIDNEY DISEASE (HCC): Primary | ICD-10-CM

## 2019-08-02 DIAGNOSIS — D63.1 ANEMIA DUE TO STAGE 3 CHRONIC KIDNEY DISEASE (HCC): Primary | ICD-10-CM

## 2019-08-02 DIAGNOSIS — M86.072 ACUTE HEMATOGENOUS OSTEOMYELITIS OF LEFT ANKLE (HCC): ICD-10-CM

## 2019-08-02 DIAGNOSIS — N18.30 CHRONIC KIDNEY DISEASE, STAGE III (MODERATE) (HCC): ICD-10-CM

## 2019-08-02 LAB
GLUCOSE BLD-MCNC: 128 MG/DL (ref 70–99)
GLUCOSE BLD-MCNC: 136 MG/DL (ref 70–99)
HEMOGLOBIN: 10.3 G/DL (ref 13.5–17.5)
PERFORMED ON: ABNORMAL
PERFORMED ON: ABNORMAL

## 2019-08-02 PROCEDURE — 96372 THER/PROPH/DIAG INJ SC/IM: CPT

## 2019-08-02 PROCEDURE — 6360000002 HC RX W HCPCS: Performed by: INTERNAL MEDICINE

## 2019-08-02 PROCEDURE — G0277 HBOT, FULL BODY CHAMBER, 30M: HCPCS

## 2019-08-02 PROCEDURE — 99183 HYPERBARIC OXYGEN THERAPY: CPT | Performed by: INTERNAL MEDICINE

## 2019-08-02 PROCEDURE — 85018 HEMOGLOBIN: CPT

## 2019-08-02 RX ADMIN — DARBEPOETIN ALFA 60 MCG: 60 SOLUTION INTRAVENOUS; SUBCUTANEOUS at 15:05

## 2019-08-05 ENCOUNTER — HOSPITAL ENCOUNTER (OUTPATIENT)
Dept: HYPERBARIC MEDICINE | Age: 64
Discharge: HOME | End: 2019-08-05
Payer: MEDICARE

## 2019-08-05 VITALS
HEART RATE: 74 BPM | TEMPERATURE: 97.9 F | RESPIRATION RATE: 16 BRPM | DIASTOLIC BLOOD PRESSURE: 91 MMHG | SYSTOLIC BLOOD PRESSURE: 164 MMHG

## 2019-08-05 DIAGNOSIS — M86.072 ACUTE HEMATOGENOUS OSTEOMYELITIS OF LEFT ANKLE (HCC): ICD-10-CM

## 2019-08-05 LAB
GLUCOSE BLD-MCNC: 141 MG/DL (ref 70–99)
PERFORMED ON: ABNORMAL

## 2019-08-05 PROCEDURE — G0277 HBOT, FULL BODY CHAMBER, 30M: HCPCS

## 2019-08-05 NOTE — PROGRESS NOTES
[facility]  Hyperbaric Oxygen Therapy   Progress Note        Dimas Sutherland  MEDICAL RECORD NUMBER: [id number]  AGE: 75 y.o.      GENDER: male    : 1955  TODAY'S DATE: 2019     Subjective     HBO Treatment Number: 17 out of Total Treatments: 30    HBO Diagnosis:     Indications: Lower Extremity Diabetic Wound ___(site)(left foot)  Terrence Presser: Terrence Presser 3     This is treatment Treatment Number: 17 out of Total Treatments: 30 WILMAN : 2  Number of Air Breaks:  Treatment Status: No Air break      Objective       Recent Labs     19  1414   POCGLU 141*       Pre treatment Vital Signs       Temp: 98.3 °F (36.8 °C)     Pulse: 77     Resp: 16     BP: (!) 161/91       Post treatment Vital Signs  Temp: 97.9 °F (36.6 °C)  Pulse: 74  Resp: 16  BP: (!) 164/91    Assessment       Patient Active Problem List   Diagnosis Code    ARF (acute renal failure) ([facility]) N17.9    Diabetic foot ulcer ([facility]) E11.621, L97.509    Diabetic foot ulcer with osteomyelitis ([facility]) E11.621, E11.69, L97.509, M86.9    Type 1 diabetes mellitus with stage 3 chronic kidney disease ([facility]) E10.22, N18.3    Mixed hyperlipidemia E78.2    Diabetic foot infection ([facility]) E11.628, L08.9    Cellulitis of foot L03.119    Acute hematogenous osteomyelitis of left foot ([facility]) M86.072    Elevated sed rate R70.0    Elevated C-reactive protein (CRP) R79.82    Acute kidney injury superimposed on chronic kidney disease ([facility]) N17.9, N18.9    Overweight E66.3    Diabetic polyneuropathy associated with type 2 diabetes mellitus ([facility]) E11.42    History of methicillin resistant staphylococcus aureus (MRSA) Z86.14    Essential hypertension I10    Anemia due to stage 3 chronic kidney disease (HCC) N18.3, D63.1    Chronic kidney disease, stage III (moderate) ([facility]) N18.3    Ulcer of left foot ([address] L97.529    Acute hematogenous osteomyelitis of left ankle ([address] M86.072       HBO timing chart:   Date: 2019  Treatment Start   Pressure Reached Time: 1231  Start compression                                               WILMAN : 2  Air Break       Start Decompression   Decompression Time: 1401   End Decompression   Treatment End Time: 1409    Total Treatment Time (hours) Length of Treatment: 90 Minutes    Chamber #: [id number]    Length of Treatment: 90 Minutes    Symptoms Noted During Treatment: None    Adverse Event: no      I was present on these premises and immediately available to furnish assistance & direction throughout the procedure. Plan          Dimas Sutherland is a 75 y.o. male  did successfully complete today's hyperbaric oxygen treatment at [facility]. Supervision of Hyperbaric Oxygen Therapy provided. Continue HBO treatment as outlined in the treatment plan. Discharge Instructions were explained and given to Mr.  Valerie [address]     Electronically signed by Tona Tinoco DPM on 8/5/2019 at 2:57 PM

## 2019-08-06 ENCOUNTER — HOSPITAL ENCOUNTER (OUTPATIENT)
Dept: HYPERBARIC MEDICINE | Age: 64
Discharge: HOME | End: 2019-08-06
Payer: MEDICARE

## 2019-08-06 VITALS
SYSTOLIC BLOOD PRESSURE: 154 MMHG | TEMPERATURE: 97.7 F | DIASTOLIC BLOOD PRESSURE: 81 MMHG | RESPIRATION RATE: 16 BRPM | HEART RATE: 69 BPM

## 2019-08-06 DIAGNOSIS — M86.072 ACUTE HEMATOGENOUS OSTEOMYELITIS OF LEFT ANKLE (HCC): ICD-10-CM

## 2019-08-06 LAB
GLUCOSE BLD-MCNC: 135 MG/DL (ref 70–99)
GLUCOSE BLD-MCNC: 168 MG/DL (ref 70–99)
GLUCOSE BLD-MCNC: 178 MG/DL (ref 70–99)
PERFORMED ON: ABNORMAL

## 2019-08-06 PROCEDURE — 99183 HYPERBARIC OXYGEN THERAPY: CPT | Performed by: SURGERY

## 2019-08-06 PROCEDURE — G0277 HBOT, FULL BODY CHAMBER, 30M: HCPCS

## 2019-08-07 ENCOUNTER — HOSPITAL ENCOUNTER (OUTPATIENT)
Dept: WOUND CARE | Age: 64
Discharge: HOME | End: 2019-08-07
Payer: MEDICARE

## 2019-08-07 ENCOUNTER — HOSPITAL ENCOUNTER (OUTPATIENT)
Dept: HYPERBARIC MEDICINE | Age: 64
Discharge: HOME | End: 2019-08-07
Payer: MEDICARE

## 2019-08-07 VITALS
DIASTOLIC BLOOD PRESSURE: 80 MMHG | RESPIRATION RATE: 18 BRPM | TEMPERATURE: 97.7 F | HEART RATE: 71 BPM | SYSTOLIC BLOOD PRESSURE: 154 MMHG

## 2019-08-07 DIAGNOSIS — M86.072 ACUTE HEMATOGENOUS OSTEOMYELITIS OF LEFT ANKLE (HCC): ICD-10-CM

## 2019-08-07 LAB
GLUCOSE BLD-MCNC: 120 MG/DL (ref 70–99)
GLUCOSE BLD-MCNC: 124 MG/DL (ref 70–99)
PERFORMED ON: ABNORMAL
PERFORMED ON: ABNORMAL

## 2019-08-07 PROCEDURE — 99183 HYPERBARIC OXYGEN THERAPY: CPT | Performed by: SURGERY

## 2019-08-07 PROCEDURE — 11042 DBRDMT SUBQ TIS 1ST 20SQCM/<: CPT

## 2019-08-07 PROCEDURE — G0277 HBOT, FULL BODY CHAMBER, 30M: HCPCS

## 2019-08-07 NOTE — PLAN OF CARE
Discharge instructions given. Patient verbalized understanding. Return to [address] [place] [address],[address] in 1 week.   Finish HBO

## 2019-08-08 ENCOUNTER — HOSPITAL ENCOUNTER (OUTPATIENT)
Dept: HYPERBARIC MEDICINE | Age: 64
Discharge: HOME | End: 2019-08-08
Payer: MEDICARE

## 2019-08-08 VITALS
DIASTOLIC BLOOD PRESSURE: 77 MMHG | RESPIRATION RATE: 16 BRPM | TEMPERATURE: 97.8 F | HEART RATE: 72 BPM | SYSTOLIC BLOOD PRESSURE: 156 MMHG

## 2019-08-08 DIAGNOSIS — M86.072 ACUTE HEMATOGENOUS OSTEOMYELITIS OF LEFT ANKLE (HCC): ICD-10-CM

## 2019-08-08 DIAGNOSIS — M86.072 ACUTE HEMATOGENOUS OSTEOMYELITIS OF LEFT FOOT (HCC): ICD-10-CM

## 2019-08-08 DIAGNOSIS — L97.529 ULCER OF LEFT FOOT, UNSPECIFIED ULCER STAGE (HCC): Primary | ICD-10-CM

## 2019-08-08 LAB
GLUCOSE BLD-MCNC: 164 MG/DL (ref 70–99)
GLUCOSE BLD-MCNC: 189 MG/DL (ref 70–99)
PERFORMED ON: ABNORMAL
PERFORMED ON: ABNORMAL

## 2019-08-08 PROCEDURE — G0277 HBOT, FULL BODY CHAMBER, 30M: HCPCS

## 2019-08-08 PROCEDURE — 99183 HYPERBARIC OXYGEN THERAPY: CPT | Performed by: NURSE PRACTITIONER

## 2019-08-09 ENCOUNTER — HOSPITAL ENCOUNTER (OUTPATIENT)
Dept: HYPERBARIC MEDICINE | Age: 64
Discharge: HOME | End: 2019-08-09
Payer: MEDICARE

## 2019-08-09 ENCOUNTER — HOSPITAL ENCOUNTER (OUTPATIENT)
Dept: INFUSION THERAPY | Age: 64
Setting detail: INFUSION SERIES
Discharge: HOME | End: 2019-08-09
Payer: MEDICARE

## 2019-08-09 VITALS
RESPIRATION RATE: 16 BRPM | TEMPERATURE: 97.7 F | HEART RATE: 66 BPM | SYSTOLIC BLOOD PRESSURE: 150 MMHG | DIASTOLIC BLOOD PRESSURE: 81 MMHG

## 2019-08-09 VITALS
RESPIRATION RATE: 16 BRPM | OXYGEN SATURATION: 97 % | HEART RATE: 79 BPM | DIASTOLIC BLOOD PRESSURE: 83 MMHG | TEMPERATURE: 98.4 F | SYSTOLIC BLOOD PRESSURE: 130 MMHG

## 2019-08-09 DIAGNOSIS — M86.072 ACUTE HEMATOGENOUS OSTEOMYELITIS OF LEFT ANKLE (HCC): ICD-10-CM

## 2019-08-09 DIAGNOSIS — N18.30 ANEMIA DUE TO STAGE 3 CHRONIC KIDNEY DISEASE (HCC): Primary | ICD-10-CM

## 2019-08-09 DIAGNOSIS — D63.1 ANEMIA DUE TO STAGE 3 CHRONIC KIDNEY DISEASE (HCC): Primary | ICD-10-CM

## 2019-08-09 DIAGNOSIS — N18.30 CHRONIC KIDNEY DISEASE, STAGE III (MODERATE) (HCC): ICD-10-CM

## 2019-08-09 LAB
GLUCOSE BLD-MCNC: 132 MG/DL (ref 70–99)
GLUCOSE BLD-MCNC: 178 MG/DL (ref 70–99)
HEMOGLOBIN: 10 G/DL (ref 13.5–17.5)
PERFORMED ON: ABNORMAL
PERFORMED ON: ABNORMAL

## 2019-08-09 PROCEDURE — 6360000002 HC RX W HCPCS: Performed by: INTERNAL MEDICINE

## 2019-08-09 PROCEDURE — G0277 HBOT, FULL BODY CHAMBER, 30M: HCPCS

## 2019-08-09 PROCEDURE — 96372 THER/PROPH/DIAG INJ SC/IM: CPT

## 2019-08-09 PROCEDURE — 99183 HYPERBARIC OXYGEN THERAPY: CPT | Performed by: SURGERY

## 2019-08-09 PROCEDURE — 85018 HEMOGLOBIN: CPT

## 2019-08-09 RX ADMIN — DARBEPOETIN ALFA 60 MCG: 60 SOLUTION INTRAVENOUS; SUBCUTANEOUS at 14:55

## 2019-08-09 NOTE — PROGRESS NOTES
[facility]    Peripheral Lab Draw    NAME:  Tin Hwang  YOB: 1955  MEDICAL RECORD NUMBER:  [id number]  Episode Date:  8/9/2019    Blood Draw Site: Location:right arm  Site cleansed with Chloroprep Scrub for 30 seconds: Yes  Site cleansed with Alcohol pads: Yes  Labs drawn with: 25 gauge             [x] Butterfly    [] Needle  Labs Obtained: Yes  Number of attempts: 1    Lab Test(s) Ordered: HGB    Lab Draw Site:  Redness: No  Bruising: No   Edema: No  Pain: No     Response to treatment:  Well tolerated by patient. Electronically signed by Nohemi Noe RN on 8/9/2019 at 3:39 PM      Outpatient [address] [facility] Visit    NAME:  Billie RAY Keara Atkinsons OF BIRTH:  1955  MEDICAL RECORD NUMBER:  [id number]  EPISODE DATE:  8/9/2019    Patient arrived to [facility]   [] per wheelchair   [x] ambulatory    Alert and oriented X4, states had HBO for left foot this AM site healing well. Has 10 more treatments til discharge. Denies any new s/s of anemia or side effects from injection.       Is the patient experiencing any:  Fatigue:   []   None  [x]   Increase over baseline but not altering normal activities  []   Moderate of causing difficulty performing some activities  []   Severe or loss of ability to perform some activities  []   Bedridden or disabling    Dizziness or Lightheadedness:   [x]   None  []   No Interference  []   Interferes with functioning but not activities of daily living  []   Interferes with daily activies  []   Bedridden or disabling    Shortness of Breath:   [x]   None   []   Dyspneic on exertion   []   Dyspnea with normal activities  []   Dyspnea at rest    Edema: none Location of edema: nothing     Tachycardia: No    Heart Palpitations: No      Chest Pain: No     /83   Pulse 79   Temp 98.4 °F (36.9 °C) (Oral)   Resp 16   SpO2 97%     Hold ARMEN dose if B/P is greater than: 180 mm/Hg     If Hgb remains less than 10 Increase dose by 25%. Do not increase dose more frequently than once every 4 weeks. If Hgb 10-10.5 g/dl  Continue same dose  If Hgb 10.6-11 g/dl Decrease dose by 25%. A decrease in dose can be done as frequently as every week. If Hgb is greater than 11 g/dl Hold Aranesp. May resume ARMEN when Hgb is less than 10 with a 25% reduction in the dose from the last administered dose or decrease with a 25% reduction in the dose from the last administered dose or decrease  frequency. Last visit date: 8/2/19     Last Hgb:  10.3 g/dl   Last dose: 60 mcg    Current Lab Data:  Hemoglobin/Hematocrit:    Lab Results   Component Value Date    HGB 10.0 08/09/2019    HCT 29.7 07/26/2019     Dose will be remain the same      Aranesp dosage administered: 60  mcg was administered slowly subcutaneously into the right upper arm. Dose verified by:  Estevan Ware RN    Response to treatment:  Well tolerated by patient. Education:    Verbalized understanding    Scheduled to return for next dose of Aranesp on August 16, 2019.      Electronically signed by Staci Meade RN on 8/9/2019 at 3:39 PM

## 2019-08-09 NOTE — PROGRESS NOTES
times daily      aspirin 81 MG chewable tablet Take 1 tablet by mouth daily      ciprofloxacin (CIPRO) 500 MG tablet Take 500 mg by mouth 2 times daily      collagenase (SANTYL) 250 UNIT/GM ointment Apply 250 Units topically daily      folic acid-pyridoxine-cyancobalamin (FOLTX) 2.5-25-2 MG TABS Take 1 tablet by mouth daily      insulin glargine (LANTUS) 100 UNIT/ML injection pen Inject 13 Units into the skin nightly 5 pen 1    carvedilol (COREG) 12.5 MG tablet Take 1 tablet by mouth 2 times daily (with meals) 60 tablet 0    amLODIPine (NORVASC) 10 MG tablet Take 1 tablet by mouth daily 30 tablet 0    vitamin D (CHOLECALCIFEROL) 1000 UNIT TABS tablet Take 2 tablets by mouth daily 60 tablet 0    B-D UF III MINI PEN NEEDLES 31G X 5 MM MISC USE FOUR TIMES DAILY AS DIRECTED. 100 each 2    levothyroxine (SYNTHROID) 100 MCG tablet Take 1 tablet by mouth Daily 30 tablet 1    insulin lispro (HUMALOG) 100 UNIT/ML pen Inject 12 Units into the skin 3 times daily (with meals) Plus correction 1:25 BS>140 for daily total of 72 units 10 Pen 1    atorvastatin (LIPITOR) 20 MG tablet Take 1 tablet by mouth daily 30 tablet 5    ACCU-CHEK VEE PLUS strip 1 each by In Vitro route 5 times daily As needed. 450 each 3    ACCU-CHEK FASTCLIX LANCETS MISC Test blood sugars 4 times daily 200 each 3    ONE TOUCH LANCETS MISC 1 each by Does not apply route 5 times daily. 200 each 5    glucose blood VI test strips (ONE TOUCH ULTRA TEST) strip 1 each by Does not apply route 5 times daily. 4 times daily. 150 each 11     No current facility-administered medications for this encounter.          No Known Allergies     Objective:     Recent Labs     08/09/19  1005 08/09/19  1210   POCGLU 178* 132*       Pre treatment Vital Signs       Temp: 98.4 °F (36.9 °C)     Pulse: 73     Resp: 16     BP: 118/64       Post treatment Vital Signs  Temp: 97.7 °F (36.5 °C)  Pulse: 66  Resp: 16  BP: (!) 150/81      Assessment:     Patient Active Problem List   Diagnosis Code    ARF (acute renal failure) ([facility]) N17.9    Diabetic foot ulcer ([facility] E11.621, L97.509    Diabetic foot ulcer with osteomyelitis ([facility] E11.621, E11.69, L97.509, M86.9    Type 1 diabetes mellitus with stage 3 chronic kidney disease ([facility] E10.22, N18.3    Mixed hyperlipidemia E78.2    Diabetic foot infection ([facility] E11.628, L08.9    Cellulitis of foot L03.119    Acute hematogenous osteomyelitis of left foot ([facility]) M86.072    Elevated sed rate R70.0    Elevated C-reactive protein (CRP) R79.82    Acute kidney injury superimposed on chronic kidney disease ([facility]) N17.9, N18.9    Overweight E66.3    Diabetic polyneuropathy associated with type 2 diabetes mellitus ([facility]) E11.42    History of methicillin resistant staphylococcus aureus (MRSA) Z86.14    Essential hypertension I10    Anemia due to stage 3 chronic kidney disease ([facility]) N18.3, D63.1    Chronic kidney disease, stage III (moderate) ([facility]) N18.3    Ulcer of left foot ([facility] L97.529    Acute hematogenous osteomyelitis of left ankle ([facility] M86.072       Chamber #: [id number]    Length of Treatment: 90 Minutes    Symptoms Noted During Treatment: None    Adverse Event: no      I was present on these premises and immediately available to furnish assistance & direction throughout the procedure. Plan:     Nicole Cochran is a 75 y.o. male  did successfully complete today's hyperbaric oxygen treatment at [facility][1]. Supervision of Hyperbaric Oxygen Therapy provided. Continue HBO treatment as outlined in the treatment plan. Discharge Instructions were explained and given to Mr. [facility]  Martha Varela MD, FACS  8/9/2019  12:33 PM

## 2019-08-12 ENCOUNTER — HOSPITAL ENCOUNTER (OUTPATIENT)
Dept: HYPERBARIC MEDICINE | Age: 64
Discharge: HOME | End: 2019-08-12
Payer: MEDICARE

## 2019-08-12 VITALS
SYSTOLIC BLOOD PRESSURE: 146 MMHG | TEMPERATURE: 97.8 F | DIASTOLIC BLOOD PRESSURE: 82 MMHG | HEART RATE: 81 BPM | RESPIRATION RATE: 16 BRPM

## 2019-08-12 DIAGNOSIS — M86.072 ACUTE HEMATOGENOUS OSTEOMYELITIS OF LEFT ANKLE (HCC): ICD-10-CM

## 2019-08-12 LAB
GLUCOSE BLD-MCNC: 79 MG/DL (ref 70–99)
GLUCOSE BLD-MCNC: 84 MG/DL (ref 70–99)
PERFORMED ON: NORMAL
PERFORMED ON: NORMAL

## 2019-08-12 PROCEDURE — 99212 OFFICE O/P EST SF 10 MIN: CPT

## 2019-08-12 NOTE — PLAN OF CARE
Unable to initiate Hyperbarics today, patient came in with a blood sugar of 79, 8 oz of strawberry Glucerna given, blood sugar only up to 84. Dr Baldo Zavala notified.

## 2019-08-13 ENCOUNTER — HOSPITAL ENCOUNTER (OUTPATIENT)
Dept: HYPERBARIC MEDICINE | Age: 64
Discharge: HOME | End: 2019-08-13
Payer: MEDICARE

## 2019-08-13 VITALS
TEMPERATURE: 97.9 F | HEART RATE: 79 BPM | DIASTOLIC BLOOD PRESSURE: 76 MMHG | SYSTOLIC BLOOD PRESSURE: 151 MMHG | RESPIRATION RATE: 16 BRPM

## 2019-08-13 LAB
GLUCOSE BLD-MCNC: 121 MG/DL (ref 70–99)
GLUCOSE BLD-MCNC: 163 MG/DL (ref 70–99)
PERFORMED ON: ABNORMAL
PERFORMED ON: ABNORMAL

## 2019-08-13 PROCEDURE — 99183 HYPERBARIC OXYGEN THERAPY: CPT | Performed by: SURGERY

## 2019-08-13 PROCEDURE — G0277 HBOT, FULL BODY CHAMBER, 30M: HCPCS

## 2019-08-14 ENCOUNTER — HOSPITAL ENCOUNTER (OUTPATIENT)
Dept: HYPERBARIC MEDICINE | Age: 64
Discharge: HOME | End: 2019-08-14
Payer: MEDICARE

## 2019-08-14 ENCOUNTER — HOSPITAL ENCOUNTER (OUTPATIENT)
Dept: WOUND CARE | Age: 64
Discharge: HOME | End: 2019-08-14
Payer: MEDICARE

## 2019-08-14 VITALS
DIASTOLIC BLOOD PRESSURE: 87 MMHG | RESPIRATION RATE: 16 BRPM | HEART RATE: 80 BPM | TEMPERATURE: 97.7 F | SYSTOLIC BLOOD PRESSURE: 161 MMHG

## 2019-08-14 LAB
GLUCOSE BLD-MCNC: 181 MG/DL (ref 70–99)
GLUCOSE BLD-MCNC: 196 MG/DL (ref 70–99)
PERFORMED ON: ABNORMAL
PERFORMED ON: ABNORMAL

## 2019-08-14 PROCEDURE — G0277 HBOT, FULL BODY CHAMBER, 30M: HCPCS

## 2019-08-14 PROCEDURE — 11043 DBRDMT MUSC&/FSCA 1ST 20/<: CPT

## 2019-08-14 PROCEDURE — 87205 SMEAR GRAM STAIN: CPT

## 2019-08-14 PROCEDURE — 87070 CULTURE OTHR SPECIMN AEROBIC: CPT

## 2019-08-14 PROCEDURE — 99183 HYPERBARIC OXYGEN THERAPY: CPT | Performed by: SURGERY

## 2019-08-15 ENCOUNTER — HOSPITAL ENCOUNTER (OUTPATIENT)
Dept: HYPERBARIC MEDICINE | Age: 64
Discharge: HOME | End: 2019-08-15
Payer: MEDICARE

## 2019-08-15 VITALS
SYSTOLIC BLOOD PRESSURE: 167 MMHG | RESPIRATION RATE: 16 BRPM | DIASTOLIC BLOOD PRESSURE: 82 MMHG | HEART RATE: 77 BPM | TEMPERATURE: 97.6 F

## 2019-08-15 LAB
GLUCOSE BLD-MCNC: 109 MG/DL (ref 70–99)
GLUCOSE BLD-MCNC: 141 MG/DL (ref 70–99)
PERFORMED ON: ABNORMAL
PERFORMED ON: ABNORMAL

## 2019-08-15 PROCEDURE — 99183 HYPERBARIC OXYGEN THERAPY: CPT | Performed by: INTERNAL MEDICINE

## 2019-08-15 PROCEDURE — G0277 HBOT, FULL BODY CHAMBER, 30M: HCPCS

## 2019-08-15 NOTE — PROGRESS NOTES
[facility]  Hyperbaric Oxygen Therapy   Progress Note      NAME: Jose J Ang  MEDICAL RECORD NUMBER:  [id number]  AGE: 75 y.o. GENDER: male  : 1955  EPISODE DATE:  8/15/2019     Subjective       HBO Treatment Number:24 out of Total Treatments: 30     HBO Diagnosis:          Indications: Lower Extremity Diabetic Wound ___(site)(Left foot)        Objective           Recent Labs     08/15/19  1012 08/15/19  1221   POCGLU 141* 109*       Pre and Post treatment Vital Signs in chart           Assessment          Adverse Event: no      I was present on these premises and immediately available to furnish assistance & direction throughout the procedure. Plan          oJse J Ang is a 75 y.o. male  did successfully complete today's hyperbaric oxygen treatment at [facility]. Supervision of Hyperbaric Oxygen Therapy provided. Continue HBO treatment as outlined in the treatment plan. Hyperbaric Oxygen: Jose J Ang tolerated Treatment Number: 24 well today without complications. Discharge Instructions were explained and given to Mr. Diaz Fearing       Thank you for allowing me to participate in the care of your patient. Please do not hesitate to call.      Ericka Brown D.O., [facility]  Interventional Cardiology     o: [phone]  [address] [place], [place] [address] [address], [address] [facility]

## 2019-08-16 ENCOUNTER — HOSPITAL ENCOUNTER (OUTPATIENT)
Dept: INFUSION THERAPY | Age: 64
Setting detail: INFUSION SERIES
Discharge: HOME | End: 2019-08-16
Payer: MEDICARE

## 2019-08-16 ENCOUNTER — HOSPITAL ENCOUNTER (OUTPATIENT)
Dept: HYPERBARIC MEDICINE | Age: 64
Discharge: HOME | End: 2019-08-16
Payer: MEDICARE

## 2019-08-16 VITALS
DIASTOLIC BLOOD PRESSURE: 84 MMHG | OXYGEN SATURATION: 97 % | HEART RATE: 74 BPM | RESPIRATION RATE: 16 BRPM | SYSTOLIC BLOOD PRESSURE: 167 MMHG | TEMPERATURE: 98.1 F

## 2019-08-16 VITALS
SYSTOLIC BLOOD PRESSURE: 167 MMHG | HEART RATE: 74 BPM | DIASTOLIC BLOOD PRESSURE: 87 MMHG | RESPIRATION RATE: 16 BRPM | TEMPERATURE: 98.3 F

## 2019-08-16 DIAGNOSIS — N18.30 ANEMIA DUE TO STAGE 3 CHRONIC KIDNEY DISEASE (HCC): Primary | ICD-10-CM

## 2019-08-16 DIAGNOSIS — D63.1 ANEMIA DUE TO STAGE 3 CHRONIC KIDNEY DISEASE (HCC): Primary | ICD-10-CM

## 2019-08-16 DIAGNOSIS — N18.30 CHRONIC KIDNEY DISEASE, STAGE III (MODERATE) (HCC): ICD-10-CM

## 2019-08-16 LAB
ALBUMIN SERPL-MCNC: 3 G/DL
ALP BLD-CCNC: 82 U/L
ALT SERPL-CCNC: 7 U/L
ANION GAP SERPL CALCULATED.3IONS-SCNC: 10 MMOL/L
AST SERPL-CCNC: 18 U/L
BASOPHILS ABSOLUTE: 0.1 /ΜL
BASOPHILS RELATIVE PERCENT: 1 %
BILIRUB SERPL-MCNC: 0.2 MG/DL (ref 0.1–1.4)
BUN BLDV-MCNC: 32 MG/DL
C-REACTIVE PROTEIN: 2.9
CALCIUM SERPL-MCNC: 8.5 MG/DL
CHLORIDE BLD-SCNC: 104 MMOL/L
CO2: 25 MMOL/L
CREAT SERPL-MCNC: 3.79 MG/DL
EOSINOPHILS ABSOLUTE: 0.4 /ΜL
EOSINOPHILS RELATIVE PERCENT: 4 %
GFR CALCULATED: 15
GLUCOSE BLD-MCNC: 117 MG/DL (ref 70–99)
GLUCOSE BLD-MCNC: 122 MG/DL
GLUCOSE BLD-MCNC: 123 MG/DL (ref 70–99)
HCT VFR BLD CALC: 22.1 % (ref 41–53)
HEMOGLOBIN: 10.4 G/DL (ref 13.5–17.5)
HEMOGLOBIN: 7.1 G/DL (ref 13.5–17.5)
LYMPHOCYTES ABSOLUTE: 1.9 /ΜL
LYMPHOCYTES RELATIVE PERCENT: 21 %
MCH RBC QN AUTO: 28.5 PG
MCHC RBC AUTO-ENTMCNC: 31.9 G/DL
MCV RBC AUTO: 89.4 FL
MONOCYTES ABSOLUTE: 0.5 /ΜL
MONOCYTES RELATIVE PERCENT: 6 %
NEUTROPHILS ABSOLUTE: 6 /ΜL
NEUTROPHILS RELATIVE PERCENT: 68 %
PDW BLD-RTO: 13.8 %
PERFORMED ON: ABNORMAL
PERFORMED ON: ABNORMAL
PLATELET # BLD: 357 K/ΜL
PMV BLD AUTO: 6.9 FL
POTASSIUM SERPL-SCNC: 4 MMOL/L
RBC # BLD: 2.48 10^6/ΜL
SEDIMENTATION RATE, ERYTHROCYTE: 61
SODIUM BLD-SCNC: 139 MMOL/L
TOTAL PROTEIN: 6.2
WBC # BLD: 8.9 10^3/ML

## 2019-08-16 PROCEDURE — 85018 HEMOGLOBIN: CPT

## 2019-08-16 PROCEDURE — G0277 HBOT, FULL BODY CHAMBER, 30M: HCPCS

## 2019-08-16 PROCEDURE — 96372 THER/PROPH/DIAG INJ SC/IM: CPT

## 2019-08-16 PROCEDURE — 6360000002 HC RX W HCPCS: Performed by: INTERNAL MEDICINE

## 2019-08-16 PROCEDURE — 99183 HYPERBARIC OXYGEN THERAPY: CPT | Performed by: INTERNAL MEDICINE

## 2019-08-16 RX ADMIN — DARBEPOETIN ALFA 60 MCG: 60 SOLUTION INTRAVENOUS; SUBCUTANEOUS at 15:04

## 2019-08-16 NOTE — PROGRESS NOTES
[facility]    Peripheral Lab Draw    NAME:  Deisy Soto  YOB: 1955  MEDICAL RECORD NUMBER:  [id number]  Episode Date:  8/16/2019    Blood Draw Site: Location:right arm  Site cleansed with Chloroprep Scrub for 30 seconds: Yes  Site cleansed with Alcohol pads: Yes  Labs drawn with: 23 gauge             [x] Butterfly    [] Needle  Labs Obtained: Yes  Number of attempts: 1    Lab Test(s) Ordered: Stat HGB    Lab Draw Site:  Redness: No  Bruising: No   Edema: No  Pain: No     Response to treatment:  Well tolerated by patient. Electronically signed by Manju Chowdhury RN on 8/16/2019 at 2:58 PM  Outpatient [address] Visit    NAME:  Ray Knightring OF BIRTH:  1955  MEDICAL RECORD NUMBER:  [id number]  EPISODE DATE:  8/16/2019    Patient arrived to [facility]   [] per wheelchair   [x] ambulatory     Alert and oriented X4, denies any side effects from last injection. States continues to have some fatigue but once he is up and going feels stronger than before, increased stamina. Continue with daily HBO treatments for left foot wound. States the MD performed a wound culture, due to a small opening remains.      Is the patient experiencing any:  Fatigue:   []   None  [x]   Increase over baseline but not altering normal activities  []   Moderate of causing difficulty performing some activities  []   Severe or loss of ability to perform some activities  []   Bedridden or disabling    Dizziness or Lightheadedness:   []   None  [x]   No Interference  []   Interferes with functioning but not activities of daily living  []   Interferes with daily activies  []   Bedridden or disabling    Shortness of Breath:   [x]   None   []   Dyspneic on exertion   []   Dyspnea with normal activities  []   Dyspnea at rest    Edema: 1+ Location of edema: left leg and right leg     Tachycardia: No    Heart Palpitations: No      Chest Pain: No     BP (!) 167/84   Pulse 74   Temp 98.1 °F (36.7 °C) (Oral)   Resp 16   SpO2 97%     Hold ARMEN dose if B/P is greater than: 180 mm/Hg     If Hgb remains less than 10 Increase dose by 25%. Do not increase dose more frequently than once every 4 weeks. If Hgb 10-10.5 g/dl  Continue same dose  If Hgb 10.6-11 g/dl Decrease dose by 25%. A decrease in dose can be done as frequently as every week. If Hgb is greater than 11 g/dl Hold Aranesp. May resume ARMEN when Hgb is less than 10 with a 25% reduction in the dose from the last administered dose or decrease with a 25% reduction in the dose from the last administered dose or decrease  frequency. Last visit date: 8/9/19     Last Hgb: 10.0 g/dl   Last dose: 60 mcg    Current Lab Data:  Hemoglobin/Hematocrit:    Lab Results   Component Value Date    HGB 10.4 08/16/2019    HCT 29.7 07/26/2019     Dose will remain the same      Aranesp dosage administered: 60 mcg was administered slowly subcutaneously into the right upper arm. Dose verified by: DANNY Tee RN    Response to treatment:  Well tolerated by patient. Education:    Verbalized understanding    Scheduled to return for next dose of Aranesp on August 23, 2019.      Electronically signed by Malorie Page RN on 8/16/2019 at 2:58 PM

## 2019-08-17 LAB
GRAM STAIN RESULT: NORMAL
WOUND/ABSCESS: NORMAL

## 2019-08-19 ENCOUNTER — HOSPITAL ENCOUNTER (OUTPATIENT)
Dept: HYPERBARIC MEDICINE | Age: 64
Discharge: HOME | End: 2019-08-19
Payer: MEDICARE

## 2019-08-19 LAB
GLUCOSE BLD-MCNC: 131 MG/DL (ref 70–99)
GLUCOSE BLD-MCNC: 146 MG/DL (ref 70–99)
PERFORMED ON: ABNORMAL
PERFORMED ON: ABNORMAL

## 2019-08-19 PROCEDURE — 99183 HYPERBARIC OXYGEN THERAPY: CPT | Performed by: SURGERY

## 2019-08-19 PROCEDURE — G0277 HBOT, FULL BODY CHAMBER, 30M: HCPCS

## 2019-08-19 NOTE — PROGRESS NOTES
[facility]  Hyperbaric Oxygen Therapy   Progress Note        Wilda Bharti  MEDICAL RECORD NUMBER: [id number]  AGE: 75 y.o.      GENDER: male    : 1955  TODAY'S DATE: 2019     Subjective     HBO Treatment Number: 26 out of Total Treatments: 30    HBO Diagnosis:     Indications: Lower Extremity Diabetic Wound ___(site)(Left foot)  Chloé Reyes: Chloé Reyes 3     This is treatment Treatment Number: 26 out of Total Treatments: 30 WILMAN : 2  Number of Air Breaks:  Treatment Status: No Air break      Objective       Recent Labs     19  1207 19  0958   POCGLU 117* 131*       Pre treatment Vital Signs       Temp: 97.6 °F (36.4 °C)     Pulse: 79     Resp: 16     BP: (!) 154/85       Post treatment Vital Signs  Temp: 97.6 °F (36.4 °C)  Pulse: 79  Resp: 16  BP: (!) 154/85    Assessment       Patient Active Problem List   Diagnosis Code    ARF (acute renal failure) ([facility]) N17.9    Diabetic foot ulcer ([facility]) E11.621, L97.509    Diabetic foot ulcer with osteomyelitis ([facility]) E11.621, E11.69, L97.509, M86.9    Type 1 diabetes mellitus with stage 3 chronic kidney disease ([facility]) E10.22, N18.3    Mixed hyperlipidemia E78.2    Diabetic foot infection ([facility]) E11.628, L08.9    Cellulitis of foot L03.119    Acute hematogenous osteomyelitis of left foot ([facility]) M86.072    Elevated sed rate R70.0    Elevated C-reactive protein (CRP) R79.82    Acute kidney injury superimposed on chronic kidney disease ([facility]) N17.9, N18.9    Overweight E66.3    Diabetic polyneuropathy associated with type 2 diabetes mellitus ([facility]) E11.42    History of methicillin resistant staphylococcus aureus (MRSA) Z86.14    Essential hypertension I10    Anemia due to stage 3 chronic kidney disease (HCC) N18.3, D63.1    Chronic kidney disease, stage III (moderate) ([facility]) N18.3    Ulcer of left foot ([address] L97.529    Acute hematogenous osteomyelitis of left ankle ([address] M86.072       HBO timing chart:   Date: 2019  Treatment

## 2019-08-20 ENCOUNTER — HOSPITAL ENCOUNTER (OUTPATIENT)
Dept: HYPERBARIC MEDICINE | Age: 64
Discharge: HOME | End: 2019-08-20
Payer: MEDICARE

## 2019-08-20 VITALS
HEART RATE: 76 BPM | SYSTOLIC BLOOD PRESSURE: 177 MMHG | RESPIRATION RATE: 16 BRPM | TEMPERATURE: 97.7 F | DIASTOLIC BLOOD PRESSURE: 98 MMHG

## 2019-08-20 DIAGNOSIS — L97.529 ULCER OF LEFT FOOT, UNSPECIFIED ULCER STAGE (HCC): ICD-10-CM

## 2019-08-20 DIAGNOSIS — M86.072 ACUTE HEMATOGENOUS OSTEOMYELITIS OF LEFT ANKLE (HCC): ICD-10-CM

## 2019-08-20 PROCEDURE — 99183 HYPERBARIC OXYGEN THERAPY: CPT | Performed by: INTERNAL MEDICINE

## 2019-08-20 PROCEDURE — G0277 HBOT, FULL BODY CHAMBER, 30M: HCPCS

## 2019-08-21 ENCOUNTER — HOSPITAL ENCOUNTER (OUTPATIENT)
Dept: WOUND CARE | Age: 64
Discharge: HOME | End: 2019-08-21
Payer: MEDICARE

## 2019-08-21 ENCOUNTER — HOSPITAL ENCOUNTER (OUTPATIENT)
Dept: HYPERBARIC MEDICINE | Age: 64
Discharge: HOME | End: 2019-08-21
Payer: MEDICARE

## 2019-08-21 VITALS
RESPIRATION RATE: 16 BRPM | TEMPERATURE: 97.8 F | HEART RATE: 73 BPM | SYSTOLIC BLOOD PRESSURE: 192 MMHG | DIASTOLIC BLOOD PRESSURE: 98 MMHG

## 2019-08-21 VITALS
SYSTOLIC BLOOD PRESSURE: 177 MMHG | DIASTOLIC BLOOD PRESSURE: 90 MMHG | TEMPERATURE: 97.9 F | HEART RATE: 79 BPM | RESPIRATION RATE: 17 BRPM

## 2019-08-21 DIAGNOSIS — L97.529 ULCER OF LEFT FOOT, UNSPECIFIED ULCER STAGE (HCC): Primary | ICD-10-CM

## 2019-08-21 LAB
GLUCOSE BLD-MCNC: 123 MG/DL (ref 70–99)
GLUCOSE BLD-MCNC: 126 MG/DL (ref 70–99)
PERFORMED ON: ABNORMAL
PERFORMED ON: ABNORMAL

## 2019-08-21 PROCEDURE — 87070 CULTURE OTHR SPECIMN AEROBIC: CPT

## 2019-08-21 PROCEDURE — 99183 HYPERBARIC OXYGEN THERAPY: CPT | Performed by: INTERNAL MEDICINE

## 2019-08-21 PROCEDURE — 87077 CULTURE AEROBIC IDENTIFY: CPT

## 2019-08-21 PROCEDURE — 87186 SC STD MICRODIL/AGAR DIL: CPT

## 2019-08-21 PROCEDURE — G0277 HBOT, FULL BODY CHAMBER, 30M: HCPCS

## 2019-08-21 PROCEDURE — 11043 DBRDMT MUSC&/FSCA 1ST 20/<: CPT

## 2019-08-21 PROCEDURE — 87205 SMEAR GRAM STAIN: CPT

## 2019-08-21 NOTE — PROGRESS NOTES
performed by Shante Rodriguez DPM at [address] TOE AMPUTATION Right 7/17/15    great toe       FAMILY HISTORY    Family History   Problem Relation Age of Onset    Cancer Mother     Diabetes Sister     Cancer Brother     Diabetes Brother     Diabetes Brother     Heart Disease Neg Hx     Stroke Neg Hx     Thyroid Disease Neg Hx        SOCIAL HISTORY    Social History     Tobacco Use    Smoking status: Never Smoker    Smokeless tobacco: Never Used   Substance Use Topics    Alcohol use: No    Drug use: No       ALLERGIES    No Known Allergies    MEDICATIONS    Current Outpatient Medications on File Prior to Encounter   Medication Sig Dispense Refill    ampicillin (PRINCIPEN) 500 MG capsule Take 500 mg by mouth 2 times daily      aspirin 81 MG chewable tablet Take 1 tablet by mouth daily      ciprofloxacin (CIPRO) 500 MG tablet Take 500 mg by mouth 2 times daily      collagenase (SANTYL) 250 UNIT/GM ointment Apply 250 Units topically daily      folic acid-pyridoxine-cyancobalamin (FOLTX) 2.5-25-2 MG TABS Take 1 tablet by mouth daily      insulin glargine (LANTUS) 100 UNIT/ML injection pen Inject 13 Units into the skin nightly 5 pen 1    carvedilol (COREG) 12.5 MG tablet Take 1 tablet by mouth 2 times daily (with meals) 60 tablet 0    amLODIPine (NORVASC) 10 MG tablet Take 1 tablet by mouth daily 30 tablet 0    vitamin D (CHOLECALCIFEROL) 1000 UNIT TABS tablet Take 2 tablets by mouth daily 60 tablet 0    B-D UF III MINI PEN NEEDLES 31G X 5 MM MISC USE FOUR TIMES DAILY AS DIRECTED. 100 each 2    levothyroxine (SYNTHROID) 100 MCG tablet Take 1 tablet by mouth Daily 30 tablet 1    insulin lispro (HUMALOG) 100 UNIT/ML pen Inject 12 Units into the skin 3 times daily (with meals) Plus correction 1:25 BS>140 for daily total of 72 units 10 Pen 1    atorvastatin (LIPITOR) 20 MG tablet Take 1 tablet by mouth daily 30 tablet 5    ACCU-CHEK VEE PLUS strip 1 each by In Vitro route 5 times daily As

## 2019-08-21 NOTE — PLAN OF CARE
Hyperbaric Oxygent Treatment Pre certification/Authorization    Pearla Brittle Age:  75 y.o. Gender:  male   :  1955  Today's Date:  2019      Insurance Company Notified: [email or web address] Phone #: [phone]  Policy #: [id number]   Name of person spoke with: Pastor Everett    Diagnosis Authorizing: DFU3, Left foot  CPT Codes Authorized: , [id number]    ICD-10 Codes: E11.621, L97.509, M86.072     Number of approved Treatments:  10  Approval Dates:  2019 through  2019    Additional Notes:  Pending Approval for 10 additional treatments. Clinicals sent by fax to [phone].       Electronically signed by Danica Jordan LPN on 3088 at 7:81 PM

## 2019-08-22 ENCOUNTER — HOSPITAL ENCOUNTER (OUTPATIENT)
Dept: HYPERBARIC MEDICINE | Age: 64
Discharge: HOME | End: 2019-08-22
Payer: MEDICARE

## 2019-08-22 ENCOUNTER — HOSPITAL ENCOUNTER (OUTPATIENT)
Dept: GENERAL RADIOLOGY | Age: 64
Discharge: HOME | End: 2019-08-22
Payer: MEDICARE

## 2019-08-22 ENCOUNTER — HOSPITAL ENCOUNTER (OUTPATIENT)
Age: 64
Discharge: HOME | End: 2019-08-22
Payer: MEDICARE

## 2019-08-22 VITALS
TEMPERATURE: 98.4 F | DIASTOLIC BLOOD PRESSURE: 98 MMHG | SYSTOLIC BLOOD PRESSURE: 189 MMHG | RESPIRATION RATE: 16 BRPM | HEART RATE: 73 BPM

## 2019-08-22 DIAGNOSIS — L97.529 ULCER OF LEFT FOOT, UNSPECIFIED ULCER STAGE (HCC): ICD-10-CM

## 2019-08-22 LAB
GLUCOSE BLD-MCNC: 117 MG/DL (ref 70–99)
GLUCOSE BLD-MCNC: 124 MG/DL (ref 70–99)
GLUCOSE BLD-MCNC: 156 MG/DL (ref 70–99)
GLUCOSE BLD-MCNC: 166 MG/DL (ref 70–99)
PERFORMED ON: ABNORMAL

## 2019-08-22 PROCEDURE — 73630 X-RAY EXAM OF FOOT: CPT

## 2019-08-22 PROCEDURE — G0277 HBOT, FULL BODY CHAMBER, 30M: HCPCS

## 2019-08-22 PROCEDURE — 99183 HYPERBARIC OXYGEN THERAPY: CPT | Performed by: INTERNAL MEDICINE

## 2019-08-23 ENCOUNTER — HOSPITAL ENCOUNTER (OUTPATIENT)
Dept: HYPERBARIC MEDICINE | Age: 64
Discharge: HOME | End: 2019-08-23
Payer: MEDICARE

## 2019-08-23 ENCOUNTER — HOSPITAL ENCOUNTER (OUTPATIENT)
Dept: INFUSION THERAPY | Age: 64
Setting detail: INFUSION SERIES
Discharge: HOME | End: 2019-08-23
Payer: MEDICARE

## 2019-08-23 VITALS
HEART RATE: 69 BPM | TEMPERATURE: 98.2 F | RESPIRATION RATE: 16 BRPM | DIASTOLIC BLOOD PRESSURE: 98 MMHG | SYSTOLIC BLOOD PRESSURE: 186 MMHG

## 2019-08-23 VITALS
DIASTOLIC BLOOD PRESSURE: 89 MMHG | TEMPERATURE: 98.4 F | RESPIRATION RATE: 16 BRPM | SYSTOLIC BLOOD PRESSURE: 181 MMHG | HEART RATE: 72 BPM

## 2019-08-23 DIAGNOSIS — M86.072 ACUTE HEMATOGENOUS OSTEOMYELITIS OF LEFT ANKLE (HCC): ICD-10-CM

## 2019-08-23 DIAGNOSIS — N18.30 ANEMIA DUE TO STAGE 3 CHRONIC KIDNEY DISEASE (HCC): Primary | ICD-10-CM

## 2019-08-23 DIAGNOSIS — N18.30 CHRONIC KIDNEY DISEASE, STAGE III (MODERATE) (HCC): ICD-10-CM

## 2019-08-23 DIAGNOSIS — L97.529 ULCER OF LEFT FOOT, UNSPECIFIED ULCER STAGE (HCC): ICD-10-CM

## 2019-08-23 DIAGNOSIS — D63.1 ANEMIA DUE TO STAGE 3 CHRONIC KIDNEY DISEASE (HCC): Primary | ICD-10-CM

## 2019-08-23 LAB
GLUCOSE BLD-MCNC: 101 MG/DL (ref 70–99)
GLUCOSE BLD-MCNC: 116 MG/DL (ref 70–99)
HEMOGLOBIN: 11.1 G/DL (ref 13.5–17.5)
PERFORMED ON: ABNORMAL
PERFORMED ON: ABNORMAL

## 2019-08-23 PROCEDURE — 99212 OFFICE O/P EST SF 10 MIN: CPT

## 2019-08-23 PROCEDURE — G0277 HBOT, FULL BODY CHAMBER, 30M: HCPCS

## 2019-08-23 PROCEDURE — 85018 HEMOGLOBIN: CPT

## 2019-08-23 PROCEDURE — 99183 HYPERBARIC OXYGEN THERAPY: CPT | Performed by: INTERNAL MEDICINE

## 2019-08-23 NOTE — PROGRESS NOTES
g/dl Decrease dose by 25%. A decrease in dose can be done as frequently as every week. If Hgb is greater than 11 g/dl Hold Aranesp. May resume ARMEN when Hgb is less than 10 with a 25% reduction in the dose from the last administered dose or decrease with a 25% reduction in the dose from the last administered dose or decrease  frequency. Last visit date: 8/16/19     Last Hgb: 10.4 g/dl   Last dose: 60 mcg    Current Lab Data:  Hemoglobin/Hematocrit:    Lab Results   Component Value Date    HGB 11.1 08/23/2019    HCT 29.7 07/26/2019     Dose will be Held      Aranesp dosage administered: 0 mcg was administered    Dose verified by: DEEDEE Duron RN    Response to treatment:  Well tolerated by patient. Education:    Verbalized understanding. Will return next week for lab, med will not restart until HGB < 10    Scheduled to return for next dose of Aranesp on August 30, 2019.      Electronically signed by Nohemi Noe RN on 8/23/2019 at 2:52 PM

## 2019-08-23 NOTE — PROGRESS NOTES
Clinic Level of Care Assessment  Infusion Center      NAME:  Gean Simmonds OF BIRTH:  1955 GENDER: male  MEDICAL RECORD NUMBER:  [id number]   DATE:  8/23/2019     Ambulation Status Document in Daily Care/Safety Tab   Status Definition Points   Independent Independently able to ambulate. Fully able (without any assistance) to get on/off exam table/chair. [x]   0   Minimal Physical Assistance Requires physical assistance of one person to ambulate and/or position patient to be examined. Includes necessary physical assistance to position lower extremities on/off stool. []   1   Moderate Physical Assistance Requires at least one staff member to physically assist patient in ambulating into treatment room, and/or on off chair/bed. Requires assistance to bathroom. []   2   Full Assistance Requires assistance of at least two staff members to transfer patient into treatment room and/or on/off bed/chair. \"Total Transfer\". Unable to use bathroom requires bedside commode and/or bedpan []   3       Dressing Complexity Document in LDA Navigator  Complexity Definition Points   No Dressing  []   0   Simple Minimal, simple dressing. i.e. bandaid, gauze, simple wrap. Peripheral IV dressing. [x]   1   Intermediate Moderately complicated PICC dressing change requiring sterile procedure and site assessment. []   2   Complex Complicated dressing change port access requiring sterile procedure and site assessment. []   3       Teaching Effort Document in AVS and/or Education Navigator    Effort Definition Points   No Teaching  []   0   Simple Teach two or less topics. Teaching documented in discharge instructions in AVS and copy given to patient. [x]   1   Intermediate Teach three to four topics. Teaching documented in Discharge Instructions in AVS using References and Attachments. Copy given to patient. []   2   Complex Teach five to six topics.  Teaching documented in Discharge Instructions in AVS using References and Attachments. May also be documentation in Education Navigator. Copy given to patient. []   3           Patient Assessment  Planning Definition Points   Simple Complete all tabs in patient assessment navigator. Review or complete patient'sTherapy Plan. [x]   1   Intermediate Complete all tabs in patient assessment navigator. Review or completed patient'sTherapy Plan. Contact doctor based on nursing assessment. []   2   Complex Complete all tabs in patient assessment navigator. Completed patient's Therapy Plan. Contact doctor based on nursing assessment and write order related to needed care. []   3     Patient Planning   Planning Definition Points   Simple Discharged, instructions and After Visit Summary given to patient/caregiver and instructions completed. Simple follow-up with routine assessment and planning. [x]   1   Intermediate Discharged, instructions and After Visit Summary given to patient/caregiver and instructions completed. Contact with outside resources; i.e. Telephone calls to PCP, home health, ECF and/or arranging transportation. []   2   Complex Discharged, instructions and After Visit Summary given to patient/caregiver and instructions completed. Contact with outside resources; i.e. Telephone calls to PCP, home health, ECF and/or arranging transportation. May include filling out forms and/or writing letters, communication with insurance , preauthorization for treatment.    []   3       Is this the Patient's First Visit to the Pending sale to [facility]  No    Is this Patient Established @ this [facility]TY [facility] [place]  Yes              Clinical Level of Care      Points  0-2  Level 1 []     Points  3-5  Level 2 [x]     Points  6-9  Level 3 []     Points  10-12  Level 4 []     Points  13-15  Level 5 []       Electronically signed by Srinivasan Morales RN on 8/23/2019 at 2:55 PM

## 2019-08-25 LAB
GRAM STAIN RESULT: ABNORMAL
ORGANISM: ABNORMAL
WOUND/ABSCESS: ABNORMAL

## 2019-08-27 NOTE — PLAN OF CARE
Hyperbaric Oxygent Treatment Pre certification/Authorization    Em Dana Age:  75 y.o. Gender:  male   :  1955  Today's Date:  2019      Evita Nolan from [facility] returned call, stated that Patient has been approved of 10 additional Hyperbaric treatments. She also stated that his approval letter will follow.          Electronically signed by Stas Gastelum LPN on  at 3:80 AM

## 2019-08-28 ENCOUNTER — HOSPITAL ENCOUNTER (OUTPATIENT)
Dept: HYPERBARIC MEDICINE | Age: 64
Discharge: HOME | End: 2019-08-28
Payer: MEDICARE

## 2019-08-28 VITALS
DIASTOLIC BLOOD PRESSURE: 98 MMHG | TEMPERATURE: 98.4 F | HEART RATE: 74 BPM | SYSTOLIC BLOOD PRESSURE: 180 MMHG | RESPIRATION RATE: 16 BRPM

## 2019-08-28 LAB
GLUCOSE BLD-MCNC: 199 MG/DL (ref 70–99)
PERFORMED ON: ABNORMAL

## 2019-08-28 PROCEDURE — 99212 OFFICE O/P EST SF 10 MIN: CPT

## 2019-08-29 ENCOUNTER — HOSPITAL ENCOUNTER (OUTPATIENT)
Dept: HYPERBARIC MEDICINE | Age: 64
Discharge: HOME | End: 2019-08-29
Payer: MEDICARE

## 2019-08-29 VITALS
RESPIRATION RATE: 16 BRPM | HEART RATE: 65 BPM | DIASTOLIC BLOOD PRESSURE: 102 MMHG | TEMPERATURE: 97.5 F | SYSTOLIC BLOOD PRESSURE: 191 MMHG

## 2019-08-29 LAB
GLUCOSE BLD-MCNC: 122 MG/DL (ref 70–99)
GLUCOSE BLD-MCNC: 168 MG/DL (ref 70–99)
PERFORMED ON: ABNORMAL
PERFORMED ON: ABNORMAL

## 2019-08-29 PROCEDURE — 99183 HYPERBARIC OXYGEN THERAPY: CPT | Performed by: INTERNAL MEDICINE

## 2019-08-29 PROCEDURE — G0277 HBOT, FULL BODY CHAMBER, 30M: HCPCS

## 2019-08-29 NOTE — PROGRESS NOTES
[facility]  Hyperbaric Oxygen Therapy   Progress Note      NAME: Kristopher Miranda  MEDICAL RECORD NUMBER:  [id number]  AGE: 75 y.o. GENDER: male  : 1955  EPISODE DATE:  2019     Subjective     HBO Treatment Number: 31 out of Total Treatments: 40    HBO Diagnosis:               Indications: Lower Extremity Diabetic Wound ___(site)(Left foot)        Objective           Recent Labs     19  1004 19  0954   POCGLU 199* 168*       Pre treatment Vital Signs       Temp: 97.7 °F (36.5 °C)     Pulse: 70     Resp: 16     BP: (!) 169/88       Post treatment Vital Signs  Temp: 97.7 °F (36.5 °C)  Pulse: 70  Resp: 16  BP: (!) 169/88      Assessment              Chamber #: [id number]      Length of Treatment: 90 Minutes         Adverse Event: no      I was present on these premises and immediately available to furnish assistance & direction throughout the procedure. Plan          Kristopher Miranda is a 75 y.o. male  did successfully complete today's hyperbaric oxygen treatment at [facility]. Supervision of Hyperbaric Oxygen Therapy provided. Continue HBO treatment as outlined in the treatment plan. Hyperbaric Oxygen: Kristopher Miranda tolerated Treatment Number: 84 well today without complications. Discharge Instructions were explained and given to Mr. Josesliem Madhavi       Thank you for allowing me to participate in the care of your patient. Please do not hesitate to call.      Timothy Ziegler D.O., [facility]  Interventional Cardiology     o: [phone]  [address] [place], [place] [address]., [address], [address] [place] [address]

## 2019-08-30 ENCOUNTER — HOSPITAL ENCOUNTER (OUTPATIENT)
Dept: HYPERBARIC MEDICINE | Age: 64
Discharge: HOME | End: 2019-08-30
Payer: MEDICARE

## 2019-08-30 ENCOUNTER — HOSPITAL ENCOUNTER (OUTPATIENT)
Dept: INFUSION THERAPY | Age: 64
Setting detail: INFUSION SERIES
Discharge: HOME | End: 2019-08-30
Payer: MEDICARE

## 2019-08-30 VITALS
RESPIRATION RATE: 16 BRPM | SYSTOLIC BLOOD PRESSURE: 194 MMHG | HEART RATE: 67 BPM | TEMPERATURE: 97.5 F | DIASTOLIC BLOOD PRESSURE: 99 MMHG

## 2019-08-30 VITALS
RESPIRATION RATE: 16 BRPM | DIASTOLIC BLOOD PRESSURE: 89 MMHG | HEART RATE: 71 BPM | SYSTOLIC BLOOD PRESSURE: 171 MMHG | OXYGEN SATURATION: 98 % | TEMPERATURE: 98 F

## 2019-08-30 DIAGNOSIS — N18.30 CHRONIC KIDNEY DISEASE, STAGE III (MODERATE) (HCC): ICD-10-CM

## 2019-08-30 DIAGNOSIS — D63.1 ANEMIA DUE TO STAGE 3 CHRONIC KIDNEY DISEASE (HCC): Primary | ICD-10-CM

## 2019-08-30 DIAGNOSIS — N18.30 ANEMIA DUE TO STAGE 3 CHRONIC KIDNEY DISEASE (HCC): Primary | ICD-10-CM

## 2019-08-30 LAB
GLUCOSE BLD-MCNC: 124 MG/DL (ref 70–99)
GLUCOSE BLD-MCNC: 142 MG/DL (ref 70–99)
HEMOGLOBIN: 10.4 G/DL (ref 13.5–17.5)
PERFORMED ON: ABNORMAL
PERFORMED ON: ABNORMAL

## 2019-08-30 PROCEDURE — 85018 HEMOGLOBIN: CPT

## 2019-08-30 PROCEDURE — 99212 OFFICE O/P EST SF 10 MIN: CPT

## 2019-08-30 PROCEDURE — 99183 HYPERBARIC OXYGEN THERAPY: CPT | Performed by: INTERNAL MEDICINE

## 2019-08-30 PROCEDURE — G0277 HBOT, FULL BODY CHAMBER, 30M: HCPCS

## 2019-08-30 NOTE — PROGRESS NOTES
[facility]  Hyperbaric Oxygen Therapy   Progress Note      NAME: Dewayne Eid  MEDICAL RECORD NUMBER:  [id number]  AGE: 75 y.o. GENDER: male  : 1955  EPISODE DATE:  2019     Subjective     HBO Treatment Number: 32 out of Total Treatments: 40    HBO Diagnosis:               Indications: Lower Extremity Diabetic Wound ___(site)(Left foot)        Objective           Recent Labs     19  1201 19  1004   POCGLU 122* 142*       Pre treatment Vital Signs       Temp: 98.4 °F (36.9 °C)     Pulse: 73     Resp: 16     BP: (!) 165/94       Post treatment Vital Signs  Temp: 98.4 °F (36.9 °C)  Pulse: 73  Resp: 16  BP: (!) 165/94      Assessment              Chamber #: [id number]                Adverse Event: no      I was present on these premises and immediately available to furnish assistance & direction throughout the procedure. Plan          Dewayne Eid is a 75 y.o. male  did successfully complete today's hyperbaric oxygen treatment at [facility]. Supervision of Hyperbaric Oxygen Therapy provided. Continue HBO treatment as outlined in the treatment plan. Hyperbaric Oxygen: Dewayne Precise tolerated Treatment Number: 28 well today without complications. Discharge Instructions were explained and given to Mr. Tessa Wood       Thank you for allowing me to participate in the care of your patient. Please do not hesitate to call.      Kerline Bolanos D.O., [facility]  Interventional Cardiology     o: [phone]  [address] [place], [place] [address]., [address], [address] [place] [address]

## 2019-08-30 NOTE — PROGRESS NOTES
[address]    Peripheral Lab Draw    NAME:  Lowell Rodriguez  YOB: 1955  MEDICAL RECORD NUMBER:  [id number]  Episode Date:  8/30/2019    Blood Draw Site: Location:right arm  Site cleansed with Chloroprep Scrub for 30 seconds: Yes  Site cleansed with Alcohol pads: Yes  Labs drawn with: 25 gauge             [x] Butterfly    [] Needle  Labs Obtained: Yes  Number of attempts: 1    Lab Test(s) Ordered: HGB    Lab Draw Site:  Redness: No  Bruising: No   Edema: No  Pain: No     Response to treatment:  Well tolerated by patient. Electronically signed by Leena Villarreal RN on 8/30/2019 at 2:57 PM              Outpatient [address] [facility] Visit    NAME:  Paulette Aleln OF BIRTH:  1955  MEDICAL RECORD NUMBER:  [id number]  EPISODE DATE:  8/30/2019    Patient arrived to [place] [address]   [] per wheelchair   [x] ambulatory     Is the patient experiencing any:  Fatigue:   [x]   None  []   Increase over baseline but not altering normal activities  []   Moderate of causing difficulty performing some activities  []   Severe or loss of ability to perform some activities  []   Bedridden or disabling    Dizziness or Lightheadedness:   [x]   None  []   No Interference  []   Interferes with functioning but not activities of daily living  []   Interferes with daily activies  []   Bedridden or disabling    Shortness of Breath:   []   None   [x]   Dyspneic on exertion   []   Dyspnea with normal activities  []   Dyspnea at rest    Edema: 1+ Location of edema: left leg     Tachycardia: No    Heart Palpitations: No      Chest Pain: No     BP (!) 171/89   Pulse 71   Temp 98 °F (36.7 °C) (Oral)   Resp 16   SpO2 98%     Hold ARMEN dose if B/P is greater than: 180 mm/Hg     If Hgb remains less than 10 Increase dose by 25%. Do not increase dose more frequently than once every 4 weeks.   If Hgb 10-10.5 g/dl  Continue same dose  If Hgb 10.6-11 g/dl Decrease dose by 25%. A decrease in dose can be done as frequently as every week. If Hgb is greater than 11 g/dl Hold Aranesp. May resume ARMEN when Hgb is less than 10 with a 25% reduction in the dose from the last administered dose or decrease with a 25% reduction in the dose from the last administered dose or decrease  frequency. Last visit date: 8/23/19     Last Hgb: 11.1 g/dl   Last dose: held   Current Lab Data:  Hemoglobin/Hematocrit:    Lab Results   Component Value Date    HGB 10.4 08/30/2019    HCT 29.7 07/26/2019     Dose will be held    Dose verified by:  Julienne Flores RN    Response to treatment:  Well tolerated by patient. Education:    Verbalized understanding    Scheduled to return for next dose of Aranesp on September 6, 2019.      Electronically signed by Landon Mercado RN on 8/30/2019 at 2:57 PM

## 2019-08-30 NOTE — PROGRESS NOTES
Clinic Level of Care Assessment  Infusion Center      NAME:  Evonne Libman OF BIRTH:  1955 GENDER: male  MEDICAL RECORD NUMBER:  [id number]   DATE:  8/30/2019     Ambulation Status Document in Daily Care/Safety Tab   Status Definition Points   Independent Independently able to ambulate. Fully able (without any assistance) to get on/off exam table/chair. [x]   0   Minimal Physical Assistance Requires physical assistance of one person to ambulate and/or position patient to be examined. Includes necessary physical assistance to position lower extremities on/off stool. []   1   Moderate Physical Assistance Requires at least one staff member to physically assist patient in ambulating into treatment room, and/or on off chair/bed. Requires assistance to bathroom. []   2   Full Assistance Requires assistance of at least two staff members to transfer patient into treatment room and/or on/off bed/chair. \"Total Transfer\". Unable to use bathroom requires bedside commode and/or bedpan []   3       Dressing Complexity Document in LDA Navigator  Complexity Definition Points   No Dressing  []   0   Simple Minimal, simple dressing. i.e. bandaid, gauze, simple wrap. Peripheral IV dressing. [x]   1   Intermediate Moderately complicated PICC dressing change requiring sterile procedure and site assessment. []   2   Complex Complicated dressing change port access requiring sterile procedure and site assessment. []   3       Teaching Effort Document in AVS and/or Education Navigator    Effort Definition Points   No Teaching  []   0   Simple Teach two or less topics. Teaching documented in discharge instructions in AVS and copy given to patient. [x]   1   Intermediate Teach three to four topics. Teaching documented in Discharge Instructions in AVS using References and Attachments. Copy given to patient. []   2   Complex Teach five to six topics.  Teaching documented in Discharge Instructions in AVS using References and Attachments. May also be documentation in Education Navigator. Copy given to patient. []   3           Patient Assessment  Planning Definition Points   Simple Complete all tabs in patient assessment navigator. Review or complete patient'sTherapy Plan. [x]   1   Intermediate Complete all tabs in patient assessment navigator. Review or completed patient'sTherapy Plan. Contact doctor based on nursing assessment. []   2   Complex Complete all tabs in patient assessment navigator. Completed patient's Therapy Plan. Contact doctor based on nursing assessment and write order related to needed care. []   3     Patient Planning   Planning Definition Points   Simple Discharged, instructions and After Visit Summary given to patient/caregiver and instructions completed. Simple follow-up with routine assessment and planning. [x]   1   Intermediate Discharged, instructions and After Visit Summary given to patient/caregiver and instructions completed. Contact with outside resources; i.e. Telephone calls to PCP, home health, ECF and/or arranging transportation. []   2   Complex Discharged, instructions and After Visit Summary given to patient/caregiver and instructions completed. Contact with outside resources; i.e. Telephone calls to PCP, home health, ECF and/or arranging transportation. May include filling out forms and/or writing letters, communication with insurance , preauthorization for treatment.    []   3       Is this the Patient's First Visit to the [facility]  No    Is this Patient Established @ this [facility] SPECIALTY [facility]  Yes              Clinical Level of Care      Points  0-2  Level 1 []     Points  3-5  Level 2 [x]     Points  6-9  Level 3 []     Points  10-12  Level 4 []     Points  13-15  Level 5 []       Electronically signed by Perfecto Spurling, RN on 8/30/2019 at 2:58 PM

## 2019-09-03 ENCOUNTER — HOSPITAL ENCOUNTER (OUTPATIENT)
Dept: HYPERBARIC MEDICINE | Age: 64
Discharge: HOME | End: 2019-09-03
Payer: MEDICARE

## 2019-09-03 VITALS
RESPIRATION RATE: 16 BRPM | HEART RATE: 68 BPM | DIASTOLIC BLOOD PRESSURE: 98 MMHG | TEMPERATURE: 97.5 F | SYSTOLIC BLOOD PRESSURE: 186 MMHG

## 2019-09-03 LAB
GLUCOSE BLD-MCNC: 180 MG/DL (ref 70–99)
GLUCOSE BLD-MCNC: 281 MG/DL (ref 70–99)
PERFORMED ON: ABNORMAL
PERFORMED ON: ABNORMAL

## 2019-09-03 PROCEDURE — G0277 HBOT, FULL BODY CHAMBER, 30M: HCPCS

## 2019-09-03 PROCEDURE — 99183 HYPERBARIC OXYGEN THERAPY: CPT | Performed by: SURGERY

## 2019-09-04 ENCOUNTER — HOSPITAL ENCOUNTER (OUTPATIENT)
Dept: WOUND CARE | Age: 64
Discharge: HOME | End: 2019-09-04
Payer: MEDICARE

## 2019-09-04 ENCOUNTER — HOSPITAL ENCOUNTER (OUTPATIENT)
Dept: HYPERBARIC MEDICINE | Age: 64
Discharge: HOME | End: 2019-09-04
Payer: MEDICARE

## 2019-09-04 VITALS
SYSTOLIC BLOOD PRESSURE: 175 MMHG | HEART RATE: 71 BPM | DIASTOLIC BLOOD PRESSURE: 86 MMHG | TEMPERATURE: 97.5 F | RESPIRATION RATE: 16 BRPM

## 2019-09-04 LAB
GLUCOSE BLD-MCNC: 175 MG/DL (ref 70–99)
GLUCOSE BLD-MCNC: 240 MG/DL (ref 70–99)
PERFORMED ON: ABNORMAL
PERFORMED ON: ABNORMAL

## 2019-09-04 PROCEDURE — 11043 DBRDMT MUSC&/FSCA 1ST 20/<: CPT

## 2019-09-04 PROCEDURE — 99183 HYPERBARIC OXYGEN THERAPY: CPT | Performed by: SURGERY

## 2019-09-04 PROCEDURE — G0277 HBOT, FULL BODY CHAMBER, 30M: HCPCS

## 2019-09-04 RX ORDER — DOXYCYCLINE HYCLATE 100 MG/1
100 CAPSULE ORAL 2 TIMES DAILY
Qty: 28 CAPSULE | Refills: 0 | Status: SHIPPED | OUTPATIENT
Start: 2019-09-04 | End: 2019-09-18

## 2019-09-04 NOTE — PROGRESS NOTES
Test blood sugars 4 times daily 200 each 3    ONE TOUCH LANCETS MISC 1 each by Does not apply route 5 times daily. 200 each 5    glucose blood VI test strips (ONE TOUCH ULTRA TEST) strip 1 each by Does not apply route 5 times daily. 4 times daily. 150 each 11     No current facility-administered medications on file prior to encounter. REVIEW OF SYSTEMS    Pertinent items are noted in HPI. Objective: There were no vitals taken for this visit. PHYSICAL EXAM    Palpable pedal pulses 1/4 PT and DP bilaterally  Edema bilaterally +2  Epicritic and protopathic sensations absent bilaterally  Puncture wound still open at surgical site with mild maceration surgical sites remains healed proximal and distal to the wound. Central granulation noted. Still probes deep but not to bone, purulent drainage when opened up light brown in color.     Assessment:     Patient Active Problem List   Diagnosis    ARF (acute renal failure) (HCC)    Diabetic foot ulcer ([address]    Diabetic foot ulcer with osteomyelitis ([address]    Type 1 diabetes mellitus with stage 3 chronic kidney disease ([address]    Mixed hyperlipidemia    Diabetic foot infection ([address]    Cellulitis of foot    Acute hematogenous osteomyelitis of left foot ([address]    Elevated sed rate    Elevated C-reactive protein (CRP)    Acute kidney injury superimposed on chronic kidney disease ([address]    Overweight    Diabetic polyneuropathy associated with type 2 diabetes mellitus ([address]    History of methicillin resistant staphylococcus aureus (MRSA)    Essential hypertension    Anemia due to stage 3 chronic kidney disease (HCC)    Chronic kidney disease, stage III (moderate) (HCC)    Ulcer of left foot ([address]    Acute hematogenous osteomyelitis of left ankle ([address]       Procedure Note    Performed by: Lia Figueroa DPM    Consent obtained: Yes    Time out taken:  Yes    Pain Control:       Debridement:Excisional Debridement    Using curette the wound was
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
## 2019-09-05 ENCOUNTER — HOSPITAL ENCOUNTER (OUTPATIENT)
Dept: HYPERBARIC MEDICINE | Age: 64
Discharge: HOME | End: 2019-09-05
Payer: MEDICARE

## 2019-09-05 VITALS
SYSTOLIC BLOOD PRESSURE: 179 MMHG | HEART RATE: 72 BPM | TEMPERATURE: 97.9 F | DIASTOLIC BLOOD PRESSURE: 102 MMHG | RESPIRATION RATE: 16 BRPM

## 2019-09-05 DIAGNOSIS — E10.22 TYPE 1 DIABETES MELLITUS WITH STAGE 3 CHRONIC KIDNEY DISEASE (HCC): ICD-10-CM

## 2019-09-05 DIAGNOSIS — M86.9 DIABETIC FOOT ULCER WITH OSTEOMYELITIS (HCC): Primary | ICD-10-CM

## 2019-09-05 DIAGNOSIS — L97.522 DIABETIC ULCER OF TOE OF LEFT FOOT ASSOCIATED WITH TYPE 2 DIABETES MELLITUS, WITH FAT LAYER EXPOSED (HCC): ICD-10-CM

## 2019-09-05 DIAGNOSIS — E11.621 DIABETIC FOOT ULCER WITH OSTEOMYELITIS (HCC): Primary | ICD-10-CM

## 2019-09-05 DIAGNOSIS — M86.072 ACUTE HEMATOGENOUS OSTEOMYELITIS OF LEFT ANKLE (HCC): ICD-10-CM

## 2019-09-05 DIAGNOSIS — E11.69 DIABETIC FOOT ULCER WITH OSTEOMYELITIS (HCC): Primary | ICD-10-CM

## 2019-09-05 DIAGNOSIS — E11.621 DIABETIC ULCER OF TOE OF LEFT FOOT ASSOCIATED WITH TYPE 2 DIABETES MELLITUS, WITH FAT LAYER EXPOSED (HCC): ICD-10-CM

## 2019-09-05 DIAGNOSIS — L97.509 DIABETIC FOOT ULCER WITH OSTEOMYELITIS (HCC): Primary | ICD-10-CM

## 2019-09-05 DIAGNOSIS — N18.30 TYPE 1 DIABETES MELLITUS WITH STAGE 3 CHRONIC KIDNEY DISEASE (HCC): ICD-10-CM

## 2019-09-05 DIAGNOSIS — M86.072 ACUTE HEMATOGENOUS OSTEOMYELITIS OF LEFT FOOT (HCC): ICD-10-CM

## 2019-09-05 DIAGNOSIS — L97.529 ULCER OF LEFT FOOT, UNSPECIFIED ULCER STAGE (HCC): ICD-10-CM

## 2019-09-05 LAB
GLUCOSE BLD-MCNC: 145 MG/DL (ref 70–99)
GLUCOSE BLD-MCNC: 195 MG/DL (ref 70–99)
PERFORMED ON: ABNORMAL
PERFORMED ON: ABNORMAL

## 2019-09-05 PROCEDURE — 99183 HYPERBARIC OXYGEN THERAPY: CPT | Performed by: NURSE PRACTITIONER

## 2019-09-05 PROCEDURE — G0277 HBOT, FULL BODY CHAMBER, 30M: HCPCS

## 2019-09-06 ENCOUNTER — HOSPITAL ENCOUNTER (OUTPATIENT)
Dept: HYPERBARIC MEDICINE | Age: 64
Discharge: HOME | End: 2019-09-06
Payer: MEDICARE

## 2019-09-06 ENCOUNTER — HOSPITAL ENCOUNTER (OUTPATIENT)
Dept: INFUSION THERAPY | Age: 64
Setting detail: INFUSION SERIES
Discharge: HOME | End: 2019-09-06
Payer: MEDICARE

## 2019-09-06 VITALS
HEART RATE: 78 BPM | OXYGEN SATURATION: 99 % | SYSTOLIC BLOOD PRESSURE: 190 MMHG | TEMPERATURE: 97.4 F | DIASTOLIC BLOOD PRESSURE: 98 MMHG | RESPIRATION RATE: 16 BRPM

## 2019-09-06 VITALS
SYSTOLIC BLOOD PRESSURE: 184 MMHG | DIASTOLIC BLOOD PRESSURE: 98 MMHG | HEART RATE: 77 BPM | TEMPERATURE: 97.9 F | RESPIRATION RATE: 16 BRPM

## 2019-09-06 DIAGNOSIS — N18.30 CHRONIC KIDNEY DISEASE, STAGE III (MODERATE) (HCC): ICD-10-CM

## 2019-09-06 DIAGNOSIS — D63.1 ANEMIA DUE TO STAGE 3 CHRONIC KIDNEY DISEASE (HCC): Primary | ICD-10-CM

## 2019-09-06 DIAGNOSIS — N18.30 ANEMIA DUE TO STAGE 3 CHRONIC KIDNEY DISEASE (HCC): Primary | ICD-10-CM

## 2019-09-06 LAB
GLUCOSE BLD-MCNC: 116 MG/DL (ref 70–99)
GLUCOSE BLD-MCNC: 122 MG/DL (ref 70–99)
GLUCOSE BLD-MCNC: 149 MG/DL (ref 70–99)
HEMOGLOBIN: 10.6 G/DL (ref 13.5–17.5)
PERFORMED ON: ABNORMAL

## 2019-09-06 PROCEDURE — 99183 HYPERBARIC OXYGEN THERAPY: CPT | Performed by: SURGERY

## 2019-09-06 PROCEDURE — G0277 HBOT, FULL BODY CHAMBER, 30M: HCPCS

## 2019-09-06 PROCEDURE — 99212 OFFICE O/P EST SF 10 MIN: CPT

## 2019-09-06 PROCEDURE — 85018 HEMOGLOBIN: CPT
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
## 2019-09-06 NOTE — PROGRESS NOTES
Clinic Level of Care Assessment  Infusion Center      NAME:  Nereyda Schmid OF BIRTH:  1955 GENDER: male  MEDICAL RECORD NUMBER:  [id number]   DATE:  9/6/2019     Ambulation Status Document in Daily Care/Safety Tab   Status Definition Points   Independent Independently able to ambulate. Fully able (without any assistance) to get on/off exam table/chair. [x]   0   Minimal Physical Assistance Requires physical assistance of one person to ambulate and/or position patient to be examined. Includes necessary physical assistance to position lower extremities on/off stool. []   1   Moderate Physical Assistance Requires at least one staff member to physically assist patient in ambulating into treatment room, and/or on off chair/bed. Requires assistance to bathroom. []   2   Full Assistance Requires assistance of at least two staff members to transfer patient into treatment room and/or on/off bed/chair. \"Total Transfer\". Unable to use bathroom requires bedside commode and/or bedpan []   3       Dressing Complexity Document in LDA Navigator  Complexity Definition Points   No Dressing  []   0   Simple Minimal, simple dressing. i.e. bandaid, gauze, simple wrap. Peripheral IV dressing. [x]   1   Intermediate Moderately complicated PICC dressing change requiring sterile procedure and site assessment. []   2   Complex Complicated dressing change port access requiring sterile procedure and site assessment. []   3       Teaching Effort Document in AVS and/or Education Navigator    Effort Definition Points   No Teaching  []   0   Simple Teach two or less topics. Teaching documented in discharge instructions in AVS and copy given to patient. [x]   1   Intermediate Teach three to four topics. Teaching documented in Discharge Instructions in AVS using References and Attachments. Copy given to patient. []   2   Complex Teach five to six topics.  Teaching documented in Discharge Instructions in AVS using References and Attachments. May also be documentation in Education Navigator. Copy given to patient. []   3           Patient Assessment  Planning Definition Points   Simple Complete all tabs in patient assessment navigator. Review or complete patient'sTherapy Plan. [x]   1   Intermediate Complete all tabs in patient assessment navigator. Review or completed patient'sTherapy Plan. Contact doctor based on nursing assessment. []   2   Complex Complete all tabs in patient assessment navigator. Completed patient's Therapy Plan. Contact doctor based on nursing assessment and write order related to needed care. []   3     Patient Planning   Planning Definition Points   Simple Discharged, instructions and After Visit Summary given to patient/caregiver and instructions completed. Simple follow-up with routine assessment and planning. [x]   1   Intermediate Discharged, instructions and After Visit Summary given to patient/caregiver and instructions completed. Contact with outside resources; i.e. Telephone calls to PCP, home health, ECF and/or arranging transportation. []   2   Complex Discharged, instructions and After Visit Summary given to patient/caregiver and instructions completed. Contact with outside resources; i.e. Telephone calls to PCP, home health, ECF and/or arranging transportation. May include filling out forms and/or writing letters, communication with insurance , preauthorization for treatment.    []   3       Is this the Patient's First Visit to the [facility]  No    Is this Patient Established @ this [facility] SPECIALTY [facility]  Yes              Clinical Level of Care      Points  0-2  Level 1 []     Points  3-5  Level 2 [x]     Points  6-9  Level 3 []     Points  10-12  Level 4 []     Points  13-15  Level 5 []       Electronically signed by Anthony Murphy RN on 9/6/2019 at 3:29 PM

## 2019-09-06 NOTE — PROGRESS NOTES
[facility] [place]    Peripheral Lab Draw    NAME:  Isreal Michel  YOB: 1955  MEDICAL RECORD NUMBER:  [id number]  Episode Date:  9/6/2019    Blood Draw Site: Location:right arm  Site cleansed with Chloroprep Scrub for 30 seconds: Yes  Site cleansed with Alcohol pads: Yes  Labs drawn with: 23 gauge             [x] Butterfly    [] Needle  Labs Obtained: Yes  Number of attempts: 1    Lab Test(s) Ordered:Stat HGB    Lab Draw Site:  Redness: No  Bruising: No   Edema: No  Pain: No     Response to treatment:  Well tolerated by patient. Electronically signed by Bakari Dewey RN on 9/6/2019 at [facility] Visit    NAME:  Tal Hanson Keys OF BIRTH:  1955  MEDICAL RECORD NUMBER:  [id number]  EPISODE DATE:  9/6/2019    Patient arrived to [facility]   [] per wheelchair   [x] ambulatory     Alert and oriented X4, denies any new s/s.  Aranesp held last 2 visits    Is the patient experiencing any:  Fatigue:   [x]   None  []   Increase over baseline but not altering normal activities  []   Moderate of causing difficulty performing some activities  []   Severe or loss of ability to perform some activities  []   Bedridden or disabling    Dizziness or Lightheadedness:   [x]   None  []   No Interference  []   Interferes with functioning but not activities of daily living  []   Interferes with daily activies  []   Bedridden or disabling    Shortness of Breath:   [x]   None   []   Dyspneic on exertion   []   Dyspnea with normal activities  []   Dyspnea at rest    Edema: none Location of edema: nothing     Tachycardia: No    Heart Palpitations: No      Chest Pain: No     BP (!) 190/98   Pulse 78   Temp 97.4 °F (36.3 °C) (Oral)   Resp 16   SpO2 99%     Hold ARMEN dose if B/P is greater than: 180 mm/Hg     If Hgb remains less than 10 Increase dose by 25%. Do not increase dose more frequently than once every 4 weeks. If Hgb 10-10.5 g/dl  Continue same dose  If Hgb 10.6-11 g/dl Decrease dose by 25%. A decrease in dose can be done as frequently as every week. If Hgb is greater than 11 g/dl Hold Aranesp. May resume ARMEN when Hgb is less than 10 with a 25% reduction in the dose from the last administered dose or decrease with a 25% reduction in the dose from the last administered dose or decrease  frequency. Last visit date: 8/30/19     Last Hgb: 10.4 g/dl   Last dose: 0 mcg dose held    Current Lab Data:  Hemoglobin/Hematocrit:    Lab Results   Component Value Date    HGB 10.6 09/06/2019    HCT 29.7 07/26/2019     Dose will be Held until HGB < 10      Aranesp dosage administered: 0 mcg was administered     Response to treatment:  Well tolerated by patient. Education:    Verbalized understanding. Discussed elevated blood pressure. States PCP that originally ordered BP meds does not take his insurance anymore so he needs to find a new PCP. Has a follow up appointment with Dr Olesya Griffin 9/26/19. We will draw extra labs on 9/20/19. BP checked daily Monday- Friday  at [facility]. Instructed re side effects of elevated BP,  to call MD.     Scheduled to return for next dose of Aranesp on September 13, 2019.      Electronically signed by Constantino Clayton RN on 9/6/2019 at 3:24 PM

## 2019-09-09 ENCOUNTER — HOSPITAL ENCOUNTER (OUTPATIENT)
Dept: HYPERBARIC MEDICINE | Age: 64
Discharge: HOME | End: 2019-09-09
Payer: MEDICARE

## 2019-09-09 VITALS
DIASTOLIC BLOOD PRESSURE: 107 MMHG | TEMPERATURE: 97.5 F | SYSTOLIC BLOOD PRESSURE: 163 MMHG | RESPIRATION RATE: 16 BRPM | HEART RATE: 79 BPM

## 2019-09-09 LAB
GLUCOSE BLD-MCNC: 164 MG/DL (ref 70–99)
PERFORMED ON: ABNORMAL

## 2019-09-09 PROCEDURE — 99212 OFFICE O/P EST SF 10 MIN: CPT

## 2019-09-11 ENCOUNTER — HOSPITAL ENCOUNTER (OUTPATIENT)
Dept: WOUND CARE | Age: 64
Discharge: HOME | End: 2019-09-11
Payer: MEDICARE

## 2019-09-11 VITALS
BODY MASS INDEX: 28.41 KG/M2 | WEIGHT: 198 LBS | RESPIRATION RATE: 84 BRPM | SYSTOLIC BLOOD PRESSURE: 197 MMHG | TEMPERATURE: 97.4 F | DIASTOLIC BLOOD PRESSURE: 104 MMHG

## 2019-09-11 PROCEDURE — 11043 DBRDMT MUSC&/FSCA 1ST 20/<: CPT
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
## 2019-09-18 ENCOUNTER — HOSPITAL ENCOUNTER (OUTPATIENT)
Dept: WOUND CARE | Age: 64
Discharge: HOME | End: 2019-09-18
Payer: MEDICARE

## 2019-09-18 VITALS — SYSTOLIC BLOOD PRESSURE: 174 MMHG | HEART RATE: 75 BPM | RESPIRATION RATE: 16 BRPM | DIASTOLIC BLOOD PRESSURE: 89 MMHG

## 2019-09-18 DIAGNOSIS — L97.524 SKIN ULCER OF LEFT FOOT WITH NECROSIS OF BONE (HCC): Primary | ICD-10-CM

## 2019-09-18 PROCEDURE — 11043 DBRDMT MUSC&/FSCA 1ST 20/<: CPT

## 2019-09-20 ENCOUNTER — HOSPITAL ENCOUNTER (OUTPATIENT)
Dept: INFUSION THERAPY | Age: 64
Setting detail: INFUSION SERIES
Discharge: HOME | End: 2019-09-20
Payer: MEDICARE

## 2019-09-20 VITALS
DIASTOLIC BLOOD PRESSURE: 108 MMHG | RESPIRATION RATE: 16 BRPM | OXYGEN SATURATION: 99 % | SYSTOLIC BLOOD PRESSURE: 187 MMHG | HEART RATE: 75 BPM

## 2019-09-20 DIAGNOSIS — D63.1 ANEMIA DUE TO STAGE 3 CHRONIC KIDNEY DISEASE (HCC): Primary | ICD-10-CM

## 2019-09-20 DIAGNOSIS — N18.30 CHRONIC KIDNEY DISEASE, STAGE III (MODERATE) (HCC): ICD-10-CM

## 2019-09-20 DIAGNOSIS — N18.30 ANEMIA DUE TO STAGE 3 CHRONIC KIDNEY DISEASE (HCC): Primary | ICD-10-CM

## 2019-09-20 LAB
ALBUMIN SERPL-MCNC: 3.8 G/DL (ref 3.4–5)
ANION GAP SERPL CALCULATED.3IONS-SCNC: 15 MMOL/L (ref 3–16)
BASOPHILS ABSOLUTE: 0 K/UL (ref 0–0.2)
BASOPHILS RELATIVE PERCENT: 0.8 %
BUN BLDV-MCNC: 38 MG/DL (ref 7–20)
C-REACTIVE PROTEIN: 0.9 MG/L (ref 0–5.1)
CALCIUM SERPL-MCNC: 9.1 MG/DL (ref 8.3–10.6)
CHLORIDE BLD-SCNC: 101 MMOL/L (ref 99–110)
CO2: 22 MMOL/L (ref 21–32)
CREAT SERPL-MCNC: 4.1 MG/DL (ref 0.8–1.3)
CREATININE URINE: 95.5 MG/DL (ref 39–259)
EOSINOPHILS ABSOLUTE: 0.2 K/UL (ref 0–0.6)
EOSINOPHILS RELATIVE PERCENT: 3.5 %
FERRITIN: 201.5 NG/ML (ref 30–400)
GFR AFRICAN AMERICAN: 18
GFR NON-AFRICAN AMERICAN: 15
GLUCOSE BLD-MCNC: 96 MG/DL (ref 70–99)
HCT VFR BLD CALC: 31.7 % (ref 40.5–52.5)
HEMOGLOBIN: 10.5 G/DL (ref 13.5–17.5)
IRON SATURATION: 37 % (ref 20–50)
IRON: 93 UG/DL (ref 59–158)
LYMPHOCYTES ABSOLUTE: 2 K/UL (ref 1–5.1)
LYMPHOCYTES RELATIVE PERCENT: 32.4 %
MCH RBC QN AUTO: 30.7 PG (ref 26–34)
MCHC RBC AUTO-ENTMCNC: 33 G/DL (ref 31–36)
MCV RBC AUTO: 93.1 FL (ref 80–100)
MONOCYTES ABSOLUTE: 0.4 K/UL (ref 0–1.3)
MONOCYTES RELATIVE PERCENT: 6.3 %
NEUTROPHILS ABSOLUTE: 3.6 K/UL (ref 1.7–7.7)
NEUTROPHILS RELATIVE PERCENT: 57 %
PARATHYROID HORMONE INTACT: 393.2 PG/ML (ref 14–72)
PDW BLD-RTO: 13.1 % (ref 12.4–15.4)
PHOSPHORUS: 3.3 MG/DL (ref 2.5–4.9)
PLATELET # BLD: 215 K/UL (ref 135–450)
PMV BLD AUTO: 7.6 FL (ref 5–10.5)
POTASSIUM SERPL-SCNC: 4.1 MMOL/L (ref 3.5–5.1)
PROTEIN PROTEIN: 265 MG/DL
PROTEIN/CREAT RATIO: 2.8 MG/DL
RBC # BLD: 3.41 M/UL (ref 4.2–5.9)
SEDIMENTATION RATE, ERYTHROCYTE: 25 MM/HR (ref 0–20)
SODIUM BLD-SCNC: 138 MMOL/L (ref 136–145)
TOTAL IRON BINDING CAPACITY: 254 UG/DL (ref 260–445)
VITAMIN D 25-HYDROXY: 31.2 NG/ML
WBC # BLD: 6.3 K/UL (ref 4–11)

## 2019-09-20 PROCEDURE — 85652 RBC SED RATE AUTOMATED: CPT

## 2019-09-20 PROCEDURE — 82306 VITAMIN D 25 HYDROXY: CPT

## 2019-09-20 PROCEDURE — 86140 C-REACTIVE PROTEIN: CPT

## 2019-09-20 PROCEDURE — 82040 ASSAY OF SERUM ALBUMIN: CPT

## 2019-09-20 PROCEDURE — 83550 IRON BINDING TEST: CPT

## 2019-09-20 PROCEDURE — 82728 ASSAY OF FERRITIN: CPT

## 2019-09-20 PROCEDURE — 85025 COMPLETE CBC W/AUTO DIFF WBC: CPT

## 2019-09-20 PROCEDURE — 84100 ASSAY OF PHOSPHORUS: CPT

## 2019-09-20 PROCEDURE — 83540 ASSAY OF IRON: CPT

## 2019-09-20 PROCEDURE — 99212 OFFICE O/P EST SF 10 MIN: CPT

## 2019-09-20 PROCEDURE — 84156 ASSAY OF PROTEIN URINE: CPT

## 2019-09-20 PROCEDURE — 80048 BASIC METABOLIC PNL TOTAL CA: CPT

## 2019-09-20 PROCEDURE — 83970 ASSAY OF PARATHORMONE: CPT

## 2019-09-20 PROCEDURE — 82570 ASSAY OF URINE CREATININE: CPT

## 2019-09-20 NOTE — PROGRESS NOTES
Clinic Level of Care Assessment  Infusion Center      NAME:  Jason Chaney OF BIRTH:  1955 GENDER: male  MEDICAL RECORD NUMBER:  [id number]   DATE:  9/20/2019     Ambulation Status Document in Daily Care/Safety Tab   Status Definition Points   Independent Independently able to ambulate. Fully able (without any assistance) to get on/off exam table/chair. [x]   0   Minimal Physical Assistance Requires physical assistance of one person to ambulate and/or position patient to be examined. Includes necessary physical assistance to position lower extremities on/off stool. []   1   Moderate Physical Assistance Requires at least one staff member to physically assist patient in ambulating into treatment room, and/or on off chair/bed. Requires assistance to bathroom. []   2   Full Assistance Requires assistance of at least two staff members to transfer patient into treatment room and/or on/off bed/chair. \"Total Transfer\". Unable to use bathroom requires bedside commode and/or bedpan []   3       Dressing Complexity Document in LDA Navigator  Complexity Definition Points   No Dressing  []   0   Simple Minimal, simple dressing. i.e. bandaid, gauze, simple wrap. Peripheral IV dressing. [x]   1   Intermediate Moderately complicated PICC dressing change requiring sterile procedure and site assessment. []   2   Complex Complicated dressing change port access requiring sterile procedure and site assessment. []   3       Teaching Effort Document in AVS and/or Education Navigator    Effort Definition Points   No Teaching  [x]   0   Simple Teach two or less topics. Teaching documented in discharge instructions in AVS and copy given to patient. []   1   Intermediate Teach three to four topics. Teaching documented in Discharge Instructions in AVS using References and Attachments. Copy given to patient. []   2   Complex Teach five to six topics.  Teaching documented in Discharge Instructions in AVS using References and Attachments. May also be documentation in Education Navigator. Copy given to patient. []   3           Patient Assessment  Planning Definition Points   Simple Complete all tabs in patient assessment navigator. Review or complete patient'sTherapy Plan. [x]   1   Intermediate Complete all tabs in patient assessment navigator. Review or completed patient'sTherapy Plan. Contact doctor based on nursing assessment. []   2   Complex Complete all tabs in patient assessment navigator. Completed patient's Therapy Plan. Contact doctor based on nursing assessment and write order related to needed care. []   3     Patient Planning   Planning Definition Points   Simple Discharged, instructions and After Visit Summary given to patient/caregiver and instructions completed. Simple follow-up with routine assessment and planning. [x]   1   Intermediate Discharged, instructions and After Visit Summary given to patient/caregiver and instructions completed. Contact with outside resources; i.e. Telephone calls to PCP, home health, ECF and/or arranging transportation. []   2   Complex Discharged, instructions and After Visit Summary given to patient/caregiver and instructions completed. Contact with outside resources; i.e. Telephone calls to PCP, home health, ECF and/or arranging transportation. May include filling out forms and/or writing letters, communication with insurance , preauthorization for treatment.    []   3       Is this the Patient's First Visit to the [facility]  No    Is this Patient Established @ this [facility]  Yes              Clinical Level of Care      Points  0-2  Level 1 []     Points  3-5  Level 2 [x]     Points  6-9  Level 3 []     Points  10-12  Level 4 []     Points  13-15  Level 5 []       Electronically signed by Teddy Felder RN on 9/20/2019 at 7:54 PM

## 2019-09-20 NOTE — PROGRESS NOTES
Clinic Level of Care Assessment  Infusion Center      NAME:  Josseline Plaza OF BIRTH:  1955 GENDER: male  MEDICAL RECORD NUMBER:  [id number]   DATE:  9/20/2019     Ambulation Status Document in Daily Care/Safety Tab   Status Definition Points   Independent Independently able to ambulate. Fully able (without any assistance) to get on/off exam table/chair. [x]   0   Minimal Physical Assistance Requires physical assistance of one person to ambulate and/or position patient to be examined. Includes necessary physical assistance to position lower extremities on/off stool. []   1   Moderate Physical Assistance Requires at least one staff member to physically assist patient in ambulating into treatment room, and/or on off chair/bed. Requires assistance to bathroom. []   2   Full Assistance Requires assistance of at least two staff members to transfer patient into treatment room and/or on/off bed/chair. \"Total Transfer\". Unable to use bathroom requires bedside commode and/or bedpan []   3       Dressing Complexity Document in LDA Navigator  Complexity Definition Points   No Dressing  []   0   Simple Minimal, simple dressing. i.e. bandaid, gauze, simple wrap. Peripheral IV dressing. [x]   1   Intermediate Moderately complicated PICC dressing change requiring sterile procedure and site assessment. []   2   Complex Complicated dressing change port access requiring sterile procedure and site assessment. []   3       Teaching Effort Document in AVS and/or Education Navigator    Effort Definition Points   No Teaching  []   0   Simple Teach two or less topics. Teaching documented in discharge instructions in AVS and copy given to patient. [x]   1   Intermediate Teach three to four topics. Teaching documented in Discharge Instructions in AVS using References and Attachments. Copy given to patient. []   2   Complex Teach five to six topics.  Teaching documented in Discharge Instructions in AVS using References and Attachments. May also be documentation in Education Navigator. Copy given to patient. []   3           Patient Assessment  Planning Definition Points   Simple Complete all tabs in patient assessment navigator. Review or complete patient'sTherapy Plan. [x]   1   Intermediate Complete all tabs in patient assessment navigator. Review or completed patient'sTherapy Plan. Contact doctor based on nursing assessment. []   2   Complex Complete all tabs in patient assessment navigator. Completed patient's Therapy Plan. Contact doctor based on nursing assessment and write order related to needed care. []   3     Patient Planning   Planning Definition Points   Simple Discharged, instructions and After Visit Summary given to patient/caregiver and instructions completed. Simple follow-up with routine assessment and planning. [x]   1   Intermediate Discharged, instructions and After Visit Summary given to patient/caregiver and instructions completed. Contact with outside resources; i.e. Telephone calls to PCP, home health, ECF and/or arranging transportation. []   2   Complex Discharged, instructions and After Visit Summary given to patient/caregiver and instructions completed. Contact with outside resources; i.e. Telephone calls to PCP, home health, ECF and/or arranging transportation. May include filling out forms and/or writing letters, communication with insurance , preauthorization for treatment.    []   3       Is this the Patient's First Visit to the [facility]  No    Is this Patient Established @ this [facility]  Yes              Clinical Level of Care      Points  0-2  Level 1 []     Points  3-5  Level 2 [x]     Points  6-9  Level 3 []     Points  10-12  Level 4 []     Points  13-15  Level 5 []       Electronically signed by Haritha Johnson RN on 9/20/2019 at 5:07 PM

## 2019-09-20 NOTE — PROGRESS NOTES
CBC with diff, CRP results routed to Dr. Mark Mohs, sed rate pending  Electronically signed by Giorgio Milan RN on 9/20/2019 at 5:17 PM

## 2019-09-20 NOTE — PROGRESS NOTES
activities  []   Bedridden or disabling    Dizziness or Lightheadedness: secondary to new BP meds  []   None  []   No Interference  [x]   Interferes with functioning but not activities of daily living  []   Interferes with daily activies  []   Bedridden or disabling    Shortness of Breath:   []   None   []   Dyspneic on exertion   []   Dyspnea with normal activities  []   Dyspnea at rest    Edema: none Location of edema: nothing     Tachycardia: No    Heart Palpitations: No      Chest Pain: No     BP (!) 187/108   Pulse 75   Resp 16   SpO2 99%     Hold ARMEN dose if B/P is greater than: 180 mm/Hg     If Hgb remains less than 10 Increase dose by 25%. Do not increase dose more frequently than once every 4 weeks. If Hgb 10-10.5 g/dl  Continue same dose  If Hgb 10.6-11 g/dl Decrease dose by 25%. A decrease in dose can be done as frequently as every week. If Hgb is greater than 11 g/dl Hold Aranesp. May resume ARMEN when Hgb is less than 10 with a 25% reduction in the dose from the last administered dose or decrease with a 25% reduction in the dose from the last administered dose or decrease  frequency. Last visit date: 9/6/19     Last Hgb: 10.6 g/dl   Last dose: 0 mcg    Current Lab Data:  Hemoglobin/Hematocrit:    Lab Results   Component Value Date    HGB 10.5 09/20/2019    HCT 31.7 09/20/2019     Dose will be held      Aranesp dosage administered: Benjaman Saunas was administered       Response to treatment:  Well tolerated by patient. Education:    Verbalized understanding    Scheduled to return for next dose of Aranesp on September 27, 2019. Message sent to Dr. Jose Martin Lamb re frequency of visits, Aranesp has been held X4. Patient has an appointment  on 9/26/19.       Electronically signed by Gladys Mcmahan RN on 9/20/2019 at 4:52 PM

## 2019-09-25 ENCOUNTER — HOSPITAL ENCOUNTER (OUTPATIENT)
Dept: WOUND CARE | Age: 64
Discharge: HOME | End: 2019-09-25
Payer: MEDICARE

## 2019-09-25 VITALS — DIASTOLIC BLOOD PRESSURE: 102 MMHG | HEART RATE: 82 BPM | SYSTOLIC BLOOD PRESSURE: 189 MMHG | RESPIRATION RATE: 16 BRPM

## 2019-09-25 PROCEDURE — 11042 DBRDMT SUBQ TIS 1ST 20SQCM/<: CPT

## 2019-09-25 RX ORDER — LIDOCAINE 40 MG/G
CREAM TOPICAL PRN
Status: DISCONTINUED | OUTPATIENT
Start: 2019-09-25 | End: 2019-09-26 | Stop reason: HOSPADM

## 2019-09-25 RX ORDER — DOXYCYCLINE HYCLATE 100 MG/1
100 CAPSULE ORAL 2 TIMES DAILY
Qty: 60 CAPSULE | Refills: 0 | Status: SHIPPED | OUTPATIENT
Start: 2019-09-25 | End: 2019-10-25

## 2019-09-25 NOTE — PROGRESS NOTES
DELAYED PRIMARY CLOSURE performed by Mary Ellen Caldwell DPM at [address] TOE AMPUTATION Right 7/17/15    great toe       FAMILY HISTORY    Family History   Problem Relation Age of Onset    Cancer Mother     Diabetes Sister     Cancer Brother     Diabetes Brother     Diabetes Brother     Heart Disease Neg Hx     Stroke Neg Hx     Thyroid Disease Neg Hx        SOCIAL HISTORY    Social History     Tobacco Use    Smoking status: Never Smoker    Smokeless tobacco: Never Used   Substance Use Topics    Alcohol use: No    Drug use: No       ALLERGIES    No Known Allergies    MEDICATIONS    Current Outpatient Medications on File Prior to Encounter   Medication Sig Dispense Refill    aspirin 81 MG chewable tablet Take 1 tablet by mouth daily      collagenase (SANTYL) 250 UNIT/GM ointment Apply 250 Units topically daily      folic acid-pyridoxine-cyancobalamin (FOLTX) 2.5-25-2 MG TABS Take 1 tablet by mouth daily      insulin glargine (LANTUS) 100 UNIT/ML injection pen Inject 13 Units into the skin nightly 5 pen 1    carvedilol (COREG) 12.5 MG tablet Take 1 tablet by mouth 2 times daily (with meals) 60 tablet 0    amLODIPine (NORVASC) 10 MG tablet Take 1 tablet by mouth daily 30 tablet 0    vitamin D (CHOLECALCIFEROL) 1000 UNIT TABS tablet Take 2 tablets by mouth daily 60 tablet 0    B-D UF III MINI PEN NEEDLES 31G X 5 MM MISC USE FOUR TIMES DAILY AS DIRECTED. 100 each 2    levothyroxine (SYNTHROID) 100 MCG tablet Take 1 tablet by mouth Daily 30 tablet 1    insulin lispro (HUMALOG) 100 UNIT/ML pen Inject 12 Units into the skin 3 times daily (with meals) Plus correction 1:25 BS>140 for daily total of 72 units 10 Pen 1    atorvastatin (LIPITOR) 20 MG tablet Take 1 tablet by mouth daily 30 tablet 5    ACCU-CHEK VEE PLUS strip 1 each by In Vitro route 5 times daily As needed.  450 each 3    ACCU-CHEK FASTCLIX LANCETS MISC Test blood sugars 4 times daily 200 each 3    ONE TOUCH LANCETS MISC 1 each by removal of epidermis, dermis, subcutaneous tissue and muscle/fascia.         Devitalized Tissue Debrided:  fibrin, slough and exudate    Pre Debridement Measurements:  Are located in the Wound Documentation Flow Sheet    Wound #: 1   Wound Care Documentation:  Diabetic Ulcer 07/16/15 Foot redness, swelling (Active)   Number of days: 6980       Wound 05/24/19 Toe (Comment  which one) Left;Plantar Great #2  (Active)   Wound Image   9/18/2019  1:47 PM   Wound Diabetic Dobbs 3 9/25/2019  1:46 PM   Offloading for Diabetic Foot Ulcers Post op shoe 9/25/2019  1:46 PM   Dressing/Treatment Dry Dressing 8/21/2019  1:20 PM   Wound Cleansed Rinsed/Irrigated with saline 9/25/2019  1:46 PM   Wound Length (cm) 0 cm 9/25/2019  1:46 PM   Wound Width (cm) 0 cm 9/25/2019  1:46 PM   Wound Depth (cm) 0 cm 9/25/2019  1:46 PM   Wound Surface Area (cm^2) 0 cm^2 9/25/2019  1:46 PM   Change in Wound Size % (l*w) 100 9/25/2019  1:46 PM   Wound Volume (cm^3) 0 cm^3 9/25/2019  1:46 PM   Wound Healing % 100 9/25/2019  1:46 PM   Post-Procedure Length (cm) 0.4 cm 9/25/2019  2:31 PM   Post-Procedure Width (cm) 0.4 cm 9/25/2019  2:31 PM   Post-Procedure Depth (cm) 0.2 cm 9/25/2019  2:31 PM   Post-Procedure Surface Area (cm^2) 0.16 cm^2 9/25/2019  2:31 PM   Post-Procedure Volume (cm^3) 0.03 cm^3 9/25/2019  2:31 PM   Wound Assessment Bleeding 9/25/2019  2:31 PM   Drainage Amount None 9/25/2019  1:46 PM   Drainage Description Serosanguinous 8/21/2019 12:31 PM   Odor None 9/25/2019  1:46 PM   Kinza-wound Assessment Calloused 9/25/2019  1:46 PM   [place]%Wound Bed 0 9/25/2019  1:46 PM   Red%Wound Bed 0 9/25/2019  1:46 PM   Yellow%Wound Bed 0 9/25/2019  1:46 PM   Black%Wound Bed 0 9/25/2019  1:46 PM   Purple%Wound Bed 0 9/25/2019  1:46 PM   Other%Wound Bed 0 9/25/2019  1:46 PM   Number of days: 124           Total Surface Area Debrided:  0.16 sq cm after debridement    Percentage of wound debrided 100%    Bleeding:  Minimal    Hemostasis Achieved:  by pressure    Procedural Pain:  0  / 10     Post Procedural Pain:  0 / 10     Response to treatment:  Well tolerated by patient. Diabetic foot infection with osteomyelitis, peripheral vascular disease    Plan:   Patient examined and evaluated  Wound debrided without incident  Again abscess noted < 0.1 cc   Continue hyperbaric oxygen treatment if we can get approval for 10 more. He has not been able to do hyperbaric due to high blood pressure. Follow-up in 1 week for debridement and reevaluation  The nature of the patient's condition was explained in depth.  The patient was informed that their compliance to the treatment plan is paramount to successful healing and prevention of further ulceration and/or infection     Discharge Treatment  Daily dressing changes with Betadine ointment    Written Patient Discharge Instructions Given            Electronically signed by Austyn Tijerina DPM on 9/25/2019 at 4:47 PM

## 2019-09-27 ENCOUNTER — HOSPITAL ENCOUNTER (OUTPATIENT)
Dept: INFUSION THERAPY | Age: 64
Setting detail: INFUSION SERIES
Discharge: HOME | End: 2019-09-27
Payer: MEDICARE

## 2019-09-27 VITALS
SYSTOLIC BLOOD PRESSURE: 160 MMHG | HEART RATE: 95 BPM | DIASTOLIC BLOOD PRESSURE: 88 MMHG | OXYGEN SATURATION: 98 % | TEMPERATURE: 98.1 F | RESPIRATION RATE: 16 BRPM

## 2019-09-27 DIAGNOSIS — D63.1 ANEMIA DUE TO STAGE 3 CHRONIC KIDNEY DISEASE (HCC): Primary | ICD-10-CM

## 2019-09-27 DIAGNOSIS — N18.30 ANEMIA DUE TO STAGE 3 CHRONIC KIDNEY DISEASE (HCC): Primary | ICD-10-CM

## 2019-09-27 DIAGNOSIS — N18.30 CHRONIC KIDNEY DISEASE, STAGE III (MODERATE) (HCC): ICD-10-CM

## 2019-09-27 LAB — HEMOGLOBIN: 9.9 G/DL (ref 13.5–17.5)

## 2019-09-27 PROCEDURE — 6360000002 HC RX W HCPCS

## 2019-09-27 PROCEDURE — 85018 HEMOGLOBIN: CPT

## 2019-09-27 PROCEDURE — 6360000002 HC RX W HCPCS: Performed by: INTERNAL MEDICINE

## 2019-09-27 PROCEDURE — 96372 THER/PROPH/DIAG INJ SC/IM: CPT

## 2019-09-27 RX ADMIN — DARBEPOETIN ALFA 45 MCG: 60 SOLUTION INTRAVENOUS; SUBCUTANEOUS at 15:42

## 2019-09-27 NOTE — PROGRESS NOTES
[facility]    Peripheral Lab Draw    NAME:  Supriya Rivera  YOB: 1955  MEDICAL RECORD NUMBER:  [id number]  Episode Date:  9/27/2019    Blood Draw Site: Location:left arm  Site cleansed with Chloroprep Scrub for 30 seconds: Yes  Site cleansed with Alcohol pads: Yes  Labs drawn with: 25 gauge             [x] Butterfly    [] Needle  Labs Obtained: Yes  Number of attempts: 1    Lab Test(s) Ordered: HGB    Lab Draw Site:  Redness: No  Bruising: No   Edema: No  Pain: No     Response to treatment:  Well tolerated by patient. Electronically signed by Angely Palencia RN on 9/27/2019 at 3:49 PM              Outpatient [address] [facility] Visit    NAME:  Rob Castillo Sings OF BIRTH:  1955  MEDICAL RECORD NUMBER:  [id number]  EPISODE DATE:  9/27/2019    Patient arrived to [place] [address]   [] per wheelchair   [x] ambulatory     Is the patient experiencing any:  Fatigue:   [x]   None  []   Increase over baseline but not altering normal activities  []   Moderate of causing difficulty performing some activities  []   Severe or loss of ability to perform some activities  []   Bedridden or disabling    Dizziness or Lightheadedness:   [x]   None  []   No Interference  []   Interferes with functioning but not activities of daily living  []   Interferes with daily activies  []   Bedridden or disabling    Shortness of Breath:   [x]   None   []   Dyspneic on exertion   []   Dyspnea with normal activities  []   Dyspnea at rest    Edema: Trace Location of edema: left leg and foot     Tachycardia: No    Heart Palpitations: No      Chest Pain: No     BP (!) 160/88   Pulse 95   Temp 98.1 °F (36.7 °C) (Oral)   Resp 16   SpO2 98%     Hold ARMEN dose if B/P is greater than: 180 mm/Hg     If Hgb remains less than 10 Increase dose by 25%. Do not increase dose more frequently than once every 4 weeks.   If Hgb 10-10.5 g/dl Continue same dose  If Hgb 10.6-11 g/dl Decrease dose by 25%. A decrease in dose can be done as frequently as every week. If Hgb is greater than 11 g/dl Hold Aranesp. May resume ARMEN when Hgb is less than 10 with a 25% reduction in the dose from the last administered dose or decrease with a 25% reduction in the dose from the last administered dose or decrease  frequency. Last visit date: 9/20/19    Last Hgb: 10.5 g/dl   Last dose: held   Current Lab Data:  Hemoglobin/Hematocrit:    Lab Results   Component Value Date    HGB 9.9 09/27/2019    HCT 31.7 09/20/2019     Dose will be decreased      Aranesp dosage administered: 45 mcg was administered slowly subcutaneously into the left upper arm. Dose verified by:  CHAYITO Reid RN    Response to treatment:  Well tolerated by patient. Education:    Verbalized understanding    Scheduled to return for next dose of Aranesp on October 11, 2019.      Electronically signed by Jennie Wellington RN on 9/27/2019 at 3:49 PM

## 2019-10-02 ENCOUNTER — HOSPITAL ENCOUNTER (OUTPATIENT)
Dept: WOUND CARE | Age: 64
Discharge: HOME | End: 2019-10-02
Payer: MEDICARE

## 2019-10-02 VITALS — SYSTOLIC BLOOD PRESSURE: 184 MMHG | RESPIRATION RATE: 16 BRPM | DIASTOLIC BLOOD PRESSURE: 105 MMHG | HEART RATE: 97 BPM

## 2019-10-02 PROCEDURE — 11042 DBRDMT SUBQ TIS 1ST 20SQCM/<: CPT

## 2019-10-02 RX ORDER — LIDOCAINE 40 MG/G
CREAM TOPICAL PRN
Status: DISCONTINUED | OUTPATIENT
Start: 2019-10-02 | End: 2019-10-03 | Stop reason: HOSPADM

## 2019-10-09 ENCOUNTER — HOSPITAL ENCOUNTER (OUTPATIENT)
Dept: WOUND CARE | Age: 64
Discharge: HOME | End: 2019-10-09
Payer: MEDICARE

## 2019-10-09 VITALS — RESPIRATION RATE: 16 BRPM | HEART RATE: 93 BPM | SYSTOLIC BLOOD PRESSURE: 179 MMHG | DIASTOLIC BLOOD PRESSURE: 101 MMHG

## 2019-10-09 PROCEDURE — 99212 OFFICE O/P EST SF 10 MIN: CPT

## 2019-10-09 RX ORDER — LIDOCAINE 40 MG/G
CREAM TOPICAL PRN
Status: DISCONTINUED | OUTPATIENT
Start: 2019-10-09 | End: 2019-10-10 | Stop reason: HOSPADM

## 2019-10-23 ENCOUNTER — HOSPITAL ENCOUNTER (OUTPATIENT)
Dept: WOUND CARE | Age: 64
Discharge: HOME | End: 2019-10-23
Payer: MEDICARE

## 2019-10-23 VITALS — HEART RATE: 91 BPM | DIASTOLIC BLOOD PRESSURE: 104 MMHG | SYSTOLIC BLOOD PRESSURE: 184 MMHG | RESPIRATION RATE: 16 BRPM

## 2019-10-23 PROCEDURE — 11043 DBRDMT MUSC&/FSCA 1ST 20/<: CPT

## 2019-10-23 RX ORDER — GENTAMICIN SULFATE 1 MG/G
CREAM TOPICAL
Qty: 1 TUBE | Refills: 0 | Status: ON HOLD | OUTPATIENT
Start: 2019-10-23 | End: 2021-02-16 | Stop reason: HOSPADM

## 2019-10-30 ENCOUNTER — HOSPITAL ENCOUNTER (OUTPATIENT)
Dept: WOUND CARE | Age: 64
Discharge: HOME | End: 2019-10-30
Payer: MEDICARE

## 2019-10-30 VITALS — SYSTOLIC BLOOD PRESSURE: 185 MMHG | HEART RATE: 99 BPM | DIASTOLIC BLOOD PRESSURE: 103 MMHG | RESPIRATION RATE: 16 BRPM

## 2019-10-30 PROCEDURE — 11043 DBRDMT MUSC&/FSCA 1ST 20/<: CPT

## 2019-10-30 PROCEDURE — 11721 DEBRIDE NAIL 6 OR MORE: CPT

## 2019-10-30 RX ORDER — LIDOCAINE 40 MG/G
CREAM TOPICAL ONCE
Status: DISCONTINUED | OUTPATIENT
Start: 2019-10-30 | End: 2019-10-31 | Stop reason: HOSPADM

## 2019-10-31 DIAGNOSIS — A49.01 BACTERIAL INFECTION DUE TO STAPHYLOCOCCUS AUREUS: ICD-10-CM

## 2019-10-31 RX ORDER — SODIUM CHLORIDE 0.9 % (FLUSH) 0.9 %
10 SYRINGE (ML) INJECTION ONCE
Status: CANCELLED
Start: 2019-10-31

## 2019-11-04 ENCOUNTER — APPOINTMENT (OUTPATIENT)
Dept: CT IMAGING | Age: 64
End: 2019-11-04
Payer: MEDICARE

## 2019-11-04 ENCOUNTER — HOSPITAL ENCOUNTER (EMERGENCY)
Age: 64
Discharge: HOME | End: 2019-11-05
Attending: EMERGENCY MEDICINE
Payer: MEDICARE

## 2019-11-04 VITALS
RESPIRATION RATE: 16 BRPM | HEART RATE: 85 BPM | BODY MASS INDEX: 25.06 KG/M2 | TEMPERATURE: 98.1 F | WEIGHT: 179 LBS | OXYGEN SATURATION: 96 % | SYSTOLIC BLOOD PRESSURE: 199 MMHG | HEIGHT: 71 IN | DIASTOLIC BLOOD PRESSURE: 113 MMHG

## 2019-11-04 DIAGNOSIS — R11.2 NON-INTRACTABLE VOMITING WITH NAUSEA, UNSPECIFIED VOMITING TYPE: Primary | ICD-10-CM

## 2019-11-04 DIAGNOSIS — R19.7 DIARRHEA, UNSPECIFIED TYPE: ICD-10-CM

## 2019-11-04 LAB
A/G RATIO: 1.3 (ref 1.1–2.2)
ALBUMIN SERPL-MCNC: 4 G/DL (ref 3.4–5)
ALP BLD-CCNC: 102 U/L (ref 40–129)
ALT SERPL-CCNC: 10 U/L (ref 10–40)
ANION GAP SERPL CALCULATED.3IONS-SCNC: 17 MMOL/L (ref 3–16)
AST SERPL-CCNC: 23 U/L (ref 15–37)
BASOPHILS ABSOLUTE: 0 K/UL (ref 0–0.2)
BASOPHILS RELATIVE PERCENT: 0.4 %
BILIRUB SERPL-MCNC: 0.4 MG/DL (ref 0–1)
BILIRUBIN URINE: NEGATIVE
BLOOD, URINE: ABNORMAL
BUN BLDV-MCNC: 60 MG/DL (ref 7–20)
CALCIUM SERPL-MCNC: 9.1 MG/DL (ref 8.3–10.6)
CHLORIDE BLD-SCNC: 102 MMOL/L (ref 99–110)
CLARITY: CLEAR
CO2: 19 MMOL/L (ref 21–32)
COLOR: YELLOW
CREAT SERPL-MCNC: 4.5 MG/DL (ref 0.8–1.3)
EOSINOPHILS ABSOLUTE: 0 K/UL (ref 0–0.6)
EOSINOPHILS RELATIVE PERCENT: 0.2 %
EPITHELIAL CELLS, UA: 0 /HPF (ref 0–5)
GFR AFRICAN AMERICAN: 16
GFR NON-AFRICAN AMERICAN: 13
GLOBULIN: 3.2 G/DL
GLUCOSE BLD-MCNC: 112 MG/DL (ref 70–99)
GLUCOSE URINE: NEGATIVE MG/DL
HCT VFR BLD CALC: 37.9 % (ref 40.5–52.5)
HEMOGLOBIN: 12.1 G/DL (ref 13.5–17.5)
HYALINE CASTS: 1 /LPF (ref 0–8)
KETONES, URINE: ABNORMAL MG/DL
LEUKOCYTE ESTERASE, URINE: NEGATIVE
LIPASE: 12 U/L (ref 13–60)
LYMPHOCYTES ABSOLUTE: 1.2 K/UL (ref 1–5.1)
LYMPHOCYTES RELATIVE PERCENT: 17.6 %
MCH RBC QN AUTO: 29.8 PG (ref 26–34)
MCHC RBC AUTO-ENTMCNC: 32 G/DL (ref 31–36)
MCV RBC AUTO: 93.1 FL (ref 80–100)
MICROSCOPIC EXAMINATION: YES
MONOCYTES ABSOLUTE: 0.7 K/UL (ref 0–1.3)
MONOCYTES RELATIVE PERCENT: 9.5 %
NEUTROPHILS ABSOLUTE: 5 K/UL (ref 1.7–7.7)
NEUTROPHILS RELATIVE PERCENT: 72.3 %
NITRITE, URINE: NEGATIVE
PDW BLD-RTO: 13.4 % (ref 12.4–15.4)
PH UA: 6 (ref 5–8)
PLATELET # BLD: 260 K/UL (ref 135–450)
PMV BLD AUTO: 7.6 FL (ref 5–10.5)
POTASSIUM SERPL-SCNC: 4.5 MMOL/L (ref 3.5–5.1)
PROTEIN UA: >=300 MG/DL
RBC # BLD: 4.07 M/UL (ref 4.2–5.9)
RBC UA: 2 /HPF (ref 0–4)
SODIUM BLD-SCNC: 138 MMOL/L (ref 136–145)
SPECIFIC GRAVITY UA: 1.02 (ref 1–1.03)
TOTAL PROTEIN: 7.2 G/DL (ref 6.4–8.2)
URINE REFLEX TO CULTURE: ABNORMAL
URINE TYPE: ABNORMAL
UROBILINOGEN, URINE: 0.2 E.U./DL
WBC # BLD: 6.9 K/UL (ref 4–11)
WBC UA: 1 /HPF (ref 0–5)

## 2019-11-04 PROCEDURE — 81001 URINALYSIS AUTO W/SCOPE: CPT

## 2019-11-04 PROCEDURE — 6360000002 HC RX W HCPCS

## 2019-11-04 PROCEDURE — 80053 COMPREHEN METABOLIC PANEL: CPT

## 2019-11-04 PROCEDURE — 96374 THER/PROPH/DIAG INJ IV PUSH: CPT

## 2019-11-04 PROCEDURE — 83690 ASSAY OF LIPASE: CPT

## 2019-11-04 PROCEDURE — 6370000000 HC RX 637 (ALT 250 FOR IP)

## 2019-11-04 PROCEDURE — 74176 CT ABD & PELVIS W/O CONTRAST: CPT

## 2019-11-04 PROCEDURE — 2580000003 HC RX 258: Performed by: EMERGENCY MEDICINE

## 2019-11-04 PROCEDURE — 96361 HYDRATE IV INFUSION ADD-ON: CPT

## 2019-11-04 PROCEDURE — 99284 EMERGENCY DEPT VISIT MOD MDM: CPT

## 2019-11-04 PROCEDURE — 85025 COMPLETE CBC W/AUTO DIFF WBC: CPT

## 2019-11-04 RX ORDER — AMLODIPINE BESYLATE 5 MG/1
10 TABLET ORAL DAILY
Status: DISCONTINUED | OUTPATIENT
Start: 2019-11-05 | End: 2019-11-04

## 2019-11-04 RX ORDER — CARVEDILOL 3.12 MG/1
12.5 TABLET ORAL ONCE
Status: COMPLETED | OUTPATIENT
Start: 2019-11-04 | End: 2019-11-04

## 2019-11-04 RX ORDER — ONDANSETRON 2 MG/ML
4 INJECTION INTRAMUSCULAR; INTRAVENOUS ONCE
Status: COMPLETED | OUTPATIENT
Start: 2019-11-04 | End: 2019-11-04

## 2019-11-04 RX ORDER — ONDANSETRON 4 MG/1
4-8 TABLET, ORALLY DISINTEGRATING ORAL EVERY 12 HOURS PRN
Qty: 12 TABLET | Refills: 0 | Status: ON HOLD | OUTPATIENT
Start: 2019-11-04 | End: 2021-01-26 | Stop reason: HOSPADM

## 2019-11-04 RX ORDER — 0.9 % SODIUM CHLORIDE 0.9 %
1000 INTRAVENOUS SOLUTION INTRAVENOUS ONCE
Status: COMPLETED | OUTPATIENT
Start: 2019-11-04 | End: 2019-11-04

## 2019-11-04 RX ORDER — CARVEDILOL 3.12 MG/1
TABLET ORAL
Status: COMPLETED
Start: 2019-11-04 | End: 2019-11-04

## 2019-11-04 RX ORDER — CARVEDILOL 3.12 MG/1
12.5 TABLET ORAL 2 TIMES DAILY WITH MEALS
Status: DISCONTINUED | OUTPATIENT
Start: 2019-11-05 | End: 2019-11-04

## 2019-11-04 RX ORDER — AMLODIPINE BESYLATE 5 MG/1
10 TABLET ORAL ONCE
Status: COMPLETED | OUTPATIENT
Start: 2019-11-04 | End: 2019-11-04

## 2019-11-04 RX ORDER — AMLODIPINE BESYLATE 5 MG/1
TABLET ORAL
Status: COMPLETED
Start: 2019-11-04 | End: 2019-11-04

## 2019-11-04 RX ORDER — ONDANSETRON 2 MG/ML
INJECTION INTRAMUSCULAR; INTRAVENOUS
Status: COMPLETED
Start: 2019-11-04 | End: 2019-11-04

## 2019-11-04 RX ADMIN — SODIUM CHLORIDE 1000 ML: 9 INJECTION, SOLUTION INTRAVENOUS at 22:11

## 2019-11-04 RX ADMIN — CARVEDILOL 12.5 MG: 3.12 TABLET, FILM COATED ORAL at 22:58

## 2019-11-04 RX ADMIN — ONDANSETRON 4 MG: 2 INJECTION INTRAMUSCULAR; INTRAVENOUS at 21:29

## 2019-11-04 RX ADMIN — AMLODIPINE BESYLATE 10 MG: 5 TABLET ORAL at 22:59

## 2019-11-04 RX ADMIN — CARVEDILOL 12.5 MG: 3.12 TABLET ORAL at 22:58

## 2019-11-05 ENCOUNTER — CLINICAL DOCUMENTATION (OUTPATIENT)
Dept: ONCOLOGY | Age: 64
End: 2019-11-05

## 2019-11-06 ENCOUNTER — HOSPITAL ENCOUNTER (OUTPATIENT)
Dept: WOUND CARE | Age: 64
Discharge: HOME | End: 2019-11-06
Payer: MEDICARE

## 2019-11-06 VITALS — DIASTOLIC BLOOD PRESSURE: 89 MMHG | SYSTOLIC BLOOD PRESSURE: 154 MMHG | HEART RATE: 87 BPM | RESPIRATION RATE: 16 BRPM

## 2019-11-06 PROCEDURE — 11043 DBRDMT MUSC&/FSCA 1ST 20/<: CPT

## 2019-11-13 ENCOUNTER — HOSPITAL ENCOUNTER (OUTPATIENT)
Dept: WOUND CARE | Age: 64
Discharge: HOME | End: 2019-11-13
Payer: MEDICARE

## 2019-11-13 VITALS — SYSTOLIC BLOOD PRESSURE: 178 MMHG | HEART RATE: 93 BPM | RESPIRATION RATE: 16 BRPM | DIASTOLIC BLOOD PRESSURE: 99 MMHG

## 2019-11-13 PROCEDURE — 11043 DBRDMT MUSC&/FSCA 1ST 20/<: CPT

## 2019-11-13 RX ORDER — LIDOCAINE 40 MG/G
CREAM TOPICAL ONCE
Status: DISCONTINUED | OUTPATIENT
Start: 2019-11-13 | End: 2019-11-14 | Stop reason: HOSPADM

## 2019-11-14 ENCOUNTER — HOSPITAL ENCOUNTER (OUTPATIENT)
Age: 64
Discharge: HOME | End: 2019-11-14
Payer: MEDICARE

## 2019-11-14 LAB
ANION GAP SERPL CALCULATED.3IONS-SCNC: 14 MMOL/L (ref 3–16)
BUN BLDV-MCNC: 35 MG/DL (ref 7–20)
CALCIUM SERPL-MCNC: 8 MG/DL (ref 8.3–10.6)
CHLORIDE BLD-SCNC: 103 MMOL/L (ref 99–110)
CO2: 22 MMOL/L (ref 21–32)
CREAT SERPL-MCNC: 4 MG/DL (ref 0.8–1.3)
GFR AFRICAN AMERICAN: 18
GFR NON-AFRICAN AMERICAN: 15
GLUCOSE BLD-MCNC: 189 MG/DL (ref 70–99)
HCT VFR BLD CALC: 28.3 % (ref 40.5–52.5)
HEMOGLOBIN: 9.2 G/DL (ref 13.5–17.5)
MCH RBC QN AUTO: 30.6 PG (ref 26–34)
MCHC RBC AUTO-ENTMCNC: 32.5 G/DL (ref 31–36)
MCV RBC AUTO: 94 FL (ref 80–100)
PDW BLD-RTO: 13.6 % (ref 12.4–15.4)
PHOSPHORUS: 3 MG/DL (ref 2.5–4.9)
PLATELET # BLD: 240 K/UL (ref 135–450)
PMV BLD AUTO: 8.8 FL (ref 5–10.5)
POTASSIUM SERPL-SCNC: 4.1 MMOL/L (ref 3.5–5.1)
RBC # BLD: 3.01 M/UL (ref 4.2–5.9)
SODIUM BLD-SCNC: 139 MMOL/L (ref 136–145)
WBC # BLD: 7.4 K/UL (ref 4–11)

## 2019-11-14 PROCEDURE — 80048 BASIC METABOLIC PNL TOTAL CA: CPT

## 2019-11-14 PROCEDURE — 84100 ASSAY OF PHOSPHORUS: CPT

## 2019-11-14 PROCEDURE — 85027 COMPLETE CBC AUTOMATED: CPT

## 2019-11-14 PROCEDURE — 36415 COLL VENOUS BLD VENIPUNCTURE: CPT

## 2019-11-18 ENCOUNTER — HOSPITAL ENCOUNTER (OUTPATIENT)
Dept: ONCOLOGY | Age: 64
Setting detail: INFUSION SERIES
Discharge: HOME | End: 2019-11-18
Payer: MEDICARE

## 2019-11-18 VITALS
HEART RATE: 86 BPM | SYSTOLIC BLOOD PRESSURE: 198 MMHG | DIASTOLIC BLOOD PRESSURE: 103 MMHG | TEMPERATURE: 97.2 F | RESPIRATION RATE: 16 BRPM

## 2019-11-18 DIAGNOSIS — A49.01 BACTERIAL INFECTION DUE TO STAPHYLOCOCCUS AUREUS: Primary | ICD-10-CM

## 2019-11-18 PROCEDURE — 96365 THER/PROPH/DIAG IV INF INIT: CPT

## 2019-11-18 PROCEDURE — 2580000003 HC RX 258: Performed by: PODIATRIST

## 2019-11-18 PROCEDURE — 96366 THER/PROPH/DIAG IV INF ADDON: CPT

## 2019-11-18 PROCEDURE — 6360000002 HC RX W HCPCS: Performed by: PODIATRIST

## 2019-11-18 RX ORDER — SODIUM CHLORIDE 0.9 % (FLUSH) 0.9 %
10 SYRINGE (ML) INJECTION ONCE
Status: COMPLETED | OUTPATIENT
Start: 2019-11-18 | End: 2019-11-18

## 2019-11-18 RX ORDER — SODIUM CHLORIDE 0.9 % (FLUSH) 0.9 %
10 SYRINGE (ML) INJECTION ONCE
Status: CANCELLED
Start: 2019-11-18

## 2019-11-18 RX ORDER — DEXTROSE MONOHYDRATE 50 MG/ML
INJECTION, SOLUTION INTRAVENOUS CONTINUOUS
Status: DISCONTINUED | OUTPATIENT
Start: 2019-11-18 | End: 2019-11-19 | Stop reason: HOSPADM

## 2019-11-18 RX ADMIN — ORITAVANCIN 1200 MG: 400 INJECTION, POWDER, LYOPHILIZED, FOR SOLUTION INTRAVENOUS at 13:40

## 2019-11-18 RX ADMIN — Medication 10 ML: at 13:19

## 2019-11-18 RX ADMIN — DEXTROSE MONOHYDRATE: 50 INJECTION, SOLUTION INTRAVENOUS at 13:19

## 2019-11-18 NOTE — PROGRESS NOTES
Patient oleg Orbactiv infusion well over 3 hours. D5W given pre and post infusion. No adverse reaction noted. B/P 198/103,Instructed Patient to take B/P meds due once arrived home. Verbalized understanding. Patient to follow up with MD.

## 2019-11-20 ENCOUNTER — HOSPITAL ENCOUNTER (OUTPATIENT)
Dept: WOUND CARE | Age: 64
Discharge: HOME | End: 2019-11-20
Payer: MEDICARE

## 2019-11-20 VITALS
SYSTOLIC BLOOD PRESSURE: 187 MMHG | DIASTOLIC BLOOD PRESSURE: 98 MMHG | HEART RATE: 95 BPM | WEIGHT: 179 LBS | BODY MASS INDEX: 25.32 KG/M2

## 2019-11-20 PROCEDURE — 11042 DBRDMT SUBQ TIS 1ST 20SQCM/<: CPT

## 2019-11-20 RX ORDER — LIDOCAINE 40 MG/G
CREAM TOPICAL PRN
Status: DISCONTINUED | OUTPATIENT
Start: 2019-11-20 | End: 2019-11-21 | Stop reason: HOSPADM

## 2019-11-27 ENCOUNTER — HOSPITAL ENCOUNTER (OUTPATIENT)
Dept: WOUND CARE | Age: 64
Discharge: HOME | End: 2019-11-27
Payer: MEDICARE

## 2019-11-27 VITALS
DIASTOLIC BLOOD PRESSURE: 98 MMHG | HEART RATE: 97 BPM | SYSTOLIC BLOOD PRESSURE: 176 MMHG | TEMPERATURE: 97.9 F | RESPIRATION RATE: 16 BRPM

## 2019-11-27 PROCEDURE — 87070 CULTURE OTHR SPECIMN AEROBIC: CPT

## 2019-11-27 PROCEDURE — 87205 SMEAR GRAM STAIN: CPT

## 2019-11-27 PROCEDURE — 11043 DBRDMT MUSC&/FSCA 1ST 20/<: CPT

## 2019-11-29 LAB
GRAM STAIN RESULT: NORMAL
WOUND/ABSCESS: NORMAL

## 2019-12-04 ENCOUNTER — HOSPITAL ENCOUNTER (OUTPATIENT)
Dept: WOUND CARE | Age: 64
Discharge: HOME | End: 2019-12-04
Payer: MEDICARE

## 2019-12-04 VITALS — RESPIRATION RATE: 16 BRPM | DIASTOLIC BLOOD PRESSURE: 77 MMHG | HEART RATE: 100 BPM | SYSTOLIC BLOOD PRESSURE: 140 MMHG

## 2019-12-04 PROCEDURE — 11043 DBRDMT MUSC&/FSCA 1ST 20/<: CPT

## 2019-12-04 RX ORDER — LIDOCAINE 40 MG/G
CREAM TOPICAL PRN
Status: DISCONTINUED | OUTPATIENT
Start: 2019-12-04 | End: 2019-12-05 | Stop reason: HOSPADM

## 2019-12-11 ENCOUNTER — HOSPITAL ENCOUNTER (OUTPATIENT)
Dept: WOUND CARE | Age: 64
Discharge: HOME | End: 2019-12-11
Payer: MEDICARE

## 2019-12-11 VITALS — SYSTOLIC BLOOD PRESSURE: 151 MMHG | DIASTOLIC BLOOD PRESSURE: 83 MMHG | RESPIRATION RATE: 16 BRPM | HEART RATE: 97 BPM

## 2019-12-11 PROCEDURE — 11043 DBRDMT MUSC&/FSCA 1ST 20/<: CPT

## 2019-12-11 RX ORDER — LIDOCAINE 40 MG/G
CREAM TOPICAL PRN
Status: DISCONTINUED | OUTPATIENT
Start: 2019-12-11 | End: 2019-12-12 | Stop reason: HOSPADM

## 2019-12-18 ENCOUNTER — HOSPITAL ENCOUNTER (OUTPATIENT)
Dept: WOUND CARE | Age: 64
Discharge: HOME | End: 2019-12-18
Payer: MEDICARE

## 2019-12-18 VITALS — RESPIRATION RATE: 16 BRPM | HEART RATE: 92 BPM | DIASTOLIC BLOOD PRESSURE: 107 MMHG | SYSTOLIC BLOOD PRESSURE: 196 MMHG

## 2019-12-18 PROCEDURE — 11055 PARING/CUTG B9 HYPRKER LES 1: CPT

## 2019-12-18 RX ORDER — LIDOCAINE 40 MG/G
CREAM TOPICAL PRN
Status: DISCONTINUED | OUTPATIENT
Start: 2019-12-18 | End: 2019-12-19 | Stop reason: HOSPADM

## 2019-12-18 RX ORDER — NIFEDIPINE 10 MG/1
10 CAPSULE ORAL DAILY
Status: ON HOLD | COMMUNITY
End: 2019-12-30 | Stop reason: HOSPADM

## 2019-12-24 ENCOUNTER — HOSPITAL ENCOUNTER (INPATIENT)
Age: 64
LOS: 6 days | Discharge: HOME | DRG: 305 | End: 2019-12-30
Attending: EMERGENCY MEDICINE | Admitting: INTERNAL MEDICINE
Payer: MEDICARE

## 2019-12-24 ENCOUNTER — APPOINTMENT (OUTPATIENT)
Dept: ULTRASOUND IMAGING | Age: 64
DRG: 305 | End: 2019-12-24
Payer: MEDICARE

## 2019-12-24 ENCOUNTER — APPOINTMENT (OUTPATIENT)
Dept: GENERAL RADIOLOGY | Age: 64
DRG: 305 | End: 2019-12-24
Payer: MEDICARE

## 2019-12-24 PROBLEM — I16.0 HYPERTENSIVE URGENCY: Status: ACTIVE | Noted: 2019-12-24

## 2019-12-24 LAB
ALBUMIN SERPL-MCNC: 3.5 G/DL (ref 3.4–5)
ALP BLD-CCNC: 96 U/L (ref 40–129)
ALT SERPL-CCNC: 8 U/L (ref 10–40)
ANION GAP SERPL CALCULATED.3IONS-SCNC: 12 MMOL/L (ref 3–16)
AST SERPL-CCNC: 14 U/L (ref 15–37)
BASOPHILS ABSOLUTE: 0.1 K/UL (ref 0–0.2)
BASOPHILS RELATIVE PERCENT: 1.3 %
BILIRUB SERPL-MCNC: <0.2 MG/DL (ref 0–1)
BILIRUBIN DIRECT: <0.2 MG/DL (ref 0–0.3)
BILIRUBIN URINE: NEGATIVE
BILIRUBIN, INDIRECT: ABNORMAL MG/DL (ref 0–1)
BLOOD, URINE: ABNORMAL
BUN BLDV-MCNC: 44 MG/DL (ref 7–20)
CALCIUM SERPL-MCNC: 8 MG/DL (ref 8.3–10.6)
CHLORIDE BLD-SCNC: 101 MMOL/L (ref 99–110)
CLARITY: CLEAR
CO2: 23 MMOL/L (ref 21–32)
COLOR: YELLOW
CREAT SERPL-MCNC: 4.9 MG/DL (ref 0.8–1.3)
EOSINOPHILS ABSOLUTE: 0.2 K/UL (ref 0–0.6)
EOSINOPHILS RELATIVE PERCENT: 4.8 %
EPITHELIAL CELLS, UA: 0 /HPF (ref 0–5)
GFR AFRICAN AMERICAN: 14
GFR NON-AFRICAN AMERICAN: 12
GLUCOSE BLD-MCNC: 114 MG/DL (ref 70–99)
GLUCOSE BLD-MCNC: 134 MG/DL (ref 70–99)
GLUCOSE BLD-MCNC: 188 MG/DL (ref 70–99)
GLUCOSE URINE: 100 MG/DL
HCT VFR BLD CALC: 29 % (ref 40.5–52.5)
HEMOGLOBIN: 9.3 G/DL (ref 13.5–17.5)
HYALINE CASTS: 0 /LPF (ref 0–8)
KETONES, URINE: NEGATIVE MG/DL
LEUKOCYTE ESTERASE, URINE: NEGATIVE
LIPASE: 14 U/L (ref 13–60)
LYMPHOCYTES ABSOLUTE: 1 K/UL (ref 1–5.1)
LYMPHOCYTES RELATIVE PERCENT: 24.9 %
MCH RBC QN AUTO: 30.3 PG (ref 26–34)
MCHC RBC AUTO-ENTMCNC: 32 G/DL (ref 31–36)
MCV RBC AUTO: 94.6 FL (ref 80–100)
MICROSCOPIC EXAMINATION: YES
MONOCYTES ABSOLUTE: 0.5 K/UL (ref 0–1.3)
MONOCYTES RELATIVE PERCENT: 11.7 %
NEUTROPHILS ABSOLUTE: 2.4 K/UL (ref 1.7–7.7)
NEUTROPHILS RELATIVE PERCENT: 57.3 %
NITRITE, URINE: NEGATIVE
PDW BLD-RTO: 13.1 % (ref 12.4–15.4)
PERFORMED ON: ABNORMAL
PERFORMED ON: ABNORMAL
PH UA: 6.5 (ref 5–8)
PLATELET # BLD: 171 K/UL (ref 135–450)
PMV BLD AUTO: 7.8 FL (ref 5–10.5)
POTASSIUM SERPL-SCNC: 4.2 MMOL/L (ref 3.5–5.1)
PRO-BNP: 4527 PG/ML (ref 0–124)
PROTEIN UA: >=300 MG/DL
RBC # BLD: 3.07 M/UL (ref 4.2–5.9)
RBC UA: 2 /HPF (ref 0–4)
SODIUM BLD-SCNC: 136 MMOL/L (ref 136–145)
SPECIFIC GRAVITY UA: 1.01 (ref 1–1.03)
TOTAL PROTEIN: 6.5 G/DL (ref 6.4–8.2)
TROPONIN: 0.09 NG/ML
TROPONIN: 0.12 NG/ML
URINE REFLEX TO CULTURE: ABNORMAL
URINE TYPE: ABNORMAL
UROBILINOGEN, URINE: 0.2 E.U./DL
WBC # BLD: 4.1 K/UL (ref 4–11)
WBC UA: 0 /HPF (ref 0–5)

## 2019-12-24 PROCEDURE — C9113 INJ PANTOPRAZOLE SODIUM, VIA: HCPCS | Performed by: INTERNAL MEDICINE

## 2019-12-24 PROCEDURE — 2060000000 HC ICU INTERMEDIATE R&B

## 2019-12-24 PROCEDURE — 2500000003 HC RX 250 WO HCPCS: Performed by: INTERNAL MEDICINE

## 2019-12-24 PROCEDURE — 71046 X-RAY EXAM CHEST 2 VIEWS: CPT

## 2019-12-24 PROCEDURE — 83690 ASSAY OF LIPASE: CPT

## 2019-12-24 PROCEDURE — 36415 COLL VENOUS BLD VENIPUNCTURE: CPT

## 2019-12-24 PROCEDURE — 6360000002 HC RX W HCPCS: Performed by: INTERNAL MEDICINE

## 2019-12-24 PROCEDURE — 99285 EMERGENCY DEPT VISIT HI MDM: CPT

## 2019-12-24 PROCEDURE — 84484 ASSAY OF TROPONIN QUANT: CPT

## 2019-12-24 PROCEDURE — 83880 ASSAY OF NATRIURETIC PEPTIDE: CPT

## 2019-12-24 PROCEDURE — 94761 N-INVAS EAR/PLS OXIMETRY MLT: CPT

## 2019-12-24 PROCEDURE — 96374 THER/PROPH/DIAG INJ IV PUSH: CPT

## 2019-12-24 PROCEDURE — 80076 HEPATIC FUNCTION PANEL: CPT

## 2019-12-24 PROCEDURE — 6370000000 HC RX 637 (ALT 250 FOR IP): Performed by: NURSE PRACTITIONER

## 2019-12-24 PROCEDURE — 81001 URINALYSIS AUTO W/SCOPE: CPT

## 2019-12-24 PROCEDURE — 6360000002 HC RX W HCPCS: Performed by: PHYSICIAN ASSISTANT

## 2019-12-24 PROCEDURE — 80048 BASIC METABOLIC PNL TOTAL CA: CPT

## 2019-12-24 PROCEDURE — 85025 COMPLETE CBC W/AUTO DIFF WBC: CPT

## 2019-12-24 PROCEDURE — 76705 ECHO EXAM OF ABDOMEN: CPT

## 2019-12-24 PROCEDURE — 93005 ELECTROCARDIOGRAM TRACING: CPT | Performed by: EMERGENCY MEDICINE

## 2019-12-24 PROCEDURE — 2580000003 HC RX 258: Performed by: INTERNAL MEDICINE

## 2019-12-24 PROCEDURE — 94640 AIRWAY INHALATION TREATMENT: CPT

## 2019-12-24 PROCEDURE — 96375 TX/PRO/DX INJ NEW DRUG ADDON: CPT

## 2019-12-24 PROCEDURE — 6370000000 HC RX 637 (ALT 250 FOR IP): Performed by: INTERNAL MEDICINE

## 2019-12-24 PROCEDURE — 2500000003 HC RX 250 WO HCPCS: Performed by: PHYSICIAN ASSISTANT

## 2019-12-24 RX ORDER — METOPROLOL TARTRATE 50 MG/1
50 TABLET, FILM COATED ORAL 2 TIMES DAILY
Status: DISCONTINUED | OUTPATIENT
Start: 2019-12-24 | End: 2019-12-30

## 2019-12-24 RX ORDER — HYDROCODONE BITARTRATE AND ACETAMINOPHEN 5; 325 MG/1; MG/1
1 TABLET ORAL EVERY 6 HOURS PRN
Status: DISCONTINUED | OUTPATIENT
Start: 2019-12-24 | End: 2019-12-30 | Stop reason: HOSPADM

## 2019-12-24 RX ORDER — LABETALOL HYDROCHLORIDE 5 MG/ML
20 INJECTION, SOLUTION INTRAVENOUS EVERY 4 HOURS PRN
Status: DISCONTINUED | OUTPATIENT
Start: 2019-12-24 | End: 2019-12-30 | Stop reason: HOSPADM

## 2019-12-24 RX ORDER — ASPIRIN 81 MG/1
324 TABLET, CHEWABLE ORAL ONCE
Status: DISCONTINUED | OUTPATIENT
Start: 2019-12-24 | End: 2019-12-27

## 2019-12-24 RX ORDER — ATORVASTATIN CALCIUM 20 MG/1
20 TABLET, FILM COATED ORAL NIGHTLY
Status: DISCONTINUED | OUTPATIENT
Start: 2019-12-24 | End: 2019-12-30 | Stop reason: HOSPADM

## 2019-12-24 RX ORDER — AMLODIPINE BESYLATE 5 MG/1
10 TABLET ORAL DAILY
Status: DISCONTINUED | OUTPATIENT
Start: 2019-12-24 | End: 2019-12-30 | Stop reason: HOSPADM

## 2019-12-24 RX ORDER — LEVOTHYROXINE SODIUM 0.1 MG/1
100 TABLET ORAL
Status: DISCONTINUED | OUTPATIENT
Start: 2019-12-25 | End: 2019-12-30 | Stop reason: HOSPADM

## 2019-12-24 RX ORDER — ONDANSETRON 2 MG/ML
4 INJECTION INTRAMUSCULAR; INTRAVENOUS ONCE
Status: COMPLETED | OUTPATIENT
Start: 2019-12-24 | End: 2019-12-24

## 2019-12-24 RX ORDER — PROMETHAZINE HYDROCHLORIDE 25 MG/ML
12.5 INJECTION, SOLUTION INTRAMUSCULAR; INTRAVENOUS EVERY 6 HOURS PRN
Status: DISCONTINUED | OUTPATIENT
Start: 2019-12-24 | End: 2019-12-30 | Stop reason: HOSPADM

## 2019-12-24 RX ORDER — INSULIN LISPRO 100 [IU]/ML
4 INJECTION, SOLUTION INTRAVENOUS; SUBCUTANEOUS
Status: DISCONTINUED | OUTPATIENT
Start: 2019-12-24 | End: 2019-12-29 | Stop reason: ALTCHOICE

## 2019-12-24 RX ORDER — SODIUM CHLORIDE 0.9 % (FLUSH) 0.9 %
10 SYRINGE (ML) INJECTION EVERY 12 HOURS SCHEDULED
Status: DISCONTINUED | OUTPATIENT
Start: 2019-12-24 | End: 2019-12-30 | Stop reason: HOSPADM

## 2019-12-24 RX ORDER — MORPHINE SULFATE 4 MG/ML
4 INJECTION, SOLUTION INTRAMUSCULAR; INTRAVENOUS ONCE
Status: COMPLETED | OUTPATIENT
Start: 2019-12-24 | End: 2019-12-24

## 2019-12-24 RX ORDER — ASPIRIN 81 MG/1
81 TABLET, CHEWABLE ORAL DAILY
Status: DISCONTINUED | OUTPATIENT
Start: 2019-12-25 | End: 2019-12-30 | Stop reason: HOSPADM

## 2019-12-24 RX ORDER — HYDROMORPHONE HYDROCHLORIDE 1 MG/ML
1 INJECTION, SOLUTION INTRAMUSCULAR; INTRAVENOUS; SUBCUTANEOUS EVERY 4 HOURS PRN
Status: DISCONTINUED | OUTPATIENT
Start: 2019-12-24 | End: 2019-12-30 | Stop reason: HOSPADM

## 2019-12-24 RX ORDER — INSULIN LISPRO 100 [IU]/ML
0-6 INJECTION, SOLUTION INTRAVENOUS; SUBCUTANEOUS
Status: DISCONTINUED | OUTPATIENT
Start: 2019-12-24 | End: 2019-12-30 | Stop reason: HOSPADM

## 2019-12-24 RX ORDER — IPRATROPIUM BROMIDE AND ALBUTEROL SULFATE 2.5; .5 MG/3ML; MG/3ML
1 SOLUTION RESPIRATORY (INHALATION) EVERY 4 HOURS PRN
Status: DISCONTINUED | OUTPATIENT
Start: 2019-12-24 | End: 2019-12-30 | Stop reason: HOSPADM

## 2019-12-24 RX ORDER — ONDANSETRON 2 MG/ML
4 INJECTION INTRAMUSCULAR; INTRAVENOUS EVERY 6 HOURS PRN
Status: DISCONTINUED | OUTPATIENT
Start: 2019-12-24 | End: 2019-12-30 | Stop reason: HOSPADM

## 2019-12-24 RX ORDER — SODIUM CHLORIDE 0.9 % (FLUSH) 0.9 %
10 SYRINGE (ML) INJECTION PRN
Status: DISCONTINUED | OUTPATIENT
Start: 2019-12-24 | End: 2019-12-30 | Stop reason: HOSPADM

## 2019-12-24 RX ORDER — PANTOPRAZOLE SODIUM 40 MG/10ML
40 INJECTION, POWDER, LYOPHILIZED, FOR SOLUTION INTRAVENOUS DAILY
Status: DISCONTINUED | OUTPATIENT
Start: 2019-12-24 | End: 2019-12-25

## 2019-12-24 RX ORDER — INSULIN LISPRO 100 [IU]/ML
0-3 INJECTION, SOLUTION INTRAVENOUS; SUBCUTANEOUS NIGHTLY
Status: DISCONTINUED | OUTPATIENT
Start: 2019-12-24 | End: 2019-12-30 | Stop reason: HOSPADM

## 2019-12-24 RX ADMIN — METOPROLOL TARTRATE 50 MG: 50 TABLET, FILM COATED ORAL at 21:32

## 2019-12-24 RX ADMIN — PANTOPRAZOLE SODIUM 40 MG: 40 INJECTION, POWDER, FOR SOLUTION INTRAVENOUS at 18:12

## 2019-12-24 RX ADMIN — AMLODIPINE BESYLATE 10 MG: 5 TABLET ORAL at 21:41

## 2019-12-24 RX ADMIN — FAMOTIDINE 20 MG: 10 INJECTION, SOLUTION INTRAVENOUS at 12:49

## 2019-12-24 RX ADMIN — LABETALOL HYDROCHLORIDE 20 MG: 5 INJECTION INTRAVENOUS at 16:47

## 2019-12-24 RX ADMIN — Medication 10 ML: at 18:13

## 2019-12-24 RX ADMIN — IPRATROPIUM BROMIDE AND ALBUTEROL SULFATE 1 AMPULE: .5; 3 SOLUTION RESPIRATORY (INHALATION) at 23:48

## 2019-12-24 RX ADMIN — Medication 10 ML: at 21:33

## 2019-12-24 RX ADMIN — LABETALOL HYDROCHLORIDE 20 MG: 5 INJECTION INTRAVENOUS at 22:55

## 2019-12-24 RX ADMIN — ATORVASTATIN CALCIUM 20 MG: 20 TABLET, FILM COATED ORAL at 21:32

## 2019-12-24 RX ADMIN — HYDROMORPHONE HYDROCHLORIDE 1 MG: 1 INJECTION, SOLUTION INTRAMUSCULAR; INTRAVENOUS; SUBCUTANEOUS at 16:53

## 2019-12-24 RX ADMIN — Medication 10 ML: at 16:47

## 2019-12-24 RX ADMIN — PROMETHAZINE HYDROCHLORIDE 12.5 MG: 25 INJECTION INTRAMUSCULAR; INTRAVENOUS at 16:53

## 2019-12-24 RX ADMIN — INSULIN GLARGINE 30 UNITS: 100 INJECTION, SOLUTION SUBCUTANEOUS at 21:49

## 2019-12-24 RX ADMIN — ONDANSETRON 4 MG: 2 INJECTION INTRAMUSCULAR; INTRAVENOUS at 21:41

## 2019-12-24 RX ADMIN — MORPHINE SULFATE 4 MG: 4 INJECTION INTRAVENOUS at 12:48

## 2019-12-24 RX ADMIN — ONDANSETRON 4 MG: 2 INJECTION INTRAMUSCULAR; INTRAVENOUS at 12:48

## 2019-12-24 ASSESSMENT — PAIN DESCRIPTION - PAIN TYPE
TYPE: ACUTE PAIN

## 2019-12-24 ASSESSMENT — HEART SCORE: ECG: 1

## 2019-12-24 ASSESSMENT — PAIN DESCRIPTION - LOCATION
LOCATION: CHEST
LOCATION: EAR;HEAD
LOCATION: EAR;HEAD

## 2019-12-24 ASSESSMENT — PAIN SCALES - GENERAL
PAINLEVEL_OUTOF10: 0
PAINLEVEL_OUTOF10: 9
PAINLEVEL_OUTOF10: 9
PAINLEVEL_OUTOF10: 10
PAINLEVEL_OUTOF10: 10

## 2019-12-24 ASSESSMENT — PAIN DESCRIPTION - ORIENTATION
ORIENTATION: LEFT
ORIENTATION: LEFT

## 2019-12-24 ASSESSMENT — ENCOUNTER SYMPTOMS
BACK PAIN: 0
RESPIRATORY NEGATIVE: 1
COLOR CHANGE: 0
DIARRHEA: 0
CONSTIPATION: 0
CHEST TIGHTNESS: 0
ABDOMINAL PAIN: 1
COUGH: 0
NAUSEA: 1
VOMITING: 1
SHORTNESS OF BREATH: 0
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
## 2019-12-24 NOTE — ED PROVIDER NOTES
I independently performed a history and physical on Ed Luis. All diagnostic, treatment, and disposition decisions were made by myself in conjunction with the advanced practice provider. Briefly, this is a 75 y.o. male here for chest and RUQ pain for the past 3d, associated with some dyspnea. Hx of vasculopathy, CKD, DM. Not on HD yet, but trending towards that    On exam, TTP to epigastrium    EKG  EKG reviewed by myself. Dated today at 26. Rate 85 normal sinus rhythm. There is LVH pattern with axis deviation and some unspecific repolarization normalities. This appears unchanged from 2015. Screenings     Heart Score for chest pain patients  History: Moderately Suspicious  ECG: Non-Specifc repolarization disturbance/LBTB/PM  Patient Age: > 65 years  *Risk factors for Atherosclerotic disease: Hypertension, Hypercholesterolemia, Diabetes Mellitus  Risk Factors: > 3 Risk factors or history of atherosclerotic disease*  Troponin: > 3X normal limit  Heart Score Total: 8      MDM  53-year-old male here with abdominal pain and chest pain. No prior cardiac work-up states recollection. History of vasculopathy and ESRD with a bump troponin be TNP. This is probably all demand ischemia or from his renal disease however without a cardiovascular history work-up with pain to his chest and EKG changes I have a hard time ignoring this. Patient Referrals:  No follow-up provider specified. Discharge Medications:  New Prescriptions    No medications on file       FINAL IMPRESSION  1. Chest discomfort    2. Elevated troponin    3. Stage 3 chronic kidney disease (HCC)        Blood pressure (!) 149/87, pulse 88, temperature 98.2 °F (36.8 °C), temperature source Infrared, resp. rate 16, height 5' 10\" (1.778 m), weight 194 lb (88 kg), SpO2 95 %. For further details of [facility] emergency department encounter, please see documentation by advanced practice provider, Luis Eduardo.         Jesus Roland, MD  12/24/19 2034

## 2019-12-24 NOTE — H&P
HOSPITALISTS HISTORY AND PHYSICAL    12/24/2019 3:00 PM    Patient Information:  Jyoti Holland is a 75 y.o. male [id number]  PCP:  [id number] [place],  (Tel: [phone] )    Chief complaint:    Chief Complaint   Patient presents with    Chest Pain     chest pain, SOB started 3 days ago        History of Present Illness:  Donato Dorsey is a 75 y.o. male is a known case of CKD stage V, hypertension he presents to us with abdominal pain for the last 3 days intermittent associated nausea and vomiting no chest discomfort in the ER was noted to uncontrolled blood pressure 180/110. Also complains of left ear pain and headache    Received aspirin in the emergency room patient admitted further evaluation            REVIEW OF SYSTEMS:     10 point ROS was completed negative unless noted above    Past Medical History:   has a past medical history of Acute hematogenous osteomyelitis of left ankle ([address] Anemia due to stage 3 chronic kidney disease ([address] Chronic kidney disease, stage III (moderate) ([address] Diabetes mellitus ([address] Diabetic foot ulcer with osteomyelitis ([address] Foot ulcer ([address] Hypercholesteremia, Hypertension, MRSA infection, Thyroid disease, and Type II or unspecified type diabetes mellitus with neurological manifestations, uncontrolled(250.62). Past Surgical History:   has a past surgical history that includes Cataract removal (1997); Abdomen surgery; Toe amputation (Right, 7/17/15); Foot Debridement (Left, 5/10/2019); and Foot Debridement (Left, 5/10/2019). Medications:  No current facility-administered medications on file prior to encounter.       Current Outpatient Medications on File Prior to Encounter   Medication Sig Dispense Refill    NIFEdipine (PROCARDIA) 10 MG capsule Take 10 mg by mouth daily      Omadacycline Tosylate 150 MG TABS Take 150 mg by mouth daily 450 mg once

## 2019-12-24 NOTE — ED NOTES
This RN into room to give aspirin. Pt up to side of bed, vomiting into trash can. Hospitalist at bedside asking for repeat BP. Aspirin held at this time until emesis resolves. Will obtained BP when pt is back in bed.       Nehal Perdomo RN  12/24/19 1722

## 2019-12-25 ENCOUNTER — APPOINTMENT (OUTPATIENT)
Dept: NUCLEAR MEDICINE | Age: 64
DRG: 305 | End: 2019-12-25
Payer: MEDICARE

## 2019-12-25 LAB
A/G RATIO: 1.4 (ref 1.1–2.2)
ALBUMIN SERPL-MCNC: 3.4 G/DL (ref 3.4–5)
ALP BLD-CCNC: 88 U/L (ref 40–129)
ALT SERPL-CCNC: 7 U/L (ref 10–40)
ANION GAP SERPL CALCULATED.3IONS-SCNC: 15 MMOL/L (ref 3–16)
AST SERPL-CCNC: 13 U/L (ref 15–37)
BILIRUB SERPL-MCNC: <0.2 MG/DL (ref 0–1)
BUN BLDV-MCNC: 45 MG/DL (ref 7–20)
CALCIUM SERPL-MCNC: 7.8 MG/DL (ref 8.3–10.6)
CHLORIDE BLD-SCNC: 103 MMOL/L (ref 99–110)
CO2: 18 MMOL/L (ref 21–32)
CREAT SERPL-MCNC: 4.7 MG/DL (ref 0.8–1.3)
GFR AFRICAN AMERICAN: 15
GFR NON-AFRICAN AMERICAN: 12
GLOBULIN: 2.5 G/DL
GLUCOSE BLD-MCNC: 101 MG/DL (ref 70–99)
GLUCOSE BLD-MCNC: 106 MG/DL (ref 70–99)
GLUCOSE BLD-MCNC: 131 MG/DL (ref 70–99)
GLUCOSE BLD-MCNC: 132 MG/DL (ref 70–99)
GLUCOSE BLD-MCNC: 173 MG/DL (ref 70–99)
GLUCOSE BLD-MCNC: 79 MG/DL (ref 70–99)
HCT VFR BLD CALC: 28.4 % (ref 40.5–52.5)
HEMOGLOBIN: 9.1 G/DL (ref 13.5–17.5)
MCH RBC QN AUTO: 29.9 PG (ref 26–34)
MCHC RBC AUTO-ENTMCNC: 32 G/DL (ref 31–36)
MCV RBC AUTO: 93.4 FL (ref 80–100)
PDW BLD-RTO: 12.9 % (ref 12.4–15.4)
PERFORMED ON: ABNORMAL
PERFORMED ON: NORMAL
PLATELET # BLD: 159 K/UL (ref 135–450)
PMV BLD AUTO: 7.9 FL (ref 5–10.5)
POTASSIUM REFLEX MAGNESIUM: 5 MMOL/L (ref 3.5–5.1)
RBC # BLD: 3.04 M/UL (ref 4.2–5.9)
SODIUM BLD-SCNC: 136 MMOL/L (ref 136–145)
TOTAL PROTEIN: 5.9 G/DL (ref 6.4–8.2)
TROPONIN: 0.12 NG/ML
WBC # BLD: 6.9 K/UL (ref 4–11)

## 2019-12-25 PROCEDURE — 80053 COMPREHEN METABOLIC PANEL: CPT

## 2019-12-25 PROCEDURE — 6360000002 HC RX W HCPCS: Performed by: SURGERY

## 2019-12-25 PROCEDURE — A9540 TC99M MAA: HCPCS | Performed by: INTERNAL MEDICINE

## 2019-12-25 PROCEDURE — 2060000000 HC ICU INTERMEDIATE R&B

## 2019-12-25 PROCEDURE — 78582 LUNG VENTILAT&PERFUS IMAGING: CPT

## 2019-12-25 PROCEDURE — 2700000000 HC OXYGEN THERAPY PER DAY

## 2019-12-25 PROCEDURE — 6370000000 HC RX 637 (ALT 250 FOR IP): Performed by: INTERNAL MEDICINE

## 2019-12-25 PROCEDURE — 2580000003 HC RX 258: Performed by: INTERNAL MEDICINE

## 2019-12-25 PROCEDURE — A9558 XE133 XENON 10MCI: HCPCS | Performed by: INTERNAL MEDICINE

## 2019-12-25 PROCEDURE — 85027 COMPLETE CBC AUTOMATED: CPT

## 2019-12-25 PROCEDURE — 36415 COLL VENOUS BLD VENIPUNCTURE: CPT

## 2019-12-25 PROCEDURE — 3430000000 HC RX DIAGNOSTIC RADIOPHARMACEUTICAL: Performed by: INTERNAL MEDICINE

## 2019-12-25 PROCEDURE — 94760 N-INVAS EAR/PLS OXIMETRY 1: CPT

## 2019-12-25 PROCEDURE — 6360000002 HC RX W HCPCS: Performed by: INTERNAL MEDICINE

## 2019-12-25 PROCEDURE — 84484 ASSAY OF TROPONIN QUANT: CPT

## 2019-12-25 PROCEDURE — 99221 1ST HOSP IP/OBS SF/LOW 40: CPT | Performed by: SURGERY

## 2019-12-25 PROCEDURE — C9113 INJ PANTOPRAZOLE SODIUM, VIA: HCPCS | Performed by: SURGERY

## 2019-12-25 RX ORDER — NICOTINE POLACRILEX 4 MG
15 LOZENGE BUCCAL PRN
Status: DISCONTINUED | OUTPATIENT
Start: 2019-12-25 | End: 2019-12-30 | Stop reason: HOSPADM

## 2019-12-25 RX ORDER — ACETAMINOPHEN 325 MG/1
650 TABLET ORAL EVERY 4 HOURS PRN
Status: DISCONTINUED | OUTPATIENT
Start: 2019-12-25 | End: 2019-12-30 | Stop reason: HOSPADM

## 2019-12-25 RX ORDER — DEXTROSE MONOHYDRATE 50 MG/ML
100 INJECTION, SOLUTION INTRAVENOUS PRN
Status: DISCONTINUED | OUTPATIENT
Start: 2019-12-25 | End: 2019-12-30 | Stop reason: HOSPADM

## 2019-12-25 RX ORDER — SODIUM CHLORIDE 0.9 % (FLUSH) 0.9 %
10 SYRINGE (ML) INJECTION EVERY 12 HOURS SCHEDULED
Status: DISCONTINUED | OUTPATIENT
Start: 2019-12-25 | End: 2019-12-27 | Stop reason: SDUPTHER

## 2019-12-25 RX ORDER — CIPROFLOXACIN 2 MG/ML
400 INJECTION, SOLUTION INTRAVENOUS
Status: DISCONTINUED | OUTPATIENT
Start: 2019-12-25 | End: 2019-12-27

## 2019-12-25 RX ORDER — XENON XE-133 10 MCI/1
8.1 GAS RESPIRATORY (INHALATION)
Status: COMPLETED | OUTPATIENT
Start: 2019-12-25 | End: 2019-12-25

## 2019-12-25 RX ORDER — SODIUM CHLORIDE 0.9 % (FLUSH) 0.9 %
10 SYRINGE (ML) INJECTION PRN
Status: DISCONTINUED | OUTPATIENT
Start: 2019-12-25 | End: 2019-12-27 | Stop reason: SDUPTHER

## 2019-12-25 RX ORDER — PANTOPRAZOLE SODIUM 40 MG/10ML
40 INJECTION, POWDER, LYOPHILIZED, FOR SOLUTION INTRAVENOUS 2 TIMES DAILY
Status: DISCONTINUED | OUTPATIENT
Start: 2019-12-25 | End: 2019-12-30 | Stop reason: HOSPADM

## 2019-12-25 RX ORDER — DEXTROSE MONOHYDRATE 25 G/50ML
12.5 INJECTION, SOLUTION INTRAVENOUS PRN
Status: DISCONTINUED | OUTPATIENT
Start: 2019-12-25 | End: 2019-12-30 | Stop reason: HOSPADM

## 2019-12-25 RX ADMIN — CIPROFLOXACIN 400 MG: 2 INJECTION, SOLUTION INTRAVENOUS at 08:55

## 2019-12-25 RX ADMIN — INSULIN LISPRO 4 UNITS: 100 INJECTION, SOLUTION INTRAVENOUS; SUBCUTANEOUS at 09:46

## 2019-12-25 RX ADMIN — Medication 10 ML: at 08:55

## 2019-12-25 RX ADMIN — METOPROLOL TARTRATE 50 MG: 50 TABLET, FILM COATED ORAL at 22:20

## 2019-12-25 RX ADMIN — LEVOTHYROXINE SODIUM 100 MCG: 100 TABLET ORAL at 05:57

## 2019-12-25 RX ADMIN — ATORVASTATIN CALCIUM 20 MG: 20 TABLET, FILM COATED ORAL at 22:20

## 2019-12-25 RX ADMIN — ASPIRIN 81 MG 81 MG: 81 TABLET ORAL at 08:58

## 2019-12-25 RX ADMIN — ACETAMINOPHEN 650 MG: 325 TABLET, FILM COATED ORAL at 06:04

## 2019-12-25 RX ADMIN — ENOXAPARIN SODIUM 30 MG: 30 INJECTION SUBCUTANEOUS at 09:03

## 2019-12-25 RX ADMIN — Medication 6 MILLICURIE: at 15:20

## 2019-12-25 RX ADMIN — INSULIN LISPRO 4 UNITS: 100 INJECTION, SOLUTION INTRAVENOUS; SUBCUTANEOUS at 11:43

## 2019-12-25 RX ADMIN — PANTOPRAZOLE SODIUM 40 MG: 40 INJECTION, POWDER, FOR SOLUTION INTRAVENOUS at 10:08

## 2019-12-25 RX ADMIN — Medication 10 ML: at 22:20

## 2019-12-25 RX ADMIN — Medication 10 ML: at 11:42

## 2019-12-25 RX ADMIN — PANTOPRAZOLE SODIUM 40 MG: 40 INJECTION, POWDER, FOR SOLUTION INTRAVENOUS at 22:20

## 2019-12-25 RX ADMIN — Medication 10 ML: at 22:21

## 2019-12-25 RX ADMIN — METOPROLOL TARTRATE 50 MG: 50 TABLET, FILM COATED ORAL at 08:58

## 2019-12-25 RX ADMIN — PROMETHAZINE HYDROCHLORIDE 12.5 MG: 25 INJECTION INTRAMUSCULAR; INTRAVENOUS at 18:24

## 2019-12-25 RX ADMIN — XENON XE-133 8.1 MILLICURIE: 10 GAS RESPIRATORY (INHALATION) at 15:19

## 2019-12-25 RX ADMIN — AMLODIPINE BESYLATE 10 MG: 5 TABLET ORAL at 08:58

## 2019-12-25 ASSESSMENT — ENCOUNTER SYMPTOMS
VOMITING: 1
CHEST TIGHTNESS: 0
EYE DISCHARGE: 0
ABDOMINAL DISTENTION: 0
ABDOMINAL PAIN: 1
BACK PAIN: 0
APNEA: 0
DIARRHEA: 0
EYE ITCHING: 0
NAUSEA: 1

## 2019-12-25 ASSESSMENT — PAIN SCALES - GENERAL
PAINLEVEL_OUTOF10: 0
PAINLEVEL_OUTOF10: 0
PAINLEVEL_OUTOF10: 7
PAINLEVEL_OUTOF10: 0
PAINLEVEL_OUTOF10: 0
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
## 2019-12-25 NOTE — PLAN OF CARE
maintain vital signs within normal range will improve  Outcome: Ongoing  Goal: Cardiovascular alteration will improve  Description  Cardiovascular alteration will improve  Outcome: Ongoing
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
## 2019-12-25 NOTE — PROGRESS NOTES
lb (88 kg)   SpO2 93%   BMI 27.84 kg/m²     Intake/Output Summary (Last 24 hours) at 12/25/2019 1109  Last data filed at 12/25/2019 1009  Gross per 24 hour   Intake 120 ml   Output --   Net 120 ml      Wt Readings from Last 3 Encounters:   12/24/19 194 lb (88 kg)   11/20/19 179 lb (81.2 kg)   11/04/19 179 lb (81.2 kg)       General appearance:  Appears comfortable  Eyes: Sclera clear. Pupils equal.  ENT: Moist oral mucosa. Trachea midline, no adenopathy. Cardiovascular: Regular rhythm, normal S1, S2. No murmur. No edema in lower extremities  Respiratory: Not using accessory muscles. Good inspiratory effort. Clear to auscultation bilaterally, no wheeze or crackles. GI: Abdomen soft, no tenderness, not distended, normal bowel sounds  Musculoskeletal: No cyanosis in digits, neck supple  Neurology: CN 2-12 grossly intact. No speech or motor deficits  Psych: Normal affect. Alert and oriented in time, place and person  Skin: Warm, dry, normal turgor  Extremity exam shows brisk capillary refill. Peripheral pulses are palpable in lower extremities   Fistula in the right upper extremity  Labs and Tests:  CBC:   Recent Labs     12/24/19  1221 12/25/19  0426   WBC 4.1 6.9   HGB 9.3* 9.1*    159     BMP:    Recent Labs     12/24/19  1221 12/25/19  0426    136   K 4.2 5.0    103   CO2 23 18*   BUN 44* 45*   CREATININE 4.9* 4.7*   GLUCOSE 188* 132*     Hepatic:   Recent Labs     12/24/19  1221 12/25/19  0426   AST 14* 13*   ALT 8* 7*   BILITOT <0.2 <0.2   ALKPHOS 96 88     US GALLBLADDER RUQ   Final Result   Nonspecific increased echogenicity in the gallbladder fossa, may be related   to focal fat. No evidence of cholelithiasis or acute cholecystitis. XR CHEST STANDARD (2 VW)   Final Result   1. No acute abnormality.          NM LUNG SCAN PERFUSION ONLY    (Results Pending)   NM HEPATOBILIARY    (Results Pending)         Problem List  Active Problems:    Hypertensive urgency  Resolved Problems: * No resolved hospital problems. *       Assessment & Plan:   Right upper quadrant pain. Ultrasound abdomen does not reveal any cholelithiasis HIDA scan tomorrow general surgery consulted. Endoscopy tomorrow to rule out peptic ulcer disease on p.o. Protonix empirically started on Cipro    Acute hypoxia rule out PE VQ lung scan ordered today continues to be on oxygen    Hypertension controlled. Hypertensive urgency on presentation    CKD  stage V kidney function at baseline. Diabetes  mellitus blood pressure low normal n.p.o. from midnight will reduce the dose of Lantus to 15 units continue sliding scale    Rest of the comorbid condition stable.     Diet: DIET RENAL;  Diet NPO Time Specified  Code:Full Code  DVT PPX Lovenox  Disposition pending      Lorena Garcia MD   12/25/2019 11:09 AM

## 2019-12-25 NOTE — PLAN OF CARE
Problem: Pain:  Goal: Pain level will decrease  Description  Pain level will decrease  12/25/2019 1444 by Xavier Dillon RN  Outcome: Ongoing     Problem: KNOWLEDGE DEFICIT  Goal: Patient/S.O. demonstrates understanding of disease process, treatment plan, medications, and discharge instructions. 12/25/2019 1444 by Xavier Dillon RN  Outcome: Ongoing     Problem: DISCHARGE BARRIERS  Goal: Patient's continuum of care needs are met  12/25/2019 1444 by Xavier Dillon RN  Outcome: Ongoing     Problem: Cardiovascular  Goal: No DVT, peripheral vascular complications  71/07/1637 1444 by Xavier Dillon RN  Outcome: Ongoing     Problem: Respiratory  Goal: No pulmonary complications  74/50/1297 1444 by Xavier Dillon RN  Outcome: Ongoing     Problem: GI  Goal: No bowel complications  99/71/4582 1444 by Xavier Dillon RN  Outcome: Ongoing     Problem:   Goal: No urinary complication  11/64/4834 1444 by Xavier Dillon RN  Outcome: Ongoing     Problem: Breathing Pattern - Ineffective:  Goal: Ability to achieve and maintain a regular respiratory rate will improve  Description  Ability to achieve and maintain a regular respiratory rate will improve  12/25/2019 1444 by Xavier Dillon RN  Outcome: Ongoing     Problem: Health Behavior:  Goal: Will modify at least one risk factor affecting health status  Description  Will modify at least one risk factor affecting health status  12/25/2019 1444 by Xavier Dillon RN  Outcome: Ongoing     Problem: Physical Regulation:  Goal: Complications related to the disease process, condition or treatment will be avoided or minimized  Description  Complications related to the disease process, condition or treatment will be avoided or minimized  12/25/2019 1444 by Xavier Dillon RN  Outcome: Ongoing   Patient is pleasant and cooperative so far this shift. Family visited. Patient denies pain. Complains of intermittent episodes of chest tightness and SOB.  Continues with Oxygen 2 liters via NC.
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
## 2019-12-25 NOTE — PROGRESS NOTES
Shift assessment complete, meds given whole with water. Pt very lethargic upon assessment, has become more awake throughout the shift. Was given pain meds and has stage 4 kidney disease, believe he hasn't excreted the meds out yet. BP remains elevated, but is trending down, IV labetalol on board. No complaints of pain. Other vitals stable. Resting comfortably at this time, will continue to monitor.

## 2019-12-26 ENCOUNTER — APPOINTMENT (OUTPATIENT)
Dept: NUCLEAR MEDICINE | Age: 64
DRG: 305 | End: 2019-12-26
Payer: MEDICARE

## 2019-12-26 ENCOUNTER — ANESTHESIA EVENT (OUTPATIENT)
Dept: ENDOSCOPY | Age: 64
DRG: 305 | End: 2019-12-26
Payer: MEDICARE

## 2019-12-26 ENCOUNTER — ANESTHESIA (OUTPATIENT)
Dept: ENDOSCOPY | Age: 64
DRG: 305 | End: 2019-12-26
Payer: MEDICARE

## 2019-12-26 VITALS
SYSTOLIC BLOOD PRESSURE: 139 MMHG | OXYGEN SATURATION: 100 % | DIASTOLIC BLOOD PRESSURE: 78 MMHG | RESPIRATION RATE: 8 BRPM

## 2019-12-26 LAB
A/G RATIO: 0.9 (ref 1.1–2.2)
ALBUMIN SERPL-MCNC: 2.7 G/DL (ref 3.4–5)
ALP BLD-CCNC: 74 U/L (ref 40–129)
ALT SERPL-CCNC: 7 U/L (ref 10–40)
ANION GAP SERPL CALCULATED.3IONS-SCNC: 15 MMOL/L (ref 3–16)
AST SERPL-CCNC: 12 U/L (ref 15–37)
BILIRUB SERPL-MCNC: 0.3 MG/DL (ref 0–1)
BUN BLDV-MCNC: 55 MG/DL (ref 7–20)
CALCIUM SERPL-MCNC: 7.8 MG/DL (ref 8.3–10.6)
CHLORIDE BLD-SCNC: 103 MMOL/L (ref 99–110)
CO2: 17 MMOL/L (ref 21–32)
CREAT SERPL-MCNC: 5.5 MG/DL (ref 0.8–1.3)
EKG ATRIAL RATE: 85 BPM
EKG DIAGNOSIS: NORMAL
EKG P AXIS: 61 DEGREES
EKG P-R INTERVAL: 164 MS
EKG Q-T INTERVAL: 380 MS
EKG QRS DURATION: 102 MS
EKG QTC CALCULATION (BAZETT): 452 MS
EKG R AXIS: -35 DEGREES
EKG T AXIS: 84 DEGREES
EKG VENTRICULAR RATE: 85 BPM
GFR AFRICAN AMERICAN: 13
GFR NON-AFRICAN AMERICAN: 10
GLOBULIN: 3.1 G/DL
GLUCOSE BLD-MCNC: 102 MG/DL (ref 70–99)
GLUCOSE BLD-MCNC: 104 MG/DL (ref 70–99)
GLUCOSE BLD-MCNC: 107 MG/DL (ref 70–99)
GLUCOSE BLD-MCNC: 114 MG/DL (ref 70–99)
GLUCOSE BLD-MCNC: 179 MG/DL (ref 70–99)
GLUCOSE BLD-MCNC: 93 MG/DL (ref 70–99)
HCT VFR BLD CALC: 26.6 % (ref 40.5–52.5)
HEMOGLOBIN: 8.5 G/DL (ref 13.5–17.5)
MCH RBC QN AUTO: 30.2 PG (ref 26–34)
MCHC RBC AUTO-ENTMCNC: 32 G/DL (ref 31–36)
MCV RBC AUTO: 94.4 FL (ref 80–100)
PDW BLD-RTO: 13.1 % (ref 12.4–15.4)
PERFORMED ON: ABNORMAL
PERFORMED ON: NORMAL
PHOSPHORUS: 5 MG/DL (ref 2.5–4.9)
PLATELET # BLD: 129 K/UL (ref 135–450)
PMV BLD AUTO: 8.2 FL (ref 5–10.5)
POTASSIUM SERPL-SCNC: 4.6 MMOL/L (ref 3.5–5.1)
RBC # BLD: 2.82 M/UL (ref 4.2–5.9)
SODIUM BLD-SCNC: 135 MMOL/L (ref 136–145)
TOTAL PROTEIN: 5.8 G/DL (ref 6.4–8.2)
WBC # BLD: 3.9 K/UL (ref 4–11)

## 2019-12-26 PROCEDURE — 6370000000 HC RX 637 (ALT 250 FOR IP): Performed by: INTERNAL MEDICINE

## 2019-12-26 PROCEDURE — 6360000004 HC RX CONTRAST MEDICATION: Performed by: SURGERY

## 2019-12-26 PROCEDURE — 6360000002 HC RX W HCPCS: Performed by: INTERNAL MEDICINE

## 2019-12-26 PROCEDURE — 7100000000 HC PACU RECOVERY - FIRST 15 MIN: Performed by: INTERNAL MEDICINE

## 2019-12-26 PROCEDURE — 6360000002 HC RX W HCPCS: Performed by: NURSE ANESTHETIST, CERTIFIED REGISTERED

## 2019-12-26 PROCEDURE — 7100000001 HC PACU RECOVERY - ADDTL 15 MIN: Performed by: INTERNAL MEDICINE

## 2019-12-26 PROCEDURE — 78227 HEPATOBIL SYST IMAGE W/DRUG: CPT

## 2019-12-26 PROCEDURE — 0DB98ZX EXCISION OF DUODENUM, VIA NATURAL OR ARTIFICIAL OPENING ENDOSCOPIC, DIAGNOSTIC: ICD-10-PCS | Performed by: INTERNAL MEDICINE

## 2019-12-26 PROCEDURE — C9113 INJ PANTOPRAZOLE SODIUM, VIA: HCPCS | Performed by: SURGERY

## 2019-12-26 PROCEDURE — 36415 COLL VENOUS BLD VENIPUNCTURE: CPT

## 2019-12-26 PROCEDURE — 3700000000 HC ANESTHESIA ATTENDED CARE: Performed by: INTERNAL MEDICINE

## 2019-12-26 PROCEDURE — 3700000001 HC ADD 15 MINUTES (ANESTHESIA): Performed by: INTERNAL MEDICINE

## 2019-12-26 PROCEDURE — 84100 ASSAY OF PHOSPHORUS: CPT

## 2019-12-26 PROCEDURE — 2060000000 HC ICU INTERMEDIATE R&B

## 2019-12-26 PROCEDURE — 2580000003 HC RX 258: Performed by: FAMILY MEDICINE

## 2019-12-26 PROCEDURE — 3430000000 HC RX DIAGNOSTIC RADIOPHARMACEUTICAL: Performed by: SURGERY

## 2019-12-26 PROCEDURE — 3609012400 HC EGD TRANSORAL BIOPSY SINGLE/MULTIPLE: Performed by: INTERNAL MEDICINE

## 2019-12-26 PROCEDURE — 2500000003 HC RX 250 WO HCPCS: Performed by: NURSE ANESTHETIST, CERTIFIED REGISTERED

## 2019-12-26 PROCEDURE — A9537 TC99M MEBROFENIN: HCPCS | Performed by: SURGERY

## 2019-12-26 PROCEDURE — 2580000003 HC RX 258: Performed by: INTERNAL MEDICINE

## 2019-12-26 PROCEDURE — 93010 ELECTROCARDIOGRAM REPORT: CPT | Performed by: INTERNAL MEDICINE

## 2019-12-26 PROCEDURE — 6360000002 HC RX W HCPCS: Performed by: SURGERY

## 2019-12-26 PROCEDURE — 99232 SBSQ HOSP IP/OBS MODERATE 35: CPT | Performed by: SURGERY

## 2019-12-26 PROCEDURE — 88305 TISSUE EXAM BY PATHOLOGIST: CPT

## 2019-12-26 PROCEDURE — 85027 COMPLETE CBC AUTOMATED: CPT

## 2019-12-26 PROCEDURE — 2709999900 HC NON-CHARGEABLE SUPPLY: Performed by: INTERNAL MEDICINE

## 2019-12-26 PROCEDURE — 80053 COMPREHEN METABOLIC PANEL: CPT

## 2019-12-26 PROCEDURE — 2580000003 HC RX 258: Performed by: SURGERY

## 2019-12-26 RX ORDER — SODIUM CHLORIDE 0.9 % (FLUSH) 0.9 %
10 SYRINGE (ML) INJECTION PRN
Status: DISCONTINUED | OUTPATIENT
Start: 2019-12-26 | End: 2019-12-27 | Stop reason: SDUPTHER

## 2019-12-26 RX ORDER — PROPOFOL 10 MG/ML
INJECTION, EMULSION INTRAVENOUS PRN
Status: DISCONTINUED | OUTPATIENT
Start: 2019-12-26 | End: 2019-12-26 | Stop reason: SDUPTHER

## 2019-12-26 RX ORDER — LIDOCAINE HYDROCHLORIDE 20 MG/ML
INJECTION, SOLUTION EPIDURAL; INFILTRATION; INTRACAUDAL; PERINEURAL PRN
Status: DISCONTINUED | OUTPATIENT
Start: 2019-12-26 | End: 2019-12-26 | Stop reason: SDUPTHER

## 2019-12-26 RX ORDER — SODIUM CHLORIDE 0.9 % (FLUSH) 0.9 %
10 SYRINGE (ML) INJECTION PRN
Status: DISCONTINUED | OUTPATIENT
Start: 2019-12-26 | End: 2019-12-26 | Stop reason: HOSPADM

## 2019-12-26 RX ORDER — PROPOFOL 10 MG/ML
INJECTION, EMULSION INTRAVENOUS CONTINUOUS PRN
Status: DISCONTINUED | OUTPATIENT
Start: 2019-12-26 | End: 2019-12-26 | Stop reason: SDUPTHER

## 2019-12-26 RX ORDER — SODIUM CHLORIDE 9 MG/ML
INJECTION, SOLUTION INTRAVENOUS ONCE
Status: COMPLETED | OUTPATIENT
Start: 2019-12-26 | End: 2019-12-26

## 2019-12-26 RX ORDER — SODIUM CHLORIDE 9 MG/ML
INJECTION, SOLUTION INTRAVENOUS CONTINUOUS
Status: DISCONTINUED | OUTPATIENT
Start: 2019-12-26 | End: 2019-12-27

## 2019-12-26 RX ORDER — SODIUM CHLORIDE 0.9 % (FLUSH) 0.9 %
10 SYRINGE (ML) INJECTION EVERY 12 HOURS SCHEDULED
Status: DISCONTINUED | OUTPATIENT
Start: 2019-12-26 | End: 2019-12-26 | Stop reason: HOSPADM

## 2019-12-26 RX ADMIN — CIPROFLOXACIN 400 MG: 2 INJECTION, SOLUTION INTRAVENOUS at 02:21

## 2019-12-26 RX ADMIN — Medication 10 ML: at 03:28

## 2019-12-26 RX ADMIN — LIDOCAINE HYDROCHLORIDE 100 MG: 20 INJECTION, SOLUTION EPIDURAL; INFILTRATION; INTRACAUDAL; PERINEURAL at 10:15

## 2019-12-26 RX ADMIN — ATORVASTATIN CALCIUM 20 MG: 20 TABLET, FILM COATED ORAL at 21:38

## 2019-12-26 RX ADMIN — Medication 10 ML: at 08:00

## 2019-12-26 RX ADMIN — CIPROFLOXACIN 400 MG: 2 INJECTION, SOLUTION INTRAVENOUS at 21:51

## 2019-12-26 RX ADMIN — SINCALIDE 1.74 MCG: 5 INJECTION, POWDER, LYOPHILIZED, FOR SOLUTION INTRAVENOUS at 14:11

## 2019-12-26 RX ADMIN — Medication 10 ML: at 21:38

## 2019-12-26 RX ADMIN — SODIUM CHLORIDE: 9 INJECTION, SOLUTION INTRAVENOUS at 08:23

## 2019-12-26 RX ADMIN — Medication 10 ML: at 13:17

## 2019-12-26 RX ADMIN — SODIUM CHLORIDE: 9 INJECTION, SOLUTION INTRAVENOUS at 14:12

## 2019-12-26 RX ADMIN — LEVOTHYROXINE SODIUM 100 MCG: 100 TABLET ORAL at 06:37

## 2019-12-26 RX ADMIN — Medication 10 ML: at 23:09

## 2019-12-26 RX ADMIN — Medication 10 ML: at 21:39

## 2019-12-26 RX ADMIN — PROPOFOL 50 MG: 10 INJECTION, EMULSION INTRAVENOUS at 10:15

## 2019-12-26 RX ADMIN — METOPROLOL TARTRATE 50 MG: 50 TABLET, FILM COATED ORAL at 21:38

## 2019-12-26 RX ADMIN — ONDANSETRON 4 MG: 2 INJECTION INTRAMUSCULAR; INTRAVENOUS at 15:33

## 2019-12-26 RX ADMIN — ONDANSETRON 4 MG: 2 INJECTION INTRAMUSCULAR; INTRAVENOUS at 23:09

## 2019-12-26 RX ADMIN — PROPOFOL 150 MCG/KG/MIN: 10 INJECTION, EMULSION INTRAVENOUS at 10:15

## 2019-12-26 RX ADMIN — ONDANSETRON 4 MG: 2 INJECTION INTRAMUSCULAR; INTRAVENOUS at 08:58

## 2019-12-26 RX ADMIN — AMLODIPINE BESYLATE 10 MG: 5 TABLET ORAL at 11:18

## 2019-12-26 RX ADMIN — PANTOPRAZOLE SODIUM 40 MG: 40 INJECTION, POWDER, FOR SOLUTION INTRAVENOUS at 21:38

## 2019-12-26 RX ADMIN — Medication 5.91 MILLICURIE: at 13:16

## 2019-12-26 RX ADMIN — ONDANSETRON 4 MG: 2 INJECTION INTRAMUSCULAR; INTRAVENOUS at 03:28

## 2019-12-26 RX ADMIN — INSULIN LISPRO 1 UNITS: 100 INJECTION, SOLUTION INTRAVENOUS; SUBCUTANEOUS at 21:12

## 2019-12-26 ASSESSMENT — PAIN SCALES - GENERAL
PAINLEVEL_OUTOF10: 0

## 2019-12-26 ASSESSMENT — ENCOUNTER SYMPTOMS
VOMITING: 1
NAUSEA: 1

## 2019-12-26 NOTE — FLOWSHEET NOTE
pt awake,dry heaving, and given zofran at [phone] per Noe Essex CVU RN; see [facility] & all flowsheets.

## 2019-12-26 NOTE — PROGRESS NOTES
Pt arrived from ENDO to PACU bay 6. Report received from ENDO staff. Pt unable to arouse at this time. 2L NC, NSR. Appears comfortable. VSS. No family present. Will continue to monitor.

## 2019-12-26 NOTE — PROGRESS NOTES
levothyroxine (SYNTHROID) tablet 100 mcg  100 mcg Oral QAM AC Rajiv Hart MD   100 mcg at 12/26/19 0637    insulin lispro (1 Unit Dial) 0-6 Units  0-6 Units Subcutaneous TID WC Bony Arevalo MD        insulin lispro (1 Unit Dial) 0-3 Units  0-3 Units Subcutaneous Nightly Rajiv Hart MD   Stopped at 12/24/19 2149    labetalol (NORMODYNE;TRANDATE) injection 20 mg  20 mg Intravenous Q4H PRN Bony Davis MD   20 mg at 12/24/19 2255    amLODIPine (NORVASC) tablet 10 mg  10 mg Oral Daily Bony Davis MD   10 mg at 12/26/19 1118    metoprolol tartrate (LOPRESSOR) tablet 50 mg  50 mg Oral BID Rajiv Hart MD   Stopped at 12/26/19 1044    promethazine (PHENERGAN) injection 12.5 mg  12.5 mg Intravenous Q6H PRN Bony Davis MD   12.5 mg at 12/25/19 1824    sodium chloride flush 0.9 % injection 10 mL  10 mL Intravenous 2 times per day Rajiv Hart MD   10 mL at 12/26/19 0800    sodium chloride flush 0.9 % injection 10 mL  10 mL Intravenous PRN Bony Davis MD   10 mL at 12/24/19 1813    magnesium hydroxide (MILK OF MAGNESIA) 400 MG/5ML suspension 30 mL  30 mL Oral Daily PRN Bony Davis MD        ondansetron (ZOFRAN) injection 4 mg  4 mg Intravenous Q6H PRN Bony Davis MD   4 mg at 12/26/19 1533    enoxaparin (LOVENOX) injection 30 mg  30 mg Subcutaneous Daily Rajiv Hart MD   Stopped at 12/26/19 1044    HYDROmorphone HCl PF (DILAUDID) injection 1 mg  1 mg Intravenous Q4H PRN Bony Davis MD   1 mg at 12/24/19 1653    HYDROcodone-acetaminophen (NORCO) 5-325 MG per tablet 1 tablet  1 tablet Oral Q6H PRN Rajiv Hart MD        ipratropium-albuterol (DUONEB) nebulizer solution 1 ampule  1 ampule Inhalation Q4H PRN Sunny Halsted, TEE - CNP   1 ampule at 12/24/19 2348     No Known Allergies  Active Problems:    Hypertensive urgency  Resolved Problems:    * No resolved hospital problems.  *    Blood pressure (!) 151/79, pulse 72, temperature

## 2019-12-26 NOTE — PROGRESS NOTES
Scope verified using 2 person system. No family present. Reviewed pt problem list, history, H&P and assessment preoperatively.

## 2019-12-26 NOTE — PLAN OF CARE
Problem: Pain:  Goal: Pain level will decrease  Description  Pain level will decrease  Outcome: Ongoing     Problem: Cardiovascular  Goal: Hemodynamic stability  Outcome: Ongoing     Pt mostly asleep overnight, awake with RN at bedside. VSS at 0636 with /80; no need for prn BP meds at this time. Pt denies pain. Pt remains NPO after MN for HIDA scan. See all flowsheets. Will continue to monitor.

## 2019-12-26 NOTE — OP NOTE
[address] [place] [address]  Endoscopy Note    Patient: Marla Parkinson  : 1955  CSN:     Procedure: Esophagogastroduodenoscopy with biopsy    Date:  2019     Surgeon:  Mei Jara MD    Referring Physician:  Ranjana Montalvo    Preoperative Diagnosis:   RUQ abdominal pain    Postoperative Diagnosis:   Mild erythematous duodenopathy    Anesthesia:  TIVA/MAC per anesthesia     EBL: <50 mL    Indications: This is a 75y.o. year old male who presents today with New-onset dyspepsia, nausea, vomiting and RUQ pain. R/o UGI pathology. Description of Procedure:  Informed consent was obtained from the patient after explanation of indications, benefits and possible risks and complications of the procedure. The patient was then taken to the endoscopy suite, placed in the left lateral decubitus position and the above IV sedation was administrered. The Olympus videoendoscope was placed in the patient's mouth and under direct visualization passed into the esophagus. Visualization of the esophagus demonstrated normal mucosa. The scope was then advanced into the stomach. Visualization of the gastric body and antrum demonstrated normal mucosa. A retroflexed exam of the gastric cardia and fundus demonstrated normal mucosa. The pylorus was patent and the scope was advanced into the duodenum. Visualization of the duodenal bulb demonstrated mucosal erythema, but no ulceration. Biopsies were obtained. The second portion of the duodenum demonstrated normal mucosa. The scope was then withdrawn back into the stomach, it was decompressed, and the scope was completely withdrawn. The patient tolerated the procedure well and was taken to the post anesthesia care unit in good condition. Impression:    1) Mild erythematous duodenopathy - biopsied     2) Otherwise normal Esophagogastroduodenoscopy       Recommendations:  1) Check pathology. 2) Trial of renal/carb control diet.      3) Get HIDA scan. 4) Can discharge from GI standpoint when tolerating diet.     Stu Sellers MD  [address]  12/26/2019

## 2019-12-26 NOTE — PLAN OF CARE
Problem: Pain:  Goal: Pain level will decrease  Description  Pain level will decrease  Outcome: Ongoing     Problem: KNOWLEDGE DEFICIT  Goal: Patient/S.O. demonstrates understanding of disease process, treatment plan, medications, and discharge instructions. Outcome: Ongoing     Problem: DISCHARGE BARRIERS  Goal: Patient's continuum of care needs are met  Outcome: Ongoing     Problem: Cardiovascular  Goal: No DVT, peripheral vascular complications  Outcome: Ongoing     Problem: Respiratory  Goal: No pulmonary complications  Outcome: Ongoing     Problem: GI  Goal: No bowel complications  Outcome: Ongoing     Problem: Serum Glucose Level - Abnormal:  Goal: Ability to maintain appropriate glucose levels will improve  Description  Ability to maintain appropriate glucose levels will improve  Outcome: Ongoing     Problem: Health Behavior:  Goal: Will modify at least one risk factor affecting health status  Description  Will modify at least one risk factor affecting health status  Outcome: Ongoing  EGD completed this am. Patient is resting quietly in bed at this time, HOB elevated, 2L Oxygen via NC in place, call light within reach. Denies pain.

## 2019-12-26 NOTE — PROGRESS NOTES
Patient stable. VSS. Patient drowsy yet oriented. Denies any pain. 3T called. Patient ready to transfer to [facility].

## 2019-12-26 NOTE — PROGRESS NOTES
Teaching / education initiated regarding perioperative experience, expectations, and pain management during stay. Patient verbalized understanding.     Electronically signed by Holly Skelton RN on 12/26/2019 at 8:19 AM

## 2019-12-26 NOTE — PROGRESS NOTES
[facility]    12/27/2019 12:23 AM        Name: Arpita Brito . Admitted: 12/24/2019  Primary Care Provider: [id number] [address] [place], DO (Tel: [phone])      Hospital Course: 3 day history of RUQ Abd pain with nausea and vomiting. Subjective: Has intermittent abdominal pain. Symptoms much improved blood pressure control improved.   2 episodes of vomiting, had HIDA scan today    Reviewed interval ancillary notes    Current Medications  0.9 % sodium chloride infusion, Continuous  sodium chloride flush 0.9 % injection 10 mL, PRN  acetaminophen (TYLENOL) tablet 650 mg, Q4H PRN  ciprofloxacin (CIPRO) IVPB 400 mg, Q18H  pantoprazole (PROTONIX) injection 40 mg, BID  sodium chloride flush 0.9 % injection 10 mL, 2 times per day  sodium chloride flush 0.9 % injection 10 mL, PRN  insulin glargine (LANTUS) injection pen 15 Units, Nightly  glucose (GLUTOSE) 40 % oral gel 15 g, PRN  dextrose 50 % IV solution, PRN  glucagon (rDNA) injection 1 mg, PRN  dextrose 5 % solution, PRN  aspirin chewable tablet 324 mg, Once  aspirin chewable tablet 81 mg, Daily  atorvastatin (LIPITOR) tablet 20 mg, Nightly  insulin lispro (1 Unit Dial) 4 Units, TID WC  levothyroxine (SYNTHROID) tablet 100 mcg, QAM AC  insulin lispro (1 Unit Dial) 0-6 Units, TID WC  insulin lispro (1 Unit Dial) 0-3 Units, Nightly  labetalol (NORMODYNE;TRANDATE) injection 20 mg, Q4H PRN  amLODIPine (NORVASC) tablet 10 mg, Daily  metoprolol tartrate (LOPRESSOR) tablet 50 mg, BID  promethazine (PHENERGAN) injection 12.5 mg, Q6H PRN  sodium chloride flush 0.9 % injection 10 mL, 2 times per day  sodium chloride flush 0.9 % injection 10 mL, PRN  magnesium hydroxide (MILK OF MAGNESIA) 400 MG/5ML suspension 30 mL, Daily PRN  ondansetron (ZOFRAN) injection 4 mg, Q6H PRN  enoxaparin (LOVENOX) injection 30 mg, Daily  HYDROmorphone HCl PF (DILAUDID) injection 1 mg, Q4H PRN  HYDROcodone-acetaminophen (NORCO) 5-325 MG per tablet 1 tablet, Q6H PRN  ipratropium-albuterol (DUONEB) nebulizer solution 1 ampule, Q4H PRN        Objective:  BP (!) 149/77   Pulse 74   Temp 97.8 °F (36.6 °C) (Temporal)   Resp 14   Ht 5' 10\" (1.778 m)   Wt 192 lb 1.6 oz (87.1 kg)   SpO2 97%   BMI 27.56 kg/m²     Intake/Output Summary (Last 24 hours) at 12/27/2019 0023  Last data filed at 12/26/2019 2251  Gross per 24 hour   Intake 670 ml   Output --   Net 670 ml      Wt Readings from Last 3 Encounters:   12/26/19 192 lb 1.6 oz (87.1 kg)   11/20/19 179 lb (81.2 kg)   11/04/19 179 lb (81.2 kg)       General appearance:  Appears comfortable  Eyes: Sclera clear. Pupils equal.  ENT: Moist oral mucosa. Trachea midline, no adenopathy. Cardiovascular: Regular rhythm, normal S1, S2. No murmur. No edema in lower extremities  Respiratory: Not using accessory muscles. Good inspiratory effort. Clear to auscultation bilaterally, no wheeze or crackles. GI: Abdomen soft, tenderness ruq, not distended, normal bowel sounds  Musculoskeletal: No cyanosis in digits, neck supple  Neurology: CN 2-12 grossly intact. No speech or motor deficits  Psych: Normal affect. Alert and oriented in time, place and person  Skin: Warm, dry, normal turgor  Extremity exam shows brisk capillary refill.   Peripheral pulses are palpable in lower extremities   Fistula in the right upper extremity  Labs and Tests:  CBC:   Recent Labs     12/24/19  1221 12/25/19  0426 12/26/19  0437   WBC 4.1 6.9 3.9*   HGB 9.3* 9.1* 8.5*    159 129*     BMP:    Recent Labs     12/24/19  1221 12/25/19  0426 12/26/19  0437    136 135*   K 4.2 5.0 4.6    103 103   CO2 23 18* 17*   BUN 44* 45* 55*   CREATININE 4.9* 4.7* 5.5*   GLUCOSE 188* 132* 102*     Hepatic:   Recent Labs     12/24/19  1221 12/25/19  0426 12/26/19  0437   AST 14* 13* 12*   ALT 8* 7* 7*   BILITOT <0.2 <0.2 0.3   ALKPHOS 96 80 74     NM HEPATOBILIARY SCAN W PHARMACOLOGICAL

## 2019-12-26 NOTE — PROGRESS NOTES
Patient transferred back to Room 372. Handoff given to 3T RN. Reported patient scheduled for Hida Scan this afternoon. NPO and no narcotics. 2L NC. Vitals stable.

## 2019-12-27 LAB
ALBUMIN SERPL-MCNC: 3 G/DL (ref 3.4–5)
ANION GAP SERPL CALCULATED.3IONS-SCNC: 15 MMOL/L (ref 3–16)
BUN BLDV-MCNC: 61 MG/DL (ref 7–20)
CALCIUM SERPL-MCNC: 7.8 MG/DL (ref 8.3–10.6)
CHLORIDE BLD-SCNC: 101 MMOL/L (ref 99–110)
CO2: 18 MMOL/L (ref 21–32)
CREAT SERPL-MCNC: 5.4 MG/DL (ref 0.8–1.3)
GFR AFRICAN AMERICAN: 13
GFR NON-AFRICAN AMERICAN: 11
GLUCOSE BLD-MCNC: 100 MG/DL (ref 70–99)
GLUCOSE BLD-MCNC: 103 MG/DL (ref 70–99)
GLUCOSE BLD-MCNC: 124 MG/DL (ref 70–99)
GLUCOSE BLD-MCNC: 129 MG/DL (ref 70–99)
GLUCOSE BLD-MCNC: 88 MG/DL (ref 70–99)
PERFORMED ON: ABNORMAL
PERFORMED ON: NORMAL
PHOSPHORUS: 4.2 MG/DL (ref 2.5–4.9)
POTASSIUM SERPL-SCNC: 3.8 MMOL/L (ref 3.5–5.1)
SODIUM BLD-SCNC: 134 MMOL/L (ref 136–145)

## 2019-12-27 PROCEDURE — 6370000000 HC RX 637 (ALT 250 FOR IP): Performed by: INTERNAL MEDICINE

## 2019-12-27 PROCEDURE — 84300 ASSAY OF URINE SODIUM: CPT

## 2019-12-27 PROCEDURE — 94760 N-INVAS EAR/PLS OXIMETRY 1: CPT

## 2019-12-27 PROCEDURE — 2580000003 HC RX 258: Performed by: INTERNAL MEDICINE

## 2019-12-27 PROCEDURE — 36415 COLL VENOUS BLD VENIPUNCTURE: CPT

## 2019-12-27 PROCEDURE — 6360000002 HC RX W HCPCS: Performed by: SURGERY

## 2019-12-27 PROCEDURE — 80069 RENAL FUNCTION PANEL: CPT

## 2019-12-27 PROCEDURE — C9113 INJ PANTOPRAZOLE SODIUM, VIA: HCPCS | Performed by: SURGERY

## 2019-12-27 PROCEDURE — 6360000002 HC RX W HCPCS: Performed by: INTERNAL MEDICINE

## 2019-12-27 PROCEDURE — 82570 ASSAY OF URINE CREATININE: CPT

## 2019-12-27 PROCEDURE — 99232 SBSQ HOSP IP/OBS MODERATE 35: CPT | Performed by: SURGERY

## 2019-12-27 PROCEDURE — 2060000000 HC ICU INTERMEDIATE R&B

## 2019-12-27 RX ORDER — HEPARIN SODIUM 5000 [USP'U]/ML
5000 INJECTION, SOLUTION INTRAVENOUS; SUBCUTANEOUS EVERY 8 HOURS SCHEDULED
Status: DISCONTINUED | OUTPATIENT
Start: 2019-12-28 | End: 2019-12-30 | Stop reason: HOSPADM

## 2019-12-27 RX ORDER — SODIUM CHLORIDE 9 MG/ML
INJECTION, SOLUTION INTRAVENOUS CONTINUOUS
Status: DISCONTINUED | OUTPATIENT
Start: 2019-12-27 | End: 2019-12-29

## 2019-12-27 RX ORDER — METOCLOPRAMIDE HYDROCHLORIDE 5 MG/ML
10 INJECTION INTRAMUSCULAR; INTRAVENOUS EVERY 6 HOURS
Status: DISCONTINUED | OUTPATIENT
Start: 2019-12-27 | End: 2019-12-29 | Stop reason: ALTCHOICE

## 2019-12-27 RX ADMIN — INSULIN GLARGINE 10 UNITS: 100 INJECTION, SOLUTION SUBCUTANEOUS at 22:18

## 2019-12-27 RX ADMIN — PANTOPRAZOLE SODIUM 40 MG: 40 INJECTION, POWDER, FOR SOLUTION INTRAVENOUS at 22:19

## 2019-12-27 RX ADMIN — ENOXAPARIN SODIUM 30 MG: 30 INJECTION SUBCUTANEOUS at 09:02

## 2019-12-27 RX ADMIN — METOPROLOL TARTRATE 50 MG: 50 TABLET, FILM COATED ORAL at 16:17

## 2019-12-27 RX ADMIN — SODIUM CHLORIDE: 9 INJECTION, SOLUTION INTRAVENOUS at 21:38

## 2019-12-27 RX ADMIN — PANTOPRAZOLE SODIUM 40 MG: 40 INJECTION, POWDER, FOR SOLUTION INTRAVENOUS at 08:58

## 2019-12-27 RX ADMIN — Medication 10 ML: at 21:39

## 2019-12-27 RX ADMIN — LEVOTHYROXINE SODIUM 100 MCG: 100 TABLET ORAL at 07:07

## 2019-12-27 RX ADMIN — ONDANSETRON 4 MG: 2 INJECTION INTRAMUSCULAR; INTRAVENOUS at 08:58

## 2019-12-27 RX ADMIN — Medication 10 ML: at 09:29

## 2019-12-27 RX ADMIN — Medication 10 ML: at 09:30

## 2019-12-27 RX ADMIN — METOCLOPRAMIDE 10 MG: 5 INJECTION, SOLUTION INTRAMUSCULAR; INTRAVENOUS at 14:06

## 2019-12-27 RX ADMIN — METOPROLOL TARTRATE 50 MG: 50 TABLET, FILM COATED ORAL at 21:38

## 2019-12-27 RX ADMIN — ATORVASTATIN CALCIUM 20 MG: 20 TABLET, FILM COATED ORAL at 21:39

## 2019-12-27 RX ADMIN — METOCLOPRAMIDE 10 MG: 5 INJECTION, SOLUTION INTRAMUSCULAR; INTRAVENOUS at 21:43

## 2019-12-27 RX ADMIN — SODIUM CHLORIDE: 9 INJECTION, SOLUTION INTRAVENOUS at 17:46

## 2019-12-27 RX ADMIN — AMLODIPINE BESYLATE 10 MG: 5 TABLET ORAL at 09:28

## 2019-12-27 ASSESSMENT — PAIN SCALES - GENERAL
PAINLEVEL_OUTOF10: 0

## 2019-12-27 NOTE — PLAN OF CARE
Problem: Pain:  Goal: Pain level will decrease  Description  Pain level will decrease  12/27/2019 1459 by Shavonne Handy RN  Outcome: Ongoing     Problem: KNOWLEDGE DEFICIT  Goal: Patient/S.O. demonstrates understanding of disease process, treatment plan, medications, and discharge instructions. Outcome: Ongoing     Problem: Cardiovascular  Goal: Hemodynamic stability  Outcome: Ongoing     Problem: Respiratory  Goal: O2 Sat > 90%  Outcome: Ongoing     Problem: Cardiac:  Goal: Ability to maintain vital signs within normal range will improve  Description  Ability to maintain vital signs within normal range will improve  Outcome: Ongoing     Problem: Nausea/Vomiting:  Goal: Absence of nausea/vomiting  Description  Absence of nausea/vomiting  Outcome: Ongoing     Problem: Nutrition  Goal: Optimal nutrition therapy  12/27/2019 1459 by Shavonne Handy RN  Outcome: Ongoing   Patient has remained free of falls. Call light within reach, bed in lowest position, and wheels locked. Unable to complete gastric emptying study d/t vomiting. Reglan ordered. Will continue to monitor.

## 2019-12-27 NOTE — PLAN OF CARE
Nutrition Problem: Inadequate energy intake  Intervention: Food and/or Nutrient Delivery: Continue current diet, Start ONS(resume diet )  Nutritional Goals: PO greater than 50% at meals and supp

## 2019-12-27 NOTE — PROGRESS NOTES
focal fat. No evidence of cholelithiasis or acute cholecystitis. XR CHEST STANDARD (2 VW)   Final Result   1. No acute abnormality. NM Gastric Emptying    (Results Pending)         Problem List  Active Problems:    Hypertensive urgency  Resolved Problems:    * No resolved hospital problems. *       Assessment & Plan:   Right upper quadrant pain. Ultrasound abdomen does not reveal any cholelithiasis HIDA scan does  not reveal cholecystitis. Cipro stopped,.upper endoscopy showed  Mild erythematous duodenopathy. P.o. Protonix    Continues to vomiting rule out diabetic osteoporosis gastric emptying study today    Oxygen Weaned off currently on room air. Chest x-ray nothing acute nuclear lung scan does not reveal PE    Hypertension mildly elevated stop saline continue fluids hypertensive urgency on presentation resolved      CKD  stage V kidney function at baseline. Nonoliguric no indication for hemodialysis      Diabetes  mellitus controlled continue current dose of Lantus    Anemia Of chronic disease      Rest of the comorbid condition stable.     Diet: Diet NPO Effective Now  Dietary Nutrition Supplements: Low Calorie High Protein Supplement  Code:Full Code  DVT PPX Lovenox  Disposition pending      Glenn Dobbins MD   12/27/2019 12:22 PM

## 2019-12-27 NOTE — PROGRESS NOTES
images of the abdomen were obtained in the anterior projection for 60 mins. A right lateral view was also obtained at 60 mins. Slow infusion of 1.76 micrograms/kg cholecystokinin was administered intravenously over 30-60 mins. Images were obtained in the [place] projection and regions of interest were drawn around the gallbladder and ejection fraction was calculated. HISTORY: ORDERING SYSTEM PROVIDED HISTORY: RUQ pain FINDINGS: Prompt, homogenous uptake by the liver is noted with normal appearance of radiotracer excretion into the biliary system. Clearance of bloodpool activity appears appropriate. Gallbladder and small bowel is visualized in appropriate sequence and time. Gallbladder ejection fraction measured 44%%. Normal value is >38% for CCK protocol and >33% for Ensure protocol. Note, Ensure normal range is based on a limited study. Negative HIDA scan. Gallbladder EF within normal limits.        Scheduled Meds:   insulin glargine  10 Units Subcutaneous Nightly    ciprofloxacin  400 mg Intravenous Q18H    pantoprazole  40 mg Intravenous BID    sodium chloride flush  10 mL Intravenous 2 times per day    aspirin  324 mg Oral Once    aspirin  81 mg Oral Daily    atorvastatin  20 mg Oral Nightly    insulin lispro (1 Unit Dial)  4 Units Subcutaneous TID WC    levothyroxine  100 mcg Oral QAM AC    insulin lispro  0-6 Units Subcutaneous TID WC    insulin lispro  0-3 Units Subcutaneous Nightly    amLODIPine  10 mg Oral Daily    metoprolol tartrate  50 mg Oral BID    sodium chloride flush  10 mL Intravenous 2 times per day    enoxaparin  30 mg Subcutaneous Daily     Continuous Infusions:   sodium chloride Stopped (12/26/19 1030)    dextrose       PRN Meds:.sodium chloride flush, acetaminophen, sodium chloride flush, glucose, dextrose, glucagon (rDNA), dextrose, labetalol, promethazine, sodium chloride flush, magnesium hydroxide, ondansetron, HYDROmorphone, HYDROcodone 5 mg - acetaminophen, ipratropium-albuterol      Assessment:  Patient Active Problem List   Diagnosis    ARF (acute renal failure) (HCC)    [facility] foot ulcer [facility]    [facility]    Type 1 diabetes mellitus with stage 3 chronic kidney disease ([facility]    Mixed hyperlipidemia    Diabetic foot infection ([facility]    Cellulitis of foot    Acute hematogenous osteomyelitis of left foot (HCC)    Elevated sed rate    Elevated C-reactive protein (CRP)    Acute kidney injury superimposed on chronic kidney disease ([facility]    Overweight    Diabetic polyneuropathy associated with type 2 diabetes mellitus ([facility]    History of methicillin resistant staphylococcus aureus (MRSA)    Essential hypertension    Anemia due to stage 3 chronic kidney disease (HCC)    Chronic kidney disease, stage III (moderate) (HCC)    Ulcer of left foot (HCC)    Acute hematogenous osteomyelitis of left ankle (HCC)    Bacterial infection due to Staphylococcus aureus    Hypertensive urgency     Epigastric pain, nausea and vomiting  Diabetes    Plan:  1. HIDA showed filling of the gallbladder and with normal ejection fraction without reproduction of symptoms. No indication for cholecystectomy  2. Defer duodenitis, gastric emptying workup and treatment to GI  3. Continue PPI  4. No further acute general surgery issues, will follow peripherally, follow in office electively after discharge as needed    Dorothy Perkins MD, FACS  12/27/2019  10:28 AM

## 2019-12-27 NOTE — PROGRESS NOTES
Nutrition Assessment    Type and Reason for Visit: Initial, Positive Nutrition Screen(+IP for wound and poor appetite. )    Nutrition Recommendations:   1. Resume current diet  2. Encourage po  3. RD ordered ensure HP BID vanilla      Nutrition Assessment: Pt is at risk for nutritional compromise d/t poor appetite and wound- diabetic ulcer on foot. Pt reports poor appetite for the past few days. Pt states that he was having n/v and unable to keep anything down. Pt is unsure whether he has lost wt. Pt is interested in a supplement. Pt agreeable to Ensure HP. Malnutrition Assessment:  · Malnutrition Status: At risk for malnutrition  · Context: Acute illness or injury  · Findings of the 6 clinical characteristics of malnutrition (Minimum of 2 out of 6 clinical characteristics is required to make the diagnosis of moderate or severe Protein Calorie Malnutrition based on AND/ASPEN Guidelines):  1. Energy Intake-Less than or equal to 75% of estimated energy requirement, (3-4 days)    2. Weight Loss-Unable to assess,    3. Fat Loss-No significant subcutaneous fat loss,    4. Muscle Loss-No significant muscle mass loss,    5. Fluid Accumulation-No significant fluid accumulation,    6.   Strength-Not measured    Nutrition Risk Level: High    Nutrient Needs:  · Estimated Daily Total Kcal: 1264-9707  · Estimated Daily Protein (g):  gms(1.3-1.5gm)  · Estimated Daily Total Fluid (ml/day): 1 ml/kcal    Nutrition Diagnosis:   · Problem: Inadequate energy intake  · Etiology: related to Insufficient energy/nutrient consumption     Signs and symptoms:  as evidenced by Diet history of poor intake, Nausea, Vomiting    Objective Information:  · Nutrition-Focused Physical Findings: No edema  · Wound Type: Diabetic Ulcer  · Current Nutrition Therapies:  · Oral Diet Orders: NPO   · Oral Diet intake: 26-50%  · Oral Nutrition Supplement (ONS) Orders: None  · Anthropometric Measures:  · Ht: 5' 10\" (177.8 cm)   · Current Body Wt: 192 lb (87.1 kg)  · Ideal Body Wt: 166 lb (75.3 kg), % Ideal Body    · BMI Classification: BMI 25.0 - 29.9 Overweight    Nutrition Interventions:   Continue current diet, Start ONS(resume diet )  Education Not Indicated, Continued Inpatient Monitoring    Nutrition Evaluation:   · Evaluation: Goals set   · Goals: PO greater than 50% at meals and supp    · Monitoring: Meal Intake, Weight, Supplement Intake, Nutrition Progression      Electronically signed by Linda Anton RD, CNSC, LD on 12/27/19 at 12:10 PM    Contact Number: [phone]

## 2019-12-27 NOTE — PROGRESS NOTES
12/26/2019    MCHC 32.0 12/26/2019    RDW 13.1 12/26/2019    SEGSPCT 79.3 07/06/2012    LYMPHOPCT 24.9 12/24/2019    MONOPCT 11.7 12/24/2019    EOSPCT 5 05/28/2019    BASOPCT 1.3 12/24/2019    MONOSABS 0.5 12/24/2019    LYMPHSABS 1.0 12/24/2019    EOSABS 0.2 12/24/2019    BASOSABS 0.1 12/24/2019    DIFFTYPE Auto 07/06/2012     CMP:    Lab Results   Component Value Date     12/26/2019    K 4.6 12/26/2019    K 5.0 12/25/2019     12/26/2019    CO2 17 12/26/2019    BUN 55 12/26/2019    CREATININE 5.5 12/26/2019    GFRAA 13 12/26/2019    GFRAA 53 07/06/2012    AGRATIO 0.9 12/26/2019    LABGLOM 10 12/26/2019    GLUCOSE 102 12/26/2019    PROT 5.8 12/26/2019    PROT 8.0 07/06/2012    LABALBU 2.7 12/26/2019    CALCIUM 7.8 12/26/2019    BILITOT 0.3 12/26/2019    ALKPHOS 74 12/26/2019    AST 12 12/26/2019    ALT 7 12/26/2019     Phosphorus:    Lab Results   Component Value Date    PHOS 5.0 12/26/2019     Uric Acid:  No results found for: LABURIC, URICACID  Last 3 Troponin:    Lab Results   Component Value Date    TROPONINI 0.12 12/25/2019    TROPONINI 0.09 12/24/2019    TROPONINI 0.12 12/24/2019     U/A:    Lab Results   Component Value Date    COLORU YELLOW 12/24/2019    PROTEINU >=300 12/24/2019    PHUR 6.5 12/24/2019    WBCUA 0 12/24/2019    RBCUA 2 12/24/2019    MUCUS 3+ 07/06/2012    BACTERIA 3+ 07/06/2012    CLARITYU Clear 12/24/2019    SPECGRAV 1.013 12/24/2019    LEUKOCYTESUR Negative 12/24/2019    UROBILINOGEN 0.2 12/24/2019    BILIRUBINUR Negative 12/24/2019    BILIRUBINUR NEGATIVE 11/02/2011    BLOODU SMALL 12/24/2019    GLUCOSEU 100 12/24/2019    GLUCOSEU >=1000 11/02/2011         IMPRESSION/RECOMMENDATIONS:      Diagnosis and Plan     1. DILIP  Cr 5.4 , BUN 61, K 3.8   FeNa - p   2. CKD stage 4  Opted for HD, has AVF in place   3. HTN   4. T2DM  5. Anemia of CKD  Hb 8.5, give ARMEN  6.  Renal osteodystrophy   Ca 7.8 , Phos 4.2      IV fluids    [place]

## 2019-12-28 LAB
ALBUMIN SERPL-MCNC: 3 G/DL (ref 3.4–5)
ANION GAP SERPL CALCULATED.3IONS-SCNC: 13 MMOL/L (ref 3–16)
BUN BLDV-MCNC: 54 MG/DL (ref 7–20)
CALCIUM SERPL-MCNC: 7.5 MG/DL (ref 8.3–10.6)
CHLORIDE BLD-SCNC: 104 MMOL/L (ref 99–110)
CO2: 16 MMOL/L (ref 21–32)
CREAT SERPL-MCNC: 5.3 MG/DL (ref 0.8–1.3)
CREATININE URINE: 65.7 MG/DL (ref 39–259)
GFR AFRICAN AMERICAN: 13
GFR NON-AFRICAN AMERICAN: 11
GLUCOSE BLD-MCNC: 105 MG/DL (ref 70–99)
GLUCOSE BLD-MCNC: 118 MG/DL (ref 70–99)
GLUCOSE BLD-MCNC: 65 MG/DL (ref 70–99)
GLUCOSE BLD-MCNC: 75 MG/DL (ref 70–99)
GLUCOSE BLD-MCNC: 82 MG/DL (ref 70–99)
GLUCOSE BLD-MCNC: 92 MG/DL (ref 70–99)
GLUCOSE BLD-MCNC: 93 MG/DL (ref 70–99)
PERFORMED ON: ABNORMAL
PERFORMED ON: NORMAL
PHOSPHORUS: 3.7 MG/DL (ref 2.5–4.9)
POTASSIUM SERPL-SCNC: 4 MMOL/L (ref 3.5–5.1)
SODIUM BLD-SCNC: 133 MMOL/L (ref 136–145)
SODIUM URINE: 60 MMOL/L

## 2019-12-28 PROCEDURE — 6360000002 HC RX W HCPCS: Performed by: INTERNAL MEDICINE

## 2019-12-28 PROCEDURE — 80069 RENAL FUNCTION PANEL: CPT

## 2019-12-28 PROCEDURE — 2580000003 HC RX 258: Performed by: INTERNAL MEDICINE

## 2019-12-28 PROCEDURE — 2060000000 HC ICU INTERMEDIATE R&B

## 2019-12-28 PROCEDURE — 6360000002 HC RX W HCPCS: Performed by: SURGERY

## 2019-12-28 PROCEDURE — 36415 COLL VENOUS BLD VENIPUNCTURE: CPT

## 2019-12-28 PROCEDURE — C9113 INJ PANTOPRAZOLE SODIUM, VIA: HCPCS | Performed by: SURGERY

## 2019-12-28 PROCEDURE — 6370000000 HC RX 637 (ALT 250 FOR IP): Performed by: INTERNAL MEDICINE

## 2019-12-28 RX ORDER — GENTAMICIN SULFATE 1 MG/G
CREAM TOPICAL DAILY
Status: DISCONTINUED | OUTPATIENT
Start: 2019-12-29 | End: 2019-12-30 | Stop reason: HOSPADM

## 2019-12-28 RX ADMIN — METOCLOPRAMIDE 10 MG: 5 INJECTION, SOLUTION INTRAMUSCULAR; INTRAVENOUS at 08:40

## 2019-12-28 RX ADMIN — INSULIN GLARGINE 10 UNITS: 100 INJECTION, SOLUTION SUBCUTANEOUS at 20:29

## 2019-12-28 RX ADMIN — PANTOPRAZOLE SODIUM 40 MG: 40 INJECTION, POWDER, FOR SOLUTION INTRAVENOUS at 08:37

## 2019-12-28 RX ADMIN — HEPARIN SODIUM 5000 UNITS: 5000 INJECTION INTRAVENOUS; SUBCUTANEOUS at 22:27

## 2019-12-28 RX ADMIN — AMLODIPINE BESYLATE 10 MG: 5 TABLET ORAL at 08:37

## 2019-12-28 RX ADMIN — HEPARIN SODIUM 5000 UNITS: 5000 INJECTION INTRAVENOUS; SUBCUTANEOUS at 13:29

## 2019-12-28 RX ADMIN — METOCLOPRAMIDE 10 MG: 5 INJECTION, SOLUTION INTRAMUSCULAR; INTRAVENOUS at 13:27

## 2019-12-28 RX ADMIN — Medication 10 ML: at 20:28

## 2019-12-28 RX ADMIN — INSULIN LISPRO 4 UNITS: 100 INJECTION, SOLUTION INTRAVENOUS; SUBCUTANEOUS at 13:29

## 2019-12-28 RX ADMIN — PANTOPRAZOLE SODIUM 40 MG: 40 INJECTION, POWDER, FOR SOLUTION INTRAVENOUS at 20:24

## 2019-12-28 RX ADMIN — Medication 10 ML: at 08:37

## 2019-12-28 RX ADMIN — SODIUM CHLORIDE: 9 INJECTION, SOLUTION INTRAVENOUS at 06:41

## 2019-12-28 RX ADMIN — METOCLOPRAMIDE 10 MG: 5 INJECTION, SOLUTION INTRAMUSCULAR; INTRAVENOUS at 20:24

## 2019-12-28 RX ADMIN — METOCLOPRAMIDE 10 MG: 5 INJECTION, SOLUTION INTRAMUSCULAR; INTRAVENOUS at 02:30

## 2019-12-28 RX ADMIN — METOPROLOL TARTRATE 50 MG: 50 TABLET, FILM COATED ORAL at 08:37

## 2019-12-28 RX ADMIN — ATORVASTATIN CALCIUM 20 MG: 20 TABLET, FILM COATED ORAL at 20:24

## 2019-12-28 RX ADMIN — SODIUM CHLORIDE: 9 INJECTION, SOLUTION INTRAVENOUS at 16:06

## 2019-12-28 RX ADMIN — HEPARIN SODIUM 5000 UNITS: 5000 INJECTION INTRAVENOUS; SUBCUTANEOUS at 06:41

## 2019-12-28 RX ADMIN — Medication 10 ML: at 13:27

## 2019-12-28 RX ADMIN — ASPIRIN 81 MG 81 MG: 81 TABLET ORAL at 08:37

## 2019-12-28 RX ADMIN — LEVOTHYROXINE SODIUM 100 MCG: 100 TABLET ORAL at 06:41

## 2019-12-28 RX ADMIN — METOPROLOL TARTRATE 50 MG: 50 TABLET, FILM COATED ORAL at 20:24

## 2019-12-28 ASSESSMENT — PAIN SCALES - GENERAL
PAINLEVEL_OUTOF10: 0

## 2019-12-28 NOTE — PLAN OF CARE
signs within normal range will improve  Description  Ability to maintain vital signs within normal range will improve  Outcome: Ongoing  Note:   161/82 before meds this AM  Goal: Cardiovascular alteration will improve  Description  Cardiovascular alteration will improve  Outcome: Ongoing  Note:   Remains in SR; periph pulses present     Problem: Health Behavior:  Goal: Will modify at least one risk factor affecting health status  Description  Will modify at least one risk factor affecting health status  Outcome: Ongoing  Note:   Pt affirms awareness current renal status and potential for HD in near or long term     Problem: Physical Regulation:  Goal: Complications related to the disease process, condition or treatment will be avoided or minimized  Description  Complications related to the disease process, condition or treatment will be avoided or minimized  Outcome: Ongoing  Note:   Discussed role of BG and DP management as R/T renal health     Problem: Serum Glucose Level - Abnormal:  Goal: Ability to maintain appropriate glucose levels will improve  Description  Ability to maintain appropriate glucose levels will improve  12/28/2019 0827 by Radames Blanco RN  Outcome: Ongoing  Note:   Cbg 65 early this AM - improved to 92 after 8oz grape juice  12/28/2019 0419 by Joce Strauss RN  Outcome: Ongoing     Problem: Nausea/Vomiting:  Goal: Absence of nausea/vomiting  Description  Absence of nausea/vomiting  12/28/2019 0827 by Radames Blanco RN  Outcome: Ongoing  Note:   Denies nausea at present - affirms awareness to report onset of same to staff  12/28/2019 0419 by Joce Strauss RN  Outcome: Ongoing     Problem: Nutrition  Goal: Optimal nutrition therapy  Outcome: Ongoing  Note:   Remains on liquid diet

## 2019-12-28 NOTE — PROGRESS NOTES
[facility] PROGRESS NOTE    12/28/2019 11:15 AM        Name: Teddy Easley . Admitted: 12/24/2019  Primary Care Provider: Ilsa Pickard DO (Tel: [phone])      Subjective: No Abdominal pain.   Had vomiting yesterday hence gastric empty study was canceled today no vomiting tolerating liquid diet    Reviewed interval ancillary notes    Current Medications  insulin glargine (LANTUS) injection pen 10 Units, Nightly  heparin (porcine) injection 5,000 Units, 3 times per day  metoclopramide (REGLAN) injection 10 mg, Q6H  0.9 % sodium chloride infusion, Continuous  acetaminophen (TYLENOL) tablet 650 mg, Q4H PRN  pantoprazole (PROTONIX) injection 40 mg, BID  glucose (GLUTOSE) 40 % oral gel 15 g, PRN  dextrose 50 % IV solution, PRN  glucagon (rDNA) injection 1 mg, PRN  dextrose 5 % solution, PRN  aspirin chewable tablet 81 mg, Daily  atorvastatin (LIPITOR) tablet 20 mg, Nightly  insulin lispro (1 Unit Dial) 4 Units, TID WC  levothyroxine (SYNTHROID) tablet 100 mcg, QAM AC  insulin lispro (1 Unit Dial) 0-6 Units, TID WC  insulin lispro (1 Unit Dial) 0-3 Units, Nightly  labetalol (NORMODYNE;TRANDATE) injection 20 mg, Q4H PRN  amLODIPine (NORVASC) tablet 10 mg, Daily  metoprolol tartrate (LOPRESSOR) tablet 50 mg, BID  promethazine (PHENERGAN) injection 12.5 mg, Q6H PRN  sodium chloride flush 0.9 % injection 10 mL, 2 times per day  sodium chloride flush 0.9 % injection 10 mL, PRN  magnesium hydroxide (MILK OF MAGNESIA) 400 MG/5ML suspension 30 mL, Daily PRN  ondansetron (ZOFRAN) injection 4 mg, Q6H PRN  HYDROmorphone HCl PF (DILAUDID) injection 1 mg, Q4H PRN  HYDROcodone-acetaminophen (NORCO) 5-325 MG per tablet 1 tablet, Q6H PRN  ipratropium-albuterol (DUONEB) nebulizer solution 1 ampule, Q4H PRN        Objective:  BP (!) 161/82   Pulse 68   Temp 97.7 °F (36.5 °C) (Axillary)   Resp 16   Ht 5' 10\" (1.778 m)   Wt

## 2019-12-28 NOTE — PLAN OF CARE
Problem: Pain:  Goal: Pain level will decrease  Description  Pain level will decrease  12/28/2019 0419 by Jaya Escamilla RN  Outcome: Ongoing  12/27/2019 1459 by Joann Dixon RN  Outcome: Ongoing   .  denies pain at this time   Problem: Nausea/Vomiting:  Goal: Absence of nausea/vomiting  Description  Absence of nausea/vomiting  12/28/2019 0419 by Jaya Escamilla RN  Outcome: Ongoing  12/27/2019 1459 by Joann Dixon RN  Outcome: Ongoing   Patient denies nausea and has no episode of emesis

## 2019-12-28 NOTE — FLOWSHEET NOTE
Patient alert and able to verbalize needs, denies pain and nausea at this time, no episode of emesis, will continue to monitor

## 2019-12-29 LAB
ALBUMIN SERPL-MCNC: 3.1 G/DL (ref 3.4–5)
ANION GAP SERPL CALCULATED.3IONS-SCNC: 15 MMOL/L (ref 3–16)
BUN BLDV-MCNC: 47 MG/DL (ref 7–20)
CALCIUM SERPL-MCNC: 7.4 MG/DL (ref 8.3–10.6)
CHLORIDE BLD-SCNC: 106 MMOL/L (ref 99–110)
CO2: 15 MMOL/L (ref 21–32)
CREAT SERPL-MCNC: 5 MG/DL (ref 0.8–1.3)
GFR AFRICAN AMERICAN: 14
GFR NON-AFRICAN AMERICAN: 12
GLUCOSE BLD-MCNC: 65 MG/DL (ref 70–99)
GLUCOSE BLD-MCNC: 68 MG/DL (ref 70–99)
GLUCOSE BLD-MCNC: 84 MG/DL (ref 70–99)
GLUCOSE BLD-MCNC: 86 MG/DL (ref 70–99)
GLUCOSE BLD-MCNC: 93 MG/DL (ref 70–99)
GLUCOSE BLD-MCNC: 97 MG/DL (ref 70–99)
GLUCOSE BLD-MCNC: 98 MG/DL (ref 70–99)
PERFORMED ON: ABNORMAL
PERFORMED ON: NORMAL
PHOSPHORUS: 3.1 MG/DL (ref 2.5–4.9)
POTASSIUM SERPL-SCNC: 3.8 MMOL/L (ref 3.5–5.1)
SODIUM BLD-SCNC: 136 MMOL/L (ref 136–145)

## 2019-12-29 PROCEDURE — 6360000002 HC RX W HCPCS: Performed by: INTERNAL MEDICINE

## 2019-12-29 PROCEDURE — 2580000003 HC RX 258: Performed by: INTERNAL MEDICINE

## 2019-12-29 PROCEDURE — 2060000000 HC ICU INTERMEDIATE R&B

## 2019-12-29 PROCEDURE — 80069 RENAL FUNCTION PANEL: CPT

## 2019-12-29 PROCEDURE — C9113 INJ PANTOPRAZOLE SODIUM, VIA: HCPCS | Performed by: SURGERY

## 2019-12-29 PROCEDURE — 36415 COLL VENOUS BLD VENIPUNCTURE: CPT

## 2019-12-29 PROCEDURE — 6370000000 HC RX 637 (ALT 250 FOR IP): Performed by: INTERNAL MEDICINE

## 2019-12-29 PROCEDURE — 6360000002 HC RX W HCPCS: Performed by: SURGERY

## 2019-12-29 RX ORDER — METOCLOPRAMIDE 10 MG/1
10 TABLET ORAL
Status: DISCONTINUED | OUTPATIENT
Start: 2019-12-29 | End: 2019-12-30 | Stop reason: HOSPADM

## 2019-12-29 RX ORDER — HYDRALAZINE HYDROCHLORIDE 25 MG/1
25 TABLET, FILM COATED ORAL EVERY 8 HOURS SCHEDULED
Status: DISCONTINUED | OUTPATIENT
Start: 2019-12-29 | End: 2019-12-30

## 2019-12-29 RX ADMIN — METOPROLOL TARTRATE 50 MG: 50 TABLET, FILM COATED ORAL at 08:39

## 2019-12-29 RX ADMIN — ATORVASTATIN CALCIUM 20 MG: 20 TABLET, FILM COATED ORAL at 21:26

## 2019-12-29 RX ADMIN — HEPARIN SODIUM 5000 UNITS: 5000 INJECTION INTRAVENOUS; SUBCUTANEOUS at 13:33

## 2019-12-29 RX ADMIN — AMLODIPINE BESYLATE 10 MG: 5 TABLET ORAL at 08:39

## 2019-12-29 RX ADMIN — LEVOTHYROXINE SODIUM 100 MCG: 100 TABLET ORAL at 05:59

## 2019-12-29 RX ADMIN — HEPARIN SODIUM 5000 UNITS: 5000 INJECTION INTRAVENOUS; SUBCUTANEOUS at 21:28

## 2019-12-29 RX ADMIN — HEPARIN SODIUM 5000 UNITS: 5000 INJECTION INTRAVENOUS; SUBCUTANEOUS at 05:59

## 2019-12-29 RX ADMIN — SODIUM CHLORIDE: 9 INJECTION, SOLUTION INTRAVENOUS at 02:12

## 2019-12-29 RX ADMIN — HYDRALAZINE HYDROCHLORIDE 25 MG: 25 TABLET, FILM COATED ORAL at 21:26

## 2019-12-29 RX ADMIN — METOCLOPRAMIDE 10 MG: 5 INJECTION, SOLUTION INTRAMUSCULAR; INTRAVENOUS at 08:39

## 2019-12-29 RX ADMIN — METOCLOPRAMIDE HYDROCHLORIDE 10 MG: 10 TABLET ORAL at 16:04

## 2019-12-29 RX ADMIN — PANTOPRAZOLE SODIUM 40 MG: 40 INJECTION, POWDER, FOR SOLUTION INTRAVENOUS at 21:26

## 2019-12-29 RX ADMIN — METOPROLOL TARTRATE 50 MG: 50 TABLET, FILM COATED ORAL at 21:26

## 2019-12-29 RX ADMIN — HYDRALAZINE HYDROCHLORIDE 25 MG: 25 TABLET, FILM COATED ORAL at 13:33

## 2019-12-29 RX ADMIN — GENTAMICIN SULFATE: 1 CREAM TOPICAL at 11:12

## 2019-12-29 RX ADMIN — Medication 10 ML: at 08:39

## 2019-12-29 RX ADMIN — ASPIRIN 81 MG 81 MG: 81 TABLET ORAL at 08:39

## 2019-12-29 RX ADMIN — METOCLOPRAMIDE HYDROCHLORIDE 10 MG: 10 TABLET ORAL at 21:26

## 2019-12-29 RX ADMIN — METOCLOPRAMIDE HYDROCHLORIDE 10 MG: 10 TABLET ORAL at 11:11

## 2019-12-29 RX ADMIN — INSULIN GLARGINE 10 UNITS: 100 INJECTION, SOLUTION SUBCUTANEOUS at 21:27

## 2019-12-29 RX ADMIN — PANTOPRAZOLE SODIUM 40 MG: 40 INJECTION, POWDER, FOR SOLUTION INTRAVENOUS at 08:39

## 2019-12-29 RX ADMIN — Medication 10 ML: at 21:26

## 2019-12-29 RX ADMIN — METOCLOPRAMIDE 10 MG: 5 INJECTION, SOLUTION INTRAMUSCULAR; INTRAVENOUS at 02:12

## 2019-12-29 ASSESSMENT — PAIN SCALES - GENERAL
PAINLEVEL_OUTOF10: 0

## 2019-12-29 NOTE — PROGRESS NOTES
[facility] PROGRESS NOTE    12/29/2019 12:03 PM        Name: Frank Islas . Admitted: 12/24/2019  Primary Care Provider: Grisel Mccrary DO (Tel: [phone])      Subjective: No Abdominal pain. No further vomiting tolerating diet p.o.  Reglan started no other complaints    Reviewed interval ancillary notes    Current Medications  hydrALAZINE (APRESOLINE) tablet 25 mg, 3 times per day  metoclopramide (REGLAN) tablet 10 mg, 4x Daily AC & HS  gentamicin (GARAMYCIN) 0.1 % cream, Daily  insulin glargine (LANTUS) injection pen 10 Units, Nightly  heparin (porcine) injection 5,000 Units, 3 times per day  acetaminophen (TYLENOL) tablet 650 mg, Q4H PRN  pantoprazole (PROTONIX) injection 40 mg, BID  glucose (GLUTOSE) 40 % oral gel 15 g, PRN  dextrose 50 % IV solution, PRN  glucagon (rDNA) injection 1 mg, PRN  dextrose 5 % solution, PRN  aspirin chewable tablet 81 mg, Daily  atorvastatin (LIPITOR) tablet 20 mg, Nightly  insulin lispro (1 Unit Dial) 4 Units, TID WC  levothyroxine (SYNTHROID) tablet 100 mcg, QAM AC  insulin lispro (1 Unit Dial) 0-6 Units, TID WC  insulin lispro (1 Unit Dial) 0-3 Units, Nightly  labetalol (NORMODYNE;TRANDATE) injection 20 mg, Q4H PRN  amLODIPine (NORVASC) tablet 10 mg, Daily  metoprolol tartrate (LOPRESSOR) tablet 50 mg, BID  promethazine (PHENERGAN) injection 12.5 mg, Q6H PRN  sodium chloride flush 0.9 % injection 10 mL, 2 times per day  sodium chloride flush 0.9 % injection 10 mL, PRN  magnesium hydroxide (MILK OF MAGNESIA) 400 MG/5ML suspension 30 mL, Daily PRN  ondansetron (ZOFRAN) injection 4 mg, Q6H PRN  HYDROmorphone HCl PF (DILAUDID) injection 1 mg, Q4H PRN  HYDROcodone-acetaminophen (NORCO) 5-325 MG per tablet 1 tablet, Q6H PRN  ipratropium-albuterol (DUONEB) nebulizer solution 1 ampule, Q4H PRN        Objective:  BP (!) 170/86   Pulse 73   Temp 98.1 °F (36.7 °C) (Oral) Resp 16   Ht 5' 10\" (1.778 m)   Wt 195 lb 4.8 oz (88.6 kg)   SpO2 94%   BMI 28.02 kg/m²     Intake/Output Summary (Last 24 hours) at 12/29/2019 1203  Last data filed at 12/29/2019 0815  Gross per 24 hour   Intake 5222.7 ml   Output 250 ml   Net 4972.7 ml      Wt Readings from Last 3 Encounters:   12/29/19 195 lb 4.8 oz (88.6 kg)   11/20/19 179 lb (81.2 kg)   11/04/19 179 lb (81.2 kg)       General appearance:  Appears comfortable  Eyes: Sclera clear. Pupils equal.  ENT: Moist oral mucosa. Trachea midline, no adenopathy. Cardiovascular: Regular rhythm, normal S1, S2. No murmur. No edema in lower extremities  Respiratory: Not using accessory muscles. Good inspiratory effort. Clear to auscultation bilaterally, no wheeze or crackles. GI: Abdomen soft, no tenderness, not distended, normal bowel sounds  Musculoskeletal: No cyanosis in digits, neck supple  Neurology: CN 2-12 grossly intact. No speech or motor deficits  Psych: Normal affect. Alert and oriented in time, place and person  Skin: Warm, dry, normal turgor  Extremity exam shows brisk capillary refill. Peripheral pulses are palpable in lower extremities   Fistula in the right upper extremity  Labs and Tests:  CBC:   No results for input(s): WBC, HGB, PLT in the last 72 hours. BMP:    Recent Labs     12/27/19  1019 12/28/19  0455 12/29/19  0454   * 133* 136   K 3.8 4.0 3.8    104 106   CO2 18* 16* 15*   BUN 61* 54* 47*   CREATININE 5.4* 5.3* 5.0*   GLUCOSE 129* 82 68*     Hepatic:   No results for input(s): AST, ALT, ALB, BILITOT, ALKPHOS in the last 72 hours. NM HEPATOBILIARY SCAN W PHARMACOLOGICAL INTERVENTION   Final Result   Negative HIDA scan. Gallbladder EF within normal limits. NM LUNG VENT/PERFUSION (VQ)   Final Result   Very low probability for pulmonary embolus. US GALLBLADDER RUQ   Final Result   Nonspecific increased echogenicity in the gallbladder fossa, may be related   to focal fat.   No evidence of

## 2019-12-29 NOTE — PROGRESS NOTES
BP continues to escalate (asymptomatic), despite recent start of Hydralazine - instructed on PM meds for BP due @ 2100; no other significant changes since earlier assessment, except as noted; no other s/s of acute distress at present.

## 2019-12-29 NOTE — PROGRESS NOTES
12/26/2019    RBC 2.82 12/26/2019    HGB 8.5 12/26/2019    HCT 26.6 12/26/2019     12/26/2019    MCV 94.4 12/26/2019    MCH 30.2 12/26/2019    MCHC 32.0 12/26/2019    RDW 13.1 12/26/2019    SEGSPCT 79.3 07/06/2012    LYMPHOPCT 24.9 12/24/2019    MONOPCT 11.7 12/24/2019    EOSPCT 5 05/28/2019    BASOPCT 1.3 12/24/2019    MONOSABS 0.5 12/24/2019    LYMPHSABS 1.0 12/24/2019    EOSABS 0.2 12/24/2019    BASOSABS 0.1 12/24/2019    DIFFTYPE Auto 07/06/2012     CMP:    Lab Results   Component Value Date     12/29/2019    K 3.8 12/29/2019    K 5.0 12/25/2019     12/29/2019    CO2 15 12/29/2019    BUN 47 12/29/2019    CREATININE 5.0 12/29/2019    GFRAA 14 12/29/2019    GFRAA 53 07/06/2012    AGRATIO 0.9 12/26/2019    LABGLOM 12 12/29/2019    GLUCOSE 68 12/29/2019    PROT 5.8 12/26/2019    PROT 8.0 07/06/2012    LABALBU 3.1 12/29/2019    CALCIUM 7.4 12/29/2019    BILITOT 0.3 12/26/2019    ALKPHOS 74 12/26/2019    AST 12 12/26/2019    ALT 7 12/26/2019     Phosphorus:    Lab Results   Component Value Date    PHOS 3.1 12/29/2019     Uric Acid:  No results found for: LABURIC, URICACID  Last 3 Troponin:    Lab Results   Component Value Date    TROPONINI 0.12 12/25/2019    TROPONINI 0.09 12/24/2019    TROPONINI 0.12 12/24/2019     U/A:    Lab Results   Component Value Date    COLORU YELLOW 12/24/2019    PROTEINU >=300 12/24/2019    PHUR 6.5 12/24/2019    WBCUA 0 12/24/2019    RBCUA 2 12/24/2019    MUCUS 3+ 07/06/2012    BACTERIA 3+ 07/06/2012    CLARITYU Clear 12/24/2019    SPECGRAV 1.013 12/24/2019    LEUKOCYTESUR Negative 12/24/2019    UROBILINOGEN 0.2 12/24/2019    BILIRUBINUR Negative 12/24/2019    BILIRUBINUR NEGATIVE 11/02/2011    BLOODU SMALL 12/24/2019    GLUCOSEU 100 12/24/2019    GLUCOSEU >=1000 11/02/2011         IMPRESSION/RECOMMENDATIONS:      Diagnosis and Plan     1. DILIP  renal function slightly better  off IVF   will defer to Dr. Rj Mcmahon on Monday whether or not to start dialysis  2.  CKD stage 4  Opted for HD, has AVF in place   3. GI  EGD showed duodenitis  negative HIDA scan  gastric emptying study pending  4. HTN   5. T2DM  6.  Anemia of CKD  on [address]

## 2019-12-29 NOTE — PROGRESS NOTES
Glucose noted to be 68 on am labs. To room, FSBS 65,  patient denies symptoms. 4 ounces of grape juice taken with am med, subcutaneous heparin administered. Advised will be back to recheck FSBS in 15 minutes.

## 2019-12-29 NOTE — PLAN OF CARE
Lenora Mclean, RN  Outcome: Ongoing  Note:   No N/V - discussed med regimen with Dr Jennifer Gonzalez (GI) - plan change in Reglan to PO  12/29/2019 0636 by Thu Rolon RN  Outcome: Ongoing  Note:   No report of nausea this shift.      Problem: Nutrition  Goal: Optimal nutrition therapy  Outcome: Ongoing  Note:   Appetite improving per pt, with no recurrence of nausea, emesis

## 2019-12-30 VITALS
HEART RATE: 73 BPM | SYSTOLIC BLOOD PRESSURE: 165 MMHG | OXYGEN SATURATION: 94 % | DIASTOLIC BLOOD PRESSURE: 85 MMHG | BODY MASS INDEX: 27.63 KG/M2 | HEIGHT: 70 IN | TEMPERATURE: 98.1 F | WEIGHT: 193 LBS | RESPIRATION RATE: 16 BRPM

## 2019-12-30 LAB
ALBUMIN SERPL-MCNC: 3.2 G/DL (ref 3.4–5)
ANION GAP SERPL CALCULATED.3IONS-SCNC: 12 MMOL/L (ref 3–16)
BUN BLDV-MCNC: 42 MG/DL (ref 7–20)
CALCIUM SERPL-MCNC: 7.7 MG/DL (ref 8.3–10.6)
CHLORIDE BLD-SCNC: 105 MMOL/L (ref 99–110)
CO2: 18 MMOL/L (ref 21–32)
CREAT SERPL-MCNC: 4.9 MG/DL (ref 0.8–1.3)
GFR AFRICAN AMERICAN: 14
GFR NON-AFRICAN AMERICAN: 12
GLUCOSE BLD-MCNC: 101 MG/DL (ref 70–99)
GLUCOSE BLD-MCNC: 110 MG/DL (ref 70–99)
GLUCOSE BLD-MCNC: 68 MG/DL (ref 70–99)
GLUCOSE BLD-MCNC: 76 MG/DL (ref 70–99)
GLUCOSE BLD-MCNC: 92 MG/DL (ref 70–99)
HAPTOGLOBIN: 296 MG/DL (ref 30–200)
HAV IGM SER IA-ACNC: NORMAL
HEPATITIS B CORE IGM ANTIBODY: NORMAL
HEPATITIS B SURFACE ANTIGEN INTERPRETATION: NORMAL
HEPATITIS C ANTIBODY INTERPRETATION: NORMAL
LACTATE DEHYDROGENASE: 242 U/L (ref 100–190)
PERFORMED ON: ABNORMAL
PERFORMED ON: NORMAL
PHOSPHORUS: 3.1 MG/DL (ref 2.5–4.9)
POTASSIUM SERPL-SCNC: 3.8 MMOL/L (ref 3.5–5.1)
SODIUM BLD-SCNC: 135 MMOL/L (ref 136–145)

## 2019-12-30 PROCEDURE — 36415 COLL VENOUS BLD VENIPUNCTURE: CPT

## 2019-12-30 PROCEDURE — 6360000002 HC RX W HCPCS: Performed by: SURGERY

## 2019-12-30 PROCEDURE — 80074 ACUTE HEPATITIS PANEL: CPT

## 2019-12-30 PROCEDURE — C9113 INJ PANTOPRAZOLE SODIUM, VIA: HCPCS | Performed by: SURGERY

## 2019-12-30 PROCEDURE — 80069 RENAL FUNCTION PANEL: CPT

## 2019-12-30 PROCEDURE — 83010 ASSAY OF HAPTOGLOBIN QUANT: CPT

## 2019-12-30 PROCEDURE — 83615 LACTATE (LD) (LDH) ENZYME: CPT

## 2019-12-30 PROCEDURE — 2580000003 HC RX 258: Performed by: INTERNAL MEDICINE

## 2019-12-30 PROCEDURE — 6360000002 HC RX W HCPCS: Performed by: INTERNAL MEDICINE

## 2019-12-30 PROCEDURE — 6370000000 HC RX 637 (ALT 250 FOR IP): Performed by: INTERNAL MEDICINE

## 2019-12-30 RX ORDER — HYDRALAZINE HYDROCHLORIDE 25 MG/1
50 TABLET, FILM COATED ORAL EVERY 8 HOURS SCHEDULED
Status: DISCONTINUED | OUTPATIENT
Start: 2019-12-30 | End: 2019-12-30 | Stop reason: HOSPADM

## 2019-12-30 RX ORDER — CARVEDILOL 6.25 MG/1
12.5 TABLET ORAL 2 TIMES DAILY WITH MEALS
Status: DISCONTINUED | OUTPATIENT
Start: 2019-12-30 | End: 2019-12-30 | Stop reason: HOSPADM

## 2019-12-30 RX ORDER — CARVEDILOL 12.5 MG/1
12.5 TABLET ORAL 2 TIMES DAILY WITH MEALS
Qty: 60 TABLET | Refills: 0 | Status: ON HOLD | OUTPATIENT
Start: 2019-12-30 | End: 2021-01-26 | Stop reason: HOSPADM

## 2019-12-30 RX ORDER — SODIUM BICARBONATE 650 MG/1
650 TABLET ORAL 2 TIMES DAILY
Status: DISCONTINUED | OUTPATIENT
Start: 2019-12-30 | End: 2019-12-30 | Stop reason: HOSPADM

## 2019-12-30 RX ORDER — SODIUM BICARBONATE 650 MG/1
650 TABLET ORAL 2 TIMES DAILY
Qty: 60 TABLET | Refills: 0 | Status: SHIPPED | OUTPATIENT
Start: 2019-12-30

## 2019-12-30 RX ORDER — METOCLOPRAMIDE 10 MG/1
10 TABLET ORAL
Qty: 120 TABLET | Refills: 0 | Status: ON HOLD | OUTPATIENT
Start: 2019-12-30 | End: 2021-01-26 | Stop reason: HOSPADM

## 2019-12-30 RX ORDER — PANTOPRAZOLE SODIUM 40 MG/1
40 TABLET, DELAYED RELEASE ORAL
Qty: 60 TABLET | Refills: 0 | Status: SHIPPED | OUTPATIENT
Start: 2019-12-30

## 2019-12-30 RX ORDER — HYDRALAZINE HYDROCHLORIDE 50 MG/1
50 TABLET, FILM COATED ORAL EVERY 8 HOURS SCHEDULED
Qty: 90 TABLET | Refills: 0 | Status: ON HOLD | OUTPATIENT
Start: 2019-12-30 | End: 2021-01-26 | Stop reason: HOSPADM

## 2019-12-30 RX ORDER — AMLODIPINE BESYLATE 10 MG/1
10 TABLET ORAL DAILY
Qty: 30 TABLET | Refills: 0 | Status: ON HOLD | OUTPATIENT
Start: 2019-12-31 | End: 2021-01-26 | Stop reason: HOSPADM

## 2019-12-30 RX ADMIN — HEPARIN SODIUM 5000 UNITS: 5000 INJECTION INTRAVENOUS; SUBCUTANEOUS at 05:09

## 2019-12-30 RX ADMIN — ASPIRIN 81 MG 81 MG: 81 TABLET ORAL at 09:37

## 2019-12-30 RX ADMIN — Medication 10 ML: at 09:38

## 2019-12-30 RX ADMIN — METOCLOPRAMIDE HYDROCHLORIDE 10 MG: 10 TABLET ORAL at 09:36

## 2019-12-30 RX ADMIN — AMLODIPINE BESYLATE 10 MG: 5 TABLET ORAL at 09:36

## 2019-12-30 RX ADMIN — LEVOTHYROXINE SODIUM 100 MCG: 100 TABLET ORAL at 05:09

## 2019-12-30 RX ADMIN — GENTAMICIN SULFATE: 1 CREAM TOPICAL at 09:39

## 2019-12-30 RX ADMIN — HYDRALAZINE HYDROCHLORIDE 25 MG: 25 TABLET, FILM COATED ORAL at 05:09

## 2019-12-30 RX ADMIN — SODIUM BICARBONATE 650 MG: 650 TABLET ORAL at 09:37

## 2019-12-30 RX ADMIN — HYDRALAZINE HYDROCHLORIDE 50 MG: 25 TABLET, FILM COATED ORAL at 15:02

## 2019-12-30 RX ADMIN — PANTOPRAZOLE SODIUM 40 MG: 40 INJECTION, POWDER, FOR SOLUTION INTRAVENOUS at 09:37

## 2019-12-30 RX ADMIN — METOCLOPRAMIDE HYDROCHLORIDE 10 MG: 10 TABLET ORAL at 05:09

## 2019-12-30 RX ADMIN — CARVEDILOL 12.5 MG: 6.25 TABLET, FILM COATED ORAL at 09:37

## 2019-12-30 ASSESSMENT — PAIN SCALES - GENERAL
PAINLEVEL_OUTOF10: 0

## 2019-12-30 NOTE — PROGRESS NOTES
BP remains elevated but below parameters for Labetolol; instructed on meds due for BP management @ 2100; no s/s of acute distress at present.

## 2019-12-30 NOTE — PROGRESS NOTES
Dressing change complete on left great toe. Area cleaned with normal saline. Antibiotic applied with fresh bandage and tape. Patient tolerated well. No drainage.   Abbey Zarate RN
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
## 2019-12-30 NOTE — DISCHARGE SUMMARY
dose of Lantus     Anemia Of chronic disease     Diabetic gastroparesis. Symptoms resolved p.o. Reglan gastric emptying discontinued     Rest of the comorbid condition stable. Outpatient follow-up with cardiology and nephrology side effects of Reglan has been explained to him    Consults. IP CONSULT TO HOSPITALIST  IP CONSULT TO GENERAL SURGERY  IP CONSULT TO GI  IP CONSULT TO NEPHROLOGY    Physical examination on discharge day. BP (!) 180/86   Pulse 77   Temp 98 °F (36.7 °C) (Oral)   Resp 16   Ht 5' 10\" (1.778 m)   Wt 193 lb (87.5 kg)   SpO2 94%   BMI 27.69 kg/m²   General appearance. Alert. Looks comfortable. HEENT. Sclera clear. Moist mucus membranes. Cardiovascular. Regular rate and rhythm, normal S1, S2. No murmur. Respiratory. Not using accessory muscles. Clear to auscultation bilaterally, no wheeze. Gastrointestinal. Abdomen soft, non-tender, not distended, normal bowel sounds  Neurology. Facial symmetry. No speech deficits. Moving all extremities equally. Extremities. No edema in lower extremities. Skin. Warm, dry, normal turgor        Condition at time of discharge stable    Medication instructions provided to patient at discharge.      Medication List      START taking these medications    amLODIPine 10 MG tablet  Commonly known as:  NORVASC  Take 1 tablet by mouth daily  Start taking on:  December 31, 2019     carvedilol 12.5 MG tablet  Commonly known as:  COREG  Take 1 tablet by mouth 2 times daily (with meals)     hydrALAZINE 50 MG tablet  Commonly known as:  APRESOLINE  Take 1 tablet by mouth every 8 hours     metoclopramide 10 MG tablet  Commonly known as:  REGLAN  Take 1 tablet by mouth 4 times daily (before meals and nightly)     pantoprazole 40 MG tablet  Commonly known as:  PROTONIX  Take 1 tablet by mouth 2 times daily (before meals)     sodium bicarbonate 650 MG tablet  Take 1 tablet by mouth 2 times daily        CHANGE how you take these medications    insulin lispro 100 UNIT/ML pen  Commonly known as:  HUMALOG  Inject 3 Units into the skin 3 times daily (with meals) Plus correction 1:25 BS>140 for daily total of 72 units  What changed:  how much to take        CONTINUE taking these medications    aspirin 81 MG chewable tablet  Notes to patient:  Use: prevents heart attacks and strokes. Side effects: bleeding or bruising more easily, stomach upset. atorvastatin 20 MG tablet  Commonly known as:  LIPITOR  Take 1 tablet by mouth daily  Notes to patient:  Use: lower cholesterol; prevent heart attack/stroke  Side effects: headache, muscle pain/weakness     * blood glucose test strips strip  Commonly known as:  ONE TOUCH ULTRA TEST  1 each by Does not apply route 5 times daily. 4 times daily. Notes to patient:  Equipment, supplies     * ACCU-CHEK VEE PLUS strip  Generic drug:  blood glucose test strips  1 each by In Vitro route 5 times daily As needed. Notes to patient:  Equipment, supplies     folic acid-pyridoxine-cyancobalamin 2.5-25-2 MG Tabs  Commonly known as:  FOLTX  Notes to patient:  Use: Treatment of anemia  Side effects: Stomach upset     gentamicin 0.1 % cream  Commonly known as:  GARAMYCIN  Apply topically  daily. Notes to patient:  USE: antibiotic cream for wound care  Side Effects: local irritation to wound site     insulin glargine 100 UNIT/ML injection pen  Commonly known as:  LANTUS  Inject 13 Units into the skin nightly  Notes to patient:  Use: treats diabetes or high blood sugar.  Long acting insulin  Side effects: Low blood sugar, irritation at the injection site     levothyroxine 100 MCG tablet  Commonly known as:  SYNTHROID  Take 1 tablet by mouth Daily  Notes to patient:  Use: to treat low thyroid levels  Side effects: hair loss, chest pain, irregular heartbeat, irritability, insomnia      ondansetron 4 MG disintegrating tablet  Commonly known as:  ZOFRAN ODT  Take 1-2 tablets by mouth every 12 hours as needed for Nausea May Sub regular tablet (non-ODT) if insurance does not cover ODT. Notes to patient:  Use: nausea and vomiting  Side effects: headache, weakness or dizziness     vitamin D 1000 UNIT Tabs tablet  Commonly known as:  CHOLECALCIFEROL  Take 2 tablets by mouth daily  Notes to patient:  Use: Dietary supplement  Side effects: Nausea         * This list has 2 medication(s) that are the same as other medications prescribed for you. Read the directions carefully, and ask your doctor or other care provider to review them with you. STOP taking these medications    ACCU-CHEK FASTCLIX LANCETS [facility] [place]     B-D UF III MINI PEN NEEDLES 31G X 5 MM Misc  Generic drug:  Insulin Pen Needle     NIFEdipine 10 MG capsule  Commonly known as:  PROCARDIA     Omadacycline Tosylate 150 MG Tabs     ONE TOUCH LANCETS Misc           Where to Get Your Medications      These medications were sent to [address] [place] [address] [place] [facility] [phone]  [address] [place] [phone]    Hours:  24-hours Phone:  [phone]   · amLODIPine 10 MG tablet  · carvedilol 12.5 MG tablet  · hydrALAZINE 50 MG tablet  · insulin lispro 100 UNIT/ML pen  · metoclopramide 10 MG tablet  · pantoprazole 40 MG tablet  · sodium bicarbonate 650 MG tablet         Discharge recommendations given to patient. Follow Up. pcp  in 1 week   Disposition. home  Activity. activity as tolerated  Diet: Dietary Nutrition Supplements: Low Calorie High Protein Supplement  DIET CARB CONTROL; Renal      Spent 26  minutes in discharge process.     Signed:  Maki Malloy MD     12/30/2019 10:45 AM

## 2019-12-31 NOTE — PROGRESS NOTES
Discharge instructions reviewed with pt/family, discussed medications, VU, denies any further questions or anxiety related to discharge. Educational handouts in regards to new medications, given via [employer], side effects discussed, VU. IV/tele discontinued. Pt discharged to home with family. Taken from room via wheel chair by Willy Sapp RN  , personal belongings taken with. New medications supplied through [employer].    Willy Sapp RN

## 2020-01-15 ENCOUNTER — HOSPITAL ENCOUNTER (OUTPATIENT)
Dept: WOUND CARE | Age: 65
Discharge: HOME | End: 2020-01-15
Payer: MEDICARE

## 2020-01-15 VITALS — DIASTOLIC BLOOD PRESSURE: 100 MMHG | HEART RATE: 88 BPM | RESPIRATION RATE: 16 BRPM | SYSTOLIC BLOOD PRESSURE: 193 MMHG

## 2020-01-15 PROCEDURE — 11043 DBRDMT MUSC&/FSCA 1ST 20/<: CPT

## 2020-01-15 NOTE — PROGRESS NOTES
DEBRIDEMENT Left 5/10/2019    INCISION, DRAINAGE, AND DEBRIDEMENT OF LEFT FOOT WITH DELAYED PRIMARY CLOSURE performed by Jennifer Jama DPM at [facility] TOE AMPUTATION Right 7/17/15    great toe    UPPER GASTROINTESTINAL ENDOSCOPY N/A 12/26/2019    EGD BIOPSY performed by Sari De León MD at [address]    Family History   Problem Relation Age of Onset    Cancer Mother     Diabetes Sister     Cancer Brother     Diabetes Brother     Diabetes Brother     Heart Disease Neg Hx     Stroke Neg Hx     Thyroid Disease Neg Hx        SOCIAL HISTORY    Social History     Tobacco Use    Smoking status: Never Smoker    Smokeless tobacco: Never Used   Substance Use Topics    Alcohol use: No    Drug use: No       ALLERGIES    No Known Allergies    MEDICATIONS    Current Outpatient Medications on File Prior to Encounter   Medication Sig Dispense Refill    sodium bicarbonate 650 MG tablet Take 1 tablet by mouth 2 times daily 60 tablet 0    insulin lispro (HUMALOG) 100 UNIT/ML pen Inject 3 Units into the skin 3 times daily (with meals) Plus correction 1:25 BS>140 for daily total of 72 units 10 pen 1    hydrALAZINE (APRESOLINE) 50 MG tablet Take 1 tablet by mouth every 8 hours 90 tablet 0    carvedilol (COREG) 12.5 MG tablet Take 1 tablet by mouth 2 times daily (with meals) 60 tablet 0    amLODIPine (NORVASC) 10 MG tablet Take 1 tablet by mouth daily 30 tablet 0    metoclopramide (REGLAN) 10 MG tablet Take 1 tablet by mouth 4 times daily (before meals and nightly) 120 tablet 0    pantoprazole (PROTONIX) 40 MG tablet Take 1 tablet by mouth 2 times daily (before meals) 60 tablet 0    ondansetron (ZOFRAN ODT) 4 MG disintegrating tablet Take 1-2 tablets by mouth every 12 hours as needed for Nausea May Sub regular tablet (non-ODT) if insurance does not cover ODT. 12 tablet 0    gentamicin (GARAMYCIN) 0.1 % cream Apply topically  daily.  1 Tube 0    aspirin 81 MG chewable tablet

## 2020-01-22 ENCOUNTER — HOSPITAL ENCOUNTER (OUTPATIENT)
Dept: WOUND CARE | Age: 65
Discharge: HOME | End: 2020-01-22
Payer: MEDICARE

## 2020-01-22 VITALS
TEMPERATURE: 97.2 F | HEART RATE: 95 BPM | SYSTOLIC BLOOD PRESSURE: 154 MMHG | RESPIRATION RATE: 16 BRPM | DIASTOLIC BLOOD PRESSURE: 85 MMHG

## 2020-01-22 PROCEDURE — 11043 DBRDMT MUSC&/FSCA 1ST 20/<: CPT

## 2020-01-22 RX ORDER — LIDOCAINE 40 MG/G
CREAM TOPICAL PRN
Status: DISCONTINUED | OUTPATIENT
Start: 2020-01-22 | End: 2020-01-23 | Stop reason: HOSPADM
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
## 2020-01-29 ENCOUNTER — HOSPITAL ENCOUNTER (OUTPATIENT)
Dept: WOUND CARE | Age: 65
Discharge: HOME | End: 2020-01-29
Payer: MEDICARE

## 2020-01-29 VITALS
HEART RATE: 92 BPM | RESPIRATION RATE: 16 BRPM | DIASTOLIC BLOOD PRESSURE: 88 MMHG | SYSTOLIC BLOOD PRESSURE: 180 MMHG | TEMPERATURE: 97.3 F

## 2020-01-29 PROCEDURE — 11042 DBRDMT SUBQ TIS 1ST 20SQCM/<: CPT

## 2020-01-29 RX ORDER — LIDOCAINE 40 MG/G
CREAM TOPICAL PRN
Status: DISCONTINUED | OUTPATIENT
Start: 2020-01-29 | End: 2020-01-30 | Stop reason: HOSPADM

## 2020-01-29 NOTE — PROGRESS NOTES
performed by Ronni Dorado DPM at [id number] Left 5/10/2019    INCISION, DRAINAGE, AND DEBRIDEMENT OF LEFT FOOT WITH DELAYED PRIMARY CLOSURE performed by Ronni Dorado DPM at [address] TOE AMPUTATION Right 7/17/15    great toe    UPPER GASTROINTESTINAL ENDOSCOPY N/A 12/26/2019    EGD BIOPSY performed by Priyank Swanson MD at [address]    Family History   Problem Relation Age of Onset    Cancer Mother     Diabetes Sister     Cancer Brother     Diabetes Brother     Diabetes Brother     Heart Disease Neg Hx     Stroke Neg Hx     Thyroid Disease Neg Hx        SOCIAL HISTORY    Social History     Tobacco Use    Smoking status: Never Smoker    Smokeless tobacco: Never Used   Substance Use Topics    Alcohol use: No    Drug use: No       ALLERGIES    No Known Allergies    MEDICATIONS    Current Outpatient Medications on File Prior to Encounter   Medication Sig Dispense Refill    sodium bicarbonate 650 MG tablet Take 1 tablet by mouth 2 times daily 60 tablet 0    insulin lispro (HUMALOG) 100 UNIT/ML pen Inject 3 Units into the skin 3 times daily (with meals) Plus correction 1:25 BS>140 for daily total of 72 units 10 pen 1    hydrALAZINE (APRESOLINE) 50 MG tablet Take 1 tablet by mouth every 8 hours 90 tablet 0    carvedilol (COREG) 12.5 MG tablet Take 1 tablet by mouth 2 times daily (with meals) 60 tablet 0    amLODIPine (NORVASC) 10 MG tablet Take 1 tablet by mouth daily 30 tablet 0    metoclopramide (REGLAN) 10 MG tablet Take 1 tablet by mouth 4 times daily (before meals and nightly) 120 tablet 0    pantoprazole (PROTONIX) 40 MG tablet Take 1 tablet by mouth 2 times daily (before meals) 60 tablet 0    ondansetron (ZOFRAN ODT) 4 MG disintegrating tablet Take 1-2 tablets by mouth every 12 hours as needed for Nausea May Sub regular tablet (non-ODT) if insurance does not cover ODT.  12 tablet 0    gentamicin (GARAMYCIN) 0.1 % cream Apply topically  daily. 1 Tube 0    aspirin 81 MG chewable tablet Take 1 tablet by mouth daily      folic acid-pyridoxine-cyancobalamin (FOLTX) 2.5-25-2 MG TABS Take 1 tablet by mouth daily      insulin glargine (LANTUS) 100 UNIT/ML injection pen Inject 13 Units into the skin nightly 5 pen 1    vitamin D (CHOLECALCIFEROL) 1000 UNIT TABS tablet Take 2 tablets by mouth daily 60 tablet 0    levothyroxine (SYNTHROID) 100 MCG tablet Take 1 tablet by mouth Daily 30 tablet 1    atorvastatin (LIPITOR) 20 MG tablet Take 1 tablet by mouth daily 30 tablet 5    ACCU-CHEK VEE PLUS strip 1 each by In Vitro route 5 times daily As needed. 450 each 3    glucose blood VI test strips (ONE TOUCH ULTRA TEST) strip 1 each by Does not apply route 5 times daily. 4 times daily. 150 each 11     No current facility-administered medications on file prior to encounter. REVIEW OF SYSTEMS    Pertinent items are noted in HPI. Objective:      BP (!) 180/88   Pulse 92   Temp 97.3 °F (36.3 °C) (Oral)   Resp 16     PHYSICAL EXAM    Palpable pedal pulses 1/4 PT and DP bilaterally  Edema bilaterally +2  Epicritic and protopathic sensations absent bilaterally  Wound reopened plantar aspect of the hallux.       Assessment:     Patient Active Problem List   Diagnosis    ARF (acute renal failure) (HCC)    Diabetic foot ulcer ([address]    Diabetic foot ulcer with osteomyelitis ([address]    Type 1 diabetes mellitus with stage 3 chronic kidney disease ([address]    Mixed hyperlipidemia    Diabetic foot infection ([address]    Cellulitis of foot    Acute hematogenous osteomyelitis of left foot (HCC)    Elevated sed rate    Elevated C-reactive protein (CRP)    Acute kidney injury superimposed on chronic kidney disease ([address]    Overweight    Diabetic polyneuropathy associated with type 2 diabetes mellitus ([address]    History of methicillin resistant staphylococcus aureus (MRSA)    Essential hypertension    [facility] to [facility]    [facility], stage III [facility]moderate) [facility]    [facility] left foot [facility]    Acute hematogenous osteomyelitis of left ankle ([facility])    Bacterial infection due to Staphylococcus aureus    Hypertensive urgency     Procedure Note    Performed by: Gladis George DPM    Consent obtained: Yes    Time out taken:  Yes    Pain Control: Anesthetic  Anesthetic: 4% Topical Xylocaine     Debridement:Excisional Debridement    Using curette the wound was sharply debrided    down through and including the removal of epidermis, dermis and subcutaneous tissue. Devitalized Tissue Debrided:  fibrin, biofilm, slough, necrotic/eschar, exudate and callus    Pre Debridement Measurements:  Are located in the Wound Documentation Flow Sheet    Wound #: 1   Wound Care Documentation:  Wound 05/24/19 Toe (Comment  which one) Left;Plantar Great #2  (Active)   Wound Image   1/15/2020  2:35 PM   Wound Diabetic 1/29/2020  2:50 PM   Offloading for Diabetic Foot Ulcers Post op shoe 1/29/2020  2:50 PM   Dressing Status Clean;Dry; Intact 11/20/2019  3:30 PM   Dressing Changed Changed/New 11/20/2019  3:30 PM   Dressing/Treatment Betadine swabs;Dry dressing 1/15/2020  3:58 PM   Wound Cleansed Rinsed/Irrigated with saline 1/29/2020  2:50 PM   Wound Length (cm) 0 cm 1/29/2020  2:50 PM   Wound Width (cm) 0 cm 1/29/2020  2:50 PM   Wound Depth (cm) 0 cm 1/29/2020  2:50 PM   Wound Surface Area (cm^2) 0 cm^2 1/29/2020  2:50 PM   Change in Wound Size % (l*w) 100 1/29/2020  2:50 PM   Wound Volume (cm^3) 0 cm^3 1/29/2020  2:50 PM   Wound Healing % 100 1/29/2020  2:50 PM   Post-Procedure Length (cm) 0.4 cm 1/29/2020  2:59 PM   Post-Procedure Width (cm) 0.4 cm 1/29/2020  2:59 PM   Post-Procedure Depth (cm) 0.2 cm 1/29/2020  2:59 PM   Post-Procedure Surface Area (cm^2) 0.16 cm^2 1/29/2020  2:59 PM   Post-Procedure Volume (cm^3) 0.03 cm^3 1/29/2020  2:59 PM   Wound Assessment Bleeding 1/29/2020  2:59 PM   Drainage Amount None 1/29/2020  2:50 PM   Drainage Description Serosanguinous 12/18/2019  1:39 PM   Odor None 1/29/2020  2:50 PM   [place]-wound Assessment Calloused 1/29/2020  2:50 PM   [place]%Wound Bed 100 1/29/2020  2:50 PM   Red%Wound Bed 0 1/29/2020  2:50 PM   Yellow%Wound Bed 0 1/29/2020  2:50 PM   Black%Wound Bed 0 1/29/2020  2:50 PM   Purple%Wound Bed 0 1/29/2020  2:50 PM   Other%Wound Bed 0 1/29/2020  2:50 PM   Number of days: 250         Total Surface Area Debrided:  0.16 sq cm     Percentage of wound debrided 100%    Bleeding:  None    Hemostasis Achieved:  by pressure    Procedural Pain:  0  / 10     Post Procedural Pain:  0 / 10     Response to treatment:  Well tolerated by patient. Plan:   Patient examined and evaluated, debridement of wound. The patient will need more aggressive surgical debridement. Plan for I&D with debridement of all nonviable soft tissue and bone once the patient is cleared by primary care and specialist.  At this time they are working on getting his blood pressure under control before this can happen. The nature of the patient's condition was explained in depth.  The patient was informed that their compliance to the treatment plan is paramount to successful healing and prevention of further ulceration and/or infection     Discharge Treatment  Daily dressing changes dry sterile bandage and gentamicin cream    Written Patient Discharge Instructions Given            Electronically signed by Thao Naqvi DPM on 1/29/2020 at 5:27 PM

## 2020-01-29 NOTE — PLAN OF CARE
Discharge instructions given. Patient verbalized understanding. Return to [facility] in 1 week.

## 2020-02-05 ENCOUNTER — HOSPITAL ENCOUNTER (OUTPATIENT)
Dept: WOUND CARE | Age: 65
Discharge: HOME | End: 2020-02-05
Payer: MEDICARE

## 2020-02-05 VITALS — HEART RATE: 94 BPM | SYSTOLIC BLOOD PRESSURE: 181 MMHG | DIASTOLIC BLOOD PRESSURE: 96 MMHG | RESPIRATION RATE: 16 BRPM

## 2020-02-05 PROCEDURE — 11042 DBRDMT SUBQ TIS 1ST 20SQCM/<: CPT
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
## 2020-02-05 NOTE — PLAN OF CARE
Discharge instructions given. Patient verbalized understanding. Return to [facility] in 1 week.

## 2020-02-12 ENCOUNTER — HOSPITAL ENCOUNTER (OUTPATIENT)
Dept: WOUND CARE | Age: 65
Discharge: HOME | End: 2020-02-12
Payer: MEDICARE

## 2020-02-12 VITALS — HEART RATE: 92 BPM | DIASTOLIC BLOOD PRESSURE: 101 MMHG | SYSTOLIC BLOOD PRESSURE: 189 MMHG | RESPIRATION RATE: 16 BRPM

## 2020-02-12 PROCEDURE — 11042 DBRDMT SUBQ TIS 1ST 20SQCM/<: CPT
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
## 2020-02-17 ENCOUNTER — HOSPITAL ENCOUNTER (OUTPATIENT)
Age: 65
Discharge: HOME | End: 2020-02-17
Payer: MEDICARE

## 2020-02-17 LAB
ANION GAP SERPL CALCULATED.3IONS-SCNC: 17 MMOL/L (ref 3–16)
BUN BLDV-MCNC: 35 MG/DL (ref 7–20)
CALCIUM SERPL-MCNC: 9.2 MG/DL (ref 8.3–10.6)
CHLORIDE BLD-SCNC: 105 MMOL/L (ref 99–110)
CO2: 20 MMOL/L (ref 21–32)
CREAT SERPL-MCNC: 4.9 MG/DL (ref 0.8–1.3)
GFR AFRICAN AMERICAN: 14
GFR NON-AFRICAN AMERICAN: 12
GLUCOSE BLD-MCNC: 133 MG/DL (ref 70–99)
PARATHYROID HORMONE INTACT: 575.7 PG/ML (ref 14–72)
PHOSPHORUS: 3.4 MG/DL (ref 2.5–4.9)
POTASSIUM SERPL-SCNC: 4.1 MMOL/L (ref 3.5–5.1)
SODIUM BLD-SCNC: 142 MMOL/L (ref 136–145)
VITAMIN D 25-HYDROXY: 17.7 NG/ML

## 2020-02-17 PROCEDURE — 84100 ASSAY OF PHOSPHORUS: CPT

## 2020-02-17 PROCEDURE — 80048 BASIC METABOLIC PNL TOTAL CA: CPT

## 2020-02-17 PROCEDURE — 82306 VITAMIN D 25 HYDROXY: CPT

## 2020-02-17 PROCEDURE — 83970 ASSAY OF PARATHORMONE: CPT

## 2020-02-17 PROCEDURE — 36415 COLL VENOUS BLD VENIPUNCTURE: CPT

## 2020-02-19 ENCOUNTER — HOSPITAL ENCOUNTER (OUTPATIENT)
Dept: WOUND CARE | Age: 65
Discharge: HOME | End: 2020-02-19
Payer: MEDICARE

## 2020-02-19 VITALS — HEART RATE: 94 BPM | DIASTOLIC BLOOD PRESSURE: 83 MMHG | SYSTOLIC BLOOD PRESSURE: 160 MMHG | RESPIRATION RATE: 16 BRPM

## 2020-02-19 PROCEDURE — 11042 DBRDMT SUBQ TIS 1ST 20SQCM/<: CPT

## 2020-02-19 NOTE — PLAN OF CARE
Discharge instructions given. Patient verbalized understanding. Return to [facility] in 1 week.
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
## 2020-02-26 ENCOUNTER — HOSPITAL ENCOUNTER (OUTPATIENT)
Dept: WOUND CARE | Age: 65
Discharge: HOME | End: 2020-02-26
Payer: MEDICARE

## 2020-02-26 VITALS — DIASTOLIC BLOOD PRESSURE: 82 MMHG | RESPIRATION RATE: 16 BRPM | SYSTOLIC BLOOD PRESSURE: 152 MMHG | HEART RATE: 72 BPM

## 2020-02-26 PROCEDURE — 99212 OFFICE O/P EST SF 10 MIN: CPT
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
## 2020-03-04 ENCOUNTER — HOSPITAL ENCOUNTER (OUTPATIENT)
Age: 65
Discharge: HOME | End: 2020-03-04
Payer: MEDICARE

## 2020-03-04 LAB
ANION GAP SERPL CALCULATED.3IONS-SCNC: 18 MMOL/L (ref 3–16)
BUN BLDV-MCNC: 42 MG/DL (ref 7–20)
CALCIUM SERPL-MCNC: 8.9 MG/DL (ref 8.3–10.6)
CHLORIDE BLD-SCNC: 106 MMOL/L (ref 99–110)
CO2: 18 MMOL/L (ref 21–32)
CREAT SERPL-MCNC: 4.6 MG/DL (ref 0.8–1.3)
FERRITIN: 141.7 NG/ML (ref 30–400)
GFR AFRICAN AMERICAN: 15
GFR NON-AFRICAN AMERICAN: 13
GLUCOSE BLD-MCNC: 109 MG/DL (ref 70–99)
HCT VFR BLD CALC: 29.8 % (ref 40.5–52.5)
HEMOGLOBIN: 9.6 G/DL (ref 13.5–17.5)
IRON SATURATION: 21 % (ref 20–50)
IRON: 59 UG/DL (ref 59–158)
MCH RBC QN AUTO: 30.4 PG (ref 26–34)
MCHC RBC AUTO-ENTMCNC: 32.4 G/DL (ref 31–36)
MCV RBC AUTO: 93.6 FL (ref 80–100)
PDW BLD-RTO: 13.2 % (ref 12.4–15.4)
PHOSPHORUS: 3.4 MG/DL (ref 2.5–4.9)
PLATELET # BLD: 197 K/UL (ref 135–450)
PMV BLD AUTO: 9 FL (ref 5–10.5)
POTASSIUM SERPL-SCNC: 4.8 MMOL/L (ref 3.5–5.1)
RBC # BLD: 3.18 M/UL (ref 4.2–5.9)
SODIUM BLD-SCNC: 142 MMOL/L (ref 136–145)
TOTAL IRON BINDING CAPACITY: 284 UG/DL (ref 260–445)
WBC # BLD: 5.3 K/UL (ref 4–11)

## 2020-03-04 PROCEDURE — 85027 COMPLETE CBC AUTOMATED: CPT

## 2020-03-04 PROCEDURE — 36415 COLL VENOUS BLD VENIPUNCTURE: CPT

## 2020-03-04 PROCEDURE — 82728 ASSAY OF FERRITIN: CPT

## 2020-03-04 PROCEDURE — 83540 ASSAY OF IRON: CPT

## 2020-03-04 PROCEDURE — 84100 ASSAY OF PHOSPHORUS: CPT

## 2020-03-04 PROCEDURE — 80048 BASIC METABOLIC PNL TOTAL CA: CPT

## 2020-03-04 PROCEDURE — 83550 IRON BINDING TEST: CPT

## 2020-03-18 ENCOUNTER — HOSPITAL ENCOUNTER (OUTPATIENT)
Dept: WOUND CARE | Age: 65
Discharge: HOME | End: 2020-03-18
Payer: MEDICARE

## 2020-03-18 VITALS
SYSTOLIC BLOOD PRESSURE: 158 MMHG | DIASTOLIC BLOOD PRESSURE: 84 MMHG | HEART RATE: 76 BPM | TEMPERATURE: 97.2 F | RESPIRATION RATE: 16 BRPM

## 2020-03-18 PROCEDURE — 99212 OFFICE O/P EST SF 10 MIN: CPT
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
## 2020-04-15 ENCOUNTER — HOSPITAL ENCOUNTER (OUTPATIENT)
Age: 65
Discharge: HOME | End: 2020-04-15
Payer: MEDICARE

## 2020-04-15 LAB
ANION GAP SERPL CALCULATED.3IONS-SCNC: 13 MMOL/L (ref 3–16)
BUN BLDV-MCNC: 41 MG/DL (ref 7–20)
CALCIUM SERPL-MCNC: 9.1 MG/DL (ref 8.3–10.6)
CHLORIDE BLD-SCNC: 105 MMOL/L (ref 99–110)
CO2: 19 MMOL/L (ref 21–32)
CREAT SERPL-MCNC: 4.2 MG/DL (ref 0.8–1.3)
GFR AFRICAN AMERICAN: 17
GFR NON-AFRICAN AMERICAN: 14
GLUCOSE BLD-MCNC: 145 MG/DL (ref 70–99)
HCT VFR BLD CALC: 31.4 % (ref 40.5–52.5)
HEMOGLOBIN: 9.9 G/DL (ref 13.5–17.5)
MCH RBC QN AUTO: 30.3 PG (ref 26–34)
MCHC RBC AUTO-ENTMCNC: 31.7 G/DL (ref 31–36)
MCV RBC AUTO: 95.6 FL (ref 80–100)
PDW BLD-RTO: 14.1 % (ref 12.4–15.4)
PHOSPHORUS: 3.5 MG/DL (ref 2.5–4.9)
PLATELET # BLD: 194 K/UL (ref 135–450)
PMV BLD AUTO: 8.2 FL (ref 5–10.5)
POTASSIUM SERPL-SCNC: 4.7 MMOL/L (ref 3.5–5.1)
RBC # BLD: 3.28 M/UL (ref 4.2–5.9)
SODIUM BLD-SCNC: 137 MMOL/L (ref 136–145)
WBC # BLD: 5.8 K/UL (ref 4–11)

## 2020-04-15 PROCEDURE — 36415 COLL VENOUS BLD VENIPUNCTURE: CPT

## 2020-04-15 PROCEDURE — 80048 BASIC METABOLIC PNL TOTAL CA: CPT

## 2020-04-15 PROCEDURE — 84100 ASSAY OF PHOSPHORUS: CPT

## 2020-04-15 PROCEDURE — 85027 COMPLETE CBC AUTOMATED: CPT

## 2020-05-03 ENCOUNTER — APPOINTMENT (OUTPATIENT)
Dept: CT IMAGING | Age: 65
End: 2020-05-03
Payer: MEDICARE

## 2020-05-03 ENCOUNTER — HOSPITAL ENCOUNTER (EMERGENCY)
Age: 65
Discharge: HOME | End: 2020-05-03
Payer: MEDICARE

## 2020-05-03 VITALS
BODY MASS INDEX: 27.92 KG/M2 | WEIGHT: 195 LBS | SYSTOLIC BLOOD PRESSURE: 197 MMHG | OXYGEN SATURATION: 97 % | DIASTOLIC BLOOD PRESSURE: 99 MMHG | TEMPERATURE: 98.2 F | HEART RATE: 95 BPM | RESPIRATION RATE: 17 BRPM | HEIGHT: 70 IN

## 2020-05-03 PROCEDURE — 70486 CT MAXILLOFACIAL W/O DYE: CPT

## 2020-05-03 PROCEDURE — 12011 RPR F/E/E/N/L/M 2.5 CM/<: CPT

## 2020-05-03 PROCEDURE — 72125 CT NECK SPINE W/O DYE: CPT

## 2020-05-03 PROCEDURE — 70450 CT HEAD/BRAIN W/O DYE: CPT

## 2020-05-03 PROCEDURE — 99283 EMERGENCY DEPT VISIT LOW MDM: CPT

## 2020-05-03 RX ORDER — CEPHALEXIN 500 MG/1
500 CAPSULE ORAL 4 TIMES DAILY
Qty: 20 CAPSULE | Refills: 0 | Status: SHIPPED | OUTPATIENT
Start: 2020-05-03 | End: 2020-05-08

## 2020-05-03 RX ORDER — BACITRACIN, NEOMYCIN, POLYMYXIN B 400; 3.5; 5 [USP'U]/G; MG/G; [USP'U]/G
OINTMENT TOPICAL
Status: DISCONTINUED
Start: 2020-05-03 | End: 2020-05-04 | Stop reason: HOSPADM

## 2020-05-03 ASSESSMENT — ENCOUNTER SYMPTOMS
ABDOMINAL PAIN: 0
COUGH: 0
SHORTNESS OF BREATH: 0
WHEEZING: 0
DIARRHEA: 0
VOMITING: 0
NAUSEA: 0
RHINORRHEA: 0

## 2020-05-03 ASSESSMENT — PAIN SCALES - GENERAL: PAINLEVEL_OUTOF10: 5
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
## 2020-07-09 ENCOUNTER — HOSPITAL ENCOUNTER (OUTPATIENT)
Age: 65
Discharge: HOME | End: 2020-07-09
Payer: MEDICARE

## 2020-07-09 LAB
ANION GAP SERPL CALCULATED.3IONS-SCNC: 13 MMOL/L (ref 3–16)
BUN BLDV-MCNC: 41 MG/DL (ref 7–20)
CALCIUM SERPL-MCNC: 8.3 MG/DL (ref 8.3–10.6)
CHLORIDE BLD-SCNC: 105 MMOL/L (ref 99–110)
CO2: 20 MMOL/L (ref 21–32)
CREAT SERPL-MCNC: 4.8 MG/DL (ref 0.8–1.3)
GFR AFRICAN AMERICAN: 15
GFR NON-AFRICAN AMERICAN: 12
GLUCOSE BLD-MCNC: 135 MG/DL (ref 70–99)
HCT VFR BLD CALC: 32.4 % (ref 40.5–52.5)
HEMOGLOBIN: 10.3 G/DL (ref 13.5–17.5)
MCH RBC QN AUTO: 31 PG (ref 26–34)
MCHC RBC AUTO-ENTMCNC: 31.8 G/DL (ref 31–36)
MCV RBC AUTO: 97.6 FL (ref 80–100)
PARATHYROID HORMONE INTACT: 610.2 PG/ML (ref 14–72)
PDW BLD-RTO: 14.5 % (ref 12.4–15.4)
PHOSPHORUS: 3.6 MG/DL (ref 2.5–4.9)
PLATELET # BLD: 169 K/UL (ref 135–450)
PMV BLD AUTO: 8.4 FL (ref 5–10.5)
POTASSIUM SERPL-SCNC: 4.6 MMOL/L (ref 3.5–5.1)
RBC # BLD: 3.32 M/UL (ref 4.2–5.9)
SODIUM BLD-SCNC: 138 MMOL/L (ref 136–145)
VITAMIN D 25-HYDROXY: 36.3 NG/ML
WBC # BLD: 5.8 K/UL (ref 4–11)

## 2020-07-09 PROCEDURE — 83970 ASSAY OF PARATHORMONE: CPT

## 2020-07-09 PROCEDURE — 82306 VITAMIN D 25 HYDROXY: CPT

## 2020-07-09 PROCEDURE — 85027 COMPLETE CBC AUTOMATED: CPT

## 2020-07-09 PROCEDURE — 80048 BASIC METABOLIC PNL TOTAL CA: CPT

## 2020-07-09 PROCEDURE — 84100 ASSAY OF PHOSPHORUS: CPT

## 2020-07-09 PROCEDURE — 36415 COLL VENOUS BLD VENIPUNCTURE: CPT

## 2021-01-02 ENCOUNTER — APPOINTMENT (OUTPATIENT)
Dept: GENERAL RADIOLOGY | Age: 66
End: 2021-01-02
Payer: MEDICARE

## 2021-01-02 ENCOUNTER — HOSPITAL ENCOUNTER (EMERGENCY)
Age: 66
Discharge: HOME | End: 2021-01-03
Attending: STUDENT IN AN ORGANIZED HEALTH CARE EDUCATION/TRAINING PROGRAM
Payer: MEDICARE

## 2021-01-02 ENCOUNTER — APPOINTMENT (OUTPATIENT)
Dept: CT IMAGING | Age: 66
End: 2021-01-02
Payer: MEDICARE

## 2021-01-02 DIAGNOSIS — N32.89 BLADDER WALL THICKENING: ICD-10-CM

## 2021-01-02 DIAGNOSIS — R10.9 CHRONIC ABDOMINAL PAIN: Primary | ICD-10-CM

## 2021-01-02 DIAGNOSIS — R91.1 RIGHT LOWER LOBE PULMONARY NODULE: ICD-10-CM

## 2021-01-02 DIAGNOSIS — K76.9 LIVER LESION: ICD-10-CM

## 2021-01-02 DIAGNOSIS — G89.29 CHRONIC ABDOMINAL PAIN: Primary | ICD-10-CM

## 2021-01-02 DIAGNOSIS — R18.8 OTHER ASCITES: ICD-10-CM

## 2021-01-02 DIAGNOSIS — Z20.822 ENCOUNTER FOR LABORATORY TESTING FOR COVID-19 VIRUS: ICD-10-CM

## 2021-01-02 DIAGNOSIS — R05.3 CHRONIC COUGH: ICD-10-CM

## 2021-01-02 DIAGNOSIS — N18.9 CHRONIC KIDNEY DISEASE, UNSPECIFIED CKD STAGE: ICD-10-CM

## 2021-01-02 DIAGNOSIS — Z86.39 HISTORY OF INSULIN DEPENDENT DIABETES MELLITUS: ICD-10-CM

## 2021-01-02 LAB
A/G RATIO: 0.6 (ref 1.1–2.2)
ALBUMIN SERPL-MCNC: 2.2 G/DL (ref 3.4–5)
ALP BLD-CCNC: 264 U/L (ref 40–129)
ALT SERPL-CCNC: 13 U/L (ref 10–40)
ANION GAP SERPL CALCULATED.3IONS-SCNC: 12 MMOL/L (ref 3–16)
AST SERPL-CCNC: 33 U/L (ref 15–37)
BASE EXCESS VENOUS: -3.1 MMOL/L (ref -3–3)
BASOPHILS ABSOLUTE: 0.1 K/UL (ref 0–0.2)
BASOPHILS RELATIVE PERCENT: 0.8 %
BILIRUB SERPL-MCNC: 1.5 MG/DL (ref 0–1)
BUN BLDV-MCNC: 75 MG/DL (ref 7–20)
CALCIUM SERPL-MCNC: 8.6 MG/DL (ref 8.3–10.6)
CARBOXYHEMOGLOBIN: 5.7 % (ref 0–1.5)
CHLORIDE BLD-SCNC: 105 MMOL/L (ref 99–110)
CO2: 19 MMOL/L (ref 21–32)
CREAT SERPL-MCNC: 4.8 MG/DL (ref 0.8–1.3)
EOSINOPHILS ABSOLUTE: 0.1 K/UL (ref 0–0.6)
EOSINOPHILS RELATIVE PERCENT: 1.1 %
GFR AFRICAN AMERICAN: 15
GFR NON-AFRICAN AMERICAN: 12
GLOBULIN: 3.8 G/DL
GLUCOSE BLD-MCNC: 80 MG/DL (ref 70–99)
HCO3 VENOUS: 21.3 MMOL/L (ref 23–29)
HCT VFR BLD CALC: 36.9 % (ref 40.5–52.5)
HEMOGLOBIN, VEN, REDUCED: 4 %
HEMOGLOBIN: 11.6 G/DL (ref 13.5–17.5)
LACTIC ACID, SEPSIS: 1.6 MMOL/L (ref 0.4–1.9)
LIPASE: 20 U/L (ref 13–60)
LYMPHOCYTES ABSOLUTE: 0.7 K/UL (ref 1–5.1)
LYMPHOCYTES RELATIVE PERCENT: 10 %
MCH RBC QN AUTO: 30.1 PG (ref 26–34)
MCHC RBC AUTO-ENTMCNC: 31.5 G/DL (ref 31–36)
MCV RBC AUTO: 95.6 FL (ref 80–100)
METHEMOGLOBIN VENOUS: 0.1 %
MONOCYTES ABSOLUTE: 0.5 K/UL (ref 0–1.3)
MONOCYTES RELATIVE PERCENT: 7.3 %
NEUTROPHILS ABSOLUTE: 5.9 K/UL (ref 1.7–7.7)
NEUTROPHILS RELATIVE PERCENT: 80.8 %
O2 CONTENT, VEN: 15 VOL %
O2 SAT, VEN: 96 %
O2 THERAPY: ABNORMAL
PCO2, VEN: 35.4 MMHG (ref 40–50)
PDW BLD-RTO: 18 % (ref 12.4–15.4)
PH VENOUS: 7.39 (ref 7.35–7.45)
PLATELET # BLD: 178 K/UL (ref 135–450)
PMV BLD AUTO: 7.7 FL (ref 5–10.5)
PO2, VEN: 74.1 MMHG (ref 25–40)
POTASSIUM REFLEX MAGNESIUM: 5.2 MMOL/L (ref 3.5–5.1)
RBC # BLD: 3.86 M/UL (ref 4.2–5.9)
REASON FOR REJECTION: NORMAL
REJECTED TEST: NORMAL
SODIUM BLD-SCNC: 136 MMOL/L (ref 136–145)
TCO2 CALC VENOUS: 50 MMOL/L
TOTAL PROTEIN: 6 G/DL (ref 6.4–8.2)
WBC # BLD: 7.3 K/UL (ref 4–11)

## 2021-01-02 PROCEDURE — 87040 BLOOD CULTURE FOR BACTERIA: CPT

## 2021-01-02 PROCEDURE — 83605 ASSAY OF LACTIC ACID: CPT

## 2021-01-02 PROCEDURE — 80053 COMPREHEN METABOLIC PANEL: CPT

## 2021-01-02 PROCEDURE — 83690 ASSAY OF LIPASE: CPT

## 2021-01-02 PROCEDURE — 71045 X-RAY EXAM CHEST 1 VIEW: CPT

## 2021-01-02 PROCEDURE — 74176 CT ABD & PELVIS W/O CONTRAST: CPT

## 2021-01-02 PROCEDURE — 85025 COMPLETE CBC W/AUTO DIFF WBC: CPT

## 2021-01-02 PROCEDURE — 99283 EMERGENCY DEPT VISIT LOW MDM: CPT

## 2021-01-02 PROCEDURE — 82803 BLOOD GASES ANY COMBINATION: CPT

## 2021-01-02 PROCEDURE — U0003 INFECTIOUS AGENT DETECTION BY NUCLEIC ACID (DNA OR RNA); SEVERE ACUTE RESPIRATORY SYNDROME CORONAVIRUS 2 (SARS-COV-2) (CORONAVIRUS DISEASE [COVID-19]), AMPLIFIED PROBE TECHNIQUE, MAKING USE OF HIGH THROUGHPUT TECHNOLOGIES AS DESCRIBED BY CMS-2020-01-R: HCPCS

## 2021-01-02 PROCEDURE — 36415 COLL VENOUS BLD VENIPUNCTURE: CPT

## 2021-01-02 ASSESSMENT — ENCOUNTER SYMPTOMS
NAUSEA: 0
SHORTNESS OF BREATH: 0
CHEST TIGHTNESS: 0
DIARRHEA: 0
COUGH: 1
ABDOMINAL PAIN: 1
VOMITING: 0

## 2021-01-02 ASSESSMENT — PAIN SCALES - GENERAL: PAINLEVEL_OUTOF10: 6

## 2021-01-03 VITALS
WEIGHT: 200 LBS | HEART RATE: 75 BPM | TEMPERATURE: 97.7 F | OXYGEN SATURATION: 99 % | HEIGHT: 70 IN | DIASTOLIC BLOOD PRESSURE: 93 MMHG | SYSTOLIC BLOOD PRESSURE: 154 MMHG | RESPIRATION RATE: 15 BRPM | BODY MASS INDEX: 28.63 KG/M2

## 2021-01-03 LAB
BILIRUBIN URINE: NEGATIVE
BLOOD, URINE: NEGATIVE
CLARITY: CLEAR
COLOR: YELLOW
EPITHELIAL CELLS, UA: 0 /HPF (ref 0–5)
GLUCOSE URINE: NEGATIVE MG/DL
HYALINE CASTS: 1 /LPF (ref 0–8)
KETONES, URINE: NEGATIVE MG/DL
LEUKOCYTE ESTERASE, URINE: NEGATIVE
MICROSCOPIC EXAMINATION: YES
NITRITE, URINE: NEGATIVE
PH UA: 6 (ref 5–8)
PROTEIN UA: 100 MG/DL
RBC UA: 1 /HPF (ref 0–4)
SARS-COV-2, PCR: NOT DETECTED
SPECIFIC GRAVITY UA: 1.01 (ref 1–1.03)
URINE REFLEX TO CULTURE: ABNORMAL
URINE TYPE: ABNORMAL
UROBILINOGEN, URINE: 2 E.U./DL
WBC UA: 0 /HPF (ref 0–5)

## 2021-01-03 PROCEDURE — 81001 URINALYSIS AUTO W/SCOPE: CPT

## 2021-01-03 RX ORDER — DICYCLOMINE HYDROCHLORIDE 10 MG/1
10 CAPSULE ORAL EVERY 6 HOURS PRN
Qty: 20 CAPSULE | Refills: 0 | Status: SHIPPED | OUTPATIENT
Start: 2021-01-03

## 2021-01-03 RX ORDER — HYDROCODONE BITARTRATE AND ACETAMINOPHEN 5; 325 MG/1; MG/1
1 TABLET ORAL EVERY 6 HOURS PRN
Qty: 8 TABLET | Refills: 0 | Status: SHIPPED | OUTPATIENT
Start: 2021-01-03 | End: 2021-01-05

## 2021-01-03 RX ORDER — ONDANSETRON 4 MG/1
4-8 TABLET, ORALLY DISINTEGRATING ORAL EVERY 12 HOURS PRN
Qty: 12 TABLET | Refills: 0 | Status: ON HOLD | OUTPATIENT
Start: 2021-01-03 | End: 2021-01-26 | Stop reason: HOSPADM

## 2021-01-03 NOTE — ED PROVIDER NOTES
I independently performed a history and physical on Pineda Rosenberg. All diagnostic, treatment, and disposition decisions were made by myself in conjunction with the advanced practice provider. Briefly, this is a 77 y.o. male here for diffuse abd pain and cough x 1 month. Pain is aching and getting progressively worse. Has associated nausea. Has hx of CKD not on HD, and DM2  Requesting covid19 swab which was sent. On exam pt is resting comfortably  Cardiac RRR, no murmur  Lungs clear bilaterally, no increased work of breathing  Abdomen soft +diffusely tender nondistended no guarding   No calf edema or tenderness  Neuro no drift in extremities       CT ABDOMEN PELVIS WO CONTRAST Additional Contrast? None   Final Result   1. Multiple liver lesions most likely represent metastatic disease. 2. Right lower lobe pulmonary nodule with left pleural effusion or pleural   thickening. CT scan of the chest is recommended for further evaluation. 3. Small to moderate amount of ascites. 4. Urinary bladder wall thickening may indicate cystitis or chronic outlet   obstruction. XR CHEST PORTABLE   Final Result   No acute cardiopulmonary abnormality.            Labs Reviewed   CBC WITH AUTO DIFFERENTIAL - Abnormal; Notable for the following components:       Result Value    RBC 3.86 (*)     Hemoglobin 11.6 (*)     Hematocrit 36.9 (*)     RDW 18.0 (*)     Lymphocytes Absolute 0.7 (*)     All other components within normal limits    Narrative:     Performed at:  [facility]  [address] [facility], [address]   Phone [phone]   BLOOD GAS, VENOUS - Abnormal; Notable for the following components:    pCO2, Reinaldo 35.4 (*)     pO2, Reinaldo 74.1 (*)     HCO3, Venous 21.3 (*)     Base Excess, Reinaldo -3.1 (*)     Carboxyhemoglobin 5.7 (*)     All other components within normal limits    Narrative:     Performed at:  [facility]  [address] [facility], [place]   Phone [phone]   COMPREHENSIVE METABOLIC PANEL W/ REFLEX TO MG FOR LOW K - Abnormal; Notable for the following components:    Potassium reflex Magnesium 5.2 (*)     CO2 19 (*)     BUN 75 (*)     CREATININE 4.8 (*)     GFR Non- 12 (*)     GFR  15 (*)     Total Protein 6.0 (*)     Alb 2.2 (*)     Albumin/Globulin Ratio 0.6 (*)     Total Bilirubin 1.5 (*)     Alkaline Phosphatase 264 (*)     All other components within normal limits    Narrative:     Performed at:  [facility] [address] [place], [place]   Phone [phone]   URINE RT REFLEX TO CULTURE - Abnormal; Notable for the following components:    Protein,  (*)     Urobilinogen, Urine 2.0 (*)     All other components within normal limits    Narrative:     Performed at:  [facility] [address], [place]   Phone [phone]   CULTURE, BLOOD 1   CULTURE, BLOOD 2   LACTATE, SEPSIS    Narrative:     Performed at:  [facility] [address], [place]   Phone [phone]    Narrative:     [address],  Rejected Test CMPX/Called to:Karina Rosenthal RN, 62/54/2890 22:26, by Cristóbal Ellis  Performed at:  [facility] [address], [place]   Phone [phone]   LIPASE    Narrative:     Performed at:  [facility] [address], [place]   Phone [phone]   MICROSCOPIC URINALYSIS    Narrative:     Performed at:  [facility] [address], [place]   Phone [phone]       Patient presents for chronic abdominal pain as well as cough and taste change. On my exam he is comfortable and hemodynamically stable.   He has diffuse abdominal tenderness without guarding or peritoneal signs. CT of the abdomen pelvis is not showing any signs of acute obstruction or perforation. There are multiple lesions concerning for metastatic disease without primary source. Findings were discussed with the patient as well as need for PCP follow-up for further testing. Urine is clean appearing without signs of infection. Patient understands that he must return if any shortness of breath, trouble breathing or inability to tolerate p.o. Otherwise he is stable for PCP follow-up. He has a Covid swab pending now and understands that he must quarantine from others he receives a negative result, or 14 days since symptom onset if his result is positive. He is agreeable to plan. Patient Referrals:  Yvette Coy, [address] [facility] [address]  [phone]    Schedule an appointment as soon as possible for a visit       [facility] Emergency Department  [address] [place] [address]  [phone]    If symptoms worsen      Discharge Medications:  Discharge Medication List as of 1/3/2021  1:35 AM      START taking these medications    Details   dicyclomine (BENTYL) 10 MG capsule Take 1 capsule by mouth every 6 hours as needed (cramps), Disp-20 capsule, R-0Print      HYDROcodone-acetaminophen (NORCO) 5-325 MG per tablet Take 1 tablet by mouth every 6 hours as needed for Pain for up to 2 days. , Disp-8 tablet, R-0Print      !! ondansetron (ZOFRAN ODT) 4 MG disintegrating tablet Take 1-2 tablets by mouth every 12 hours as needed for Nausea May Sub regular tablet (non-ODT) if insurance does not cover ODT., Disp-12 tablet, R-0Print       !! - Potential duplicate medications found. Please discuss with provider. FINAL IMPRESSION  1. Chronic abdominal pain    2. Chronic cough    3. Multiple liver lesions concerning for metastatic disease without known primary carcinoma    4. Right lower lobe pulmonary nodule    5.  Chronic kidney disease, unspecified CKD stage    6. Other ascites    7. Bladder wall thickening    8. Encounter for laboratory testing for COVID-19 virus    9. History of insulin dependent diabetes mellitus        Blood pressure (!) 154/93, pulse 75, temperature 97.7 °F (36.5 °C), resp. rate 15, height 5' 10\" (1.778 m), weight 200 lb (90.7 kg), SpO2 99 %.      For further details of [facility] emergency department encounter, please see documentation by advanced practice provider        Veronica Cardona MD  01/03/21 8456

## 2021-01-03 NOTE — ED NOTES
Discharge instructions reviewed with pt. Pt verbalized understanding. Peripheral IV removed without complications. Pt given copy of discharge instructions.       Derrick Mccall RN  01/03/21 9234
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
## 2021-01-06 LAB
BLOOD CULTURE, ROUTINE: NORMAL
CULTURE, BLOOD 2: NORMAL

## 2021-01-18 ENCOUNTER — HOSPITAL ENCOUNTER (OUTPATIENT)
Age: 66
Discharge: HOME | DRG: 435 | End: 2021-01-18
Payer: MEDICARE

## 2021-01-18 LAB
INR BLD: 1.53 (ref 0.86–1.14)
PROTHROMBIN TIME: 17.8 SEC (ref 10–13.2)

## 2021-01-18 PROCEDURE — 36415 COLL VENOUS BLD VENIPUNCTURE: CPT

## 2021-01-18 PROCEDURE — 84153 ASSAY OF PSA TOTAL: CPT

## 2021-01-18 PROCEDURE — 85610 PROTHROMBIN TIME: CPT

## 2021-01-18 PROCEDURE — 85025 COMPLETE CBC W/AUTO DIFF WBC: CPT

## 2021-01-18 PROCEDURE — 82378 CARCINOEMBRYONIC ANTIGEN: CPT

## 2021-01-18 PROCEDURE — 80053 COMPREHEN METABOLIC PANEL: CPT

## 2021-01-19 ENCOUNTER — HOSPITAL ENCOUNTER (INPATIENT)
Age: 66
LOS: 7 days | Discharge: HOME | DRG: 435 | End: 2021-01-26
Attending: EMERGENCY MEDICINE | Admitting: INTERNAL MEDICINE
Payer: MEDICARE

## 2021-01-19 ENCOUNTER — APPOINTMENT (OUTPATIENT)
Dept: CT IMAGING | Age: 66
DRG: 435 | End: 2021-01-19
Payer: MEDICARE

## 2021-01-19 DIAGNOSIS — C78.7 MALIGNANT NEOPLASM METASTATIC TO LIVER (HCC): ICD-10-CM

## 2021-01-19 DIAGNOSIS — N18.9 CHRONIC KIDNEY DISEASE, UNSPECIFIED CKD STAGE: ICD-10-CM

## 2021-01-19 DIAGNOSIS — R10.9 RIGHT SIDED ABDOMINAL PAIN: Primary | ICD-10-CM

## 2021-01-19 PROBLEM — R63.4 WEIGHT LOSS: Status: ACTIVE | Noted: 2021-01-19

## 2021-01-19 LAB
A/G RATIO: 0.7 (ref 1.1–2.2)
A/G RATIO: 0.7 (ref 1.1–2.2)
ALBUMIN SERPL-MCNC: 2.5 G/DL (ref 3.4–5)
ALBUMIN SERPL-MCNC: 2.6 G/DL (ref 3.4–5)
ALBUMIN SERPL-MCNC: 3 G/DL (ref 3.4–5)
ALP BLD-CCNC: 287 U/L (ref 40–129)
ALP BLD-CCNC: 300 U/L (ref 40–129)
ALP BLD-CCNC: 323 U/L (ref 40–129)
ALT SERPL-CCNC: 13 U/L (ref 10–40)
ALT SERPL-CCNC: 14 U/L (ref 10–40)
ALT SERPL-CCNC: 15 U/L (ref 10–40)
ANION GAP SERPL CALCULATED.3IONS-SCNC: 11 MMOL/L (ref 3–16)
ANION GAP SERPL CALCULATED.3IONS-SCNC: 20 MMOL/L (ref 3–16)
AST SERPL-CCNC: 30 U/L (ref 15–37)
AST SERPL-CCNC: 30 U/L (ref 15–37)
AST SERPL-CCNC: 32 U/L (ref 15–37)
BASOPHILS ABSOLUTE: 0 K/UL (ref 0–0.2)
BASOPHILS ABSOLUTE: 0.1 K/UL (ref 0–0.2)
BASOPHILS RELATIVE PERCENT: 0.4 %
BASOPHILS RELATIVE PERCENT: 1 %
BILIRUB SERPL-MCNC: 2.3 MG/DL (ref 0–1)
BILIRUB SERPL-MCNC: 2.4 MG/DL (ref 0–1)
BILIRUB SERPL-MCNC: 2.6 MG/DL (ref 0–1)
BILIRUBIN DIRECT: 1.5 MG/DL (ref 0–0.3)
BILIRUBIN, INDIRECT: 0.8 MG/DL (ref 0–1)
BUN BLDV-MCNC: 72 MG/DL (ref 7–20)
BUN BLDV-MCNC: 72 MG/DL (ref 7–20)
CALCIUM SERPL-MCNC: 9.4 MG/DL (ref 8.3–10.6)
CALCIUM SERPL-MCNC: 9.5 MG/DL (ref 8.3–10.6)
CEA: 259.9 NG/ML (ref 0–5)
CHLORIDE BLD-SCNC: 101 MMOL/L (ref 99–110)
CHLORIDE BLD-SCNC: 101 MMOL/L (ref 99–110)
CO2: 19 MMOL/L (ref 21–32)
CO2: 23 MMOL/L (ref 21–32)
CREAT SERPL-MCNC: 5.1 MG/DL (ref 0.8–1.3)
CREAT SERPL-MCNC: 5.4 MG/DL (ref 0.8–1.3)
EKG ATRIAL RATE: 81 BPM
EKG DIAGNOSIS: NORMAL
EKG P AXIS: 57 DEGREES
EKG P-R INTERVAL: 150 MS
EKG Q-T INTERVAL: 408 MS
EKG QRS DURATION: 102 MS
EKG QTC CALCULATION (BAZETT): 473 MS
EKG R AXIS: -34 DEGREES
EKG T AXIS: 135 DEGREES
EKG VENTRICULAR RATE: 81 BPM
EOSINOPHILS ABSOLUTE: 0 K/UL (ref 0–0.6)
EOSINOPHILS ABSOLUTE: 0.1 K/UL (ref 0–0.6)
EOSINOPHILS RELATIVE PERCENT: 0.5 %
EOSINOPHILS RELATIVE PERCENT: 1.5 %
GFR AFRICAN AMERICAN: 13
GFR AFRICAN AMERICAN: 14
GFR NON-AFRICAN AMERICAN: 11
GFR NON-AFRICAN AMERICAN: 11
GLOBULIN: 3.9 G/DL
GLOBULIN: 4.1 G/DL
GLUCOSE BLD-MCNC: 73 MG/DL (ref 70–99)
GLUCOSE BLD-MCNC: 95 MG/DL (ref 70–99)
GLUCOSE BLD-MCNC: 99 MG/DL (ref 70–99)
HCT VFR BLD CALC: 36.2 % (ref 40.5–52.5)
HCT VFR BLD CALC: 39.2 % (ref 40.5–52.5)
HEMOGLOBIN: 11.4 G/DL (ref 13.5–17.5)
HEMOGLOBIN: 12.5 G/DL (ref 13.5–17.5)
LACTATE DEHYDROGENASE: 235 U/L (ref 100–190)
LIPASE: 18 U/L (ref 13–60)
LYMPHOCYTES ABSOLUTE: 0.6 K/UL (ref 1–5.1)
LYMPHOCYTES ABSOLUTE: 0.8 K/UL (ref 1–5.1)
LYMPHOCYTES RELATIVE PERCENT: 12.3 %
LYMPHOCYTES RELATIVE PERCENT: 9.7 %
MCH RBC QN AUTO: 30.8 PG (ref 26–34)
MCH RBC QN AUTO: 30.9 PG (ref 26–34)
MCHC RBC AUTO-ENTMCNC: 31.6 G/DL (ref 31–36)
MCHC RBC AUTO-ENTMCNC: 31.9 G/DL (ref 31–36)
MCV RBC AUTO: 96.6 FL (ref 80–100)
MCV RBC AUTO: 97.7 FL (ref 80–100)
MONOCYTES ABSOLUTE: 0.5 K/UL (ref 0–1.3)
MONOCYTES ABSOLUTE: 0.6 K/UL (ref 0–1.3)
MONOCYTES RELATIVE PERCENT: 10.6 %
MONOCYTES RELATIVE PERCENT: 8.3 %
NEUTROPHILS ABSOLUTE: 4.6 K/UL (ref 1.7–7.7)
NEUTROPHILS ABSOLUTE: 4.9 K/UL (ref 1.7–7.7)
NEUTROPHILS RELATIVE PERCENT: 74.6 %
NEUTROPHILS RELATIVE PERCENT: 81.1 %
PDW BLD-RTO: 18.8 % (ref 12.4–15.4)
PDW BLD-RTO: 18.8 % (ref 12.4–15.4)
PERFORMED ON: NORMAL
PLATELET # BLD: 111 K/UL (ref 135–450)
PLATELET # BLD: 119 K/UL (ref 135–450)
PMV BLD AUTO: 8.4 FL (ref 5–10.5)
PMV BLD AUTO: 8.7 FL (ref 5–10.5)
POTASSIUM SERPL-SCNC: 5.2 MMOL/L (ref 3.5–5.1)
POTASSIUM SERPL-SCNC: 5.9 MMOL/L (ref 3.5–5.1)
PRO-BNP: ABNORMAL PG/ML (ref 0–124)
PROSTATE SPECIFIC ANTIGEN: 0.58 NG/ML (ref 0–4)
RBC # BLD: 3.7 M/UL (ref 4.2–5.9)
RBC # BLD: 4.06 M/UL (ref 4.2–5.9)
SODIUM BLD-SCNC: 135 MMOL/L (ref 136–145)
SODIUM BLD-SCNC: 140 MMOL/L (ref 136–145)
TOTAL PROTEIN: 6.5 G/DL (ref 6.4–8.2)
TOTAL PROTEIN: 6.6 G/DL (ref 6.4–8.2)
TOTAL PROTEIN: 7.1 G/DL (ref 6.4–8.2)
TROPONIN: 0.16 NG/ML
WBC # BLD: 6.1 K/UL (ref 4–11)
WBC # BLD: 6.1 K/UL (ref 4–11)

## 2021-01-19 PROCEDURE — 36415 COLL VENOUS BLD VENIPUNCTURE: CPT

## 2021-01-19 PROCEDURE — 86301 IMMUNOASSAY TUMOR CA 19-9: CPT

## 2021-01-19 PROCEDURE — 80053 COMPREHEN METABOLIC PANEL: CPT

## 2021-01-19 PROCEDURE — 81001 URINALYSIS AUTO W/SCOPE: CPT

## 2021-01-19 PROCEDURE — 6370000000 HC RX 637 (ALT 250 FOR IP): Performed by: INTERNAL MEDICINE

## 2021-01-19 PROCEDURE — 85025 COMPLETE CBC W/AUTO DIFF WBC: CPT

## 2021-01-19 PROCEDURE — 83550 IRON BINDING TEST: CPT

## 2021-01-19 PROCEDURE — 6360000004 HC RX CONTRAST MEDICATION: Performed by: INTERNAL MEDICINE

## 2021-01-19 PROCEDURE — 93005 ELECTROCARDIOGRAM TRACING: CPT | Performed by: EMERGENCY MEDICINE

## 2021-01-19 PROCEDURE — 1200000000 HC SEMI PRIVATE

## 2021-01-19 PROCEDURE — 83036 HEMOGLOBIN GLYCOSYLATED A1C: CPT

## 2021-01-19 PROCEDURE — 83615 LACTATE (LD) (LDH) ENZYME: CPT

## 2021-01-19 PROCEDURE — 83880 ASSAY OF NATRIURETIC PEPTIDE: CPT

## 2021-01-19 PROCEDURE — 93010 ELECTROCARDIOGRAM REPORT: CPT | Performed by: INTERNAL MEDICINE

## 2021-01-19 PROCEDURE — 99283 EMERGENCY DEPT VISIT LOW MDM: CPT

## 2021-01-19 PROCEDURE — 2580000003 HC RX 258: Performed by: INTERNAL MEDICINE

## 2021-01-19 PROCEDURE — 84484 ASSAY OF TROPONIN QUANT: CPT

## 2021-01-19 PROCEDURE — 74177 CT ABD & PELVIS W/CONTRAST: CPT

## 2021-01-19 PROCEDURE — 6360000004 HC RX CONTRAST MEDICATION: Performed by: EMERGENCY MEDICINE

## 2021-01-19 PROCEDURE — 83690 ASSAY OF LIPASE: CPT

## 2021-01-19 PROCEDURE — 82728 ASSAY OF FERRITIN: CPT

## 2021-01-19 PROCEDURE — 71260 CT THORAX DX C+: CPT

## 2021-01-19 PROCEDURE — 6360000002 HC RX W HCPCS: Performed by: INTERNAL MEDICINE

## 2021-01-19 PROCEDURE — 83540 ASSAY OF IRON: CPT

## 2021-01-19 RX ORDER — ACETAMINOPHEN 325 MG/1
650 TABLET ORAL EVERY 6 HOURS PRN
Status: DISCONTINUED | OUTPATIENT
Start: 2021-01-19 | End: 2021-01-26 | Stop reason: HOSPADM

## 2021-01-19 RX ORDER — DEXTROSE MONOHYDRATE 50 MG/ML
100 INJECTION, SOLUTION INTRAVENOUS PRN
Status: DISCONTINUED | OUTPATIENT
Start: 2021-01-19 | End: 2021-01-26 | Stop reason: HOSPADM

## 2021-01-19 RX ORDER — INSULIN LISPRO 100 [IU]/ML
0-3 INJECTION, SOLUTION INTRAVENOUS; SUBCUTANEOUS NIGHTLY
Status: DISCONTINUED | OUTPATIENT
Start: 2021-01-19 | End: 2021-01-21

## 2021-01-19 RX ORDER — ONDANSETRON 2 MG/ML
4 INJECTION INTRAMUSCULAR; INTRAVENOUS EVERY 6 HOURS PRN
Status: DISCONTINUED | OUTPATIENT
Start: 2021-01-19 | End: 2021-01-26 | Stop reason: HOSPADM

## 2021-01-19 RX ORDER — DEXTROSE MONOHYDRATE 25 G/50ML
12.5 INJECTION, SOLUTION INTRAVENOUS PRN
Status: DISCONTINUED | OUTPATIENT
Start: 2021-01-19 | End: 2021-01-26 | Stop reason: HOSPADM

## 2021-01-19 RX ORDER — PANTOPRAZOLE SODIUM 40 MG/1
40 TABLET, DELAYED RELEASE ORAL
Status: DISCONTINUED | OUTPATIENT
Start: 2021-01-20 | End: 2021-01-26 | Stop reason: HOSPADM

## 2021-01-19 RX ORDER — LEVOTHYROXINE SODIUM 0.1 MG/1
100 TABLET ORAL DAILY
Status: DISCONTINUED | OUTPATIENT
Start: 2021-01-20 | End: 2021-01-26 | Stop reason: HOSPADM

## 2021-01-19 RX ORDER — AMLODIPINE BESYLATE 5 MG/1
10 TABLET ORAL DAILY
Status: DISCONTINUED | OUTPATIENT
Start: 2021-01-20 | End: 2021-01-20

## 2021-01-19 RX ORDER — DICYCLOMINE HYDROCHLORIDE 10 MG/1
10 CAPSULE ORAL EVERY 6 HOURS PRN
Status: DISCONTINUED | OUTPATIENT
Start: 2021-01-19 | End: 2021-01-26 | Stop reason: HOSPADM

## 2021-01-19 RX ORDER — INSULIN LISPRO 100 [IU]/ML
0-6 INJECTION, SOLUTION INTRAVENOUS; SUBCUTANEOUS
Status: DISCONTINUED | OUTPATIENT
Start: 2021-01-20 | End: 2021-01-21

## 2021-01-19 RX ORDER — METOCLOPRAMIDE 10 MG/1
10 TABLET ORAL
Status: DISCONTINUED | OUTPATIENT
Start: 2021-01-19 | End: 2021-01-20

## 2021-01-19 RX ORDER — POLYETHYLENE GLYCOL 3350 17 G/17G
17 POWDER, FOR SOLUTION ORAL DAILY PRN
Status: DISCONTINUED | OUTPATIENT
Start: 2021-01-19 | End: 2021-01-26 | Stop reason: HOSPADM

## 2021-01-19 RX ORDER — INSULIN LISPRO 100 [IU]/ML
3 INJECTION, SOLUTION INTRAVENOUS; SUBCUTANEOUS
Status: DISCONTINUED | OUTPATIENT
Start: 2021-01-20 | End: 2021-01-20

## 2021-01-19 RX ORDER — NICOTINE POLACRILEX 4 MG
15 LOZENGE BUCCAL PRN
Status: DISCONTINUED | OUTPATIENT
Start: 2021-01-19 | End: 2021-01-26 | Stop reason: HOSPADM

## 2021-01-19 RX ORDER — ATORVASTATIN CALCIUM 20 MG/1
20 TABLET, FILM COATED ORAL DAILY
Status: DISCONTINUED | OUTPATIENT
Start: 2021-01-20 | End: 2021-01-26 | Stop reason: HOSPADM

## 2021-01-19 RX ORDER — HEPARIN SODIUM 5000 [USP'U]/ML
5000 INJECTION, SOLUTION INTRAVENOUS; SUBCUTANEOUS EVERY 8 HOURS SCHEDULED
Status: DISCONTINUED | OUTPATIENT
Start: 2021-01-19 | End: 2021-01-22

## 2021-01-19 RX ORDER — PROMETHAZINE HYDROCHLORIDE 25 MG/1
12.5 TABLET ORAL EVERY 6 HOURS PRN
Status: DISCONTINUED | OUTPATIENT
Start: 2021-01-19 | End: 2021-01-26 | Stop reason: HOSPADM

## 2021-01-19 RX ORDER — CARVEDILOL 6.25 MG/1
12.5 TABLET ORAL 2 TIMES DAILY WITH MEALS
Status: DISCONTINUED | OUTPATIENT
Start: 2021-01-20 | End: 2021-01-25

## 2021-01-19 RX ORDER — SODIUM CHLORIDE 0.9 % (FLUSH) 0.9 %
10 SYRINGE (ML) INJECTION PRN
Status: DISCONTINUED | OUTPATIENT
Start: 2021-01-19 | End: 2021-01-26 | Stop reason: HOSPADM

## 2021-01-19 RX ORDER — SODIUM CHLORIDE 9 MG/ML
INJECTION, SOLUTION INTRAVENOUS CONTINUOUS
Status: DISCONTINUED | OUTPATIENT
Start: 2021-01-19 | End: 2021-01-20

## 2021-01-19 RX ORDER — ALBUMIN (HUMAN) 12.5 G/50ML
25 SOLUTION INTRAVENOUS
Status: ACTIVE | OUTPATIENT
Start: 2021-01-19 | End: 2021-01-20

## 2021-01-19 RX ORDER — HYDRALAZINE HYDROCHLORIDE 25 MG/1
50 TABLET, FILM COATED ORAL EVERY 8 HOURS SCHEDULED
Status: DISCONTINUED | OUTPATIENT
Start: 2021-01-19 | End: 2021-01-20

## 2021-01-19 RX ORDER — VITAMIN B COMPLEX
2000 TABLET ORAL DAILY
Status: DISCONTINUED | OUTPATIENT
Start: 2021-01-20 | End: 2021-01-26 | Stop reason: HOSPADM

## 2021-01-19 RX ORDER — SODIUM BICARBONATE 650 MG/1
650 TABLET ORAL 2 TIMES DAILY
Status: DISCONTINUED | OUTPATIENT
Start: 2021-01-19 | End: 2021-01-20

## 2021-01-19 RX ORDER — ACETAMINOPHEN 650 MG/1
650 SUPPOSITORY RECTAL EVERY 6 HOURS PRN
Status: DISCONTINUED | OUTPATIENT
Start: 2021-01-19 | End: 2021-01-26 | Stop reason: HOSPADM

## 2021-01-19 RX ORDER — SODIUM CHLORIDE 0.9 % (FLUSH) 0.9 %
10 SYRINGE (ML) INJECTION EVERY 12 HOURS SCHEDULED
Status: DISCONTINUED | OUTPATIENT
Start: 2021-01-19 | End: 2021-01-26 | Stop reason: HOSPADM

## 2021-01-19 RX ORDER — ASPIRIN 81 MG/1
81 TABLET, CHEWABLE ORAL DAILY
Status: DISCONTINUED | OUTPATIENT
Start: 2021-01-20 | End: 2021-01-26 | Stop reason: HOSPADM

## 2021-01-19 RX ADMIN — IOPAMIDOL 75 ML: 755 INJECTION, SOLUTION INTRAVENOUS at 19:55

## 2021-01-19 RX ADMIN — METOCLOPRAMIDE 10 MG: 10 TABLET ORAL at 22:01

## 2021-01-19 RX ADMIN — Medication 10 ML: at 22:02

## 2021-01-19 RX ADMIN — HEPARIN SODIUM 5000 UNITS: 5000 INJECTION INTRAVENOUS; SUBCUTANEOUS at 22:04

## 2021-01-19 RX ADMIN — HYDRALAZINE HYDROCHLORIDE 50 MG: 25 TABLET, FILM COATED ORAL at 22:01

## 2021-01-19 RX ADMIN — IOHEXOL 50 ML: 240 INJECTION, SOLUTION INTRATHECAL; INTRAVASCULAR; INTRAVENOUS; ORAL at 19:55

## 2021-01-19 RX ADMIN — SODIUM CHLORIDE: 9 INJECTION, SOLUTION INTRAVENOUS at 23:35

## 2021-01-19 RX ADMIN — SODIUM BICARBONATE 650 MG: 650 TABLET ORAL at 22:03

## 2021-01-19 RX ADMIN — ACETAMINOPHEN 650 MG: 325 TABLET ORAL at 22:01

## 2021-01-19 ASSESSMENT — PAIN DESCRIPTION - PAIN TYPE
TYPE: CHRONIC PAIN

## 2021-01-19 ASSESSMENT — PAIN DESCRIPTION - LOCATION
LOCATION: ABDOMEN

## 2021-01-19 ASSESSMENT — PAIN DESCRIPTION - ORIENTATION
ORIENTATION: RIGHT;UPPER
ORIENTATION: RIGHT;UPPER

## 2021-01-19 ASSESSMENT — PAIN SCALES - GENERAL
PAINLEVEL_OUTOF10: 8
PAINLEVEL_OUTOF10: 8

## 2021-01-19 NOTE — ED NOTES
Dr. Patrick Beaumont bedside to evaluate pt and update on plan of care. RN x 2 attempt periphael IV , unsuccessful.       Ronaldo Granger RN  01/19/21 4557
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
## 2021-01-19 NOTE — ED NOTES
Bed: 02  Expected date:   Expected time:   Means of arrival:   Comments:  mena Reeves RN  01/19/21 [id number]

## 2021-01-20 ENCOUNTER — APPOINTMENT (OUTPATIENT)
Dept: CT IMAGING | Age: 66
DRG: 435 | End: 2021-01-20
Payer: MEDICARE

## 2021-01-20 LAB
A/G RATIO: 0.6 (ref 1.1–2.2)
ALBUMIN SERPL-MCNC: 2.1 G/DL (ref 3.4–5)
ALP BLD-CCNC: 266 U/L (ref 40–129)
ALT SERPL-CCNC: 12 U/L (ref 10–40)
ANION GAP SERPL CALCULATED.3IONS-SCNC: 11 MMOL/L (ref 3–16)
AST SERPL-CCNC: 29 U/L (ref 15–37)
BACTERIA: ABNORMAL /HPF
BILIRUB SERPL-MCNC: 2 MG/DL (ref 0–1)
BILIRUBIN URINE: ABNORMAL
BLOOD, URINE: NEGATIVE
BUN BLDV-MCNC: 67 MG/DL (ref 7–20)
CALCIUM SERPL-MCNC: 8.3 MG/DL (ref 8.3–10.6)
CHLORIDE BLD-SCNC: 102 MMOL/L (ref 99–110)
CLARITY: CLEAR
CO2: 21 MMOL/L (ref 21–32)
COLOR: ABNORMAL
CREAT SERPL-MCNC: 4.3 MG/DL (ref 0.8–1.3)
EPITHELIAL CELLS, UA: 0 /HPF (ref 0–5)
ESTIMATED AVERAGE GLUCOSE: 79.6 MG/DL
FERRITIN: 598.4 NG/ML (ref 30–400)
FOLATE: 14.83 NG/ML (ref 4.78–24.2)
GFR AFRICAN AMERICAN: 17
GFR NON-AFRICAN AMERICAN: 14
GLOBULIN: 3.8 G/DL
GLUCOSE BLD-MCNC: 104 MG/DL (ref 70–99)
GLUCOSE BLD-MCNC: 115 MG/DL (ref 70–99)
GLUCOSE BLD-MCNC: 66 MG/DL (ref 70–99)
GLUCOSE BLD-MCNC: 77 MG/DL (ref 70–99)
GLUCOSE BLD-MCNC: 84 MG/DL (ref 70–99)
GLUCOSE URINE: NEGATIVE MG/DL
HBA1C MFR BLD: 4.4 %
HBV SURFACE AB TITR SER: 264.5 MIU/ML
HEPATITIS B SURFACE ANTIGEN INTERPRETATION: NORMAL
HYALINE CASTS: 2 /LPF (ref 0–8)
IRON SATURATION: 27 % (ref 20–50)
IRON: 53 UG/DL (ref 59–158)
KETONES, URINE: NEGATIVE MG/DL
LEUKOCYTE ESTERASE, URINE: NEGATIVE
MAGNESIUM: 1.9 MG/DL (ref 1.8–2.4)
MICROSCOPIC EXAMINATION: YES
NITRITE, URINE: NEGATIVE
PERFORMED ON: ABNORMAL
PERFORMED ON: NORMAL
PH UA: 5.5 (ref 5–8)
PHOSPHORUS: 3.4 MG/DL (ref 2.5–4.9)
POTASSIUM SERPL-SCNC: 4.5 MMOL/L (ref 3.5–5.1)
PROTEIN UA: 100 MG/DL
RBC UA: 2 /HPF (ref 0–4)
SODIUM BLD-SCNC: 134 MMOL/L (ref 136–145)
SPECIFIC GRAVITY UA: 1.02 (ref 1–1.03)
TOTAL IRON BINDING CAPACITY: 193 UG/DL (ref 260–445)
TOTAL PROTEIN: 5.9 G/DL (ref 6.4–8.2)
URINE REFLEX TO CULTURE: ABNORMAL
URINE TYPE: ABNORMAL
UROBILINOGEN, URINE: 2 E.U./DL
VITAMIN B-12: >2000 PG/ML (ref 211–911)
WBC UA: 1 /HPF (ref 0–5)

## 2021-01-20 PROCEDURE — 83735 ASSAY OF MAGNESIUM: CPT

## 2021-01-20 PROCEDURE — 6370000000 HC RX 637 (ALT 250 FOR IP): Performed by: INTERNAL MEDICINE

## 2021-01-20 PROCEDURE — 99223 1ST HOSP IP/OBS HIGH 75: CPT | Performed by: INTERNAL MEDICINE

## 2021-01-20 PROCEDURE — 80053 COMPREHEN METABOLIC PANEL: CPT

## 2021-01-20 PROCEDURE — 90935 HEMODIALYSIS ONE EVALUATION: CPT

## 2021-01-20 PROCEDURE — 87340 HEPATITIS B SURFACE AG IA: CPT

## 2021-01-20 PROCEDURE — 0FB23ZX EXCISION OF LEFT LOBE LIVER, PERCUTANEOUS APPROACH, DIAGNOSTIC: ICD-10-PCS | Performed by: RADIOLOGY

## 2021-01-20 PROCEDURE — 47000 NEEDLE BIOPSY OF LIVER PERQ: CPT

## 2021-01-20 PROCEDURE — 2580000003 HC RX 258: Performed by: INTERNAL MEDICINE

## 2021-01-20 PROCEDURE — 88307 TISSUE EXAM BY PATHOLOGIST: CPT

## 2021-01-20 PROCEDURE — 86706 HEP B SURFACE ANTIBODY: CPT

## 2021-01-20 PROCEDURE — 82746 ASSAY OF FOLIC ACID SERUM: CPT

## 2021-01-20 PROCEDURE — 1200000000 HC SEMI PRIVATE

## 2021-01-20 PROCEDURE — 6370000000 HC RX 637 (ALT 250 FOR IP): Performed by: NURSE PRACTITIONER

## 2021-01-20 PROCEDURE — 5A1D70Z PERFORMANCE OF URINARY FILTRATION, INTERMITTENT, LESS THAN 6 HOURS PER DAY: ICD-10-PCS | Performed by: INTERNAL MEDICINE

## 2021-01-20 PROCEDURE — 6360000002 HC RX W HCPCS: Performed by: INTERNAL MEDICINE

## 2021-01-20 PROCEDURE — 88342 IMHCHEM/IMCYTCHM 1ST ANTB: CPT

## 2021-01-20 PROCEDURE — 6360000002 HC RX W HCPCS: Performed by: RADIOLOGY

## 2021-01-20 PROCEDURE — 84100 ASSAY OF PHOSPHORUS: CPT

## 2021-01-20 PROCEDURE — 82607 VITAMIN B-12: CPT

## 2021-01-20 PROCEDURE — 88341 IMHCHEM/IMCYTCHM EA ADD ANTB: CPT

## 2021-01-20 PROCEDURE — 6370000000 HC RX 637 (ALT 250 FOR IP): Performed by: PHYSICIAN ASSISTANT

## 2021-01-20 PROCEDURE — 86704 HEP B CORE ANTIBODY TOTAL: CPT

## 2021-01-20 PROCEDURE — 36415 COLL VENOUS BLD VENIPUNCTURE: CPT

## 2021-01-20 RX ORDER — PEG-3350, SODIUM SULFATE, SODIUM CHLORIDE, POTASSIUM CHLORIDE, SODIUM ASCORBATE AND ASCORBIC ACID 7.5-2.691G
100 KIT ORAL ONCE
Status: COMPLETED | OUTPATIENT
Start: 2021-01-20 | End: 2021-01-20

## 2021-01-20 RX ORDER — MIDAZOLAM HYDROCHLORIDE 1 MG/ML
INJECTION INTRAMUSCULAR; INTRAVENOUS
Status: COMPLETED | OUTPATIENT
Start: 2021-01-20 | End: 2021-01-20

## 2021-01-20 RX ORDER — LOSARTAN POTASSIUM 25 MG/1
25 TABLET ORAL DAILY
Status: DISCONTINUED | OUTPATIENT
Start: 2021-01-20 | End: 2021-01-26 | Stop reason: HOSPADM

## 2021-01-20 RX ORDER — AMLODIPINE BESYLATE 5 MG/1
10 TABLET ORAL DAILY
Status: DISCONTINUED | OUTPATIENT
Start: 2021-01-20 | End: 2021-01-21

## 2021-01-20 RX ORDER — METOCLOPRAMIDE 10 MG/1
5 TABLET ORAL
Status: DISCONTINUED | OUTPATIENT
Start: 2021-01-20 | End: 2021-01-26 | Stop reason: HOSPADM

## 2021-01-20 RX ORDER — FENTANYL CITRATE 50 UG/ML
INJECTION, SOLUTION INTRAMUSCULAR; INTRAVENOUS
Status: COMPLETED | OUTPATIENT
Start: 2021-01-20 | End: 2021-01-20

## 2021-01-20 RX ORDER — HYDRALAZINE HYDROCHLORIDE 25 MG/1
25 TABLET, FILM COATED ORAL EVERY 8 HOURS SCHEDULED
Status: DISCONTINUED | OUTPATIENT
Start: 2021-01-20 | End: 2021-01-22

## 2021-01-20 RX ADMIN — LOSARTAN POTASSIUM 25 MG: 25 TABLET, FILM COATED ORAL at 13:07

## 2021-01-20 RX ADMIN — MIDAZOLAM 1 MG: 1 INJECTION INTRAMUSCULAR; INTRAVENOUS at 13:44

## 2021-01-20 RX ADMIN — PANTOPRAZOLE SODIUM 40 MG: 40 TABLET, DELAYED RELEASE ORAL at 16:56

## 2021-01-20 RX ADMIN — POLYETHYLENE GLYCOL 3350, SODIUM SULFATE, SODIUM CHLORIDE, POTASSIUM CHLORIDE, ASCORBIC ACID, SODIUM ASCORBATE 100 G: KIT at 21:14

## 2021-01-20 RX ADMIN — POLYETHYLENE GLYCOL 3350, SODIUM SULFATE, SODIUM CHLORIDE, POTASSIUM CHLORIDE, ASCORBIC ACID, SODIUM ASCORBATE 100 G: KIT at 17:05

## 2021-01-20 RX ADMIN — METOCLOPRAMIDE 5 MG: 10 TABLET ORAL at 16:56

## 2021-01-20 RX ADMIN — FENTANYL CITRATE 50 MCG: 50 INJECTION, SOLUTION INTRAMUSCULAR; INTRAVENOUS at 13:44

## 2021-01-20 RX ADMIN — DEXTROSE MONOHYDRATE 100 ML/HR: 50 INJECTION, SOLUTION INTRAVENOUS at 13:02

## 2021-01-20 RX ADMIN — HYDRALAZINE HYDROCHLORIDE 25 MG: 25 TABLET, FILM COATED ORAL at 21:16

## 2021-01-20 RX ADMIN — HEPARIN SODIUM 5000 UNITS: 5000 INJECTION INTRAVENOUS; SUBCUTANEOUS at 21:15

## 2021-01-20 RX ADMIN — CARVEDILOL 12.5 MG: 6.25 TABLET, FILM COATED ORAL at 16:56

## 2021-01-20 RX ADMIN — HYDRALAZINE HYDROCHLORIDE 25 MG: 25 TABLET, FILM COATED ORAL at 13:07

## 2021-01-20 RX ADMIN — AMLODIPINE BESYLATE 10 MG: 5 TABLET ORAL at 16:56

## 2021-01-20 ASSESSMENT — PAIN SCALES - GENERAL: PAINLEVEL_OUTOF10: 0

## 2021-01-20 ASSESSMENT — PAIN DESCRIPTION - DESCRIPTORS: DESCRIPTORS: TENDER

## 2021-01-20 ASSESSMENT — PAIN DESCRIPTION - FREQUENCY: FREQUENCY: INTERMITTENT

## 2021-01-20 ASSESSMENT — PAIN DESCRIPTION - PAIN TYPE: TYPE: ACUTE PAIN

## 2021-01-20 ASSESSMENT — PAIN - FUNCTIONAL ASSESSMENT: PAIN_FUNCTIONAL_ASSESSMENT: ACTIVITIES ARE NOT PREVENTED

## 2021-01-20 NOTE — PROGRESS NOTES
[facility] PROGRESS NOTE    1/20/2021 8:03 AM        Name: Vito Salcedo . Admitted: 1/19/2021  Primary Care Provider: Shyann Beatty DO (Tel: [phone])      Subjective: Arias Cardona Pt seen in the afternoon hours following his liver biopsy. He was arousable but not a valid historian due to fentanyl  He denied any pain and frequently drifts off to sleep.       Care discussed with RN    Reviewed interval ancillary notes    Current Medications      0.9 % sodium chloride infusion, Continuous      amLODIPine (NORVASC) tablet 10 mg, Daily      aspirin chewable tablet 81 mg, Daily      carvedilol (COREG) tablet 12.5 mg, BID WC      dicyclomine (BENTYL) capsule 10 mg, U9N PRN      folic acid-pyridoxine-cyanocobalamine (FOLTX) 2.5-25-1 MG tablet 1 tablet, Daily      hydrALAZINE (APRESOLINE) tablet 50 mg, 3 times per day      insulin glargine (LANTUS;BASAGLAR) injection pen 13 Units, Nightly      insulin lispro (1 Unit Dial) 3 Units, TID WC      levothyroxine (SYNTHROID) tablet 100 mcg, Daily      metoclopramide (REGLAN) tablet 10 mg, 4x Daily AC & HS      pantoprazole (PROTONIX) tablet 40 mg, BID AC      sodium bicarbonate tablet 650 mg, BID      vitamin D (CHOLECALCIFEROL) tablet 2,000 Units, Daily      atorvastatin (LIPITOR) tablet 20 mg, Daily      insulin lispro (1 Unit Dial) 0-6 Units, TID WC      insulin lispro (1 Unit Dial) 0-3 Units, Nightly      glucose (GLUTOSE) 40 % oral gel 15 g, PRN      dextrose 50 % IV solution, PRN      glucagon (rDNA) injection 1 mg, PRN      dextrose 5 % solution, PRN      sodium chloride flush 0.9 % injection 10 mL, 2 times per day      sodium chloride flush 0.9 % injection 10 mL, PRN      promethazine (PHENERGAN) tablet 12.5 mg, Q6H PRN    Or      ondansetron (ZOFRAN) injection 4 mg, Q6H PRN      polyethylene glycol (GLYCOLAX) packet 17 g, Daily PRN     acetaminophen (TYLENOL) tablet 650 mg, Q6H PRN    Or      acetaminophen (TYLENOL) suppository 650 mg, Q6H PRN      heparin (porcine) injection 5,000 Units, 3 times per day        Objective:  /85   Pulse 76   Temp 97.4 °F (36.3 °C) (Oral)   Resp 16   Wt 150 lb (68 kg)   SpO2 99%   BMI 21.52 kg/m²     Intake/Output Summary (Last 24 hours) at 1/20/2021 0803  Last data filed at 1/20/2021 0248  Gross per 24 hour   Intake --   Output 800 ml   Net -800 ml      Wt Readings from Last 3 Encounters:   01/19/21 150 lb (68 kg)   01/02/21 200 lb (90.7 kg)   05/03/20 195 lb (88.5 kg)       General appearance:  Appears comfortable and recently  Sedated,  Looks chronically ill    Eyes: Sclera clear. Pupils equal.  ENT: Moist oral mucosa. Trachea midline, no adenopathy. Cardiovascular: Regular rhythm, normal S1, S2. No murmur. No edema in lower extremities  Respiratory: Not using accessory muscles. Good inspiratory effort. Clear to auscultation bilaterally, no wheeze or crackles. GI: Abdomen soft, no tenderness, not distended, normal bowel sounds  Musculoskeletal: No cyanosis in digits, neck supple  Neurology: CN 2-12 grossly intact. No speech or motor deficits  Psych: Normal affect. Alert and oriented in time, place and person  Skin: Warm, dry, normal turgor    Labs and Tests:  CBC:   Recent Labs     01/18/21  1615 01/19/21  1343   WBC 6.1 6.1   HGB 12.5* 11.4*   * 119*     BMP:    Recent Labs     01/18/21  1615 01/19/21  1343    135*   K 5.9* 5.2*    101   CO2 19* 23   BUN 72* 72*   CREATININE 5.1* 5.4*   GLUCOSE 99 95     Hepatic:   Recent Labs     01/18/21  1615 01/19/21  1343   AST 32 30  30   ALT 14 15  13   BILITOT 2.6* 2.3*  2.4*   ALKPHOS 323* 300*  287*     CT chest and abd:  Redemonstration of 10 x 12 mm noncalcified right lower lobe pulmonary   nodule.  4 mm pleural-based spiculated nodule in the lingula laterally.  No   acute pulmonary infiltrate.    2. Diffuse hepatic metastatic disease.  No significant finding within the   abdomen or pelvis to indicate the primary source.  Consider PET-CT or needle   sampling for further evaluation. 3. Third-spacing with small to moderate amount of ascites, anasarca and   edematous changes throughout the abdomen.  This may be related to underlying   liver disease. 4. Postoperative clips in the retroperitoneum as well as inferior to the   liver with deformity of the lower pole of the left kidney, possibly   posttraumatic in etiology. Results for Catalino Gonsalves (MRN [id number]) as of 1/20/2021 16:49   Ref. Range 1/19/2021 21:20 1/20/2021 08:28 1/20/2021 12:51 1/20/2021 16:09   POC Glucose Latest Ref Range: 70 - 99 mg/dl 73 77 66 (L) 104 (H)      AIC 4.4     Problem List  Active Problems:    Weight loss  Resolved Problems:    * No resolved hospital problems. *       Assessment & Plan:   1. Metastatic lesions to the liver with unknown primary source. S/p liver biopsy today. Oncology is following appreciate their input. CEA is 259,  Ca 19-9 is pendng  2. ESRD on HD:  Nephrology is following  3. HTN:  bp elevated on BB and hydralazine. Will add home dose of CCB and follow closely   4. T2DM: AIC 4.4 Bg values on the low side today.   Will stop hs lantus and use only low dose correction as needed and follow closely         Diet: Diet NPO, After Midnight  Code:Full Code  DVT PPX      TEE Moreno CNP   1/20/2021 8:03 AM

## 2021-01-20 NOTE — ED NOTES
Pt tolerating oral contrast well at this time. No further needs expressed. Will continue to monitor.       Barbara Winter RN  01/19/21 1945

## 2021-01-20 NOTE — CONSULTS
DPM at [address] TOE AMPUTATION Right 7/17/15    great toe    UPPER GASTROINTESTINAL ENDOSCOPY N/A 12/26/2019    EGD BIOPSY performed by Keiry Daugherty MD at [address]     Current Medications:    Current Facility-Administered Medications: losartan (COZAAR) tablet 25 mg, 25 mg, Oral, Daily  hydrALAZINE (APRESOLINE) tablet 25 mg, 25 mg, Oral, 3 times per day  metoclopramide (REGLAN) tablet 5 mg, 5 mg, Oral, TID AC  aspirin chewable tablet 81 mg, 81 mg, Oral, Daily  carvedilol (COREG) tablet 12.5 mg, 12.5 mg, Oral, BID WC  dicyclomine (BENTYL) capsule 10 mg, 10 mg, Oral, C9M PRN  folic acid-pyridoxine-cyanocobalamine (FOLTX) 2.5-25-1 MG tablet 1 tablet, 1 tablet, Oral, Daily  insulin glargine (LANTUS;BASAGLAR) injection pen 13 Units, 13 Units, Subcutaneous, Nightly  insulin lispro (1 Unit Dial) 3 Units, 3 Units, Subcutaneous, TID WC  levothyroxine (SYNTHROID) tablet 100 mcg, 100 mcg, Oral, Daily  pantoprazole (PROTONIX) tablet 40 mg, 40 mg, Oral, BID AC  vitamin D (CHOLECALCIFEROL) tablet 2,000 Units, 2,000 Units, Oral, Daily  atorvastatin (LIPITOR) tablet 20 mg, 20 mg, Oral, Daily  insulin lispro (1 Unit Dial) 0-6 Units, 0-6 Units, Subcutaneous, TID WC  insulin lispro (1 Unit Dial) 0-3 Units, 0-3 Units, Subcutaneous, Nightly  glucose (GLUTOSE) 40 % oral gel 15 g, 15 g, Oral, PRN  dextrose 50 % IV solution, 12.5 g, Intravenous, PRN  glucagon (rDNA) injection 1 mg, 1 mg, Intramuscular, PRN  dextrose 5 % solution, 100 mL/hr, Intravenous, PRN  sodium chloride flush 0.9 % injection 10 mL, 10 mL, Intravenous, 2 times per day  sodium chloride flush 0.9 % injection 10 mL, 10 mL, Intravenous, PRN  promethazine (PHENERGAN) tablet 12.5 mg, 12.5 mg, Oral, Q6H PRN **OR** ondansetron (ZOFRAN) injection 4 mg, 4 mg, Intravenous, Q6H PRN  polyethylene glycol (GLYCOLAX) packet 17 g, 17 g, Oral, Daily PRN  acetaminophen (TYLENOL) tablet 650 mg, 650 mg, Oral, Q6H PRN **OR** acetaminophen (TYLENOL) suppository 650 mg, 650 [Urine:400; Stool:400]  No intake/output data recorded. Physical Exam:  General Appearance: alert and oriented to person, place and time, significant weight loss, in no acute distress  Skin: warm and dry, no rash or erythema  Head: normocephalic and atraumatic  Eyes: pupils equal, round, and reactive to light, extraocular eye movements intact, conjunctivae normal  ENT: external ear and ear canal normal bilaterally, nose without deformity, nasal mucosa and turbinates normal  Neck: supple and non-tender without mass, no cervical lymphadenopathy  Pulmonary/Chest: clear to auscultation bilaterally- no wheezes, rales or rhonchi, normal air movement, no respiratory distress  Cardiovascular: normal rate, regular rhythm,  no murmurs, rubs, distal pulses intact, no carotid bruits  Abdomen: soft, right upper quadrant tenderness, non-distended, normal bowel sounds, no masses or organomegaly  Lymph Nodes: Cervical, supraclavicular normal  Extremities: no cyanosis, clubbing or edema  Musculoskeletal: normal range of motion, no joint swelling, deformity or tenderness  Neurologic: alert, no focal neurologic deficits    Data Review:  CBC:   Lab Results   Component Value Date    WBC 6.1 01/19/2021    RBC 3.70 01/19/2021     BMP:   Lab Results   Component Value Date    GLUCOSE 84 01/20/2021    CO2 21 01/20/2021    BUN 67 01/20/2021    CREATININE 4.3 01/20/2021    CALCIUM 8.3 01/20/2021     ABG: No results found for: XFY5YNP, BEART, T2SFYCVO, PHART, THGBART, IFK9MDW, PO2ART, TRF6MQB    Radiology: All pertinent images / reports were reviewed as a part of this visit. Narrative   EXAMINATION:   CT OF THE CHEST WITH CONTRAST; CT OF THE ABDOMEN AND PELVIS WITH CONTRAST,   1/19/2021 6:24 pm       TECHNIQUE:   CT of the chest was performed with the administration of intravenous   contrast. Multiplanar reformatted images are provided for review.  Dose   modulation, iterative reconstruction, and/or weight based adjustment of the   mA/kV was utilized to reduce the radiation dose to as low as reasonably   achievable.; CT of the abdomen and pelvis was performed with the   administration of intravenous contrast. Multiplanar reformatted images are   provided for review. Dose modulation, iterative reconstruction, and/or weight   based adjustment of the mA/kV was utilized to reduce the radiation dose to as   low as reasonably achievable.       COMPARISON:   CT of the abdomen and pelvis 01/02/2021.       HISTORY:   ORDERING SYSTEM PROVIDED HISTORY: CT abdomen   TECHNOLOGIST PROVIDED HISTORY:   Reason for exam:   CT abdomen   Reason for Exam: Ca   Acuity: Acute   Type of Exam: Initial       ORDERING SYSTEM PROVIDED HISTORY: Multiple liver mets, elevated . TECHNOLOGIST PROVIDED HISTORY:   Reason for exam:   Multiple liver mets, elevated . Additional Contrast?   Oral   Reason for Exam:  Multiple liver mets, elevated . Acuity: Acute   Type of Exam: Initial   Relevant Medical/Surgical History: Unable to finish PO. pt will be having   dialysis after being admitted per Dr. Luigi Kwong.       FINDINGS:       Chest:       Mediastinum: The main pulmonary artery is normal in caliber.  The thoracic   aorta is normal in course and caliber with mild atherosclerotic plaque.  The   heart is borderline in size with no pericardial effusion.  No pathologic   hilar or mediastinal adenopathy.       Lungs/pleura: No acute pulmonary infiltrate, consolidation or edema.  The   trace left pleural effusion noted previously has largely resolved.  10 x 12   mm noncalcified nodule in the right posterior costophrenic sulcus medially is   again noted.  4 mm spiculated pleural-based nodule in the lingula laterally.    The central airways are patent.       Soft Tissues/Bones: Diffuse anasarca.  No acute osseous findings.  No lytic   or blastic osseous lesions.  Mild osteoarthritic spurring in the thoracic   spine.           Abdomen/Pelvis:       Organs: Diffuse hepatic metastatic disease.  No acute biliary findings.  The   spleen and adrenal glands are unremarkable.  No significant pancreatic   abnormality.  No renal mass or significant hydronephrosis.  There is mild   deformity of the lower pole of the left kidney, possibly related to remote   trauma.       GI/Bowel: Mild generalized bowel wall thickening, likely related to   third-spacing.  No significant pericolonic inflammatory changes.  The   appendix is unremarkable.  No small bowel distension.  The stomach is mildly   distended with oral contrast.  The duodenal sweep is unremarkable.       Pelvis: Moderate amount of pelvic ascites.  No significant pelvic or inguinal   adenopathy.  The prostate is not enlarged.  Mild distention of the urinary   bladder.       Peritoneum/Retroperitoneum: Diffuse edematous changes throughout the abdomen. Small quantity of upper abdominal ascites.  Postoperative clips in the   retroperitoneum at the level of the pancreatic uncinate process as well as   inferior to the right lobe of the liver likely related to the history of   previous trauma.  The abdominal aorta is normal in caliber.  There is no   significant retroperitoneal adenopathy.       Bones/Soft Tissues: Diffuse anasarca.  No acute osseous findings.  Multilevel   degenerative disc disease in the lumbar spine, most pronounced at L4-5 and   L5-S1.  No lytic or blastic osseous lesions.           Impression   1. Redemonstration of 10 x 12 mm noncalcified right lower lobe pulmonary   nodule.  4 mm pleural-based spiculated nodule in the lingula laterally.  No   acute pulmonary infiltrate. 2. Diffuse hepatic metastatic disease.  No significant finding within the   abdomen or pelvis to indicate the primary source.  Consider PET-CT or needle   sampling for further evaluation.    3. Third-spacing with small to moderate amount of ascites, anasarca and   edematous changes throughout the abdomen.  This may be related to underlying   liver No acute pulmonary infiltrate, consolidation or edema.  The   trace left pleural effusion noted previously has largely resolved.  10 x 12   mm noncalcified nodule in the right posterior costophrenic sulcus medially is   again noted.  4 mm spiculated pleural-based nodule in the lingula laterally. The central airways are patent.       Soft Tissues/Bones: Diffuse anasarca.  No acute osseous findings.  No lytic   or blastic osseous lesions.  Mild osteoarthritic spurring in the thoracic   spine.           Abdomen/Pelvis:       Organs: Diffuse hepatic metastatic disease.  No acute biliary findings.  The   spleen and adrenal glands are unremarkable.  No significant pancreatic   abnormality.  No renal mass or significant hydronephrosis.  There is mild   deformity of the lower pole of the left kidney, possibly related to remote   trauma.       GI/Bowel: Mild generalized bowel wall thickening, likely related to   third-spacing.  No significant pericolonic inflammatory changes.  The   appendix is unremarkable.  No small bowel distension.  The stomach is mildly   distended with oral contrast.  The duodenal sweep is unremarkable.       Pelvis: Moderate amount of pelvic ascites.  No significant pelvic or inguinal   adenopathy.  The prostate is not enlarged.  Mild distention of the urinary   bladder.       Peritoneum/Retroperitoneum: Diffuse edematous changes throughout the abdomen.    Small quantity of upper abdominal ascites.  Postoperative clips in the   retroperitoneum at the level of the pancreatic uncinate process as well as   inferior to the right lobe of the liver likely related to the history of   previous trauma.  The abdominal aorta is normal in caliber.  There is no   significant retroperitoneal adenopathy.       Bones/Soft Tissues: Diffuse anasarca.  No acute osseous findings.  Multilevel   degenerative disc disease in the lumbar spine, most pronounced at L4-5 and   L5-S1.  No lytic or blastic osseous lesions.         Impression   1. Redemonstration of 10 x 12 mm noncalcified right lower lobe pulmonary   nodule.  4 mm pleural-based spiculated nodule in the lingula laterally.  No   acute pulmonary infiltrate. 2. Diffuse hepatic metastatic disease.  No significant finding within the   abdomen or pelvis to indicate the primary source.  Consider PET-CT or needle   sampling for further evaluation. 3. Third-spacing with small to moderate amount of ascites, anasarca and   edematous changes throughout the abdomen.  This may be related to underlying   liver disease. 4. Postoperative clips in the retroperitoneum as well as inferior to the   liver with deformity of the lower pole of the left kidney, possibly   posttraumatic in etiology. Problem List:   Liver metastasis possibly bowel origin  Lung nodules    Assessment/Plan:     Reviewed patient CT scan of the chest and abdomen, which is consistent with liver metastatic lesions. Lung nodules also noted-particularly in the right base, which could also be related to malignancy. CEA is elevated. Concerns for bowel malignancy. Patient is awaiting CT-guided liver biopsy for diagnosis. Will await for results of the biopsy. Pulmonary will follow.     Deuce Harp MD
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
## 2021-01-20 NOTE — PROGRESS NOTES
Dr Hines Close in to round. Transport present to take pt to dialysis. Pt is awake and alert. Denies pain. Pt is axox4 and able to answer questions and follow commands throughout assessment   Denies needs at this time.

## 2021-01-20 NOTE — CARE COORDINATION
Discharge Planning Assessment  Rn/SW discharge planner met w/patient/family member to discuss reason for admission, current living situation, and potential needs at the time of discharge. Demographics/Insurance verified Yes    Current type of dwelling: house    Living arrangements: w/daughter    Level of function/support:independent w/all ADL's    PCP: Maria Teresa Chang    DME: stated none    Active with any community resources/agencies/skilled home care: stated no services in the home    Medication compliance issues: stated no issues    Financial issues that could impact healthcare: stated no issues    Transportation at time of d/c (Discussed w/pt/family that on the day of discharge home, tentative time of discharge will be between 10am and noon): family    Discussed and provided facilities of choice if transition to a skilled nursing facility is required a the time of discharge-N/A. Tentative discharge plan:  Met w/pt to inform that HD is being arranged. Call placed to Lucio Gray to inform of need. Sw following for any other dc recommendations/needs.     Electronically signed by HZEN Juárez  [phone]

## 2021-01-20 NOTE — PROGRESS NOTES
Oncology and Hematology Care   Progress Note      1/20/2021 9:46 AM        Name: Santiago Judd . Admitted: 1/19/2021    SUBJECTIVE:  Patient going for liver biopsy.     Reviewed interval ancillary notes    Current Medications      losartan (COZAAR) tablet 25 mg, Daily      hydrALAZINE (APRESOLINE) tablet 25 mg, 3 times per day      metoclopramide (REGLAN) tablet 5 mg, TID AC      aspirin chewable tablet 81 mg, Daily      carvedilol (COREG) tablet 12.5 mg, BID WC      dicyclomine (BENTYL) capsule 10 mg, F6Y PRN      folic acid-pyridoxine-cyanocobalamine (FOLTX) 2.5-25-1 MG tablet 1 tablet, Daily      insulin glargine (LANTUS;BASAGLAR) injection pen 13 Units, Nightly      insulin lispro (1 Unit Dial) 3 Units, TID WC      levothyroxine (SYNTHROID) tablet 100 mcg, Daily      pantoprazole (PROTONIX) tablet 40 mg, BID AC      vitamin D (CHOLECALCIFEROL) tablet 2,000 Units, Daily      atorvastatin (LIPITOR) tablet 20 mg, Daily      insulin lispro (1 Unit Dial) 0-6 Units, TID WC      insulin lispro (1 Unit Dial) 0-3 Units, Nightly      glucose (GLUTOSE) 40 % oral gel 15 g, PRN      dextrose 50 % IV solution, PRN      glucagon (rDNA) injection 1 mg, PRN      dextrose 5 % solution, PRN      sodium chloride flush 0.9 % injection 10 mL, 2 times per day      sodium chloride flush 0.9 % injection 10 mL, PRN      promethazine (PHENERGAN) tablet 12.5 mg, Q6H PRN    Or      ondansetron (ZOFRAN) injection 4 mg, Q6H PRN      polyethylene glycol (GLYCOLAX) packet 17 g, Daily PRN      acetaminophen (TYLENOL) tablet 650 mg, Q6H PRN    Or      acetaminophen (TYLENOL) suppository 650 mg, Q6H PRN      heparin (porcine) injection 5,000 Units, 3 times per day        Objective:  BP (!) 145/76   Pulse 82   Temp 98 °F (36.7 °C) (Axillary)   Resp 16   Wt 150 lb (68 kg)   SpO2 97%   BMI 21.52 kg/m²     Intake/Output Summary (Last 24 hours) at 1/20/2021 0946  Last data filed at 1/20/2021 0248  Gross guided liver biopsy today. - CEA is 259.9, CA 19-9 pending. Elevated bilirubin   - GI consulted.        Chronic renal failure managed by Dr. Sam Oneill       History of osteomyelitis and multiple wounds on ankle requiring amputation of toes       Hyperkalemia   - Improved        Anemia and thrombocytopenia   - Will check iron studies, B12 and folate.        Jody Sutton.  [phone]    Patient was seen and examined. Agree with above. Continue current care. Will obtain CT-guided biopsy of the liver. GI consult is pending.     Maksim Braun MD

## 2021-01-20 NOTE — PROGRESS NOTES
Nephrology Progress Note  [phone]   [email or web address].        Reason for Consult:  CKD stage 4/5    HISTORY OF PRESENT ILLNESS:                This is a patient with significant past medical history of biopsy proven DGS, CKD stage 4/5, baseline SCr h 4 range, s/p AVF, DM2, HTN, PVD, diabetic foot ulcer,  who was sent to ER for hyperkalemia. Routine outpatient labs showed K of 5.9. Pt reports weight loss of 43# over 2-3 months. He reports N/V for the past 3 months. States anti-nausea medications has not been helping. He denies abdominal pain but does report occasional discomfort. He denies black stools of BRBPR. -he has right AVF created in 11/15/2019-had angioplasty in 1/17/20-he was last seen in 05/20-has missed appts  -he was seen in ER on 1/2/20 for abdominal pain, he had CT of A/P w/o contrast showed multiple liver lesions concerning for metastatic disease but no primary identified. Subjective:  -pt seen and examined  -PMSHx and meds reviewed  -No family at bedside    Seen on dialysis this am  Events from ON noted  No acute events ON  Seen by oncology-CT of chest /Ap/P with contrast completed  AP is better      ROS: neg chest pain/SOB    PHYSICAL EXAM:    Vitals:    BP (!) 145/76   Pulse 82   Temp 98 °F (36.7 °C) (Axillary)   Resp 16   Wt 150 lb (68 kg)   SpO2 97%   BMI 21.52 kg/m²   I/O last 3 completed shifts:  In: -   Out: 800 [Urine:400; Stool:400]  No intake/output data recorded. Physical Exam:  Gen: ill appearing, cachectic  HEENT: nc/at  CV: +s1/s2, rrr  Lungs: Good respiratory effort, clear air entry   Abd: +bs, TENDER ruq  Ext: + edema, no cyanosis  Skin: Warm. No rashes appreciated. : No TTP over bladder, nondistended. Neuro: Alert and oriented x 3, nonfocal.  Joints: No erythema noted over joints.   Access: SHANNAN AVF + T/B    DATA:    CBC:   Lab Results   Component Value Date    WBC 6.1 01/19/2021    RBC 3.70 01/19/2021    HGB 11.4 01/19/2021    HCT 36.2 01/19/2021    MCV 97.7 01/19/2021    MCH 30.9 01/19/2021    MCHC 31.6 01/19/2021    RDW 18.8 01/19/2021     01/19/2021    MPV 8.4 01/19/2021     BMP:    Lab Results   Component Value Date     01/19/2021    K 5.2 01/19/2021    K 5.2 01/02/2021     01/19/2021    CO2 23 01/19/2021    BUN 72 01/19/2021    LABALBU 2.6 01/19/2021    LABALBU 2.5 01/19/2021    CREATININE 5.4 01/19/2021    CALCIUM 9.4 01/19/2021    GFRAA 13 01/19/2021    GFRAA 53 07/06/2012    LABGLOM 11 01/19/2021    GLUCOSE 95 01/19/2021       IMPRESSION/RECOMMENDATIONS:      CKD stage 5: due to biopsy proven DGS/history of NSAIDs extensive in the past, now complicated by hyperkalemia, N/V most likely due to underlying malignancy, however, cannot r/o uremia. Has bene non-compliant with follow up  -seen on dialysis-lytes pending-AVF BFR 250ml/min  -will plan for UF 1-1.5L today if tolerated  -plan for 3 hours HD in am  -request SW assistance for outpatient unit placement    FEN:  Hyperkalemia: due to advanced CKD  -HD initiated on 1/20/21  -lytes pending today    Liver masses/metastatic disease:   -concerning for GI malignancy  -elevated CEA-Oncology consulted-d/w Dr. Irma Jerry  -CT with contrast noted, plan to biopsy hepatic mass  -appreciate Oncology assistance    Weight loss: due to underlying malignancy  -add dietary nepro with meals    HTN: stable, add losartan-d/c amlodipine, lower HDLZ  -challenge UF as tolerated    DM2: on insulin    PVD: s/p amputation, toes, h/o OM    CKD-MBD: check phos    Anemia: hold ARMEN for now-hgb is 11.4    Thrombocytopenia: new onset, monitor  -pending today    Thank you for allowing me to participate in the care of this patient. I will continue to follow along. Please call with questions.     Haily Galeana MD

## 2021-01-20 NOTE — PROGRESS NOTES
Occupational/Physical Therapy  HOLD NOTE  Chivo Jennings      Attempted to see patient this date, patient currently on hold due to just returning from liver biopsy . Will follow up as medically appropriate and schedule allows. Thank you,   Dominic Goldstein.  Nolvia Yuan, OTR/L -[phone]  Ruby Herrera, [facility], T [id number]
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
## 2021-01-20 NOTE — PROGRESS NOTES
Admission assessment completed. Medications given per MAR. Tylenol for pain. Patient up to bathroom with standby assistance. Urine sample sent. Patient denies other needs. Call light within reach and fall precaution in place. The care plan and education has been reviewed and mutually agreed upon with the patient.

## 2021-01-21 ENCOUNTER — ANESTHESIA EVENT (OUTPATIENT)
Dept: ENDOSCOPY | Age: 66
DRG: 435 | End: 2021-01-21
Payer: MEDICARE

## 2021-01-21 ENCOUNTER — ANESTHESIA (OUTPATIENT)
Dept: ENDOSCOPY | Age: 66
DRG: 435 | End: 2021-01-21
Payer: MEDICARE

## 2021-01-21 VITALS
RESPIRATION RATE: 20 BRPM | DIASTOLIC BLOOD PRESSURE: 57 MMHG | SYSTOLIC BLOOD PRESSURE: 97 MMHG | OXYGEN SATURATION: 100 %

## 2021-01-21 LAB
A/G RATIO: 0.6 (ref 1.1–2.2)
ALBUMIN SERPL-MCNC: 2.2 G/DL (ref 3.4–5)
ALP BLD-CCNC: 249 U/L (ref 40–129)
ALT SERPL-CCNC: 14 U/L (ref 10–40)
ANION GAP SERPL CALCULATED.3IONS-SCNC: 14 MMOL/L (ref 3–16)
AST SERPL-CCNC: 37 U/L (ref 15–37)
BASOPHILS ABSOLUTE: 0 K/UL (ref 0–0.2)
BASOPHILS RELATIVE PERCENT: 0.3 %
BILIRUB SERPL-MCNC: 2 MG/DL (ref 0–1)
BUN BLDV-MCNC: 51 MG/DL (ref 7–20)
CALCIUM SERPL-MCNC: 8.7 MG/DL (ref 8.3–10.6)
CHLORIDE BLD-SCNC: 103 MMOL/L (ref 99–110)
CO2: 19 MMOL/L (ref 21–32)
CREAT SERPL-MCNC: 4 MG/DL (ref 0.8–1.3)
EOSINOPHILS ABSOLUTE: 0 K/UL (ref 0–0.6)
EOSINOPHILS RELATIVE PERCENT: 1 %
GFR AFRICAN AMERICAN: 18
GFR NON-AFRICAN AMERICAN: 15
GLOBULIN: 3.5 G/DL
GLUCOSE BLD-MCNC: 100 MG/DL (ref 70–99)
GLUCOSE BLD-MCNC: 139 MG/DL (ref 70–99)
GLUCOSE BLD-MCNC: 186 MG/DL (ref 70–99)
GLUCOSE BLD-MCNC: 66 MG/DL (ref 70–99)
GLUCOSE BLD-MCNC: 69 MG/DL (ref 70–99)
GLUCOSE BLD-MCNC: 71 MG/DL (ref 70–99)
GLUCOSE BLD-MCNC: 82 MG/DL (ref 70–99)
GLUCOSE BLD-MCNC: 82 MG/DL (ref 70–99)
GLUCOSE BLD-MCNC: 84 MG/DL (ref 70–99)
HCT VFR BLD CALC: 38.1 % (ref 40.5–52.5)
HEMOGLOBIN: 11.9 G/DL (ref 13.5–17.5)
HEPATITIS B CORE TOTAL ANTIBODY: POSITIVE
LYMPHOCYTES ABSOLUTE: 0.5 K/UL (ref 1–5.1)
LYMPHOCYTES RELATIVE PERCENT: 10.3 %
MAGNESIUM: 1.9 MG/DL (ref 1.8–2.4)
MCH RBC QN AUTO: 30.6 PG (ref 26–34)
MCHC RBC AUTO-ENTMCNC: 31.4 G/DL (ref 31–36)
MCV RBC AUTO: 97.6 FL (ref 80–100)
MONOCYTES ABSOLUTE: 0.5 K/UL (ref 0–1.3)
MONOCYTES RELATIVE PERCENT: 9.5 %
NEUTROPHILS ABSOLUTE: 3.8 K/UL (ref 1.7–7.7)
NEUTROPHILS RELATIVE PERCENT: 78.9 %
PDW BLD-RTO: 19.1 % (ref 12.4–15.4)
PERFORMED ON: ABNORMAL
PERFORMED ON: NORMAL
PHOSPHORUS: 3.6 MG/DL (ref 2.5–4.9)
PLATELET # BLD: 85 K/UL (ref 135–450)
PLATELET SLIDE REVIEW: ABNORMAL
PMV BLD AUTO: 8.4 FL (ref 5–10.5)
POTASSIUM SERPL-SCNC: 4.6 MMOL/L (ref 3.5–5.1)
RBC # BLD: 3.9 M/UL (ref 4.2–5.9)
SLIDE REVIEW: ABNORMAL
SODIUM BLD-SCNC: 136 MMOL/L (ref 136–145)
T4 FREE: 1.5 NG/DL (ref 0.9–1.8)
TOTAL PROTEIN: 5.7 G/DL (ref 6.4–8.2)
TSH REFLEX: 9.49 UIU/ML (ref 0.27–4.2)
WBC # BLD: 4.8 K/UL (ref 4–11)

## 2021-01-21 PROCEDURE — 90935 HEMODIALYSIS ONE EVALUATION: CPT

## 2021-01-21 PROCEDURE — 84443 ASSAY THYROID STIM HORMONE: CPT

## 2021-01-21 PROCEDURE — 6370000000 HC RX 637 (ALT 250 FOR IP): Performed by: INTERNAL MEDICINE

## 2021-01-21 PROCEDURE — 3609012400 HC EGD TRANSORAL BIOPSY SINGLE/MULTIPLE: Performed by: INTERNAL MEDICINE

## 2021-01-21 PROCEDURE — 99232 SBSQ HOSP IP/OBS MODERATE 35: CPT | Performed by: INTERNAL MEDICINE

## 2021-01-21 PROCEDURE — 84100 ASSAY OF PHOSPHORUS: CPT

## 2021-01-21 PROCEDURE — 36415 COLL VENOUS BLD VENIPUNCTURE: CPT

## 2021-01-21 PROCEDURE — 3700000001 HC ADD 15 MINUTES (ANESTHESIA): Performed by: INTERNAL MEDICINE

## 2021-01-21 PROCEDURE — 6360000002 HC RX W HCPCS: Performed by: NURSE ANESTHETIST, CERTIFIED REGISTERED

## 2021-01-21 PROCEDURE — 88305 TISSUE EXAM BY PATHOLOGIST: CPT

## 2021-01-21 PROCEDURE — 1200000000 HC SEMI PRIVATE

## 2021-01-21 PROCEDURE — 0DB78ZX EXCISION OF STOMACH, PYLORUS, VIA NATURAL OR ARTIFICIAL OPENING ENDOSCOPIC, DIAGNOSTIC: ICD-10-PCS | Performed by: INTERNAL MEDICINE

## 2021-01-21 PROCEDURE — 3700000000 HC ANESTHESIA ATTENDED CARE: Performed by: INTERNAL MEDICINE

## 2021-01-21 PROCEDURE — 6360000002 HC RX W HCPCS: Performed by: INTERNAL MEDICINE

## 2021-01-21 PROCEDURE — 88342 IMHCHEM/IMCYTCHM 1ST ANTB: CPT

## 2021-01-21 PROCEDURE — 85025 COMPLETE CBC W/AUTO DIFF WBC: CPT

## 2021-01-21 PROCEDURE — 2580000003 HC RX 258: Performed by: NURSE ANESTHETIST, CERTIFIED REGISTERED

## 2021-01-21 PROCEDURE — 83735 ASSAY OF MAGNESIUM: CPT

## 2021-01-21 PROCEDURE — 2709999900 HC NON-CHARGEABLE SUPPLY: Performed by: INTERNAL MEDICINE

## 2021-01-21 PROCEDURE — 3609010600 HC COLONOSCOPY POLYPECTOMY SNARE/COLD BIOPSY: Performed by: INTERNAL MEDICINE

## 2021-01-21 PROCEDURE — 7100000001 HC PACU RECOVERY - ADDTL 15 MIN: Performed by: INTERNAL MEDICINE

## 2021-01-21 PROCEDURE — 2500000003 HC RX 250 WO HCPCS: Performed by: INTERNAL MEDICINE

## 2021-01-21 PROCEDURE — 80053 COMPREHEN METABOLIC PANEL: CPT

## 2021-01-21 PROCEDURE — 0DBL8ZZ EXCISION OF TRANSVERSE COLON, VIA NATURAL OR ARTIFICIAL OPENING ENDOSCOPIC: ICD-10-PCS | Performed by: INTERNAL MEDICINE

## 2021-01-21 PROCEDURE — 84439 ASSAY OF FREE THYROXINE: CPT

## 2021-01-21 PROCEDURE — 7100000000 HC PACU RECOVERY - FIRST 15 MIN: Performed by: INTERNAL MEDICINE

## 2021-01-21 PROCEDURE — 2500000003 HC RX 250 WO HCPCS: Performed by: NURSE ANESTHETIST, CERTIFIED REGISTERED

## 2021-01-21 PROCEDURE — 0DBH8ZZ EXCISION OF CECUM, VIA NATURAL OR ARTIFICIAL OPENING ENDOSCOPIC: ICD-10-PCS | Performed by: INTERNAL MEDICINE

## 2021-01-21 RX ORDER — PROPOFOL 10 MG/ML
INJECTION, EMULSION INTRAVENOUS CONTINUOUS PRN
Status: DISCONTINUED | OUTPATIENT
Start: 2021-01-21 | End: 2021-01-21 | Stop reason: SDUPTHER

## 2021-01-21 RX ORDER — SODIUM CHLORIDE 9 MG/ML
INJECTION, SOLUTION INTRAVENOUS CONTINUOUS PRN
Status: DISCONTINUED | OUTPATIENT
Start: 2021-01-21 | End: 2021-01-21 | Stop reason: SDUPTHER

## 2021-01-21 RX ORDER — LIDOCAINE HYDROCHLORIDE 20 MG/ML
INJECTION, SOLUTION EPIDURAL; INFILTRATION; INTRACAUDAL; PERINEURAL PRN
Status: DISCONTINUED | OUTPATIENT
Start: 2021-01-21 | End: 2021-01-21 | Stop reason: SDUPTHER

## 2021-01-21 RX ORDER — PROPOFOL 10 MG/ML
INJECTION, EMULSION INTRAVENOUS PRN
Status: DISCONTINUED | OUTPATIENT
Start: 2021-01-21 | End: 2021-01-21 | Stop reason: SDUPTHER

## 2021-01-21 RX ORDER — LIDOCAINE HYDROCHLORIDE 10 MG/ML
0.2 INJECTION, SOLUTION EPIDURAL; INFILTRATION; INTRACAUDAL; PERINEURAL PRN
Status: DISCONTINUED | OUTPATIENT
Start: 2021-01-21 | End: 2021-01-26 | Stop reason: HOSPADM

## 2021-01-21 RX ORDER — AMLODIPINE BESYLATE 5 MG/1
5 TABLET ORAL DAILY
Status: DISCONTINUED | OUTPATIENT
Start: 2021-01-22 | End: 2021-01-25

## 2021-01-21 RX ORDER — GLYCOPYRROLATE 0.2 MG/ML
INJECTION INTRAMUSCULAR; INTRAVENOUS PRN
Status: DISCONTINUED | OUTPATIENT
Start: 2021-01-21 | End: 2021-01-21 | Stop reason: SDUPTHER

## 2021-01-21 RX ADMIN — GLYCOPYRROLATE 0.2 MG: 0.2 INJECTION INTRAMUSCULAR; INTRAVENOUS at 09:26

## 2021-01-21 RX ADMIN — HEPARIN SODIUM 5000 UNITS: 5000 INJECTION INTRAVENOUS; SUBCUTANEOUS at 20:28

## 2021-01-21 RX ADMIN — LIDOCAINE HYDROCHLORIDE 0.2 ML: 10 INJECTION, SOLUTION EPIDURAL; INFILTRATION; INTRACAUDAL; PERINEURAL at 11:07

## 2021-01-21 RX ADMIN — SODIUM CHLORIDE: 9 INJECTION, SOLUTION INTRAVENOUS at 09:18

## 2021-01-21 RX ADMIN — METOCLOPRAMIDE 5 MG: 10 TABLET ORAL at 16:55

## 2021-01-21 RX ADMIN — PROPOFOL 120 MCG/KG/MIN: 10 INJECTION, EMULSION INTRAVENOUS at 09:26

## 2021-01-21 RX ADMIN — PROPOFOL 20 MG: 10 INJECTION, EMULSION INTRAVENOUS at 09:31

## 2021-01-21 RX ADMIN — PANTOPRAZOLE SODIUM 40 MG: 40 TABLET, DELAYED RELEASE ORAL at 16:55

## 2021-01-21 RX ADMIN — PROPOFOL 50 MG: 10 INJECTION, EMULSION INTRAVENOUS at 09:27

## 2021-01-21 RX ADMIN — HYDRALAZINE HYDROCHLORIDE 25 MG: 25 TABLET, FILM COATED ORAL at 20:28

## 2021-01-21 RX ADMIN — LIDOCAINE HYDROCHLORIDE 80 MG: 20 INJECTION, SOLUTION EPIDURAL; INFILTRATION; INTRACAUDAL; PERINEURAL at 09:27

## 2021-01-21 ASSESSMENT — PULMONARY FUNCTION TESTS
PIF_VALUE: 1
PIF_VALUE: 0
PIF_VALUE: 1

## 2021-01-21 ASSESSMENT — PAIN SCALES - GENERAL
PAINLEVEL_OUTOF10: 0

## 2021-01-21 NOTE — FLOWSHEET NOTE
01/21/21 1108 01/21/21 1413   Vital Signs   /69 135/75   Temp 97.4 °F (36.3 °C) 97.5 °F (36.4 °C)   Pulse 56 63   Resp 20 20     Treatment time: 3 hours    Net UF: 1 L    Pre weight: Stated not able to stand, s/p colonoscopy, on stretcher without scale  Post weight: Stated not able to stand, s/p colonoscopy, on stretcher without scale  EDW: TBD    Access used: SHANNAN AVF  Access function: Venous needle infiltrated 30 minutes after initiation of tx, used 16 g per order. Recannulated with 17 g needle distal of infiltration, high  180-220. Prolonged bleeding post tx, >10 minutes. Medications or blood products given: None    Regular outpatient schedule: TBD    Summary of response to treatment: Tolerated 2nd HD tx without difficulty. VSS. Copy of dialysis treatment record placed in chart, to be scanned into EMR. Report called to Valencia Hu RN.

## 2021-01-21 NOTE — BRIEF OP NOTE
Brief Postoperative Note - Full Note in Chart Review/Procedures tab       Patient: Cuate Boateng  YOB: 1955  MRN: [id number]    Date of Procedure: 1/21/2021    Pre-Op Diagnosis:  Nausea/weight loss    Post-Op Diagnosis: Same       Procedure(s):  EGD BIOPSY  COLONOSCOPY POLYPECTOMY SNARE/COLD BIOPSY    Surgeon(s):  Dianne Awad MD    Assistant:  * No surgical staff found *    Anesthesia: Monitor Anesthesia Care    Estimated Blood Loss (mL): Minimal    Complications: None    Specimens:   ID Type Source Tests Collected by Time Destination   A : Gastric erosions Bx Tissue Stomach SURGICAL PATHOLOGY Dianne Awad MD 1/21/2021 4047    B :  Tissue Colon SURGICAL PATHOLOGY Dianne Awad MD 1/21/2021 5128    C :  Tissue Colon SURGICAL PATHOLOGY Dianne Awad MD 1/21/2021 3731        Implants:  * No implants in log *      Drains: * No LDAs found *    Findings:  1) Gastric erosions in fundus, body and antrum - biopsied    2) Normal esophagus and duodenum    3) 4 mm cecal polyp, three transverse colon polyps 4 mm - 6 mm in size - all cold snared. 4) No other colonic abnormalities    5) Normal terminal ileum    Rec:  1) Check pathology - patient to call office in 5 days for results. 2) Check liver bx path. 3) Resume diet and meds  4) Follow up as inpatient  5) Recall colonoscopy 3 yrs.     Electronically signed by Dianne Awad MD on 1/21/2021 at 10:08 AM

## 2021-01-21 NOTE — PROGRESS NOTES
neck pain and stiff joints  Neurological: negative for dizziness, gait problems, headaches, seizures, speech problems, tremors and weakness  Behavioral/Psych: negative for anxiety, behavior problems, depression, fatigue and sleep disturbance  Endocrine: negative for diabetic symptoms including none, neuropathy, polyphagia, polyuria, polydipsia, vomiting and diarrhea and temperature intolerance  Allergic/Immunologic: negative for anaphylaxis, angioedema, hay fever and urticaria    Objective:     Patient Vitals for the past 8 hrs:   BP Temp Temp src Pulse Resp SpO2 Height Weight   01/21/21 1413 135/75 97.5 °F (36.4 °C) -- 63 20 -- -- --   01/21/21 1108 130/69 97.4 °F (36.3 °C) -- 56 20 -- -- --   01/21/21 1040 108/89 96.9 °F (36.1 °C) Temporal 52 16 100 % -- --   01/21/21 1025 (!) 106/58 97.1 °F (36.2 °C) Temporal 54 16 94 % -- --   01/21/21 1020 100/62 96.9 °F (36.1 °C) Temporal 52 12 99 % -- --   01/21/21 1015 (!) 94/58 -- -- -- -- 99 % -- --   01/21/21 1010 (!) 90/57 -- -- 52 11 99 % -- --   01/21/21 1006 (!) 89/56 96.9 °F (36.1 °C) Temporal 52 11 99 % -- --   01/21/21 0906 -- 96.8 °F (36 °C) Temporal 63 -- 100 % 5' 11\" (1.803 m) 149 lb (67.6 kg)   01/21/21 0730 135/75 98 °F (36.7 °C) Oral 66 16 99 % -- --     I/O last 3 completed shifts: In: 1852 [I.V.:1452]  Out: 1400   No intake/output data recorded.     General Appearance: alert and oriented to person, place and time, well developed and well- nourished, in no acute distress  Skin: warm and dry, no rash or erythema  Head: normocephalic and atraumatic  Eyes: pupils equal, round, and reactive to light, extraocular eye movements intact, conjunctivae normal  ENT: external ear and ear canal normal bilaterally, nose without deformity, nasal mucosa and turbinates normal  Neck: supple and non-tender without mass, no cervical lymphadenopathy  Pulmonary/Chest: clear to auscultation bilaterally- no wheezes, rales or rhonchi, normal air movement, no respiratory guide needle was advanced into   a left hepatic lobe mass.  3 separate 18 gauge core biopsies were obtained. The needles removed hemostasis achieved with Gelfoam slurry and manual   pressure.           Impression   Successful CT-guided liver mass biopsy. CT OF THE CHEST WITH CONTRAST; CT OF THE ABDOMEN AND PELVIS WITH CONTRAST,   1/19/2021 6:24 pm       TECHNIQUE:   CT of the chest was performed with the administration of intravenous   contrast. Multiplanar reformatted images are provided for review. Dose   modulation, iterative reconstruction, and/or weight based adjustment of the   mA/kV was utilized to reduce the radiation dose to as low as reasonably   achievable.; CT of the abdomen and pelvis was performed with the   administration of intravenous contrast. Multiplanar reformatted images are   provided for review. Dose modulation, iterative reconstruction, and/or weight   based adjustment of the mA/kV was utilized to reduce the radiation dose to as   low as reasonably achievable.       COMPARISON:   CT of the abdomen and pelvis 01/02/2021.       HISTORY:   ORDERING SYSTEM PROVIDED HISTORY: CT abdomen   TECHNOLOGIST PROVIDED HISTORY:   Reason for exam:   CT abdomen   Reason for Exam: Ca   Acuity: Acute   Type of Exam: Initial       ORDERING SYSTEM PROVIDED HISTORY: Multiple liver mets, elevated . TECHNOLOGIST PROVIDED HISTORY:   Reason for exam:   Multiple liver mets, elevated . Additional Contrast?   Oral   Reason for Exam:  Multiple liver mets, elevated .    Acuity: Acute   Type of Exam: Initial   Relevant Medical/Surgical History: Unable to finish PO. pt will be having   dialysis after being admitted per Dr. Cornell Marquez.       FINDINGS:       Chest:       Mediastinum: The main pulmonary artery is normal in caliber.  The thoracic   aorta is normal in course and caliber with mild atherosclerotic plaque.  The   heart is borderline in size with no pericardial effusion.  No pathologic hilar or mediastinal adenopathy.       Lungs/pleura: No acute pulmonary infiltrate, consolidation or edema.  The   trace left pleural effusion noted previously has largely resolved.  10 x 12   mm noncalcified nodule in the right posterior costophrenic sulcus medially is   again noted.  4 mm spiculated pleural-based nodule in the lingula laterally. The central airways are patent.       Soft Tissues/Bones: Diffuse anasarca.  No acute osseous findings.  No lytic   or blastic osseous lesions.  Mild osteoarthritic spurring in the thoracic   spine.           Abdomen/Pelvis:       Organs: Diffuse hepatic metastatic disease.  No acute biliary findings.  The   spleen and adrenal glands are unremarkable.  No significant pancreatic   abnormality.  No renal mass or significant hydronephrosis.  There is mild   deformity of the lower pole of the left kidney, possibly related to remote   trauma.       GI/Bowel: Mild generalized bowel wall thickening, likely related to   third-spacing.  No significant pericolonic inflammatory changes.  The   appendix is unremarkable.  No small bowel distension.  The stomach is mildly   distended with oral contrast.  The duodenal sweep is unremarkable.       Pelvis: Moderate amount of pelvic ascites.  No significant pelvic or inguinal   adenopathy.  The prostate is not enlarged.  Mild distention of the urinary   bladder.       Peritoneum/Retroperitoneum: Diffuse edematous changes throughout the abdomen.    Small quantity of upper abdominal ascites.  Postoperative clips in the   retroperitoneum at the level of the pancreatic uncinate process as well as   inferior to the right lobe of the liver likely related to the history of   previous trauma.  The abdominal aorta is normal in caliber.  There is no   significant retroperitoneal adenopathy.       Bones/Soft Tissues: Diffuse anasarca.  No acute osseous findings.  Multilevel   degenerative disc disease in the lumbar spine, most pronounced at L4-5 and   L5-S1.  No lytic or blastic osseous lesions.           Impression   1. Redemonstration of 10 x 12 mm noncalcified right lower lobe pulmonary   nodule.  4 mm pleural-based spiculated nodule in the lingula laterally.  No   acute pulmonary infiltrate. 2. Diffuse hepatic metastatic disease.  No significant finding within the   abdomen or pelvis to indicate the primary source.  Consider PET-CT or needle   sampling for further evaluation. 3. Third-spacing with small to moderate amount of ascites, anasarca and   edematous changes throughout the abdomen.  This may be related to underlying   liver disease. 4. Postoperative clips in the retroperitoneum as well as inferior to the   liver with deformity of the lower pole of the left kidney, possibly   posttraumatic in etiology. Problem List:     Liver metastasis possibly bowel origin  Lung nodule    Assessment/Plan:     CT-guided liver biopsy was successfully performed yesterday. CEA is elevated. EGD/colonoscopy today, small colonic polyps only. Lung nodules, could be metastatic. Await for liver biopsy results. Pulmonary will follow.     Julian Blanca MD

## 2021-01-21 NOTE — PROGRESS NOTES
Nephrology Progress Note  [phone]  [phone]   [email or web address]        Reason for Consult:  CKD stage 4/5    HISTORY OF PRESENT ILLNESS:                This is a patient with significant past medical history of biopsy proven DGS, CKD stage 4/5, baseline SCr h 4 range, s/p AVF, DM2, HTN, PVD, diabetic foot ulcer,  who was sent to ER for hyperkalemia. Routine outpatient labs showed K of 5.9. Pt reports weight loss of 43# over 2-3 months. He reports N/V for the past 3 months. States anti-nausea medications has not been helping. He denies abdominal pain but does report occasional discomfort. He denies black stools of BRBPR. -he has right AVF created in 11/15/2019-had angioplasty in 1/17/20-he was last seen in 05/20-has missed appts  -he was seen in ER on 1/2/20 for abdominal pain, he had CT of A/P w/o contrast showed multiple liver lesions concerning for metastatic disease but no primary identified. Subjective/interval history    Pt seen and examined on dialysis  Tolerating well  No chest pains or sob  No fevers/chills  On room air  BPs better controlled and actually was hypotensive this AM      PHYSICAL EXAM:    Vitals:    /62   Pulse 52   Temp 96.9 °F (36.1 °C) (Temporal)   Resp 12   Ht 5' 11\" (1.803 m)   Wt 149 lb (67.6 kg)   SpO2 99%   BMI 20.78 kg/m²   I/O last 3 completed shifts: In: 1252 [I.V.:1252]  Out: -   I/O this shift:  In: 200 [I.V.:200]  Out: -     Physical Exam:  Gen: ill appearing, awake, alert, oriented x3, not in pain or resp distress  HEENT: no palor or icterus  CVS : S1 S2 normal, regular rhythm   Lungs: Good respiratory effort, clear air entry   Abd: soft, bowel sounds+  Ext: no LE edema  Skin: Warm. No rashes appreciated. : No TTP over bladder, nondistended.   Neuro: Alert and oriented x 3, nonfocal.  Access: SHANNAN RIVAS + accessed    DATA:    CBC:   Lab Results   Component Value Date    WBC 4.8 01/21/2021    RBC 3.90 01/21/2021    HGB 11.9 01/21/2021    HCT 38.1 01/21/2021    MCV 97.6 01/21/2021    MCH 30.6 01/21/2021    MCHC 31.4 01/21/2021    RDW 19.1 01/21/2021    PLT 85 01/21/2021    MPV 8.4 01/21/2021     BMP:    Lab Results   Component Value Date     01/21/2021    K 4.6 01/21/2021    K 5.2 01/02/2021     01/21/2021    CO2 19 01/21/2021    BUN 51 01/21/2021    LABALBU 2.2 01/21/2021    CREATININE 4.0 01/21/2021    CALCIUM 8.7 01/21/2021    GFRAA 18 01/21/2021    GFRAA 53 07/06/2012    LABGLOM 15 01/21/2021    GLUCOSE 100 01/21/2021       IMPRESSION/RECOMMENDATIONS:      CKD stage 5: due to biopsy proven DGS/history of NSAIDs extensive in the past, now complicated by hyperkalemia, N/V most likely due to underlying malignancy, however, cannot r/o uremia. Has been non-compliant with follow up  Started on dialysis on 1/20  Second HD today, discussed orders with dialysis RN  Next HD in AM  -request SW assistance for outpatient unit placement    Hyperkalemia improved    Liver masses/metastatic disease:   -concerning for GI malignancy  -elevated CEA-Oncology consulted-d/w Dr. Joey Joaquin  -CT with contrast noted, plan to biopsy hepatic mass  -appreciate Oncology assistance    Weight loss: due to underlying malignancy  -add dietary nepro with meals    HTN: stable, add losartan-d/c amlodipine, lower HDLZ  -challenge UF as tolerated    DM2: on insulin    PVD: s/p amputation, toes, h/o OM    CKD-MBD  Phos at goal.     Anemia: hold ARMEN for now-hgb is 11.4    Thrombocytopenia: new onset, monitor      Thank you for allowing me to participate in the care of this patient. I will continue to follow along. Please call with questions.     Sheila Mercado MD

## 2021-01-21 NOTE — ANESTHESIA POSTPROCEDURE EVALUATION
Department of Anesthesiology  Postprocedure Note    Patient: Pineda Rosenberg  MRN: [id number]  YOB: 1955  Date of evaluation: 1/21/2021  Time:  11:40 AM     Procedure Summary     Date: 01/21/21 Room / Location: [address] [place] [address]    Anesthesia Start: 1544 Anesthesia Stop: 1010    Procedures:       EGD BIOPSY (N/A Abdomen)      COLONOSCOPY POLYPECTOMY SNARE/COLD BIOPSY (N/A ) Diagnosis: (Nausea/weight loss)    Surgeons: Morenita Ackerman MD Responsible Provider: Hany Alcantara MD    Anesthesia Type: MAC ASA Status: 3          Anesthesia Type: MAC    David Phase I: David Score: 9    David Phase II:      Last vitals: Reviewed and per EMR flowsheets.        Anesthesia Post Evaluation    Level of consciousness: awake  Complications: no

## 2021-01-21 NOTE — PROGRESS NOTES
Occupational/Physical Therapy  Stephanie Case  Pt RAMIRO for procedure, OT/PT to evaluate when medically appropriate.    Thanks,  Crispin Siu, OTR/L [id number]  Lokesh Valdez, PT, DPT [id number]

## 2021-01-21 NOTE — PROGRESS NOTES
Pt arrived from Endo to PACU bay 8. Report received from Endo staff. Pt arouses easily to voice. RA, NSR, VSS. Will continue to monitor.

## 2021-01-21 NOTE — PROGRESS NOTES
Scope verified using 2 person system. Reviewed patient's medical and surgical history in electronic record and with patient at the bedside. All questions regarding procedure answered.

## 2021-01-21 NOTE — PROGRESS NOTES
Red alert sleeve attached to right arm for right upper arm fistula site. Anterior abdomen at liver biopsy site from 1- dressing is dry and intact.

## 2021-01-21 NOTE — PROGRESS NOTES
Pt ready for transfer to dialysis. Report given to Suzy Hein RN called. Pt's belongings taken up with the pt.

## 2021-01-21 NOTE — PROGRESS NOTES
Pt resting awake in bed. NPO after MN for procedure today. Transporter en route to take pt for EGD/colonoscopy. Pt denies pain at this time. Pt is axox4 and able to answer questions and follow commands throughout assessment.

## 2021-01-21 NOTE — PROGRESS NOTES
[facility]   Progress Note      1/21/2021 8:39 AM        Name: Jaclyn Diane . Admitted: 1/19/2021    SUBJECTIVE:  He is doing ok, tolerated liver bx yesterday without problems, he is planning on EGD and colonoscopy today.       Reviewed interval ancillary notes    Current Medications      losartan (COZAAR) tablet 25 mg, Daily      hydrALAZINE (APRESOLINE) tablet 25 mg, 3 times per day      metoclopramide (REGLAN) tablet 5 mg, TID AC      amLODIPine (NORVASC) tablet 10 mg, Daily      aspirin chewable tablet 81 mg, Daily      carvedilol (COREG) tablet 12.5 mg, BID WC      dicyclomine (BENTYL) capsule 10 mg, V2I PRN      folic acid-pyridoxine-cyanocobalamine (FOLTX) 2.5-25-1 MG tablet 1 tablet, Daily      levothyroxine (SYNTHROID) tablet 100 mcg, Daily      pantoprazole (PROTONIX) tablet 40 mg, BID AC      vitamin D (CHOLECALCIFEROL) tablet 2,000 Units, Daily      atorvastatin (LIPITOR) tablet 20 mg, Daily      insulin lispro (1 Unit Dial) 0-6 Units, TID WC      insulin lispro (1 Unit Dial) 0-3 Units, Nightly      glucose (GLUTOSE) 40 % oral gel 15 g, PRN      dextrose 50 % IV solution, PRN      glucagon (rDNA) injection 1 mg, PRN      dextrose 5 % solution, PRN      sodium chloride flush 0.9 % injection 10 mL, 2 times per day      sodium chloride flush 0.9 % injection 10 mL, PRN      promethazine (PHENERGAN) tablet 12.5 mg, Q6H PRN    Or      ondansetron (ZOFRAN) injection 4 mg, Q6H PRN      polyethylene glycol (GLYCOLAX) packet 17 g, Daily PRN      acetaminophen (TYLENOL) tablet 650 mg, Q6H PRN    Or      acetaminophen (TYLENOL) suppository 650 mg, Q6H PRN      heparin (porcine) injection 5,000 Units, 3 times per day        Objective:  /75   Pulse 66   Temp 98 °F (36.7 °C) (Oral)   Resp 16   Wt 149 lb 11.1 oz (67.9 kg)   SpO2 99%   BMI 21.48 kg/m²     Intake/Output Summary (Last 24 hours) at 1/21/2021 0839  Last data filed at 1/20/2021 2100  Gross per 24 hour   Intake 1252 ml   Output --   Net 1252 ml      Wt Readings from Last 3 Encounters:   01/20/21 149 lb 11.1 oz (67.9 kg)   01/02/21 200 lb (90.7 kg)   05/03/20 195 lb (88.5 kg)       General appearance:  Appears comfortable  Eyes: Sclera clear. Pupils equal.  ENT: Moist oral mucosa. Trachea midline, no adenopathy. Cardiovascular: Regular rhythm, normal S1, S2. No murmur. No edema in lower extremities  Respiratory: Not using accessory muscles. Good inspiratory effort. Clear to auscultation bilaterally, no wheeze or crackles. GI: Abdomen soft, no tenderness, not distended  Musculoskeletal: No cyanosis in digits, neck supple  Neurology: CN 2-12 grossly intact. No speech or motor deficits  Psych: Normal affect. Alert and oriented in time, place and person  Skin: Warm, dry, normal turgor    Labs and Tests:  CBC:   Recent Labs     01/18/21  1615 01/19/21  1343 01/21/21  0415   WBC 6.1 6.1 4.8   HGB 12.5* 11.4* 11.9*   * 119* 85*     BMP:    Recent Labs     01/19/21  1343 01/20/21  0850 01/21/21  0415   * 134* 136   K 5.2* 4.5 4.6    102 103   CO2 23 21 19*   BUN 72* 67* 51*   CREATININE 5.4* 4.3* 4.0*   GLUCOSE 95 84 100*     Hepatic:   Recent Labs     01/19/21  1343 01/20/21  0850 01/21/21  0415   AST 30  30 29 37   ALT 15  13 12 14   BILITOT 2.3*  2.4* 2.0* 2.0*   ALKPHOS 300*  287* 266* 249*       ASSESSMENT AND PLAN    Active Problems:    Weight loss  Resolved Problems:    * No resolved hospital problems.  *      Probable carcinoma metastatic to liver uncertain primary   - CT chest, abdomen and pelvis shows right lower lobe pulmonary nodule and diffuse hepatic metastatic disease.  - CT guided liver biopsy 1/20/21, path pending  - CEA is 259.9, CA 19-9 pending.   - EGD and colonoscopy today per GI        Elevated bilirubin   - GI following        Chronic renal failure managed by Dr. Charissa Kim  - on HD        History of osteomyelitis and multiple wounds on ankle requiring amputation of toes        Hyperkalemia   - Improved          Anemia and thrombocytopenia   - in evidence of iron, B12, or folate deficiency      Aditya Caballero CNP  [facility]    Patient appears to have cancer metastatic to liver endoscopy to assess for primary ct guided biopsy today

## 2021-01-21 NOTE — CARE COORDINATION
Met w/pt regarding HD schedule preference between the TTS and MWF schedules avail. Pt stated he would like MWF at 315pm.  Message left for Patricia Garcia to inform of pt's wishes.   Electronically signed by ZHEN Pizano on 1/21/2021 at 3:31 PM

## 2021-01-21 NOTE — PROGRESS NOTES
Glucose checked in dialysis. Glucose level is 69. Gave x 1 juice to Dialysis Rn to give to pt while waiting for transport as he will be finished with HD momentarily.

## 2021-01-21 NOTE — PROGRESS NOTES
Spoke with NP Nery via telephone regarding low glucose recently. Per telephone order ok to start d5% at 100 ml/h and diet changed to general, ok to stop d5% if glucose becomes elevated after meal. Spoke to Dr Rivera Armas with GI to clarify if heparin dose is to be given after biopsies taken and polyps removed  during EGD/colonoscopy. telphone order taken to hold 1400 heparin dose.

## 2021-01-21 NOTE — PROGRESS NOTES
[facility] PROGRESS NOTE    1/21/2021 3:44 PM        Name: Joselito Loredo . Admitted: 1/19/2021  Primary Care Provider: Zayra Rendon DO (Tel: [phone])      Subjective: Laure Whyte Pt seen in the am while on HD. He had just completed C scope and EGD. He was groggy and reported hunger. He denies any pain.        Reviewed interval ancillary notes    Current Medications      lidocaine PF 1 % injection 0.2 mL, PRN      losartan (COZAAR) tablet 25 mg, Daily      hydrALAZINE (APRESOLINE) tablet 25 mg, 3 times per day      metoclopramide (REGLAN) tablet 5 mg, TID AC      amLODIPine (NORVASC) tablet 10 mg, Daily      aspirin chewable tablet 81 mg, Daily      carvedilol (COREG) tablet 12.5 mg, BID WC      dicyclomine (BENTYL) capsule 10 mg, M0C PRN      folic acid-pyridoxine-cyanocobalamine (FOLTX) 2.5-25-1 MG tablet 1 tablet, Daily      levothyroxine (SYNTHROID) tablet 100 mcg, Daily      pantoprazole (PROTONIX) tablet 40 mg, BID AC      vitamin D (CHOLECALCIFEROL) tablet 2,000 Units, Daily      atorvastatin (LIPITOR) tablet 20 mg, Daily      insulin lispro (1 Unit Dial) 0-6 Units, TID WC      insulin lispro (1 Unit Dial) 0-3 Units, Nightly      glucose (GLUTOSE) 40 % oral gel 15 g, PRN      dextrose 50 % IV solution, PRN      glucagon (rDNA) injection 1 mg, PRN      dextrose 5 % solution, PRN      sodium chloride flush 0.9 % injection 10 mL, 2 times per day      sodium chloride flush 0.9 % injection 10 mL, PRN      promethazine (PHENERGAN) tablet 12.5 mg, Q6H PRN    Or      ondansetron (ZOFRAN) injection 4 mg, Q6H PRN      polyethylene glycol (GLYCOLAX) packet 17 g, Daily PRN      acetaminophen (TYLENOL) tablet 650 mg, Q6H PRN    Or      acetaminophen (TYLENOL) suppository 650 mg, Q6H PRN      heparin (porcine) injection 5,000 Units, 3 times per day        Objective:  /75   Pulse 63   Temp 97.5 °F (36.4 °C)   Resp 20   Ht 5' 11\" (1.803 m)   Wt 149 lb (67.6 kg)   SpO2 100%   BMI 20.78 kg/m²     Intake/Output Summary (Last 24 hours) at 1/21/2021 1544  Last data filed at 1/21/2021 1413  Gross per 24 hour   Intake 1852 ml   Output 1400 ml   Net 452 ml      Wt Readings from Last 3 Encounters:   01/21/21 149 lb (67.6 kg)   01/02/21 200 lb (90.7 kg)   05/03/20 195 lb (88.5 kg)       General appearance:  Appears comfortable Looks chronically ill    Eyes: Sclera clear. Pupils equal.  ENT: Moist oral mucosa. Trachea midline, no adenopathy. Cardiovascular: Regular rhythm, normal S1, S2. No murmur. No edema in lower extremities  Respiratory: Not using accessory muscles. Good inspiratory effort. Clear to auscultation bilaterally, no wheeze or crackles. GI: Abdomen soft, no tenderness, not distended, normal bowel sounds  Musculoskeletal: No cyanosis in digits, neck supple  Neurology: CN 2-12 grossly intact. No speech or motor deficits  Psych: Normal affect. Alert and oriented in time, place and person  Skin: Warm, dry, normal turgor    Labs and Tests:  CBC:   Recent Labs     01/18/21  1615 01/19/21  1343 01/21/21  0415   WBC 6.1 6.1 4.8   HGB 12.5* 11.4* 11.9*   * 119* 85*     BMP:    Recent Labs     01/19/21  1343 01/20/21  0850 01/21/21  0415   * 134* 136   K 5.2* 4.5 4.6    102 103   CO2 23 21 19*   BUN 72* 67* 51*   CREATININE 5.4* 4.3* 4.0*   GLUCOSE 95 84 100*     Hepatic:   Recent Labs     01/19/21  1343 01/20/21  0850 01/21/21  0415   AST 30  30 29 37   ALT 15  13 12 14   BILITOT 2.3*  2.4* 2.0* 2.0*   ALKPHOS 300*  287* 266* 249*     CT chest and abd:  Redemonstration of 10 x 12 mm noncalcified right lower lobe pulmonary   nodule.  4 mm pleural-based spiculated nodule in the lingula laterally.  No   acute pulmonary infiltrate.    2. Diffuse hepatic metastatic disease.  No significant finding within the   abdomen or pelvis to indicate the primary source.  Consider PET-CT or needle   sampling for further evaluation. 3. Third-spacing with small to moderate amount of ascites, anasarca and   edematous changes throughout the abdomen.  This may be related to underlying   liver disease. 4. Postoperative clips in the retroperitoneum as well as inferior to the   liver with deformity of the lower pole of the left kidney, possibly   posttraumatic in etiology. Results for Alea Biswas (MRN [id number]) as of 1/21/2021 15:46   Ref. Range 1/21/2021 09:20 1/21/2021 10:10 1/21/2021 14:14 1/21/2021 15:40   POC Glucose Latest Ref Range: 70 - 99 mg/dl 84 82 69 (L) 66 (L)      AIC 4.4     1) Gastric erosions in fundus, body and antrum - biopsied                              2) Normal esophagus and duodenum                              3) 4 mm cecal polyp, three transverse colon polyps 4 mm - 6 mm in size - all cold snared. 4) No other colonic abnormalities                              5) Normal terminal ileum      Problem List  Active Problems:    Weight loss  Resolved Problems:    * No resolved hospital problems. *       Assessment & Plan:   1. Metastatic lesions to the liver with unknown primary source. S/p liver biopsy on 1/20,  Had C scope and EGD today. .  Oncology is following appreciate their input. CEA is 259,  Ca 19-9 is pendng  2. Lung lesion : pulmonary is following. Awaiting bx results   3. ESRD on HD:  Nephrology is following  4. HTN:  bp in rang on home BB, CCB and  hydralazine. Occasional bradycardia noted. Will add hold parameters to BB  5. T2DM: AIC 4.4 Bg values on the low side today. Will stop prn correction insulin today. Po intake encouraged  6. Weight loss: likely due malignancy. Dietary to follow         Diet: DIET CARB CONTROL; Carb Control: 4 carb choices (60 gms)/meal; Low Sodium (2 GM);  Daily Fluid Restriction: 2000 ml; Renal  Code:Full Code  DVT PPX      TEE Luke - CNP   1/21/2021 3:44 PM
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
## 2021-01-22 LAB
A/G RATIO: 0.6 (ref 1.1–2.2)
ALBUMIN SERPL-MCNC: 2 G/DL (ref 3.4–5)
ALP BLD-CCNC: 206 U/L (ref 40–129)
ALT SERPL-CCNC: 13 U/L (ref 10–40)
ANION GAP SERPL CALCULATED.3IONS-SCNC: 11 MMOL/L (ref 3–16)
AST SERPL-CCNC: 34 U/L (ref 15–37)
BASOPHILS ABSOLUTE: 0 K/UL (ref 0–0.2)
BASOPHILS RELATIVE PERCENT: 0.6 %
BILIRUB SERPL-MCNC: 1.7 MG/DL (ref 0–1)
BLOOD SMEAR REVIEW: NORMAL
BUN BLDV-MCNC: 33 MG/DL (ref 7–20)
CA 19-9: 5 U/ML (ref 0–35)
CALCIUM SERPL-MCNC: 8.3 MG/DL (ref 8.3–10.6)
CHLORIDE BLD-SCNC: 98 MMOL/L (ref 99–110)
CO2: 21 MMOL/L (ref 21–32)
CREAT SERPL-MCNC: 3.5 MG/DL (ref 0.8–1.3)
EOSINOPHILS ABSOLUTE: 0.1 K/UL (ref 0–0.6)
EOSINOPHILS RELATIVE PERCENT: 2 %
GFR AFRICAN AMERICAN: 21
GFR NON-AFRICAN AMERICAN: 18
GLOBULIN: 3.1 G/DL
GLUCOSE BLD-MCNC: 114 MG/DL (ref 70–99)
GLUCOSE BLD-MCNC: 123 MG/DL (ref 70–99)
GLUCOSE BLD-MCNC: 140 MG/DL (ref 70–99)
GLUCOSE BLD-MCNC: 147 MG/DL (ref 70–99)
GLUCOSE BLD-MCNC: 71 MG/DL (ref 70–99)
HCT VFR BLD CALC: 34.1 % (ref 40.5–52.5)
HEMOGLOBIN: 10.7 G/DL (ref 13.5–17.5)
LYMPHOCYTES ABSOLUTE: 0.7 K/UL (ref 1–5.1)
LYMPHOCYTES RELATIVE PERCENT: 13.6 %
MAGNESIUM: 1.7 MG/DL (ref 1.8–2.4)
MCH RBC QN AUTO: 30.7 PG (ref 26–34)
MCHC RBC AUTO-ENTMCNC: 31.4 G/DL (ref 31–36)
MCV RBC AUTO: 97.6 FL (ref 80–100)
MONOCYTES ABSOLUTE: 0.6 K/UL (ref 0–1.3)
MONOCYTES RELATIVE PERCENT: 11.8 %
NEUTROPHILS ABSOLUTE: 3.6 K/UL (ref 1.7–7.7)
NEUTROPHILS RELATIVE PERCENT: 72 %
PDW BLD-RTO: 18.8 % (ref 12.4–15.4)
PERFORMED ON: ABNORMAL
PERFORMED ON: NORMAL
PHOSPHORUS: 3.4 MG/DL (ref 2.5–4.9)
PLATELET # BLD: 77 K/UL (ref 135–450)
PMV BLD AUTO: 8.8 FL (ref 5–10.5)
POTASSIUM SERPL-SCNC: 4.3 MMOL/L (ref 3.5–5.1)
RBC # BLD: 3.49 M/UL (ref 4.2–5.9)
SODIUM BLD-SCNC: 130 MMOL/L (ref 136–145)
TOTAL PROTEIN: 5.1 G/DL (ref 6.4–8.2)
WBC # BLD: 5.1 K/UL (ref 4–11)

## 2021-01-22 PROCEDURE — 97161 PT EVAL LOW COMPLEX 20 MIN: CPT

## 2021-01-22 PROCEDURE — 6370000000 HC RX 637 (ALT 250 FOR IP): Performed by: INTERNAL MEDICINE

## 2021-01-22 PROCEDURE — 99232 SBSQ HOSP IP/OBS MODERATE 35: CPT | Performed by: INTERNAL MEDICINE

## 2021-01-22 PROCEDURE — 1200000000 HC SEMI PRIVATE

## 2021-01-22 PROCEDURE — 84100 ASSAY OF PHOSPHORUS: CPT

## 2021-01-22 PROCEDURE — 6370000000 HC RX 637 (ALT 250 FOR IP): Performed by: NURSE PRACTITIONER

## 2021-01-22 PROCEDURE — 6360000002 HC RX W HCPCS: Performed by: INTERNAL MEDICINE

## 2021-01-22 PROCEDURE — 97116 GAIT TRAINING THERAPY: CPT

## 2021-01-22 PROCEDURE — 2580000003 HC RX 258: Performed by: INTERNAL MEDICINE

## 2021-01-22 PROCEDURE — 90935 HEMODIALYSIS ONE EVALUATION: CPT

## 2021-01-22 PROCEDURE — 6360000002 HC RX W HCPCS: Performed by: NURSE PRACTITIONER

## 2021-01-22 PROCEDURE — 80053 COMPREHEN METABOLIC PANEL: CPT

## 2021-01-22 PROCEDURE — 83735 ASSAY OF MAGNESIUM: CPT

## 2021-01-22 PROCEDURE — 85025 COMPLETE CBC W/AUTO DIFF WBC: CPT

## 2021-01-22 RX ORDER — OXYCODONE HYDROCHLORIDE AND ACETAMINOPHEN 5; 325 MG/1; MG/1
1 TABLET ORAL EVERY 4 HOURS PRN
Status: DISCONTINUED | OUTPATIENT
Start: 2021-01-22 | End: 2021-01-25

## 2021-01-22 RX ORDER — HYDROMORPHONE HYDROCHLORIDE 1 MG/ML
0.5 INJECTION, SOLUTION INTRAMUSCULAR; INTRAVENOUS; SUBCUTANEOUS
Status: DISCONTINUED | OUTPATIENT
Start: 2021-01-22 | End: 2021-01-26 | Stop reason: HOSPADM

## 2021-01-22 RX ORDER — MAGNESIUM SULFATE IN WATER 40 MG/ML
2000 INJECTION, SOLUTION INTRAVENOUS ONCE
Status: COMPLETED | OUTPATIENT
Start: 2021-01-22 | End: 2021-01-22

## 2021-01-22 RX ADMIN — CARVEDILOL 12.5 MG: 6.25 TABLET, FILM COATED ORAL at 16:23

## 2021-01-22 RX ADMIN — METOCLOPRAMIDE 5 MG: 10 TABLET ORAL at 16:24

## 2021-01-22 RX ADMIN — Medication 1 TABLET: at 12:32

## 2021-01-22 RX ADMIN — CARVEDILOL 12.5 MG: 6.25 TABLET, FILM COATED ORAL at 12:32

## 2021-01-22 RX ADMIN — LEVOTHYROXINE SODIUM 100 MCG: 0.1 TABLET ORAL at 04:32

## 2021-01-22 RX ADMIN — AMLODIPINE BESYLATE 5 MG: 5 TABLET ORAL at 12:33

## 2021-01-22 RX ADMIN — Medication 10 ML: at 22:07

## 2021-01-22 RX ADMIN — ATORVASTATIN CALCIUM 20 MG: 20 TABLET, FILM COATED ORAL at 12:33

## 2021-01-22 RX ADMIN — HEPARIN SODIUM 5000 UNITS: 5000 INJECTION INTRAVENOUS; SUBCUTANEOUS at 04:33

## 2021-01-22 RX ADMIN — LOSARTAN POTASSIUM 25 MG: 25 TABLET, FILM COATED ORAL at 12:32

## 2021-01-22 RX ADMIN — PANTOPRAZOLE SODIUM 40 MG: 40 TABLET, DELAYED RELEASE ORAL at 16:23

## 2021-01-22 RX ADMIN — Medication 2000 UNITS: at 12:33

## 2021-01-22 RX ADMIN — ASPIRIN 81 MG: 81 TABLET, CHEWABLE ORAL at 12:33

## 2021-01-22 RX ADMIN — OXYCODONE AND ACETAMINOPHEN 1 TABLET: 5; 325 TABLET ORAL at 16:23

## 2021-01-22 RX ADMIN — MAGNESIUM SULFATE HEPTAHYDRATE 2000 MG: 40 INJECTION, SOLUTION INTRAVENOUS at 12:35

## 2021-01-22 RX ADMIN — PANTOPRAZOLE SODIUM 40 MG: 40 TABLET, DELAYED RELEASE ORAL at 04:31

## 2021-01-22 RX ADMIN — Medication 10 ML: at 12:22

## 2021-01-22 RX ADMIN — METOCLOPRAMIDE 5 MG: 10 TABLET ORAL at 04:31

## 2021-01-22 ASSESSMENT — PAIN SCALES - GENERAL: PAINLEVEL_OUTOF10: 8

## 2021-01-22 NOTE — PROGRESS NOTES
Nephrology Progress Note  [phone]  [phone]   [email or web address].        Reason for Consult:  CKD stage 4/5    HISTORY OF PRESENT ILLNESS:                This is a patient with significant past medical history of biopsy proven DGS, CKD stage 4/5, baseline SCr high 4 range, s/p AVF, DM2, HTN, PVD, diabetic foot ulcer,  who was sent to ER for hyperkalemia. Routine outpatient labs showed K of 5.9. Reports of weight loss of 43# over 2-3 months. He reports N/V for the past 3 months. States anti-nausea medications has not been helping. He denies abdominal pain but does report occasional discomfort. He denies black stools of BRBPR. -he has right AVF created in 11/15/2019-had angioplasty in 1/17/20-he was last seen in 05/20-has missed appts  -he was seen in ER on 1/2/20 for abdominal pain, he had CT of A/P w/o contrast showed multiple liver lesions concerning for metastatic disease but no primary identified. Subjective/interval history    Pt seen and examined   Completed dialysis earlier  BPs controlled  Has been afebrile  On room air  No chest pains or sob        PHYSICAL EXAM:    Vitals:    /73   Pulse 69   Temp 97.2 °F (36.2 °C) (Temporal)   Resp 16   Ht 5' 11\" (1.803 m)   Wt 145 lb 8.1 oz (66 kg)   SpO2 92%   BMI 20.29 kg/m²   I/O last 3 completed shifts: In: 900 [P.O.:300; I.V.:200]  Out: 1800 [Urine:400]  No intake/output data recorded. Physical Exam:  Gen: ill appearing, awake, alert, oriented x3, not in pain or resp distress  HEENT: no palor or icterus  CVS : S1 S2 normal, regular rhythm   Lungs: Good respiratory effort, clear air entry   Abd: soft, bowel sounds+  Ext: no LE edema  : No TTP over bladder, nondistended.   Neuro: Alert and oriented x 3, nonfocal.  Access: SHANNAN AVF + thrill/bruit+    DATA:    CBC:   Lab Results   Component Value Date    WBC 5.1 01/22/2021    RBC 3.49 01/22/2021    HGB 10.7 01/22/2021    HCT 34.1 01/22/2021    MCV 97.6 01/22/2021    MCH 30.7 01/22/2021    MCHC 31.4 01/22/2021    RDW 18.8 01/22/2021    PLT 77 01/22/2021    MPV 8.8 01/22/2021     BMP:    Lab Results   Component Value Date     01/22/2021    K 4.3 01/22/2021    K 5.2 01/02/2021    CL 98 01/22/2021    CO2 21 01/22/2021    BUN 33 01/22/2021    LABALBU 2.0 01/22/2021    CREATININE 3.5 01/22/2021    CALCIUM 8.3 01/22/2021    GFRAA 21 01/22/2021    GFRAA 53 07/06/2012    LABGLOM 18 01/22/2021    GLUCOSE 114 01/22/2021       IMPRESSION/RECOMMENDATIONS:      ESRD    due to biopsy proven DGS/history of NSAIDs extensive in the past, now complicated by hyperkalemia, N/V most likely due to underlying malignancy, however, could not r/o uremia. Has been non-compliant with follow up with Dr. Myra Villasenor in the office  Started on dialysis on 1/20  Had HD on 1/20, 1/21 and then today  Next HD again on Monday  -request SW assistance for outpatient unit placement    Hyperkalemia improved    Hyponatremia mild  Keep on fluid restriction    Hypomagnesemia  Replete     Liver mets  Unknown primary  Suspect metastatic disease  CTs with contrast of c/A/P with kitty nodules and liver mets  S/p live mass biopsy 1/20  S/p EGD and colonoscopy on 1/20, colonic polyps identified    Lung nodules  ? Metastatic  Pulm on board      Weight loss: due to underlying malignancy  -add dietary nepro with meals    Hypertension controlled  Can stop hydralazine    DM II  Per primary    PVD: s/p amputation, toes, h/o OM    CKD-MBD  Phos at goal.     Anemia in CKD  Hb at goal  No need of ESAs for now    Thrombocytopenia: new onset, monitor    Plan  Next dialysis on Monday  Stop Hydralazine    Thank you for allowing me to participate in the care of this patient. I will continue to follow along. Please call with questions.     Tna Damico MD

## 2021-01-22 NOTE — PROGRESS NOTES
Occupational Therapy/Physical Therapy    OT referral received and appreciated. Pt's chart reviewed. Pt currently off floor for dialysis. Will follow up later as schedule permits.      Electronically signed by HERIBERTO Murphy/L on 1/22/2021 at 10:45 AM  Elio Matthew PT [id number]

## 2021-01-22 NOTE — PROGRESS NOTES
[facility] PROGRESS NOTE    1/22/2021 7:44 AM        Name: Chivo Jennings . Admitted: 1/19/2021  Primary Care Provider: Yvette Coy DO (Tel: [phone])      Subjective: Chayito Quach Pt seen in the am while on HD this am  He reports right sided abdominal pain that he rates at # 8  Percocet was added    He is hungry this am  Denies any nausea or vomiting    No pathology results at the time of my encounter .        Reviewed interval ancillary notes    Current Medications      lidocaine PF 1 % injection 0.2 mL, PRN      amLODIPine (NORVASC) tablet 5 mg, Daily      losartan (COZAAR) tablet 25 mg, Daily      hydrALAZINE (APRESOLINE) tablet 25 mg, 3 times per day      metoclopramide (REGLAN) tablet 5 mg, TID AC      aspirin chewable tablet 81 mg, Daily      carvedilol (COREG) tablet 12.5 mg, BID WC      dicyclomine (BENTYL) capsule 10 mg, B0C PRN      folic acid-pyridoxine-cyanocobalamine (FOLTX) 2.5-25-1 MG tablet 1 tablet, Daily      levothyroxine (SYNTHROID) tablet 100 mcg, Daily      pantoprazole (PROTONIX) tablet 40 mg, BID AC      vitamin D (CHOLECALCIFEROL) tablet 2,000 Units, Daily      atorvastatin (LIPITOR) tablet 20 mg, Daily      glucose (GLUTOSE) 40 % oral gel 15 g, PRN      dextrose 50 % IV solution, PRN      glucagon (rDNA) injection 1 mg, PRN      dextrose 5 % solution, PRN      sodium chloride flush 0.9 % injection 10 mL, 2 times per day      sodium chloride flush 0.9 % injection 10 mL, PRN      promethazine (PHENERGAN) tablet 12.5 mg, Q6H PRN    Or      ondansetron (ZOFRAN) injection 4 mg, Q6H PRN      polyethylene glycol (GLYCOLAX) packet 17 g, Daily PRN      acetaminophen (TYLENOL) tablet 650 mg, Q6H PRN    Or      acetaminophen (TYLENOL) suppository 650 mg, Q6H PRN      heparin (porcine) injection 5,000 Units, 3 times per day        Objective:  /75   Pulse 63 Temp 97.4 °F (36.3 °C) (Oral)   Resp 15   Ht 5' 11\" (1.803 m)   Wt 149 lb (67.6 kg)   SpO2 92%   BMI 20.78 kg/m²     Intake/Output Summary (Last 24 hours) at 1/22/2021 0744  Last data filed at 1/21/2021 2331  Gross per 24 hour   Intake 900 ml   Output 1800 ml   Net -900 ml      Wt Readings from Last 3 Encounters:   01/21/21 149 lb (67.6 kg)   01/02/21 200 lb (90.7 kg)   05/03/20 195 lb (88.5 kg)       General appearance:  Appears comfortable Looks chronically ill    Eyes: Sclera clear. Pupils equal.  ENT: Moist oral mucosa. Trachea midline, no adenopathy. Cardiovascular: Regular rhythm, normal S1, S2. No murmur. No edema in lower extremities  Respiratory: Not using accessory muscles. Good inspiratory effort. Clear to auscultation bilaterally, no wheeze or crackles. GI: Abdomen soft with bowel sounds present. Tender over the RUQ  Musculoskeletal: No cyanosis in digits, neck supple  Neurology: CN 2-12 grossly intact. No speech or motor deficits  Psych: Normal affect. Alert and oriented in time, place and person  Skin: Warm, dry, normal turgor    Labs and Tests:  CBC:   Recent Labs     01/21/21  0415 01/22/21  0331   WBC 4.8 5.1   HGB 11.9* 10.7*   PLT 85* 77*     BMP:    Recent Labs     01/20/21  0850 01/21/21  0415 01/22/21  0331   * 136 130*   K 4.5 4.6 4.3    103 98*   CO2 21 19* 21   BUN 67* 51* 33*   CREATININE 4.3* 4.0* 3.5*   GLUCOSE 84 100* 114*     Hepatic:   Recent Labs     01/20/21  0850 01/21/21  0415 01/22/21  0331   AST 29 37 34   ALT 12 14 13   BILITOT 2.0* 2.0* 1.7*   ALKPHOS 266* 249* 206*     CT chest and abd:  Redemonstration of 10 x 12 mm noncalcified right lower lobe pulmonary   nodule.  4 mm pleural-based spiculated nodule in the lingula laterally.  No   acute pulmonary infiltrate. 2. Diffuse hepatic metastatic disease.  No significant finding within the   abdomen or pelvis to indicate the primary source.  Consider PET-CT or needle   sampling for further evaluation.    3. TEE Gabriel - CNP   1/22/2021 7:44 AM

## 2021-01-22 NOTE — PROGRESS NOTES
Assessment: 0-10  Pain Level: 8(RN alerted)  Vital Signs  Patient Currently in Pain: Yes       Orientation  Orientation  Overall Orientation Status: Within Normal Limits  Social/Functional History  Social/Functional History  Lives With: Other (comment)(Brother)  Type of Home: House  Home Layout: Two level, Bed/Bath upstairs  Home Access: Stairs to enter without rails  Entrance Stairs - Number of Steps: 1 GALI  Bathroom Shower/Tub: Walk-in shower  Bathroom Toilet: Standard  ADL Assistance: [address]: Independent  Homemaking Responsibilities: Yes  Ambulation Assistance: Independent  Transfer Assistance: Independent  Active : Yes  Occupation: Retired  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  Leisure & Hobbies: Watch TV  Additional Comments: Pt reports 1 fall ~3-4 months ago, stepping up 1 step to enter home, tripped getting into house, broke nose  Cognition        Objective     Observation/Palpation  Posture: Fair    AROM RLE (degrees)  RLE AROM: WFL  AROM LLE (degrees)  LLE AROM : WFL  Strength RLE  Strength RLE: WFL  Strength LLE  Strength LLE: WFL  Tone RLE  RLE Tone: Normotonic  Tone LLE  LLE Tone: Normotonic  Motor Control  Gross Motor?: WFL  Sensation  Overall Sensation Status: WFL  Bed mobility  Supine to Sit: Supervision  Sit to Supine: Supervision  Scooting: Supervision  Transfers  Sit to Stand: Stand by assistance  Stand to sit: Stand by assistance  Ambulation  Ambulation?: Yes  Ambulation 1  Surface: level tile  Device: No Device  Assistance: Contact guard assistance  Quality of Gait: Pt ambulates with decreased step length, slow forrest, upright posture with decreased arm swing, decreased heel strike, narrow DANGELO, with occasional scissoring. No LOB. Distance: ~50 ft  Comments: Pt reporting lightheadedness at longterm point of ambulation, prompting PT to elect to return to room.  BP at 98/53 following ambulation  Stairs/Curb  Stairs?: No     Balance  Posture: Good  Sitting -

## 2021-01-22 NOTE — PROGRESS NOTES
sleep disturbance  Endocrine: negative for diabetic symptoms including none, neuropathy, polyphagia, polyuria, polydipsia, vomiting and diarrhea and temperature intolerance  Allergic/Immunologic: negative for anaphylaxis, angioedema, hay fever and urticaria    Objective:     Patient Vitals for the past 8 hrs:   BP Temp Temp src Pulse Resp SpO2 Weight   01/22/21 1453 105/65 -- -- -- -- 96 % --   01/22/21 1223 128/75 94.2 °F (34.6 °C) Axillary 74 14 98 % --   01/22/21 1105 129/72 96.5 °F (35.8 °C) -- 69 16 -- 143 lb 4.8 oz (65 kg)     I/O last 3 completed shifts: In: 1010 [P.O.:300; I.V.:10]  Out: 2100 [Urine:400]  No intake/output data recorded.     General Appearance: alert and oriented to person, place and time, well developed and well- nourished, in no acute distress  Skin: warm and dry, no rash or erythema  Head: normocephalic and atraumatic  Eyes: pupils equal, round, and reactive to light, extraocular eye movements intact, conjunctivae normal  ENT: external ear and ear canal normal bilaterally, nose without deformity, nasal mucosa and turbinates normal  Neck: supple and non-tender without mass, no cervical lymphadenopathy  Pulmonary/Chest: clear to auscultation bilaterally- no wheezes, rales or rhonchi, normal air movement, no respiratory distress  Cardiovascular: normal rate, regular rhythm,  no murmurs, rubs, distal pulses intact, no carotid bruits  Abdomen: soft, non-tender, non-distended, normal bowel sounds, no masses or organomegaly  Lymph Nodes: Cervical, supraclavicular normal  Extremities: no cyanosis, clubbing or edema  Musculoskeletal: normal range of motion, no joint swelling, deformity or tenderness  Neurologic: alert, no focal neurologic deficits    Data Review:  CBC:   Lab Results   Component Value Date    WBC 5.1 01/22/2021    RBC 3.49 01/22/2021     BMP:   Lab Results   Component Value Date    GLUCOSE 114 01/22/2021    CO2 21 01/22/2021    BUN 33 01/22/2021    CREATININE 3.5 01/22/2021 achievable.; CT of the abdomen and pelvis was performed with the   administration of intravenous contrast. Multiplanar reformatted images are   provided for review. Dose modulation, iterative reconstruction, and/or weight   based adjustment of the mA/kV was utilized to reduce the radiation dose to as   low as reasonably achievable.       COMPARISON:   CT of the abdomen and pelvis 01/02/2021.       HISTORY:   ORDERING SYSTEM PROVIDED HISTORY: CT abdomen   TECHNOLOGIST PROVIDED HISTORY:   Reason for exam:   CT abdomen   Reason for Exam: Ca   Acuity: Acute   Type of Exam: Initial       ORDERING SYSTEM PROVIDED HISTORY: Multiple liver mets, elevated . TECHNOLOGIST PROVIDED HISTORY:   Reason for exam:   Multiple liver mets, elevated . Additional Contrast?   Oral   Reason for Exam:  Multiple liver mets, elevated . Acuity: Acute   Type of Exam: Initial   Relevant Medical/Surgical History: Unable to finish PO. pt will be having   dialysis after being admitted per Dr. Talia Gutierrez.       FINDINGS:       Chest:       Mediastinum: The main pulmonary artery is normal in caliber.  The thoracic   aorta is normal in course and caliber with mild atherosclerotic plaque.  The   heart is borderline in size with no pericardial effusion.  No pathologic   hilar or mediastinal adenopathy.       Lungs/pleura: No acute pulmonary infiltrate, consolidation or edema.  The   trace left pleural effusion noted previously has largely resolved.  10 x 12   mm noncalcified nodule in the right posterior costophrenic sulcus medially is   again noted.  4 mm spiculated pleural-based nodule in the lingula laterally.    The central airways are patent.       Soft Tissues/Bones: Diffuse anasarca.  No acute osseous findings.  No lytic   or blastic osseous lesions.  Mild osteoarthritic spurring in the thoracic   spine.           Abdomen/Pelvis:       Organs: Diffuse hepatic metastatic disease.  No acute biliary findings.  The   spleen and adrenal glands are unremarkable.  No significant pancreatic   abnormality.  No renal mass or significant hydronephrosis.  There is mild   deformity of the lower pole of the left kidney, possibly related to remote   trauma.       GI/Bowel: Mild generalized bowel wall thickening, likely related to   third-spacing.  No significant pericolonic inflammatory changes.  The   appendix is unremarkable.  No small bowel distension.  The stomach is mildly   distended with oral contrast.  The duodenal sweep is unremarkable.       Pelvis: Moderate amount of pelvic ascites.  No significant pelvic or inguinal   adenopathy.  The prostate is not enlarged.  Mild distention of the urinary   bladder.       Peritoneum/Retroperitoneum: Diffuse edematous changes throughout the abdomen. Small quantity of upper abdominal ascites.  Postoperative clips in the   retroperitoneum at the level of the pancreatic uncinate process as well as   inferior to the right lobe of the liver likely related to the history of   previous trauma.  The abdominal aorta is normal in caliber.  There is no   significant retroperitoneal adenopathy.       Bones/Soft Tissues: Diffuse anasarca.  No acute osseous findings.  Multilevel   degenerative disc disease in the lumbar spine, most pronounced at L4-5 and   L5-S1.  No lytic or blastic osseous lesions.           Impression   1. Redemonstration of 10 x 12 mm noncalcified right lower lobe pulmonary   nodule.  4 mm pleural-based spiculated nodule in the lingula laterally.  No   acute pulmonary infiltrate. 2. Diffuse hepatic metastatic disease.  No significant finding within the   abdomen or pelvis to indicate the primary source.  Consider PET-CT or needle   sampling for further evaluation. 3. Third-spacing with small to moderate amount of ascites, anasarca and   edematous changes throughout the abdomen.  This may be related to underlying   liver disease.    4. Postoperative clips in the retroperitoneum as well as inferior to the   liver with deformity of the lower pole of the left kidney, possibly   posttraumatic in etiology. Problem List:     Liver metastasis possibly bowel origin  Lung nodule    Assessment/Plan:     CT-guided liver biopsy was successfully performed 1/20, results are not available yet. CEA is elevated. CKD currently on hemodialysis. Lung nodules, could be metastatic. Await for liver biopsy results. Pulmonary will follow.     Rebeca Weinberg MD

## 2021-01-22 NOTE — PROGRESS NOTES
[facility]   Progress Note      1/22/2021 11:11 AM        Name: Ofelia Wu . Admitted: 1/19/2021    SUBJECTIVE:  Patient seen in dialysis, he reports feeling better, offers no new complaints.      Reviewed interval ancillary notes    Current Medications      magnesium sulfate 2000 mg in 50 mL IVPB premix, Once      lidocaine PF 1 % injection 0.2 mL, PRN      amLODIPine (NORVASC) tablet 5 mg, Daily      losartan (COZAAR) tablet 25 mg, Daily      hydrALAZINE (APRESOLINE) tablet 25 mg, 3 times per day      metoclopramide (REGLAN) tablet 5 mg, TID AC      aspirin chewable tablet 81 mg, Daily      carvedilol (COREG) tablet 12.5 mg, BID WC      dicyclomine (BENTYL) capsule 10 mg, N5K PRN      folic acid-pyridoxine-cyanocobalamine (FOLTX) 2.5-25-1 MG tablet 1 tablet, Daily      levothyroxine (SYNTHROID) tablet 100 mcg, Daily      pantoprazole (PROTONIX) tablet 40 mg, BID AC      vitamin D (CHOLECALCIFEROL) tablet 2,000 Units, Daily      atorvastatin (LIPITOR) tablet 20 mg, Daily      glucose (GLUTOSE) 40 % oral gel 15 g, PRN      dextrose 50 % IV solution, PRN      glucagon (rDNA) injection 1 mg, PRN      dextrose 5 % solution, PRN      sodium chloride flush 0.9 % injection 10 mL, 2 times per day      sodium chloride flush 0.9 % injection 10 mL, PRN      promethazine (PHENERGAN) tablet 12.5 mg, Q6H PRN    Or      ondansetron (ZOFRAN) injection 4 mg, Q6H PRN      polyethylene glycol (GLYCOLAX) packet 17 g, Daily PRN      acetaminophen (TYLENOL) tablet 650 mg, Q6H PRN    Or      acetaminophen (TYLENOL) suppository 650 mg, Q6H PRN      heparin (porcine) injection 5,000 Units, 3 times per day        Objective:  /73   Pulse 69   Temp 97.2 °F (36.2 °C) (Temporal)   Resp 16   Ht 5' 11\" (1.803 m)   Wt 145 lb 8.1 oz (66 kg)   SpO2 92%   BMI 20.29 kg/m²     Intake/Output Summary (Last 24 hours) at 1/22/2021 1111  Last data filed at 1/21/2021 2331  Gross per 24 hour   Intake 700 ml   Output 1800 ml   Net -1100 ml      Wt Readings from Last 3 Encounters:   01/22/21 145 lb 8.1 oz (66 kg)   01/02/21 200 lb (90.7 kg)   05/03/20 195 lb (88.5 kg)       General appearance:  Appears comfortable  Eyes: Sclera clear. Pupils equal.  ENT: Moist oral mucosa. Trachea midline, no adenopathy. Cardiovascular: Regular rhythm, normal S1, S2. No murmur. No edema in lower extremities  Respiratory: Not using accessory muscles. Good inspiratory effort. Clear to auscultation bilaterally, no wheeze or crackles. GI: Abdomen soft, no tenderness, not distended  Musculoskeletal: No cyanosis in digits, neck supple  Neurology: CN 2-12 grossly intact. No speech or motor deficits  Psych: Normal affect. Alert and oriented in time, place and person  Skin: Warm, dry, normal turgor    Labs and Tests:  CBC:   Recent Labs     01/19/21  1343 01/21/21  0415 01/22/21  0331   WBC 6.1 4.8 5.1   HGB 11.4* 11.9* 10.7*   * 85* 77*     BMP:    Recent Labs     01/20/21  0850 01/21/21  0415 01/22/21  0331   * 136 130*   K 4.5 4.6 4.3    103 98*   CO2 21 19* 21   BUN 67* 51* 33*   CREATININE 4.3* 4.0* 3.5*   GLUCOSE 84 100* 114*     Hepatic:   Recent Labs     01/20/21  0850 01/21/21  0415 01/22/21  0331   AST 29 37 34   ALT 12 14 13   BILITOT 2.0* 2.0* 1.7*   ALKPHOS 266* 249* 206*       ASSESSMENT AND PLAN    Active Problems:    Weight loss  Resolved Problems:    * No resolved hospital problems.  *      Probable carcinoma metastatic to liver uncertain primary   - CT chest, abdomen and pelvis shows right lower lobe pulmonary nodule and diffuse hepatic metastatic disease.  - CT guided liver biopsy 1/20/21, path pending  - CEA is 259.9, CA 19-9 pending.   - EGD and colonoscopy 1/21/21, polyps removed, gastric erosions biopsied, path pending       Elevated bilirubin   - GI following        Chronic renal failure managed by Dr. Chris Kaufman  - on HD        History of osteomyelitis and multiple wounds on ankle requiring amputation of toes        Hyperkalemia   - Improved          Anemia and thrombocytopenia   - in evidence of iron, B12, or folate deficiency  - plts 111 on 1/18/21, down to 77 today (1/22/21)  - peripheral smear to be reviewed  - check HIT  - hold SQ heparin       Thierno Foster, [facility]  [facility]    Patient seen and examined. Agree with above. Pathology from liver biopsy is pending. Has worsening thrombocytopenia. Subcu heparin has been discontinued HIT will be checked. Peripheral smear did not show clumping or schistocytes. Hydralazine has been discontinued by nephrologist  The patient is not getting heparin with dialysis - D/W Dr. Jenny Patterson  We will monitor.     Reji Valladares MD

## 2021-01-22 NOTE — PLAN OF CARE
Problem: Pain:  Goal: Pain level will decrease  Description: Pain level will decrease  Outcome: Ongoing     Problem: Pain:  Goal: Control of acute pain  Description: Control of acute pain  Outcome: Ongoing     Problem: Pain:  Goal: Control of chronic pain  Description: Control of chronic pain  Outcome: Ongoing     Problem: Falls - Risk of:  Goal: Will remain free from falls  Description: Will remain free from falls  Outcome: Met This Shift  Goal: Absence of physical injury  Description: Absence of physical injury  Outcome: Met This Shift     Problem: Fluid Volume:  Goal: Ability to achieve a balanced intake and output will improve  Description: Ability to achieve a balanced intake and output will improve  Outcome: Met This Shift

## 2021-01-22 NOTE — PROGRESS NOTES
[facility]  Gastroenterology Progress Note    Tita Ramos is a 77 y.o. male patient. Active Problems:    Weight loss  Resolved Problems:    * No resolved hospital problems. *      SUBJECTIVE:  Tolerating solid food here. No N/V. Some RUQ pain. ROS:  No fever, chills  No chest pain, palpitations  No SOB, cough  Gastrointestinal ROS: see above    Physical    VITALS:  /73   Pulse 69   Temp 97.2 °F (36.2 °C) (Temporal)   Resp 16   Ht 5' 11\" (1.803 m)   Wt 145 lb 8.1 oz (66 kg)   SpO2 92%   BMI 20.29 kg/m²   TEMPERATURE:  Current - Temp: 97.2 °F (36.2 °C); Max - Temp  Av.3 °F (36.3 °C)  Min: 96.9 °F (36.1 °C)  Max: 98.2 °F (36.8 °C)    NAD  Regular rate   Lungs CTA Bilaterally  Abdomen soft, ND, mild RUQ tenderness,  Bowel sounds normal.    Data    Data Review:    Recent Labs     21  1343 21  0415 21  0331   WBC 6.1 4.8 5.1   HGB 11.4* 11.9* 10.7*   HCT 36.2* 38.1* 34.1*   MCV 97.7 97.6 97.6   * 85* 77*     Recent Labs     21  0850 21  0415 21  0331   * 136 130*   K 4.5 4.6 4.3    103 98*   CO2    PHOS 3.4 3.6 3.4   BUN 67* 51* 33*   CREATININE 4.3* 4.0* 3.5*     Recent Labs     21  1343 21  0850 21  0415 21  0331   AST 30  30 29 37 34   ALT 15  13 12 14 13   BILIDIR 1.5*  --   --   --    BILITOT 2.3*  2.4* 2.0* 2.0* 1.7*   ALKPHOS 300*  287* 266* 249* 206*     Recent Labs     21  1343   LIPASE 18.0     No results for input(s): PROTIME, INR in the last 72 hours. No results for input(s): PTT in the last 72 hours. ASSESSMENT :    Nausea, vomiting, RUQ pain, and weight loss - over the past few months. CT with liver lesions c/w metastatic disease as well as 2 lung nodules. CEA is 260. EGD with gastric erosions. Colonoscopy with 4 polyps (ranged from 4-6 mm in size). CKD stage V  - started on HD.      PLAN :  - f/u liver bx  - f/u gastric and colon bx    Discussed with Dr. Jesse Brewster, NELY  [address]  I have personally performed a face to face diagnostic evaluation on this patient. I have interviewed and examined the patient and I agree with the findings and recommended plan of care. In summary, my findings and plan are the following: As above, feels fine s/p EGD/Colonoscopy that failed to find primary GI malignancy. Liver Biopsy path: Liver biopsy:      - Adenocarcinoma-See Comment. COMMENT: No established cancer history is noted. The tumor cells are   positive for MOC-31, CK7, CDX2 (patchy), focally positive for CK20, and   negative for TTF1 (common lung adenocarcinoma marker) and NKX3.1   (prostatic marker). This immunoprofile suggests GI tract origin   (including pancreaticobiliary). Correlation with clinical and radiologic   finding is necessary.       Ca19-9 is 5 and . Unclear origin. Will defer further w/u and treatment to Oncology. He should have repeat colonoscopy in 5 yrs. Will sign off. Please call with questions.       Arturo Rousseau MD  [address]  1/22/2021

## 2021-01-22 NOTE — CARE COORDINATION
HD schedule received and provided to pt-copy also on pt's chart. Schedule is currently TTS at 1130. Pt may switch to MWF at 315 though-informed pt that should he decide to switch, to call the charge nurse at [facility] to make request-pt understands.   Electronically signed by ZHEN Briggs on 1/22/2021 at 11:58 AM

## 2021-01-22 NOTE — PLAN OF CARE
Problem: Pain:  Goal: Pain level will decrease  Description: Pain level will decrease  Note: No complaints of pain this shift. Problem: Falls - Risk of:  Goal: Absence of physical injury  Description: Absence of physical injury  Note: No injuries this shift.

## 2021-01-22 NOTE — PROGRESS NOTES
Patient back from dialysis in stable condition. He is sitting up in bed eating lunch. No concerns at this time.  Electronically signed by Michael Breaux RN on 1/22/2021 at 12:23 PM

## 2021-01-22 NOTE — PROGRESS NOTES
Clinical Pharmacy Note     Heparin 5000 units SQ every 8 hours placed on hold by Morena Lynch. Order discontinued per protocol. Please assess and reorder when appropriate. Thank you for allowing us to participate in the care of this patient.      Poly Vincent, FélixD

## 2021-01-23 LAB
APTT: 33.4 SEC (ref 24.2–36.2)
BASOPHILS ABSOLUTE: 0 K/UL (ref 0–0.2)
BASOPHILS RELATIVE PERCENT: 0.2 %
EOSINOPHILS ABSOLUTE: 0.1 K/UL (ref 0–0.6)
EOSINOPHILS RELATIVE PERCENT: 1.2 %
FIBRINOGEN: 178 MG/DL (ref 200–397)
GLUCOSE BLD-MCNC: 101 MG/DL (ref 70–99)
GLUCOSE BLD-MCNC: 94 MG/DL (ref 70–99)
GLUCOSE BLD-MCNC: 96 MG/DL (ref 70–99)
GLUCOSE BLD-MCNC: 99 MG/DL (ref 70–99)
HCT VFR BLD CALC: 32.5 % (ref 40.5–52.5)
HEMOGLOBIN: 10.2 G/DL (ref 13.5–17.5)
HEPARIN INDUCED PLATELET ANTIBODY: NEGATIVE
INR BLD: 1.51 (ref 0.86–1.14)
LACTATE DEHYDROGENASE: 240 U/L (ref 100–190)
LYMPHOCYTES ABSOLUTE: 0.7 K/UL (ref 1–5.1)
LYMPHOCYTES RELATIVE PERCENT: 12.9 %
MCH RBC QN AUTO: 30.7 PG (ref 26–34)
MCHC RBC AUTO-ENTMCNC: 31.5 G/DL (ref 31–36)
MCV RBC AUTO: 97.7 FL (ref 80–100)
MONOCYTES ABSOLUTE: 0.7 K/UL (ref 0–1.3)
MONOCYTES RELATIVE PERCENT: 11.5 %
NEUTROPHILS ABSOLUTE: 4.3 K/UL (ref 1.7–7.7)
NEUTROPHILS RELATIVE PERCENT: 74.2 %
PDW BLD-RTO: 18.6 % (ref 12.4–15.4)
PERFORMED ON: ABNORMAL
PERFORMED ON: NORMAL
PLATELET # BLD: 59 K/UL (ref 135–450)
PMV BLD AUTO: 9 FL (ref 5–10.5)
PROTHROMBIN TIME: 17.6 SEC (ref 10–13.2)
RBC # BLD: 3.32 M/UL (ref 4.2–5.9)
WBC # BLD: 5.7 K/UL (ref 4–11)

## 2021-01-23 PROCEDURE — 85384 FIBRINOGEN ACTIVITY: CPT

## 2021-01-23 PROCEDURE — 83615 LACTATE (LD) (LDH) ENZYME: CPT

## 2021-01-23 PROCEDURE — 1200000000 HC SEMI PRIVATE

## 2021-01-23 PROCEDURE — 6370000000 HC RX 637 (ALT 250 FOR IP): Performed by: INTERNAL MEDICINE

## 2021-01-23 PROCEDURE — 85025 COMPLETE CBC W/AUTO DIFF WBC: CPT

## 2021-01-23 PROCEDURE — 6370000000 HC RX 637 (ALT 250 FOR IP): Performed by: NURSE PRACTITIONER

## 2021-01-23 PROCEDURE — 6360000002 HC RX W HCPCS: Performed by: INTERNAL MEDICINE

## 2021-01-23 PROCEDURE — 86022 PLATELET ANTIBODIES: CPT

## 2021-01-23 PROCEDURE — 85730 THROMBOPLASTIN TIME PARTIAL: CPT

## 2021-01-23 PROCEDURE — 36415 COLL VENOUS BLD VENIPUNCTURE: CPT

## 2021-01-23 PROCEDURE — 85610 PROTHROMBIN TIME: CPT

## 2021-01-23 PROCEDURE — 2580000003 HC RX 258: Performed by: INTERNAL MEDICINE

## 2021-01-23 PROCEDURE — 99232 SBSQ HOSP IP/OBS MODERATE 35: CPT | Performed by: INTERNAL MEDICINE

## 2021-01-23 RX ORDER — PROMETHAZINE HYDROCHLORIDE 25 MG/ML
12.5 INJECTION, SOLUTION INTRAMUSCULAR; INTRAVENOUS EVERY 6 HOURS PRN
Status: DISCONTINUED | OUTPATIENT
Start: 2021-01-23 | End: 2021-01-26 | Stop reason: HOSPADM

## 2021-01-23 RX ADMIN — ATORVASTATIN CALCIUM 20 MG: 20 TABLET, FILM COATED ORAL at 11:07

## 2021-01-23 RX ADMIN — CARVEDILOL 12.5 MG: 6.25 TABLET, FILM COATED ORAL at 11:07

## 2021-01-23 RX ADMIN — ONDANSETRON 4 MG: 2 INJECTION INTRAMUSCULAR; INTRAVENOUS at 17:34

## 2021-01-23 RX ADMIN — Medication 10 ML: at 11:07

## 2021-01-23 RX ADMIN — PANTOPRAZOLE SODIUM 40 MG: 40 TABLET, DELAYED RELEASE ORAL at 06:42

## 2021-01-23 RX ADMIN — LOSARTAN POTASSIUM 25 MG: 25 TABLET, FILM COATED ORAL at 11:06

## 2021-01-23 RX ADMIN — LEVOTHYROXINE SODIUM 100 MCG: 0.1 TABLET ORAL at 06:42

## 2021-01-23 RX ADMIN — PROMETHAZINE HYDROCHLORIDE 12.5 MG: 25 INJECTION INTRAMUSCULAR; INTRAVENOUS at 18:36

## 2021-01-23 RX ADMIN — OXYCODONE AND ACETAMINOPHEN 1 TABLET: 5; 325 TABLET ORAL at 21:31

## 2021-01-23 RX ADMIN — OXYCODONE AND ACETAMINOPHEN 1 TABLET: 5; 325 TABLET ORAL at 04:09

## 2021-01-23 RX ADMIN — METOCLOPRAMIDE 5 MG: 10 TABLET ORAL at 06:42

## 2021-01-23 RX ADMIN — ASPIRIN 81 MG: 81 TABLET, CHEWABLE ORAL at 11:06

## 2021-01-23 RX ADMIN — CARVEDILOL 12.5 MG: 6.25 TABLET, FILM COATED ORAL at 21:32

## 2021-01-23 RX ADMIN — Medication 2000 UNITS: at 11:06

## 2021-01-23 RX ADMIN — PANTOPRAZOLE SODIUM 40 MG: 40 TABLET, DELAYED RELEASE ORAL at 21:32

## 2021-01-23 RX ADMIN — METOCLOPRAMIDE 5 MG: 10 TABLET ORAL at 11:06

## 2021-01-23 RX ADMIN — METOCLOPRAMIDE 5 MG: 10 TABLET ORAL at 21:32

## 2021-01-23 RX ADMIN — Medication 10 ML: at 21:34

## 2021-01-23 RX ADMIN — AMLODIPINE BESYLATE 5 MG: 5 TABLET ORAL at 11:06

## 2021-01-23 RX ADMIN — Medication 1 TABLET: at 11:06

## 2021-01-23 ASSESSMENT — PAIN DESCRIPTION - LOCATION: LOCATION: ABDOMEN

## 2021-01-23 ASSESSMENT — PAIN SCALES - GENERAL
PAINLEVEL_OUTOF10: 5
PAINLEVEL_OUTOF10: 10
PAINLEVEL_OUTOF10: 9

## 2021-01-23 ASSESSMENT — PAIN DESCRIPTION - DESCRIPTORS
DESCRIPTORS: ACHING;TENDER
DESCRIPTORS: SHARP;SHOOTING

## 2021-01-23 ASSESSMENT — PAIN DESCRIPTION - ORIENTATION
ORIENTATION: RIGHT;UPPER
ORIENTATION: RIGHT;UPPER

## 2021-01-23 ASSESSMENT — PAIN DESCRIPTION - FREQUENCY
FREQUENCY: CONTINUOUS
FREQUENCY: INTERMITTENT
FREQUENCY: CONTINUOUS

## 2021-01-23 NOTE — PROGRESS NOTES
[facility] PROGRESS NOTE    1/23/2021 7:45 AM        Name: Macarena Frost . Admitted: 1/19/2021  Primary Care Provider: Demetra Randall DO (Tel: [phone])      Subjective:  . Seen in the late afternoon hours  Was groggy states pain meds are helping   Has met with oncology. He is aware of his cancer diagnosis  I explained that his potential treatment would only be palliative    Has 6  Children in town and is  ,  Wife has poor isamar and will not be able to visit. He will reach out to set up a meeting.        Reviewed interval ancillary notes    Current Medications      oxyCODONE-acetaminophen (PERCOCET) 5-325 MG per tablet 1 tablet, Q4H PRN      HYDROmorphone HCl PF (DILAUDID) injection 0.5 mg, Q3H PRN      lidocaine PF 1 % injection 0.2 mL, PRN      amLODIPine (NORVASC) tablet 5 mg, Daily      losartan (COZAAR) tablet 25 mg, Daily      metoclopramide (REGLAN) tablet 5 mg, TID AC      aspirin chewable tablet 81 mg, Daily      carvedilol (COREG) tablet 12.5 mg, BID WC      dicyclomine (BENTYL) capsule 10 mg, C2V PRN      folic acid-pyridoxine-cyanocobalamine (FOLTX) 2.5-25-1 MG tablet 1 tablet, Daily      levothyroxine (SYNTHROID) tablet 100 mcg, Daily      pantoprazole (PROTONIX) tablet 40 mg, BID AC      vitamin D (CHOLECALCIFEROL) tablet 2,000 Units, Daily      atorvastatin (LIPITOR) tablet 20 mg, Daily      glucose (GLUTOSE) 40 % oral gel 15 g, PRN      dextrose 50 % IV solution, PRN      glucagon (rDNA) injection 1 mg, PRN      dextrose 5 % solution, PRN      sodium chloride flush 0.9 % injection 10 mL, 2 times per day      sodium chloride flush 0.9 % injection 10 mL, PRN      promethazine (PHENERGAN) tablet 12.5 mg, Q6H PRN    Or      ondansetron (ZOFRAN) injection 4 mg, Q6H PRN      polyethylene glycol (GLYCOLAX) packet 17 g, Daily PRN      acetaminophen (TYLENOL) tablet 650 mg, Q6H PRN    Or      acetaminophen (TYLENOL) suppository 650 mg, Q6H PRN        Objective:  /74   Pulse 64   Temp 97.3 °F (36.3 °C) (Oral)   Resp 17   Ht 5' 11\" (1.803 m)   Wt 143 lb 4.8 oz (65 kg)   SpO2 96%   BMI 19.99 kg/m²     Intake/Output Summary (Last 24 hours) at 1/23/2021 0745  Last data filed at 1/22/2021 1222  Gross per 24 hour   Intake 710 ml   Output 1700 ml   Net -990 ml      Wt Readings from Last 3 Encounters:   01/22/21 143 lb 4.8 oz (65 kg)   01/02/21 200 lb (90.7 kg)   05/03/20 195 lb (88.5 kg)       General appearance:  Appears comfortable Looks chronically ill, groggy    Eyes: Sclera clear. Pupils equal.  ENT: Moist oral mucosa. Trachea midline, no adenopathy. Cardiovascular: Regular rhythm, normal S1, S2. No murmur. No edema in lower extremities  Respiratory: Not using accessory muscles. Good inspiratory effort. Clear to auscultation bilaterally, no wheeze or crackles. GI: Abdomen soft with bowel sounds present. Tender over the RUQ  Musculoskeletal: No cyanosis in digits, neck supple  Neurology: CN 2-12 grossly intact. No speech or motor deficits  Psych: Normal affect. Alert and oriented in time, place and person  Skin: Warm, dry, normal turgor    Labs and Tests:  CBC:   Recent Labs     01/21/21  0415 01/22/21  0331 01/23/21  0346   WBC 4.8 5.1 5.7   HGB 11.9* 10.7* 10.2*   PLT 85* 77* 59*     BMP:    Recent Labs     01/20/21  0850 01/21/21  0415 01/22/21  0331   * 136 130*   K 4.5 4.6 4.3    103 98*   CO2 21 19* 21   BUN 67* 51* 33*   CREATININE 4.3* 4.0* 3.5*   GLUCOSE 84 100* 114*     Hepatic:   Recent Labs     01/20/21  0850 01/21/21  0415 01/22/21  0331   AST 29 37 34   ALT 12 14 13   BILITOT 2.0* 2.0* 1.7*   ALKPHOS 266* 249* 206*     CT chest and abd:  Redemonstration of 10 x 12 mm noncalcified right lower lobe pulmonary   nodule.  4 mm pleural-based spiculated nodule in the lingula laterally.  No   acute pulmonary infiltrate.    2. Diffuse hepatic metastatic disease.  No significant finding within the   abdomen or pelvis to indicate the primary source.  Consider PET-CT or needle   sampling for further evaluation. 3. Third-spacing with small to moderate amount of ascites, anasarca and   edematous changes throughout the abdomen.  This may be related to underlying   liver disease. 4. Postoperative clips in the retroperitoneum as well as inferior to the   liver with deformity of the lower pole of the left kidney, possibly   posttraumatic in etiology. 1/20/21:  Liver biopsy:     Adenocarcinoma-    A. Gastric biopsy:   - Chronic active gastritis, moderate. - Helicobacter microorganisms identified by immunostain for H. pylori,   marked. - Negative for intestinal metaplasia, dysplasia or malignancy. B. Colon polyp, cecum:   - Tubular adenoma. - Negative for high-grade dysplasia or malignancy. C.  Colon polyp, transverse x3:   - Tubular adenoma (multiple fragments). - Negative for high-grade dysplasia or malignancy. Comment: Sections of A show gastric mucosa with moderate gastritis.  The   morphologic findings raise the possibility of Helicobacter infection. Therefore, immunohistochemical stain for Helicobacter is performed.  It   is positive.  Controls stain appropriately. 1) Gastric erosions in fundus, body and antrum - biopsied                              2) Normal esophagus and duodenum                              3) 4 mm cecal polyp, three transverse colon polyps 4 mm - 6 mm in size - all cold snared. 4) No other colonic abnormalities                              5) Normal terminal ileum    AIC 4.4 %  Results for Aldo Guerrero (MRN [id number]) as of 1/23/2021    Ref.  Range 1/22/2021 04:12 1/22/2021 11:56 1/22/2021 16:17 1/22/2021 21:06 1/23/2021 07:20   POC Glucose Latest Ref Range: 70 - 99 mg/dl 147 (H) 71 123 (H) 140 (H) 94     Problem List  Active Problems:    Weight loss  Resolved Problems:    * No resolved hospital problems. *       Assessment & Plan:   1. Metastatic lesions to the liver : s/p biopsy on 1/20: with  Adenocarcinoma,  ? primary source is pancreaticobiliary. Had C scope and EGD 1/21/21 , results noted above,  + H pylori . 2. Right sided abd pain:  Percocet added and helping. Will also add IV narcotics if needed   3. Lung lesion : pulmonary is following. Awaiting bx results   4. ESRD on HD:  Nephrology is following  5. HTN:  bp in range on home BB, CCB and  hydralazine. 6. T2DM: AIC 4.4 Bg values improving. Po intake was encourage  7. Weight loss: likely due malignancy. Dietary to follow   8. Hypothyroidism:  TSH elevated at 9,  May be due to euthyroid sick syndrome. Will recheck in 4 weeks   9. Pt asked to have family meeting tomorrow with oncology to discuss possible palliative treatment options. Diet: DIET GENERAL; Carb Control: 5 carb choices (75 gms)/meal; Low Sodium (2 GM);  Daily Fluid Restriction: 1500 ml; Renal  Code:Full Code  DVT PPX      TEE Beatty - CNP   1/23/2021 7:45 AM

## 2021-01-23 NOTE — PROGRESS NOTES
9837 Patient assessment complete. Denies any needs at this time. Care plan and education reviewed and agreed upon with patient. Bed in low position, call light within reach. Will continue to monitor.

## 2021-01-23 NOTE — PROGRESS NOTES
[facility]   Progress Note      1/23/2021 9:04 AM        Name: Macarena Frost . Admitted: 1/19/2021    SUBJECTIVE:     Doing okay. Does not offer new complaints.     Reviewed interval ancillary notes    Current Medications      oxyCODONE-acetaminophen (PERCOCET) 5-325 MG per tablet 1 tablet, Q4H PRN      HYDROmorphone HCl PF (DILAUDID) injection 0.5 mg, Q3H PRN      lidocaine PF 1 % injection 0.2 mL, PRN      amLODIPine (NORVASC) tablet 5 mg, Daily      losartan (COZAAR) tablet 25 mg, Daily      metoclopramide (REGLAN) tablet 5 mg, TID AC      aspirin chewable tablet 81 mg, Daily      carvedilol (COREG) tablet 12.5 mg, BID WC      dicyclomine (BENTYL) capsule 10 mg, Q9D PRN      folic acid-pyridoxine-cyanocobalamine (FOLTX) 2.5-25-1 MG tablet 1 tablet, Daily      levothyroxine (SYNTHROID) tablet 100 mcg, Daily      pantoprazole (PROTONIX) tablet 40 mg, BID AC      vitamin D (CHOLECALCIFEROL) tablet 2,000 Units, Daily      atorvastatin (LIPITOR) tablet 20 mg, Daily      glucose (GLUTOSE) 40 % oral gel 15 g, PRN      dextrose 50 % IV solution, PRN      glucagon (rDNA) injection 1 mg, PRN      dextrose 5 % solution, PRN      sodium chloride flush 0.9 % injection 10 mL, 2 times per day      sodium chloride flush 0.9 % injection 10 mL, PRN      promethazine (PHENERGAN) tablet 12.5 mg, Q6H PRN    Or      ondansetron (ZOFRAN) injection 4 mg, Q6H PRN      polyethylene glycol (GLYCOLAX) packet 17 g, Daily PRN      acetaminophen (TYLENOL) tablet 650 mg, Q6H PRN    Or      acetaminophen (TYLENOL) suppository 650 mg, Q6H PRN        Objective:  /74   Pulse 64   Temp 97.3 °F (36.3 °C) (Oral)   Resp 17   Ht 5' 11\" (1.803 m)   Wt 143 lb 4.8 oz (65 kg)   SpO2 96%   BMI 19.99 kg/m²     Intake/Output Summary (Last 24 hours) at 1/23/2021 0904  Last data filed at 1/22/2021 1222  Gross per 24 hour   Intake 710 ml   Output 1700 ml   Net -990 ml      Wt Readings from Last 3 Encounters:   01/22/21 143 lb 4.8 oz (65 kg)   01/02/21 200 lb (90.7 kg)   05/03/20 195 lb (88.5 kg)       Conscious alert oriented. Appears comfortable. No neck fullness. Respiratory efforts are normal.    Abdomen is not distended. No leg edema    No focal deficits. Labs and Tests:  CBC:   Recent Labs     01/21/21  0415 01/22/21  0331 01/23/21  0346   WBC 4.8 5.1 5.7   HGB 11.9* 10.7* 10.2*   PLT 85* 77* 59*     BMP:    Recent Labs     01/21/21  0415 01/22/21  0331    130*   K 4.6 4.3    98*   CO2 19* 21   BUN 51* 33*   CREATININE 4.0* 3.5*   GLUCOSE 100* 114*     Hepatic:   Recent Labs     01/21/21  0415 01/22/21  0331   AST 37 34   ALT 14 13   BILITOT 2.0* 1.7*   ALKPHOS 249* 206*       ASSESSMENT AND PLAN    Active Problems:    Weight loss  Resolved Problems:    * No resolved hospital problems.  *       metastatic adenocarcinoma to liver unknown primary     - Liver biopsy was consistent with adenocarcinoma possibly pancreatobiliary origin.  - CT chest, abdomen and pelvis shows right lower lobe pulmonary nodule and diffuse hepatic metastatic disease.  - CEA is 259.9, CA 19-9 5  - EGD and colonoscopy 1/21/21, polyps removed, gastric erosions biopsied     -Plan would be to order next generation sequencing on the pathology specimen to see if he would be eligible for immunotherapy or any other molecularly targeted therapy.  -Otherwise treatment would be for carcinoma of unknown primary of GI origin oxaliplatin/5-FU based regimen  - Told him to arrange for family meeting, hopefully tomorrow  - D/W RN         Elevated bilirubin   - GI following        Chronic renal failure managed by Dr. Leopoldo Bannister  - on HD        History of osteomyelitis and multiple wounds on ankle requiring amputation of toes        Hyperkalemia   - Improved          Anemia and thrombocytopenia   - in evidence of iron, B12, or folate deficiency  - plts 111 on 1/18/21, down to 77 today (1/22/21)  - peripheral smear - unremarkable  -  HIT pending  - hold SQ heparin   - Fibrinogen 178  - Recheck LDH      Elbert Kenyon MD

## 2021-01-23 NOTE — PROGRESS NOTES
[facility] Pulmonary/CCM Progress note      Admit Date: 1/19/2021    Chief Complaint: Abdominal pain, nausea, vomiting and weight loss    Subjective: Interval History: Looks tired, denies any symptoms of note. No further episodes of nausea or vomiting. Received hemodialysis yesterday.     Scheduled Meds:   amLODIPine  5 mg Oral Daily    losartan  25 mg Oral Daily    metoclopramide  5 mg Oral TID AC    aspirin  81 mg Oral Daily    carvedilol  12.5 mg Oral BID WC    folic acid-pyridoxine-cyanocobalamine  1 tablet Oral Daily    levothyroxine  100 mcg Oral Daily    pantoprazole  40 mg Oral BID AC    Vitamin D  2,000 Units Oral Daily    atorvastatin  20 mg Oral Daily    sodium chloride flush  10 mL Intravenous 2 times per day     Continuous Infusions:   dextrose Stopped (01/21/21 2024)     PRN Meds:oxyCODONE-acetaminophen, HYDROmorphone, lidocaine PF, dicyclomine, glucose, dextrose, glucagon (rDNA), dextrose, sodium chloride flush, promethazine **OR** ondansetron, polyethylene glycol, acetaminophen **OR** acetaminophen    Review of Systems  Constitutional: negative for fatigue, fevers, malaise and weight loss  Ears, nose, mouth, throat: negative for ear drainage, epistaxis, hoarseness, nasal congestion, sore throat and voice change  Respiratory: negative for shortness of breath, cough, phlegm or pleurisy  Cardiovascular: negative for chest pain, chest pressure/discomfort, irregular heart beat, lower extremity edema and palpitations  Gastrointestinal: negative for abdominal pain, constipation, diarrhea, jaundice, melena, odynophagia, reflux symptoms and vomiting  Hematologic/lymphatic: negative for bleeding, easy bruising, lymphadenopathy and petechiae  Musculoskeletal:negative for arthralgias, bone pain, muscle weakness, neck pain and stiff joints  Neurological: negative for dizziness, gait problems, headaches, seizures, speech problems, tremors and weakness  Behavioral/Psych: negative for anxiety, behavior problems, depression, fatigue and sleep disturbance  Endocrine: negative for diabetic symptoms including none, neuropathy, polyphagia, polyuria, polydipsia, vomiting and diarrhea and temperature intolerance  Allergic/Immunologic: negative for anaphylaxis, angioedema, hay fever and urticaria    Objective:     Patient Vitals for the past 8 hrs:   BP Temp Temp src Pulse Resp SpO2   01/23/21 1100 113/63 97.4 °F (36.3 °C) Oral 64 16 98 %   01/23/21 0458 115/74 97.3 °F (36.3 °C) Oral 64 17 96 %     I/O last 3 completed shifts: In: 710 [I.V.:10]  Out: 1700   No intake/output data recorded.     General Appearance: alert and oriented to person, place and time, well developed and well- nourished, in no acute distress  Skin: warm and dry, no rash or erythema  Head: normocephalic and atraumatic  Eyes: pupils equal, round, and reactive to light, extraocular eye movements intact, conjunctivae normal  ENT: external ear and ear canal normal bilaterally, nose without deformity, nasal mucosa and turbinates normal  Neck: supple and non-tender without mass, no cervical lymphadenopathy  Pulmonary/Chest: clear to auscultation bilaterally- no wheezes, rales or rhonchi, normal air movement, no respiratory distress  Cardiovascular: normal rate, regular rhythm,  no murmurs, rubs, distal pulses intact, no carotid bruits  Abdomen: soft, non-tender, non-distended, normal bowel sounds, no masses or organomegaly  Lymph Nodes: Cervical, supraclavicular normal  Extremities: no cyanosis, clubbing or edema  Musculoskeletal: normal range of motion, no joint swelling, deformity or tenderness  Neurologic: alert, no focal neurologic deficits    Data Review:  CBC:   Lab Results   Component Value Date    WBC 5.7 01/23/2021    RBC 3.32 01/23/2021     BMP:   Lab Results   Component Value Date    GLUCOSE 114 01/22/2021    CO2 21 01/22/2021    BUN 33 01/22/2021    CREATININE 3.5 01/22/2021    CALCIUM 8.3 01/22/2021     ABG: No results found for: JHG0UVV, BEART, C2UFENEA, PHART, THGBART, IHX7MQX, PO2ART, [place]    Radiology: All pertinent images / reports were reviewed as a part of this visit. Narrative   PROCEDURE:   CT NEEDLE BIOPSY LIVER PERCUTANEOUS       MODERATE CONSCIOUS SEDATION       1/20/2021       HISTORY:   ORDERING SYSTEM PROVIDED HISTORY: liver lesions   TECHNOLOGIST PROVIDED HISTORY:   Reason for exam:->liver lesions   Reason for Exam: liver lesions   Acuity: Acute   Type of Exam: Initial       TECHNIQUE:   Dose modulation, iterative reconstruction, and/or weight based adjustment of   the mA/kV was utilized to reduce the radiation dose to as low as reasonably   achievable.       CONTRAST:   None       SEDATION:   Moderate conscious sedation with versed and fentanyl, was intravenously   administered by a nurse under controlled circumstances.       Duration of sedation was approximately 15 minutes       ESTIMATED BLOOD LOSS: Minimal       FLUOROSCOPY DOSE AND TYPE OR TIME AND EXPOSURES:   None       DESCRIPTION OF PROCEDURE:   Informed consent was obtained after a detailed explanation of the procedure   including risks, benefits, and alternatives.  Universal protocol was   observed. Julia Lee direct CT guidance a 17 gauge guide needle was advanced into   a left hepatic lobe mass.  3 separate 18 gauge core biopsies were obtained. The needles removed hemostasis achieved with Gelfoam slurry and manual   pressure.           Impression   Successful CT-guided liver mass biopsy. CT OF THE CHEST WITH CONTRAST; CT OF THE ABDOMEN AND PELVIS WITH CONTRAST,   1/19/2021 6:24 pm       TECHNIQUE:   CT of the chest was performed with the administration of intravenous   contrast. Multiplanar reformatted images are provided for review.  Dose   modulation, iterative reconstruction, and/or weight based adjustment of the   mA/kV was utilized to reduce the radiation dose to as low as reasonably   achievable.; CT of the abdomen and pelvis was performed with the administration of intravenous contrast. Multiplanar reformatted images are   provided for review. Dose modulation, iterative reconstruction, and/or weight   based adjustment of the mA/kV was utilized to reduce the radiation dose to as   low as reasonably achievable.       COMPARISON:   CT of the abdomen and pelvis 01/02/2021.       HISTORY:   ORDERING SYSTEM PROVIDED HISTORY: CT abdomen   TECHNOLOGIST PROVIDED HISTORY:   Reason for exam:   CT abdomen   Reason for Exam: Ca   Acuity: Acute   Type of Exam: Initial       ORDERING SYSTEM PROVIDED HISTORY: Multiple liver mets, elevated . TECHNOLOGIST PROVIDED HISTORY:   Reason for exam:   Multiple liver mets, elevated . Additional Contrast?   Oral   Reason for Exam:  Multiple liver mets, elevated . Acuity: Acute   Type of Exam: Initial   Relevant Medical/Surgical History: Unable to finish PO. pt will be having   dialysis after being admitted per Dr. Abby Crawford.       FINDINGS:       Chest:       Mediastinum: The main pulmonary artery is normal in caliber.  The thoracic   aorta is normal in course and caliber with mild atherosclerotic plaque.  The   heart is borderline in size with no pericardial effusion.  No pathologic   hilar or mediastinal adenopathy.       Lungs/pleura: No acute pulmonary infiltrate, consolidation or edema.  The   trace left pleural effusion noted previously has largely resolved.  10 x 12   mm noncalcified nodule in the right posterior costophrenic sulcus medially is   again noted.  4 mm spiculated pleural-based nodule in the lingula laterally.    The central airways are patent.       Soft Tissues/Bones: Diffuse anasarca.  No acute osseous findings.  No lytic   or blastic osseous lesions.  Mild osteoarthritic spurring in the thoracic   spine.           Abdomen/Pelvis:       Organs: Diffuse hepatic metastatic disease.  No acute biliary findings.  The   spleen and adrenal glands are unremarkable.  No significant pancreatic abnormality.  No renal mass or significant hydronephrosis.  There is mild   deformity of the lower pole of the left kidney, possibly related to remote   trauma.       GI/Bowel: Mild generalized bowel wall thickening, likely related to   third-spacing.  No significant pericolonic inflammatory changes.  The   appendix is unremarkable.  No small bowel distension.  The stomach is mildly   distended with oral contrast.  The duodenal sweep is unremarkable.       Pelvis: Moderate amount of pelvic ascites.  No significant pelvic or inguinal   adenopathy.  The prostate is not enlarged.  Mild distention of the urinary   bladder.       Peritoneum/Retroperitoneum: Diffuse edematous changes throughout the abdomen. Small quantity of upper abdominal ascites.  Postoperative clips in the   retroperitoneum at the level of the pancreatic uncinate process as well as   inferior to the right lobe of the liver likely related to the history of   previous trauma.  The abdominal aorta is normal in caliber.  There is no   significant retroperitoneal adenopathy.       Bones/Soft Tissues: Diffuse anasarca.  No acute osseous findings.  Multilevel   degenerative disc disease in the lumbar spine, most pronounced at L4-5 and   L5-S1.  No lytic or blastic osseous lesions.           Impression   1. Redemonstration of 10 x 12 mm noncalcified right lower lobe pulmonary   nodule.  4 mm pleural-based spiculated nodule in the lingula laterally.  No   acute pulmonary infiltrate. 2. Diffuse hepatic metastatic disease.  No significant finding within the   abdomen or pelvis to indicate the primary source.  Consider PET-CT or needle   sampling for further evaluation. 3. Third-spacing with small to moderate amount of ascites, anasarca and   edematous changes throughout the abdomen.  This may be related to underlying   liver disease.    4. Postoperative clips in the retroperitoneum as well as inferior to the   liver with deformity of the lower pole of the left kidney, possibly   posttraumatic in etiology. Problem List:     Liver metastasis possibly bowel origin  Lung nodule    Assessment/Plan:     CT-guided liver biopsy suggestive of adenocarcinoma, probable GI origin. Lung nodules noted on CT could be metastatic. I very much doubt that patient has primary pulmonary pathology, TTF1 negative. I discussed the results with the patient. GI work-up including EGD/colonoscopy biopsies negative for malignancy or dysplasia. CKD currently on hemodialysis. Would need continued oncology follow-up to determine therapy.     Pulmonary will sign off    Carmela Paget, MD

## 2021-01-23 NOTE — PROGRESS NOTES
Nephrology Progress Note  [phone]   [email or web address].        Reason for Consult:  CKD stage 4/5    HISTORY OF PRESENT ILLNESS:                This is a patient with significant past medical history of biopsy proven DGS, CKD stage 4/5, baseline SCr high 4 range, s/p AVF, DM2, HTN, PVD, diabetic foot ulcer,  who was sent to ER for hyperkalemia. Routine outpatient labs showed K of 5.9. Reports of weight loss of 43# over 2-3 months. He reports N/V for the past 3 months. States anti-nausea medications has not been helping. He denies abdominal pain but does report occasional discomfort. He denies black stools of BRBPR. -he has right AVF created in 11/15/2019-had angioplasty in 1/17/20-he was last seen in 05/20-has missed appts  -he was seen in ER on 1/2/20 for abdominal pain, he had CT of A/P w/o contrast showed multiple liver lesions concerning for metastatic disease but no primary identified. Subjective/interval history    Pt seen and examined   Had dialysis yesterday  No BMP today  BPs controlled  Has been afebrile  No fevers/chills  On room air      PHYSICAL EXAM:    Vitals:    /63   Pulse 64   Temp 97.4 °F (36.3 °C) (Oral)   Resp 16   Ht 5' 11\" (1.803 m)   Wt 143 lb 4.8 oz (65 kg)   SpO2 98%   BMI 19.99 kg/m²   No intake/output data recorded. No intake/output data recorded. Physical Exam:  Gen: ill appearing, awake, alert, oriented x3, not in pain or resp distress  HEENT: no palor or icterus  CVS : S1 S2 normal, regular rhythm   Lungs: Good respiratory effort, clear air entry   Abd: soft, bowel sounds+  Ext: no LE edema  : No TTP over bladder, nondistended.   Access: SHANNAN AVF + thrill/bruit+    DATA:    CBC:   Lab Results   Component Value Date    WBC 5.7 01/23/2021    RBC 3.32 01/23/2021    HGB 10.2 01/23/2021    HCT 32.5 01/23/2021    MCV 97.7 01/23/2021    MCH 30.7 01/23/2021    MCHC 31.5 01/23/2021    RDW 18.6 01/23/2021    PLT 59 01/23/2021    MPV 9.0 01/23/2021 BMP:    Lab Results   Component Value Date     01/22/2021    K 4.3 01/22/2021    K 5.2 01/02/2021    CL 98 01/22/2021    CO2 21 01/22/2021    BUN 33 01/22/2021    LABALBU 2.0 01/22/2021    CREATININE 3.5 01/22/2021    CALCIUM 8.3 01/22/2021    GFRAA 21 01/22/2021    GFRAA 53 07/06/2012    LABGLOM 18 01/22/2021    GLUCOSE 114 01/22/2021       IMPRESSION/RECOMMENDATIONS:      ESRD    due to biopsy proven DGS/history of NSAIDs extensive in the past, now complicated by hyperkalemia, N/V most likely due to underlying malignancy, however, could not r/o uremia. Has been non-compliant with follow up with Dr. Rosina Muro in the office  Started on dialysis on 1/20  Had HD on 1/20, 1/21, 1/22  Next HD again on Monday   SW assistance for outpatient unit placement    Metastatic disease  With liver mets and RLL pulm nodule on CT of C/A/P  Liver biopsy on 1/20, consistent with adenocarcinoma possibly pancreatobiliary origin  S/p EGD and colonoscopy on 1/20, colonic polyps identified  Plans for family meeting and chemotherapy possibly noted    Hypertension controlled  Continue present regimen  Hydralazine stopped on 1/22    DM II  Per primary    PVD: s/p amputation, toes, h/o OM    CKD-MBD  Phos at goal.     Anemia in CKD  Hb at goal   No need of ESAs for now    Thrombocytopenia  HIT pending  PS unremarkable per Hematology  No heparin on dialysis      Thank you for allowing me to participate in the care of this patient. I will continue to follow along. Please call with questions.     Larry Krishnamurthy MD

## 2021-01-23 NOTE — PLAN OF CARE
Problem: Pain:  Description: Pain management should include both nonpharmacologic and pharmacologic interventions. Goal: Pain level will decrease  Description: Pain level will decrease  1/23/2021 0502 by Rojas Sanchez  Outcome: Ongoing  1/22/2021 1539 by Carlo Florez RN  Note: No complaints of pain this shift. Goal: Control of acute pain  Description: Control of acute pain  Outcome: Ongoing  Goal: Control of chronic pain  Description: Control of chronic pain  Outcome: Ongoing     Problem: Falls - Risk of:  Goal: Will remain free from falls  Description: Will remain free from falls  Outcome: Ongoing  Goal: Absence of physical injury  Description: Absence of physical injury  1/23/2021 0502 by Rojas Sanchez  Outcome: Ongoing  1/22/2021 1539 by Carlo Florez RN  Note: No injuries this shift.       Problem: Fluid Volume:  Goal: Ability to achieve a balanced intake and output will improve  Description: Ability to achieve a balanced intake and output will improve  Outcome: Ongoing

## 2021-01-24 LAB
BASOPHILS ABSOLUTE: 0 K/UL (ref 0–0.2)
BASOPHILS RELATIVE PERCENT: 0.4 %
EOSINOPHILS ABSOLUTE: 0.1 K/UL (ref 0–0.6)
EOSINOPHILS RELATIVE PERCENT: 1.1 %
GLUCOSE BLD-MCNC: 103 MG/DL (ref 70–99)
GLUCOSE BLD-MCNC: 109 MG/DL (ref 70–99)
GLUCOSE BLD-MCNC: 117 MG/DL (ref 70–99)
GLUCOSE BLD-MCNC: 85 MG/DL (ref 70–99)
HCT VFR BLD CALC: 30.1 % (ref 40.5–52.5)
HEMOGLOBIN: 9.5 G/DL (ref 13.5–17.5)
LYMPHOCYTES ABSOLUTE: 0.7 K/UL (ref 1–5.1)
LYMPHOCYTES RELATIVE PERCENT: 10.6 %
MCH RBC QN AUTO: 30.6 PG (ref 26–34)
MCHC RBC AUTO-ENTMCNC: 31.4 G/DL (ref 31–36)
MCV RBC AUTO: 97.5 FL (ref 80–100)
MONOCYTES ABSOLUTE: 0.7 K/UL (ref 0–1.3)
MONOCYTES RELATIVE PERCENT: 10 %
NEUTROPHILS ABSOLUTE: 5.5 K/UL (ref 1.7–7.7)
NEUTROPHILS RELATIVE PERCENT: 77.9 %
PDW BLD-RTO: 17.9 % (ref 12.4–15.4)
PERFORMED ON: ABNORMAL
PERFORMED ON: NORMAL
PLATELET # BLD: 67 K/UL (ref 135–450)
PMV BLD AUTO: 9.4 FL (ref 5–10.5)
RBC # BLD: 3.09 M/UL (ref 4.2–5.9)
REASON FOR REJECTION: NORMAL
REJECTED TEST: NORMAL
WBC # BLD: 7.1 K/UL (ref 4–11)

## 2021-01-24 PROCEDURE — 1200000000 HC SEMI PRIVATE

## 2021-01-24 PROCEDURE — 85025 COMPLETE CBC W/AUTO DIFF WBC: CPT

## 2021-01-24 PROCEDURE — 6370000000 HC RX 637 (ALT 250 FOR IP): Performed by: INTERNAL MEDICINE

## 2021-01-24 PROCEDURE — 6370000000 HC RX 637 (ALT 250 FOR IP): Performed by: NURSE PRACTITIONER

## 2021-01-24 PROCEDURE — 36415 COLL VENOUS BLD VENIPUNCTURE: CPT

## 2021-01-24 PROCEDURE — 2580000003 HC RX 258: Performed by: INTERNAL MEDICINE

## 2021-01-24 RX ADMIN — METOCLOPRAMIDE 5 MG: 10 TABLET ORAL at 06:48

## 2021-01-24 RX ADMIN — PANTOPRAZOLE SODIUM 40 MG: 40 TABLET, DELAYED RELEASE ORAL at 18:28

## 2021-01-24 RX ADMIN — AMLODIPINE BESYLATE 5 MG: 5 TABLET ORAL at 10:06

## 2021-01-24 RX ADMIN — ATORVASTATIN CALCIUM 20 MG: 20 TABLET, FILM COATED ORAL at 10:06

## 2021-01-24 RX ADMIN — Medication 1 TABLET: at 10:06

## 2021-01-24 RX ADMIN — Medication 2000 UNITS: at 10:06

## 2021-01-24 RX ADMIN — OXYCODONE AND ACETAMINOPHEN 1 TABLET: 5; 325 TABLET ORAL at 06:51

## 2021-01-24 RX ADMIN — Medication 10 ML: at 10:06

## 2021-01-24 RX ADMIN — PANTOPRAZOLE SODIUM 40 MG: 40 TABLET, DELAYED RELEASE ORAL at 06:48

## 2021-01-24 RX ADMIN — METOCLOPRAMIDE 5 MG: 10 TABLET ORAL at 10:06

## 2021-01-24 RX ADMIN — LOSARTAN POTASSIUM 25 MG: 25 TABLET, FILM COATED ORAL at 10:06

## 2021-01-24 RX ADMIN — OXYCODONE AND ACETAMINOPHEN 1 TABLET: 5; 325 TABLET ORAL at 18:30

## 2021-01-24 RX ADMIN — METOCLOPRAMIDE 5 MG: 10 TABLET ORAL at 18:27

## 2021-01-24 RX ADMIN — CARVEDILOL 12.5 MG: 6.25 TABLET, FILM COATED ORAL at 18:26

## 2021-01-24 RX ADMIN — CARVEDILOL 12.5 MG: 6.25 TABLET, FILM COATED ORAL at 10:07

## 2021-01-24 RX ADMIN — LEVOTHYROXINE SODIUM 100 MCG: 0.1 TABLET ORAL at 06:48

## 2021-01-24 RX ADMIN — ASPIRIN 81 MG: 81 TABLET, CHEWABLE ORAL at 10:06

## 2021-01-24 RX ADMIN — Medication 10 ML: at 20:11

## 2021-01-24 ASSESSMENT — PAIN DESCRIPTION - DESCRIPTORS: DESCRIPTORS: SHARP;SHOOTING

## 2021-01-24 ASSESSMENT — PAIN - FUNCTIONAL ASSESSMENT: PAIN_FUNCTIONAL_ASSESSMENT: ACTIVITIES ARE NOT PREVENTED

## 2021-01-24 ASSESSMENT — PAIN SCALES - GENERAL
PAINLEVEL_OUTOF10: 7
PAINLEVEL_OUTOF10: 0
PAINLEVEL_OUTOF10: 6

## 2021-01-24 ASSESSMENT — PAIN DESCRIPTION - ORIENTATION: ORIENTATION: RIGHT;UPPER

## 2021-01-24 NOTE — PROGRESS NOTES
Nephrology Progress Note  [phone]   [email or web address].        Reason for Consult:  CKD stage 4/5    HISTORY OF PRESENT ILLNESS:                This is a patient with significant past medical history of biopsy proven DGS, CKD stage 4/5, baseline SCr high 4 range, s/p AVF, DM2, HTN, PVD, diabetic foot ulcer,  who was sent to ER for hyperkalemia. Routine outpatient labs showed K of 5.9. Reports of weight loss of 43# over 2-3 months. He reports N/V for the past 3 months. States anti-nausea medications has not been helping. He denies abdominal pain but does report occasional discomfort. He denies black stools of BRBPR. -he has right AVF created in 11/15/2019-had angioplasty in 1/17/20-he was last seen in 05/20-has missed appts  -he was seen in ER on 1/2/20 for abdominal pain, he had CT of A/P w/o contrast showed multiple liver lesions concerning for metastatic disease but no primary identified. Subjective/interval history    Pt seen and examined   Visitors at the bed-side  BPs controlled  On room air  Has been afebrile    PHYSICAL EXAM:    Vitals:    /65   Pulse 65   Temp 98.1 °F (36.7 °C) (Oral)   Resp 16   Ht 5' 11\" (1.803 m)   Wt 143 lb 4.8 oz (65 kg)   SpO2 97%   BMI 19.99 kg/m²   No intake/output data recorded. No intake/output data recorded.     Physical Exam:  Gen: ill appearing, awake, alert, oriented x3, not in pain or resp distress  HEENT: no palor or icterus  CVS : S1 S2 normal, regular rhythm   Lungs: Good respiratory effort, clear air entry   Abd: soft, bowel sounds+  Ext: no LE edema  Access: SHANNAN AVF + thrill/bruit+    DATA:    CBC:   Lab Results   Component Value Date    WBC 7.1 01/24/2021    RBC 3.09 01/24/2021    HGB 9.5 01/24/2021    HCT 30.1 01/24/2021    MCV 97.5 01/24/2021    MCH 30.6 01/24/2021    MCHC 31.4 01/24/2021    RDW 17.9 01/24/2021    PLT 67 01/24/2021    MPV 9.4 01/24/2021     BMP:    Lab Results   Component Value Date     01/22/2021    K 4.3 01/22/2021    K 5.2 01/02/2021    CL 98 01/22/2021    CO2 21 01/22/2021    BUN 33 01/22/2021    LABALBU 2.0 01/22/2021    CREATININE 3.5 01/22/2021    CALCIUM 8.3 01/22/2021    GFRAA 21 01/22/2021    GFRAA 53 07/06/2012    LABGLOM 18 01/22/2021    GLUCOSE 114 01/22/2021       IMPRESSION/RECOMMENDATIONS:      ESRD on PD  due to biopsy proven DGS/history of NSAIDs extensive in the past, now complicated by hyperkalemia, N/V most likely due to underlying malignancy, however, could not r/o uremia. Has been non-compliant with follow up with Dr. Caty Winslow in the office  Started on dialysis on 1/20  Had HD on 1/20, 1/21, 1/22  Next HD again on Monday  SW assistance for outpatient unit placement    Metastatic disease  With liver mets and RLL pulm nodule on CT of C/A/P  Liver biopsy on 1/20, consistent with adenocarcinoma possibly pancreatobiliary origin  S/p EGD and colonoscopy on 1/20, colonic polyps identified  Plans for family meeting and chemotherapy possibly noted    Hypertension controlled  Continue present regimen  Hydralazine stopped on 1/22    DM II  Per primary    PVD   s/p amputation of toes, h/o OM    CKD-MBD  Phos at goal.     Anemia in CKD  Hb at goal   No need of ESAs for now    Thrombocytopenia  HIT negative  PS unremarkable per Hematology  No heparin on dialysis  Possible BM aspiration per hematology if no improvement    Thank you for allowing me to participate in the care of this patient. I will continue to follow along. Please call with questions.     Riaz Carbone MD

## 2021-01-24 NOTE — PROGRESS NOTES
[facility] PROGRESS NOTE    1/24/2021 7:59 AM        Name: Jose Tran . Admitted: 1/19/2021  Primary Care Provider: Jannet Arredondo DO (Tel: [phone])      Subjective:  .     Seen in the am hours with wife at bedside  He is very quiet today  No current nausea but not eating much    He is aware of his cancer diagnosis  He is aware that his potential treatment would only be palliative    Has six children    Will meet with oncology later today to discuss plan of care       Reviewed interval ancillary notes    Current Medications      promethazine (PHENERGAN) injection 12.5 mg, Q6H PRN      oxyCODONE-acetaminophen (PERCOCET) 5-325 MG per tablet 1 tablet, Q4H PRN      HYDROmorphone HCl PF (DILAUDID) injection 0.5 mg, Q3H PRN      lidocaine PF 1 % injection 0.2 mL, PRN      amLODIPine (NORVASC) tablet 5 mg, Daily      losartan (COZAAR) tablet 25 mg, Daily      metoclopramide (REGLAN) tablet 5 mg, TID AC      aspirin chewable tablet 81 mg, Daily      carvedilol (COREG) tablet 12.5 mg, BID WC      dicyclomine (BENTYL) capsule 10 mg, C0T PRN      folic acid-pyridoxine-cyanocobalamine (FOLTX) 2.5-25-1 MG tablet 1 tablet, Daily      levothyroxine (SYNTHROID) tablet 100 mcg, Daily      pantoprazole (PROTONIX) tablet 40 mg, BID AC      vitamin D (CHOLECALCIFEROL) tablet 2,000 Units, Daily      atorvastatin (LIPITOR) tablet 20 mg, Daily      glucose (GLUTOSE) 40 % oral gel 15 g, PRN      dextrose 50 % IV solution, PRN      glucagon (rDNA) injection 1 mg, PRN      dextrose 5 % solution, PRN      sodium chloride flush 0.9 % injection 10 mL, 2 times per day      sodium chloride flush 0.9 % injection 10 mL, PRN      promethazine (PHENERGAN) tablet 12.5 mg, Q6H PRN    Or      ondansetron (ZOFRAN) injection 4 mg, Q6H PRN      polyethylene glycol (GLYCOLAX) packet 17 g, Daily PRN      acetaminophen (TYLENOL) tablet 650 mg, Q6H PRN    Or      acetaminophen (TYLENOL) suppository 650 mg, Q6H PRN        Objective:  /64   Pulse 67   Temp 98.1 °F (36.7 °C) (Oral)   Resp 16   Ht 5' 11\" (1.803 m)   Wt 143 lb 4.8 oz (65 kg)   SpO2 97%   BMI 19.99 kg/m²   No intake or output data in the 24 hours ending 01/24/21 0759   Wt Readings from Last 3 Encounters:   01/22/21 143 lb 4.8 oz (65 kg)   01/02/21 200 lb (90.7 kg)   05/03/20 195 lb (88.5 kg)       General appearance:  Appears comfortable Looks chronically ill   Eyes: Sclera clear. Pupils equal.  ENT: Moist oral mucosa. Trachea midline, no adenopathy. Cardiovascular: Regular rhythm, normal S1, S2. No murmur. No edema in lower extremities  Respiratory: Not using accessory muscles. Good inspiratory effort. Clear to auscultation bilaterally, no wheeze or crackles. GI: Abdomen soft with bowel sounds present. Tender over the RUQ  Musculoskeletal: No cyanosis in digits, neck supple  Neurology: CN 2-12 grossly intact. No speech or motor deficits  Psych: Normal affect. Alert and oriented in time, place and person  Skin: Warm, dry, normal turgor    Labs and Tests:  CBC:   Recent Labs     01/22/21  0331 01/23/21  0346 01/24/21  0910   WBC 5.1 5.7 7.1   HGB 10.7* 10.2* 9.5*   PLT 77* 59* 67*     BMP:    Recent Labs     01/22/21  0331   *   K 4.3   CL 98*   CO2 21   BUN 33*   CREATININE 3.5*   GLUCOSE 114*     Hepatic:   Recent Labs     01/22/21  0331   AST 34   ALT 13   BILITOT 1.7*   ALKPHOS 206*     CT chest and abd:  Redemonstration of 10 x 12 mm noncalcified right lower lobe pulmonary   nodule.  4 mm pleural-based spiculated nodule in the lingula laterally.  No   acute pulmonary infiltrate. 2. Diffuse hepatic metastatic disease.  No significant finding within the   abdomen or pelvis to indicate the primary source.  Consider PET-CT or needle   sampling for further evaluation.    3. Third-spacing with small to moderate amount of ascites, anasarca and edematous changes throughout the abdomen.  This may be related to underlying   liver disease. 4. Postoperative clips in the retroperitoneum as well as inferior to the   liver with deformity of the lower pole of the left kidney, possibly   posttraumatic in etiology. 1/20/21:  Liver biopsy:     Adenocarcinoma-    A. Gastric biopsy:   - Chronic active gastritis, moderate. - Helicobacter microorganisms identified by immunostain for H. pylori,   marked. - Negative for intestinal metaplasia, dysplasia or malignancy. B. Colon polyp, cecum:   - Tubular adenoma. - Negative for high-grade dysplasia or malignancy. C.  Colon polyp, transverse x3:   - Tubular adenoma (multiple fragments). - Negative for high-grade dysplasia or malignancy. Comment: Sections of A show gastric mucosa with moderate gastritis.  The   morphologic findings raise the possibility of Helicobacter infection. Therefore, immunohistochemical stain for Helicobacter is performed.  It   is positive.  Controls stain appropriately. 1) Gastric erosions in fundus, body and antrum - biopsied                              2) Normal esophagus and duodenum                              3) 4 mm cecal polyp, three transverse colon polyps 4 mm - 6 mm in size - all cold snared. 4) No other colonic abnormalities                              5) Normal terminal ileum    AIC 4.4 %  Results for Carollee Ee (MRN [id number]) as of 1/24/2021 14:22   Ref. Range 1/23/2021 11:04 1/23/2021 15:55 1/23/2021 20:17 1/24/2021 07:10 1/24/2021 11:08   POC Glucose Latest Ref Range: 70 - 99 mg/dl 99 101 (H) 96 85 109 (H)     Problem List  Active Problems:    Weight loss  Resolved Problems:    * No resolved hospital problems. *       Assessment & Plan:   1. Metastatic lesions to the liver : s/p biopsy on 1/20: with  Adenocarcinoma,  ? primary source is pancreaticobiliary.    Had C scope and EGD 1/21/21 , results noted above  2. Right sided abd pain: Pt reports IV pain medications are working better. 3. Lung lesion : pulmonary is following. Awaiting bx results   4. ESRD on HD:  Nephrology is following,anticipate HD in the am   5. HTN:  bp in range on home BB, CCB and  hydralazine. 6. T2DM: AIC 4.4 Bg values improving. Po intake was encourage. All insulin was d/c 2 days ago   7. Weight loss: likely due malignancy. Dietary to follow   8. Hypothyroidism:  TSH elevated at 9,  May be due to euthyroid sick syndrome. Will recheck in 4 weeks   9. Will meet with oncology to discuss plan of care         Diet: DIET GENERAL; Carb Control: 5 carb choices (75 gms)/meal; Low Sodium (2 GM);  Daily Fluid Restriction: 1500 ml; Renal  Code:Full Code  DVT PPX      TEE Beatty - CNP   1/24/2021 7:59 AM

## 2021-01-24 NOTE — PROGRESS NOTES
Occupational Therapy  Nolvia Baez    Not seen for OT evaluation due to multiple family members here meeting with doctor regarding new cancer diagnosis (metastic lesions to the liver)    Jasvir BARRERA  [address]

## 2021-01-24 NOTE — PLAN OF CARE
Problem: Pain:  Description: Pain management should include both nonpharmacologic and pharmacologic interventions.   Goal: Pain level will decrease  Description: Pain level will decrease  Outcome: Ongoing  Goal: Control of acute pain  Description: Control of acute pain  Outcome: Ongoing  Goal: Control of chronic pain  Description: Control of chronic pain  Outcome: Ongoing     Problem: Falls - Risk of:  Goal: Will remain free from falls  Description: Will remain free from falls  Outcome: Ongoing  Goal: Absence of physical injury  Description: Absence of physical injury  Outcome: Ongoing     Problem: Fluid Volume:  Goal: Ability to achieve a balanced intake and output will improve  Description: Ability to achieve a balanced intake and output will improve  Outcome: Ongoing

## 2021-01-24 NOTE — PROGRESS NOTES
7039 Patient assessment complete. Took medications without difficulties. C/o right upper abdomen pain, prn pain medication given. Denies any further needs at this time. Care plan and education reviewed and agreed upon with patient. Bed in low position, call light within reach. Will continue to monitor. [address] [facility] Patient's daughter Caroline Rivas called requesting an update. She is not on the emergency contact list. [facility] She requested to speak to the patient. I transferred her to his room. [place] [address] Patient's daughter, Caroline Rivas came to visit. Patient stated that it was okay to add her to the emergency contact list. Emergency contact list updated.

## 2021-01-24 NOTE — PROGRESS NOTES
Oncology [facility]   Progress Note      1/24/2021 2:45 PM        Name: Zuly Roldan . Admitted: 1/19/2021    SUBJECTIVE:     Doing okay. No abdominal pain  Appetite is okay. No external bleeding.     Reviewed interval ancillary notes    Current Medications      promethazine (PHENERGAN) injection 12.5 mg, Q6H PRN      oxyCODONE-acetaminophen (PERCOCET) 5-325 MG per tablet 1 tablet, Q4H PRN      HYDROmorphone HCl PF (DILAUDID) injection 0.5 mg, Q3H PRN      lidocaine PF 1 % injection 0.2 mL, PRN      amLODIPine (NORVASC) tablet 5 mg, Daily      losartan (COZAAR) tablet 25 mg, Daily      metoclopramide (REGLAN) tablet 5 mg, TID AC      aspirin chewable tablet 81 mg, Daily      carvedilol (COREG) tablet 12.5 mg, BID WC      dicyclomine (BENTYL) capsule 10 mg, K6Z PRN      folic acid-pyridoxine-cyanocobalamine (FOLTX) 2.5-25-1 MG tablet 1 tablet, Daily      levothyroxine (SYNTHROID) tablet 100 mcg, Daily      pantoprazole (PROTONIX) tablet 40 mg, BID AC      vitamin D (CHOLECALCIFEROL) tablet 2,000 Units, Daily      atorvastatin (LIPITOR) tablet 20 mg, Daily      glucose (GLUTOSE) 40 % oral gel 15 g, PRN      dextrose 50 % IV solution, PRN      glucagon (rDNA) injection 1 mg, PRN      dextrose 5 % solution, PRN      sodium chloride flush 0.9 % injection 10 mL, 2 times per day      sodium chloride flush 0.9 % injection 10 mL, PRN      promethazine (PHENERGAN) tablet 12.5 mg, Q6H PRN    Or      ondansetron (ZOFRAN) injection 4 mg, Q6H PRN      polyethylene glycol (GLYCOLAX) packet 17 g, Daily PRN      acetaminophen (TYLENOL) tablet 650 mg, Q6H PRN    Or      acetaminophen (TYLENOL) suppository 650 mg, Q6H PRN        Objective:  /65   Pulse 65   Temp 98.1 °F (36.7 °C) (Oral)   Resp 16   Ht 5' 11\" (1.803 m)   Wt 143 lb 4.8 oz (65 kg)   SpO2 97%   BMI 19.99 kg/m²   No intake or output data in the 24 hours ending 01/24/21 1445   Wt Readings from Last 3 Encounters: increase the life expectancy.    -Further work-up would include PET scan as outpatient, next generation sequencing on pathology. We might consider doing bone marrow aspiration and see if platelet count does not improve.       Elevated bilirubin   - GI following        Chronic renal failure managed by Dr. Anh Ludwig  - on HD        History of osteomyelitis and multiple wounds on ankle requiring amputation of toes        Hyperkalemia   - Improved          Anemia and thrombocytopenia   - in evidence of iron, B12, or folate deficiency  - plts 111 on 1/18/21  - Went as low as 59.  -Today platelet count is 67  - peripheral smear - unremarkable  -  HIT negative  - hold SQ heparin   - Fibrinogen 178  - LDH rechecked-240.  - Might consider bone marrow aspiration and biopsy if platelet count continues to be low.       Chirag Iverson MD

## 2021-01-25 PROBLEM — E44.0 MODERATE MALNUTRITION (HCC): Chronic | Status: ACTIVE | Noted: 2021-01-25

## 2021-01-25 LAB
ALBUMIN SERPL-MCNC: 1.9 G/DL (ref 3.4–5)
ANION GAP SERPL CALCULATED.3IONS-SCNC: 10 MMOL/L (ref 3–16)
BASOPHILS ABSOLUTE: 0 K/UL (ref 0–0.2)
BASOPHILS RELATIVE PERCENT: 0.4 %
BUN BLDV-MCNC: 45 MG/DL (ref 7–20)
CALCIUM SERPL-MCNC: 8 MG/DL (ref 8.3–10.6)
CHLORIDE BLD-SCNC: 96 MMOL/L (ref 99–110)
CO2: 22 MMOL/L (ref 21–32)
CREAT SERPL-MCNC: 5.3 MG/DL (ref 0.8–1.3)
EOSINOPHILS ABSOLUTE: 0.1 K/UL (ref 0–0.6)
EOSINOPHILS RELATIVE PERCENT: 1.6 %
GFR AFRICAN AMERICAN: 13
GFR NON-AFRICAN AMERICAN: 11
GLUCOSE BLD-MCNC: 119 MG/DL (ref 70–99)
GLUCOSE BLD-MCNC: 82 MG/DL (ref 70–99)
GLUCOSE BLD-MCNC: 90 MG/DL (ref 70–99)
HCT VFR BLD CALC: 28.6 % (ref 40.5–52.5)
HEMOGLOBIN: 9.1 G/DL (ref 13.5–17.5)
LYMPHOCYTES ABSOLUTE: 0.9 K/UL (ref 1–5.1)
LYMPHOCYTES RELATIVE PERCENT: 10.9 %
MCH RBC QN AUTO: 31.2 PG (ref 26–34)
MCHC RBC AUTO-ENTMCNC: 31.7 G/DL (ref 31–36)
MCV RBC AUTO: 98.1 FL (ref 80–100)
MONOCYTES ABSOLUTE: 0.9 K/UL (ref 0–1.3)
MONOCYTES RELATIVE PERCENT: 11.3 %
NEUTROPHILS ABSOLUTE: 6 K/UL (ref 1.7–7.7)
NEUTROPHILS RELATIVE PERCENT: 75.8 %
PDW BLD-RTO: 17.1 % (ref 12.4–15.4)
PERFORMED ON: ABNORMAL
PERFORMED ON: NORMAL
PHOSPHORUS: 3.9 MG/DL (ref 2.5–4.9)
PLATELET # BLD: 74 K/UL (ref 135–450)
PMV BLD AUTO: 9.9 FL (ref 5–10.5)
POTASSIUM SERPL-SCNC: 4.7 MMOL/L (ref 3.5–5.1)
RBC # BLD: 2.92 M/UL (ref 4.2–5.9)
SODIUM BLD-SCNC: 128 MMOL/L (ref 136–145)
WBC # BLD: 7.9 K/UL (ref 4–11)

## 2021-01-25 PROCEDURE — 6370000000 HC RX 637 (ALT 250 FOR IP): Performed by: INTERNAL MEDICINE

## 2021-01-25 PROCEDURE — 94760 N-INVAS EAR/PLS OXIMETRY 1: CPT

## 2021-01-25 PROCEDURE — P9047 ALBUMIN (HUMAN), 25%, 50ML: HCPCS | Performed by: INTERNAL MEDICINE

## 2021-01-25 PROCEDURE — 6360000002 HC RX W HCPCS: Performed by: INTERNAL MEDICINE

## 2021-01-25 PROCEDURE — 6370000000 HC RX 637 (ALT 250 FOR IP): Performed by: PHYSICIAN ASSISTANT

## 2021-01-25 PROCEDURE — 90935 HEMODIALYSIS ONE EVALUATION: CPT

## 2021-01-25 PROCEDURE — 80069 RENAL FUNCTION PANEL: CPT

## 2021-01-25 PROCEDURE — 1200000000 HC SEMI PRIVATE

## 2021-01-25 PROCEDURE — 36415 COLL VENOUS BLD VENIPUNCTURE: CPT

## 2021-01-25 PROCEDURE — 85025 COMPLETE CBC W/AUTO DIFF WBC: CPT

## 2021-01-25 PROCEDURE — 2580000003 HC RX 258: Performed by: INTERNAL MEDICINE

## 2021-01-25 RX ORDER — ALBUMIN (HUMAN) 12.5 G/50ML
12.5 SOLUTION INTRAVENOUS PRN
Status: DISCONTINUED | OUTPATIENT
Start: 2021-01-25 | End: 2021-01-26 | Stop reason: HOSPADM

## 2021-01-25 RX ORDER — ALBUMIN (HUMAN) 12.5 G/50ML
50 SOLUTION INTRAVENOUS ONCE
Status: COMPLETED | OUTPATIENT
Start: 2021-01-25 | End: 2021-01-25

## 2021-01-25 RX ORDER — OXYCODONE AND ACETAMINOPHEN 7.5; 325 MG/1; MG/1
1 TABLET ORAL EVERY 4 HOURS PRN
Status: DISCONTINUED | OUTPATIENT
Start: 2021-01-25 | End: 2021-01-26 | Stop reason: HOSPADM

## 2021-01-25 RX ORDER — CARVEDILOL 6.25 MG/1
6.25 TABLET ORAL 2 TIMES DAILY WITH MEALS
Status: DISCONTINUED | OUTPATIENT
Start: 2021-01-25 | End: 2021-01-26 | Stop reason: HOSPADM

## 2021-01-25 RX ADMIN — PANTOPRAZOLE SODIUM 40 MG: 40 TABLET, DELAYED RELEASE ORAL at 17:29

## 2021-01-25 RX ADMIN — PANTOPRAZOLE SODIUM 40 MG: 40 TABLET, DELAYED RELEASE ORAL at 06:10

## 2021-01-25 RX ADMIN — METOCLOPRAMIDE 5 MG: 10 TABLET ORAL at 17:29

## 2021-01-25 RX ADMIN — Medication 10 ML: at 20:53

## 2021-01-25 RX ADMIN — METOCLOPRAMIDE 5 MG: 10 TABLET ORAL at 06:11

## 2021-01-25 RX ADMIN — CARVEDILOL 6.25 MG: 6.25 TABLET, FILM COATED ORAL at 17:29

## 2021-01-25 RX ADMIN — LEVOTHYROXINE SODIUM 100 MCG: 0.1 TABLET ORAL at 06:10

## 2021-01-25 RX ADMIN — ALBUMIN (HUMAN) 50 G: 0.25 INJECTION, SOLUTION INTRAVENOUS at 10:47

## 2021-01-25 RX ADMIN — OXYCODONE HYDROCHLORIDE AND ACETAMINOPHEN 1 TABLET: 7.5; 325 TABLET ORAL at 17:29

## 2021-01-25 ASSESSMENT — PAIN SCALES - GENERAL: PAINLEVEL_OUTOF10: 8

## 2021-01-25 ASSESSMENT — PAIN DESCRIPTION - PAIN TYPE: TYPE: SURGICAL PAIN;NEUROPATHIC PAIN

## 2021-01-25 NOTE — PLAN OF CARE
Nutrition Problem #1: Moderate malnutrition  Intervention: Food and/or Nutrient Delivery: Continue Current Diet, Start Oral Nutrition Supplement  Nutritional Goals: po intake at least 50% of meals & supplements

## 2021-01-25 NOTE — PROGRESS NOTES
Shift assessment completed. Medications given per MAR. Patient resting in bed and denying any needs at this time. Call light within reach and fall precaution in place. The care plan and education has been reviewed and mutually agreed upon with the patient.

## 2021-01-25 NOTE — PROGRESS NOTES
Output 450 ml   Net -450 ml      Wt Readings from Last 3 Encounters:   01/25/21 149 lb 14.6 oz (68 kg)   01/02/21 200 lb (90.7 kg)   05/03/20 195 lb (88.5 kg)       Conscious alert oriented. Appears comfortable. No neck fullness. Respiratory efforts are normal.  Abdomen is not distended. No leg edema  No focal deficits. Labs and Tests:  CBC:   Recent Labs     01/23/21  0346 01/24/21  0910 01/25/21  0424   WBC 5.7 7.1 7.9   HGB 10.2* 9.5* 9.1*   PLT 59* 67* 74*     BMP:    Recent Labs     01/25/21  0424   *   K 4.7   CL 96*   CO2 22   BUN 45*   CREATININE 5.3*   GLUCOSE 82     Hepatic: No results for input(s): AST, ALT, ALB, BILITOT, ALKPHOS in the last 72 hours. ASSESSMENT AND PLAN    Active Problems:    Weight loss  Resolved Problems:    * No resolved hospital problems. *      Metastatic adenocarcinoma to liver unknown primary      - Liver biopsy was consistent with adenocarcinoma possibly pancreatobiliary origin.   - CT chest, abdomen and pelvis shows right lower lobe pulmonary nodule and diffuse hepatic metastatic disease.  - CEA is 259.9, CA 19-9 - 5  - EGD and colonoscopy 1/21/21, polyps removed, gastric erosions biopsied      -Plan would be to order next generation sequencing on the pathology specimen to see if he would be eligible for immunotherapy or any other molecularly targeted therapy.  -Otherwise treatment would be for carcinoma of unknown primary of GI origin oxaliplatin/5-FU based regimen     - Had lengthy discussion with several family members for over 30 minutes,  told them following  -The Patient has adenocarcinoma involving liver-biopsy-proven, he also has metastasis to the lungs.  -Primary source is unknown at this time possibly GI origin, but EGD and colonoscopy did not show any evidence of malignancy  - CEA is elevated  -We are dealing with stage IV cancer, which is not curable  -We would be able to do systemic therapy-either chemotherapy or molecularly targeted treatment if eligible. The goal of treatment would be palliative, to possibly increase the life expectancy.     -Further work-up would include PET scan as outpatient, next generation sequencing on pathology. We might consider doing bone marrow aspiration and see if platelet count does not improve.        Elevated bilirubin   - GI following        Chronic renal failure managed by Dr. Michael Levi  - on HD        History of osteomyelitis and multiple wounds on ankle requiring amputation of toes        Hyperkalemia   - Improved          Anemia and thrombocytopenia   - in evidence of iron, B12, or folate deficiency  - plts 111 on 1/18/21  - Went as low as 59.  -Today platelet count is 74  - peripheral smear - unremarkable  - HIT negative  - No DIC, hemolytic anemia. - Might consider bone marrow aspiration and biopsy if platelet count continues to be low.       Jody Montoya.  [phone]    Seen and examined. Dr. Constantin Luis will be seeing patient tomorrow.     Amauri Rehman MD

## 2021-01-25 NOTE — PROGRESS NOTES
Comprehensive Nutrition Assessment    Type and Reason for Visit:  Initial(admitting dx wt loss)    Nutrition Recommendations/Plan:   1. Nepro TID     Nutrition Assessment:  Pt admitted for abdominal pain with with CKD & report of wt loss. Pt is new ESRD with start of HD this admission, as well as new dx liver CA with lung mets. Hx reveals 43 lb wt loss over past 2-3 months, per pt report to MD, with N/V. Pt currently off unit for HD. MD ordered Nepro TID to help increase kcal & protein intake. Malnutrition Assessment:  Malnutrition Status: Moderate malnutrition    Context:  Acute Illness     Findings of the 6 clinical characteristics of malnutrition:  Energy Intake:  1 - 75% or less of estimated energy requirements for 7 or more days  Weight Loss:  7 - Greater than 7.5% over 3 months     Body Fat Loss:  Unable to assess     Muscle Mass Loss:  Unable to assess    Fluid Accumulation:  No significant fluid accumulation     Strength:  Not Performed    Estimated Daily Nutrient Needs:  Energy (kcal):  1700- 2040 (25-30 kcal/68kg); Weight Used for Energy Requirements:  Current     Protein (g):  82- 136 g(1.2-2.0g/68kg); Weight Used for Protein Requirements:  Current        Fluid (ml/day):   ; Method Used for Fluid Requirements:         Nutrition Related Findings:  low Na+ 128, Elevated BUN 45, creat 5.3. LBM 1/22      Wounds:     NA    Current Nutrition Therapies:    DIET GENERAL; Carb Control: 5 carb choices (75 gms)/meal; Low Sodium (2 GM);  Daily Fluid Restriction: 1500 ml; Renal  Dietary Nutrition Supplements: Renal Oral Supplement    Anthropometric Measures:  · Height: 5' 11\" (180.3 cm)  · Current Body Weight: 149 lb (67.6 kg)   · Admission Body Weight:      · Usual Body Weight: 192 lb (87.1 kg)(pt reported 43 lb wt loss in 2-3 months)     · Ideal Body Weight: 172 lbs; % Ideal Body Weight 86.6 %   · BMI: 20.8  · Adjusted Body Weight:  ; No Adjustment   · Adjusted BMI:      · BMI Categories: Normal Weight (BMI 22.0 to 24.9) age over 72       Nutrition Diagnosis:   · Moderate malnutrition related to   as evidenced by poor intake prior to admission, weight loss 7.5% in 3 months, vomiting, nausea      Nutrition Interventions:   Food and/or Nutrient Delivery:  Continue Current Diet, Start Oral Nutrition Supplement  Nutrition Education/Counseling:  Education not indicated   Coordination of Nutrition Care:  Continue to monitor while inpatient    Goals:  po intake at least 50% of meals & supplements       Nutrition Monitoring and Evaluation:   Behavioral-Environmental Outcomes:  None Identified   Food/Nutrient Intake Outcomes:  Food and Nutrient Intake, Supplement Intake  Physical Signs/Symptoms Outcomes:  Biochemical Data, Nausea or Vomiting, Weight     Discharge Planning:     Too soon to determine     Electronically signed by María Elena Manrique RD, LD on 1/25/21 at 1:48 PM EST    Contact: [phone]

## 2021-01-25 NOTE — PROGRESS NOTES
Nephrology Progress Note        [phone]        [email or web address]          Assessment/Plan:    Newly Declared ESRD on HD:  -Due to biopsy proved DGS  -Follows with Dr. Charissa Kim, but non-adherent with Nephrology follow up  -Started HD on 1/20/21  -Seen on HD today  -Continue MWF schedule while hospitalized  -Last post-HD weight 65kg. Establishing TW. Plan for IV albumin with HD as below  -Will need outpatient HD unit prior to DC    Hypertension:  -At goal, but UF limited due to hypotension with HD  -Stop amlodipine and decrease coreg to 6.25mg PO BID  -Challenge volume removal with HD    FEN:  -Near goal  -Modulate with HD    Anemia of Malignancy/CKD:  -At goal  -Defer ARMEN therapy/transfusion threshold per Oncology    CKD-MBD:  -At goal  -Not on Phos binders    Metastatic Malignancy of Unknown Primary:  -Suspected GI source, but EGD/colonosocpy unremarkable  -Management per Oncology          Coyr Stallings MD, MADHU  1/25/2021  [facility]                                                                                                                                                                                                                                                                                                    Consult: ESRD on HD  Brief HPI: 77 y.o. male admitted for metastatic malignancy of unclear source      Subjective:  -No acute events overnight  -Seen on HD today      Review of Systems: No new/active complaints. All other ROS negative.   Social History: No visitors present at bedside      Medications:  Scheduled Meds:   albumin human  50 g Intravenous Once    amLODIPine  5 mg Oral Daily    losartan  25 mg Oral Daily    metoclopramide  5 mg Oral TID AC    aspirin  81 mg Oral Daily    carvedilol  12.5 mg Oral BID WC    folic acid-pyridoxine-cyanocobalamine  1 tablet Oral Daily    levothyroxine  100 mcg Oral Daily    pantoprazole  40 mg Oral BID AC    Vitamin D  2,000 Units Oral Daily    atorvastatin  20 mg Oral Daily    sodium chloride flush  10 mL Intravenous 2 times per day     Continuous Infusions:   dextrose Stopped (01/21/21 2024)     PRN Meds:.albumin human, promethazine, oxyCODONE-acetaminophen, HYDROmorphone, lidocaine PF, dicyclomine, glucose, dextrose, glucagon (rDNA), dextrose, sodium chloride flush, promethazine **OR** ondansetron, polyethylene glycol, acetaminophen **OR** acetaminophen        Vitals:  /65   Pulse 72   Temp 97.5 °F (36.4 °C) (Oral)   Resp 16   Ht 5' 11\" (1.803 m)   Wt 143 lb 4.8 oz (65 kg)   SpO2 97%   BMI 19.99 kg/m²   I/O last 3 completed shifts:  In: -   Out: 450 [Urine:450]  No intake/output data recorded. Physical Exam:  Gen: NAD, Frail appearing  HEENT: Sclera anicteric, MMM  Neck: Supple. No JVD  CV: RRR, S1/S2  Pulm: CTAB/L  Abd: Soft, NT, ND  Ext: 1-2+ pitting edema in B/L LE  Neuro: Awake/Alert, Answers questions appropriately  Skin: Warm.  No significant visible rashes appreciated  Psych: Normal affect/mood  Access: R upper arm AVF w/ (+) thrill/bruit        Labs:  CBC with Differential:    Lab Results   Component Value Date    WBC 7.9 01/25/2021    RBC 2.92 01/25/2021    HGB 9.1 01/25/2021    HCT 28.6 01/25/2021    PLT 74 01/25/2021    MCV 98.1 01/25/2021    MCH 31.2 01/25/2021    MCHC 31.7 01/25/2021    RDW 17.1 01/25/2021    SEGSPCT 79.3 07/06/2012    LYMPHOPCT 10.9 01/25/2021    MONOPCT 11.3 01/25/2021    EOSPCT 5 05/28/2019    BASOPCT 0.4 01/25/2021    MONOSABS 0.9 01/25/2021    LYMPHSABS 0.9 01/25/2021    EOSABS 0.1 01/25/2021    BASOSABS 0.0 01/25/2021    DIFFTYPE Auto 07/06/2012     BMP:    Lab Results   Component Value Date     01/25/2021    K 4.7 01/25/2021    K 5.2 01/02/2021    CL 96 01/25/2021    CO2 22 01/25/2021    BUN 45 01/25/2021    LABALBU 1.9 01/25/2021    CREATININE 5.3 01/25/2021    CALCIUM 8.0 01/25/2021    GFRAA 13 01/25/2021    GFRAA 53 07/06/2012    LABGLOM 11 01/25/2021 GLUCOSE 82 01/25/2021     U/A:    Lab Results   Component Value Date    COLORU DK YELLOW 01/19/2021    PROTEINU 100 01/19/2021    PHUR 5.5 01/19/2021    WBCUA 1 01/19/2021    RBCUA 2 01/19/2021    MUCUS 3+ 07/06/2012    BACTERIA RARE 01/19/2021    CLARITYU Clear 01/19/2021    SPECGRAV 1.019 01/19/2021    LEUKOCYTESUR Negative 01/19/2021    UROBILINOGEN 2.0 01/19/2021    BILIRUBINUR SMALL 01/19/2021    BILIRUBINUR NEGATIVE 11/02/2011    BLOODU Negative 01/19/2021    GLUCOSEU Negative 01/19/2021    GLUCOSEU >=1000 11/02/2011

## 2021-01-25 NOTE — PROGRESS NOTES
[facility] PROGRESS NOTE    1/25/2021 4:03 PM        Name: Stephanie Regan . Admitted: 1/19/2021  Primary Care Provider: Kyler Hess DO (Tel: [phone])      Subjective:  . Patient seen in dialysis. Doing ok. His niece is the dialysis nurse. He has been having a lot of visitors. Nausea has improved. Mild RUQ abdominal pain. He is aware of his cancer diagnosis  He is aware that his potential treatment would only be palliative    Has six children but lives with his brother.          Reviewed interval ancillary notes    Current Medications      albumin human 25 % IV solution 12.5 g, PRN      carvedilol (COREG) tablet 6.25 mg, BID WC      promethazine (PHENERGAN) injection 12.5 mg, Q6H PRN      oxyCODONE-acetaminophen (PERCOCET) 5-325 MG per tablet 1 tablet, Q4H PRN      HYDROmorphone HCl PF (DILAUDID) injection 0.5 mg, Q3H PRN      lidocaine PF 1 % injection 0.2 mL, PRN      losartan (COZAAR) tablet 25 mg, Daily      metoclopramide (REGLAN) tablet 5 mg, TID AC      aspirin chewable tablet 81 mg, Daily      dicyclomine (BENTYL) capsule 10 mg, Z9Z PRN      folic acid-pyridoxine-cyanocobalamine (FOLTX) 2.5-25-1 MG tablet 1 tablet, Daily      levothyroxine (SYNTHROID) tablet 100 mcg, Daily      pantoprazole (PROTONIX) tablet 40 mg, BID AC      vitamin D (CHOLECALCIFEROL) tablet 2,000 Units, Daily      atorvastatin (LIPITOR) tablet 20 mg, Daily      glucose (GLUTOSE) 40 % oral gel 15 g, PRN      dextrose 50 % IV solution, PRN      glucagon (rDNA) injection 1 mg, PRN      dextrose 5 % solution, PRN      sodium chloride flush 0.9 % injection 10 mL, 2 times per day      sodium chloride flush 0.9 % injection 10 mL, PRN      promethazine (PHENERGAN) tablet 12.5 mg, Q6H PRN    Or      ondansetron (ZOFRAN) injection 4 mg, Q6H PRN      polyethylene glycol (GLYCOLAX) packet 17 g, Daily PRN      acetaminophen (TYLENOL) tablet 650 mg, Q6H PRN    Or      acetaminophen (TYLENOL) suppository 650 mg, Q6H PRN        Objective:  /62   Pulse 60   Temp 98 °F (36.7 °C)   Resp 18   Ht 5' 11\" (1.803 m)   Wt 149 lb 14.6 oz (68 kg)   SpO2 97%   BMI 20.91 kg/m²     Intake/Output Summary (Last 24 hours) at 1/25/2021 1603  Last data filed at 1/24/2021 2350  Gross per 24 hour   Intake --   Output 450 ml   Net -450 ml      Wt Readings from Last 3 Encounters:   01/25/21 149 lb 14.6 oz (68 kg)   01/02/21 200 lb (90.7 kg)   05/03/20 195 lb (88.5 kg)       General appearance:  Appears comfortable Looks chronically ill   Eyes: Sclera clear. Pupils equal.  ENT: Moist oral mucosa. Trachea midline, no adenopathy. Cardiovascular: Regular rhythm, normal S1, S2. No murmur. No edema in lower extremities  Respiratory: Not using accessory muscles. Good inspiratory effort. Clear to auscultation bilaterally, no wheeze or crackles. GI: Abdomen soft with bowel sounds present. Tender over the RUQ  Musculoskeletal: No cyanosis in digits, neck supple  Neurology: CN 2-12 grossly intact. No speech or motor deficits  Psych: Normal affect. Alert and oriented in time, place and person  Skin: Warm, dry, normal turgor    Labs and Tests:  CBC:   Recent Labs     01/23/21  0346 01/24/21  0910 01/25/21  0424   WBC 5.7 7.1 7.9   HGB 10.2* 9.5* 9.1*   PLT 59* 67* 74*     BMP:    Recent Labs     01/25/21  0424   *   K 4.7   CL 96*   CO2 22   BUN 45*   CREATININE 5.3*   GLUCOSE 82     Hepatic:   No results for input(s): AST, ALT, ALB, BILITOT, ALKPHOS in the last 72 hours. CT chest and abd:  Redemonstration of 10 x 12 mm noncalcified right lower lobe pulmonary   nodule.  4 mm pleural-based spiculated nodule in the lingula laterally.  No   acute pulmonary infiltrate.    2. Diffuse hepatic metastatic disease.  No significant finding within the   abdomen or pelvis to indicate the primary source.  Consider PET-CT or needle sampling for further evaluation. 3. Third-spacing with small to moderate amount of ascites, anasarca and   edematous changes throughout the abdomen.  This may be related to underlying   liver disease. 4. Postoperative clips in the retroperitoneum as well as inferior to the   liver with deformity of the lower pole of the left kidney, possibly   posttraumatic in etiology. 1/20/21:  Liver biopsy:     Adenocarcinoma-    A. Gastric biopsy:   - Chronic active gastritis, moderate. - Helicobacter microorganisms identified by immunostain for H. pylori,   marked. - Negative for intestinal metaplasia, dysplasia or malignancy. B. Colon polyp, cecum:   - Tubular adenoma. - Negative for high-grade dysplasia or malignancy. C.  Colon polyp, transverse x3:   - Tubular adenoma (multiple fragments). - Negative for high-grade dysplasia or malignancy. Comment: Sections of A show gastric mucosa with moderate gastritis.  The   morphologic findings raise the possibility of Helicobacter infection. Therefore, immunohistochemical stain for Helicobacter is performed.  It   is positive.  Controls stain appropriately. 1) Gastric erosions in fundus, body and antrum - biopsied                              2) Normal esophagus and duodenum                              3) 4 mm cecal polyp, three transverse colon polyps 4 mm - 6 mm in size - all cold snared. 4) No other colonic abnormalities                              5) Normal terminal ileum    AIC 4.4 %  Results for Milena Ye (MRN [id number]) as of 1/24/2021 14:22   Ref. Range 1/23/2021 11:04 1/23/2021 15:55 1/23/2021 20:17 1/24/2021 07:10 1/24/2021 11:08   POC Glucose Latest Ref Range: 70 - 99 mg/dl 99 101 (H) 96 85 109 (H)     Problem List  Active Problems:    Weight loss    Moderate malnutrition (HCC)  Resolved Problems:    * No resolved hospital problems. *       Assessment & Plan:   1.  Metastatic lesions to the liver : s/p biopsy on 1/20: with  Adenocarcinoma,  ? primary source is pancreaticobiliary. Had C scope and EGD 1/21/21 , results noted above. Needs outpatient PET scan-discussed with Dr Mtz Nicely. 2. Right sided abd pain: Pt reports IV pain medications are working better. Increase percocet to 7.5.   3. Hpylori-will dc on prevpak   4. ESRD on HD:  Nephrology is following. Discussed with Nephro-repeat dialysis Tuesday then dc home. His dialysis days will be M,W,F.    5. T2DM: AIC 4.4 Bg values improving. Po intake was encourage. All insulin was d/c 3 days ago   6. Weight loss: likely due malignancy. Dietary to follow   7. Hypothyroidism:  TSH elevated at 9,  May be due to euthyroid sick syndrome. Will recheck in 4 weeks   8. Disposition-dc home tomorrow after dialysis. Diet: DIET GENERAL; Carb Control: 5 carb choices (75 gms)/meal; Low Sodium (2 GM);  Daily Fluid Restriction: 1500 ml; Renal  Dietary Nutrition Supplements: Renal Oral Supplement  Code:Full Code  DVT PPX      Zak Stafford PA-C   1/25/2021 4:03 PM

## 2021-01-26 ENCOUNTER — APPOINTMENT (OUTPATIENT)
Dept: CT IMAGING | Age: 66
DRG: 435 | End: 2021-01-26
Payer: MEDICARE

## 2021-01-26 VITALS
SYSTOLIC BLOOD PRESSURE: 112 MMHG | BODY MASS INDEX: 20.06 KG/M2 | RESPIRATION RATE: 16 BRPM | HEIGHT: 71 IN | OXYGEN SATURATION: 96 % | HEART RATE: 62 BPM | DIASTOLIC BLOOD PRESSURE: 60 MMHG | WEIGHT: 143.3 LBS | TEMPERATURE: 97.6 F

## 2021-01-26 LAB
ALBUMIN SERPL-MCNC: 2.4 G/DL (ref 3.4–5)
ALBUMIN SERPL-MCNC: 2.5 G/DL (ref 3.4–5)
ALP BLD-CCNC: 243 U/L (ref 40–129)
ALT SERPL-CCNC: 17 U/L (ref 10–40)
ANION GAP SERPL CALCULATED.3IONS-SCNC: 11 MMOL/L (ref 3–16)
AST SERPL-CCNC: 59 U/L (ref 15–37)
BASOPHILS ABSOLUTE: 0 K/UL (ref 0–0.2)
BASOPHILS RELATIVE PERCENT: 0.2 %
BILIRUB SERPL-MCNC: 3.1 MG/DL (ref 0–1)
BILIRUBIN DIRECT: 1.8 MG/DL (ref 0–0.3)
BILIRUBIN, INDIRECT: 1.3 MG/DL (ref 0–1)
BUN BLDV-MCNC: 31 MG/DL (ref 7–20)
CALCIUM SERPL-MCNC: 8.4 MG/DL (ref 8.3–10.6)
CHLORIDE BLD-SCNC: 97 MMOL/L (ref 99–110)
CO2: 24 MMOL/L (ref 21–32)
CREAT SERPL-MCNC: 4.2 MG/DL (ref 0.8–1.3)
EOSINOPHILS ABSOLUTE: 0.1 K/UL (ref 0–0.6)
EOSINOPHILS RELATIVE PERCENT: 0.9 %
GFR AFRICAN AMERICAN: 17
GFR NON-AFRICAN AMERICAN: 14
GLUCOSE BLD-MCNC: 108 MG/DL (ref 70–99)
GLUCOSE BLD-MCNC: 126 MG/DL (ref 70–99)
GLUCOSE BLD-MCNC: 146 MG/DL (ref 70–99)
GLUCOSE BLD-MCNC: 97 MG/DL (ref 70–99)
HCT VFR BLD CALC: 27.8 % (ref 40.5–52.5)
HEMOGLOBIN: 8.7 G/DL (ref 13.5–17.5)
LYMPHOCYTES ABSOLUTE: 0.6 K/UL (ref 1–5.1)
LYMPHOCYTES RELATIVE PERCENT: 9.1 %
MCH RBC QN AUTO: 30.8 PG (ref 26–34)
MCHC RBC AUTO-ENTMCNC: 31.4 G/DL (ref 31–36)
MCV RBC AUTO: 98 FL (ref 80–100)
MONOCYTES ABSOLUTE: 0.7 K/UL (ref 0–1.3)
MONOCYTES RELATIVE PERCENT: 9.5 %
NEUTROPHILS ABSOLUTE: 5.6 K/UL (ref 1.7–7.7)
NEUTROPHILS RELATIVE PERCENT: 80.3 %
PDW BLD-RTO: 17.3 % (ref 12.4–15.4)
PERFORMED ON: ABNORMAL
PERFORMED ON: ABNORMAL
PERFORMED ON: NORMAL
PHOSPHORUS: 3.6 MG/DL (ref 2.5–4.9)
PLATELET # BLD: 81 K/UL (ref 135–450)
PMV BLD AUTO: 9.9 FL (ref 5–10.5)
POTASSIUM SERPL-SCNC: 4.3 MMOL/L (ref 3.5–5.1)
RBC # BLD: 2.84 M/UL (ref 4.2–5.9)
SODIUM BLD-SCNC: 132 MMOL/L (ref 136–145)
TOTAL PROTEIN: 5.5 G/DL (ref 6.4–8.2)
WBC # BLD: 7 K/UL (ref 4–11)

## 2021-01-26 PROCEDURE — 6360000002 HC RX W HCPCS: Performed by: RADIOLOGY

## 2021-01-26 PROCEDURE — 07DR3ZX EXTRACTION OF ILIAC BONE MARROW, PERCUTANEOUS APPROACH, DIAGNOSTIC: ICD-10-PCS | Performed by: RADIOLOGY

## 2021-01-26 PROCEDURE — 6370000000 HC RX 637 (ALT 250 FOR IP): Performed by: INTERNAL MEDICINE

## 2021-01-26 PROCEDURE — 88185 FLOWCYTOMETRY/TC ADD-ON: CPT

## 2021-01-26 PROCEDURE — 88184 FLOWCYTOMETRY/ TC 1 MARKER: CPT

## 2021-01-26 PROCEDURE — 6360000002 HC RX W HCPCS: Performed by: INTERNAL MEDICINE

## 2021-01-26 PROCEDURE — 90935 HEMODIALYSIS ONE EVALUATION: CPT

## 2021-01-26 PROCEDURE — 80076 HEPATIC FUNCTION PANEL: CPT

## 2021-01-26 PROCEDURE — 88311 DECALCIFY TISSUE: CPT

## 2021-01-26 PROCEDURE — 88342 IMHCHEM/IMCYTCHM 1ST ANTB: CPT

## 2021-01-26 PROCEDURE — 80069 RENAL FUNCTION PANEL: CPT

## 2021-01-26 PROCEDURE — 85025 COMPLETE CBC W/AUTO DIFF WBC: CPT

## 2021-01-26 PROCEDURE — 88305 TISSUE EXAM BY PATHOLOGIST: CPT

## 2021-01-26 PROCEDURE — 2500000003 HC RX 250 WO HCPCS: Performed by: INTERNAL MEDICINE

## 2021-01-26 PROCEDURE — P9047 ALBUMIN (HUMAN), 25%, 50ML: HCPCS | Performed by: INTERNAL MEDICINE

## 2021-01-26 PROCEDURE — C1830 POWER BONE MARROW BX NEEDLE: HCPCS

## 2021-01-26 PROCEDURE — 88313 SPECIAL STAINS GROUP 2: CPT

## 2021-01-26 PROCEDURE — 36415 COLL VENOUS BLD VENIPUNCTURE: CPT

## 2021-01-26 RX ORDER — MIDAZOLAM HYDROCHLORIDE 1 MG/ML
INJECTION INTRAMUSCULAR; INTRAVENOUS
Status: COMPLETED | OUTPATIENT
Start: 2021-01-26 | End: 2021-01-26

## 2021-01-26 RX ORDER — FENTANYL CITRATE 50 UG/ML
INJECTION, SOLUTION INTRAMUSCULAR; INTRAVENOUS
Status: COMPLETED | OUTPATIENT
Start: 2021-01-26 | End: 2021-01-26

## 2021-01-26 RX ORDER — OXYCODONE AND ACETAMINOPHEN 7.5; 325 MG/1; MG/1
1 TABLET ORAL EVERY 6 HOURS PRN
Qty: 20 TABLET | Refills: 0 | Status: SHIPPED | OUTPATIENT
Start: 2021-01-26 | End: 2021-01-31

## 2021-01-26 RX ORDER — CLARITHROMYCIN 500 MG/1
500 TABLET, COATED ORAL 2 TIMES DAILY
Qty: 28 TABLET | Refills: 0 | Status: SHIPPED | OUTPATIENT
Start: 2021-01-26 | End: 2021-02-09

## 2021-01-26 RX ORDER — METOCLOPRAMIDE 5 MG/1
5 TABLET ORAL
Qty: 120 TABLET | Refills: 1 | Status: SHIPPED | OUTPATIENT
Start: 2021-01-26

## 2021-01-26 RX ORDER — ALBUMIN (HUMAN) 12.5 G/50ML
50 SOLUTION INTRAVENOUS ONCE
Status: COMPLETED | OUTPATIENT
Start: 2021-01-26 | End: 2021-01-26

## 2021-01-26 RX ORDER — CARVEDILOL 6.25 MG/1
6.25 TABLET ORAL 2 TIMES DAILY WITH MEALS
Qty: 60 TABLET | Refills: 1 | Status: ON HOLD | OUTPATIENT
Start: 2021-01-26 | End: 2021-02-16 | Stop reason: HOSPADM

## 2021-01-26 RX ORDER — METRONIDAZOLE 500 MG/1
500 TABLET ORAL 3 TIMES DAILY
Qty: 42 TABLET | Refills: 0 | Status: SHIPPED | OUTPATIENT
Start: 2021-01-26 | End: 2021-02-09

## 2021-01-26 RX ORDER — LOSARTAN POTASSIUM 25 MG/1
25 TABLET ORAL DAILY
Qty: 30 TABLET | Refills: 1 | Status: ON HOLD | OUTPATIENT
Start: 2021-01-26 | End: 2021-02-16 | Stop reason: HOSPADM

## 2021-01-26 RX ADMIN — PANTOPRAZOLE SODIUM 40 MG: 40 TABLET, DELAYED RELEASE ORAL at 06:06

## 2021-01-26 RX ADMIN — FENTANYL CITRATE 25 MCG: 50 INJECTION, SOLUTION INTRAMUSCULAR; INTRAVENOUS at 12:43

## 2021-01-26 RX ADMIN — ATORVASTATIN CALCIUM 20 MG: 20 TABLET, FILM COATED ORAL at 14:37

## 2021-01-26 RX ADMIN — FENTANYL CITRATE 50 MCG: 50 INJECTION, SOLUTION INTRAMUSCULAR; INTRAVENOUS at 12:39

## 2021-01-26 RX ADMIN — ALBUMIN (HUMAN) 50 G: 0.25 INJECTION, SOLUTION INTRAVENOUS at 08:30

## 2021-01-26 RX ADMIN — MIDAZOLAM 1 MG: 1 INJECTION INTRAMUSCULAR; INTRAVENOUS at 12:39

## 2021-01-26 RX ADMIN — Medication 2000 UNITS: at 14:37

## 2021-01-26 RX ADMIN — METOCLOPRAMIDE 5 MG: 10 TABLET ORAL at 06:06

## 2021-01-26 RX ADMIN — LEVOTHYROXINE SODIUM 100 MCG: 0.1 TABLET ORAL at 06:06

## 2021-01-26 RX ADMIN — CARVEDILOL 6.25 MG: 6.25 TABLET, FILM COATED ORAL at 16:33

## 2021-01-26 RX ADMIN — PANTOPRAZOLE SODIUM 40 MG: 40 TABLET, DELAYED RELEASE ORAL at 14:36

## 2021-01-26 RX ADMIN — METOCLOPRAMIDE 5 MG: 10 TABLET ORAL at 14:36

## 2021-01-26 RX ADMIN — Medication 1 TABLET: at 14:36

## 2021-01-26 RX ADMIN — MIDAZOLAM 0.5 MG: 1 INJECTION INTRAMUSCULAR; INTRAVENOUS at 12:43

## 2021-01-26 RX ADMIN — LIDOCAINE HYDROCHLORIDE 0.2 ML: 10 INJECTION, SOLUTION EPIDURAL; INFILTRATION; INTRACAUDAL; PERINEURAL at 07:55

## 2021-01-26 ASSESSMENT — PAIN SCALES - GENERAL
PAINLEVEL_OUTOF10: 0

## 2021-01-26 NOTE — BRIEF OP NOTE
Brief Operative Note      Patient: Maria E Garcia MRN: [id number]     YOB: 1955  Age: 77 y.o. Sex: male        DATE OF PROCEDURE: 1/26/2021     PROCEDURE PERFORMED: Bone marrow     SURGEON: Leroy Chapman MD    ANESTHESIA: Fentanyl, Versed    Estimated Blood Loss:none    Complications: None. Device implanted: None.            Specimen: 2 aspirates one core    Electronically signed by Leroy Chapman MD on 1/26/2021 at 1:12 PM

## 2021-01-26 NOTE — PROGRESS NOTES
Pt has discharge orders in place. IV d/c'd. Pt son Missael Diggs at bedside. Pt remains drowsy post bone marrow biopsy. Dressing in place with small amount of drainage, son aware. Discharge instructions, f/u appts and prescriptions discussed with patient and son. Zamzam ANDERSON called as I was discharging patient to son car. New antibiotics added to medication rec. Son updated on new medications sent to pharmacy. Both son and pt have no further questions at this time. Belongings packed up and sent home with pt. Will wheel patient to front lobby.

## 2021-01-26 NOTE — DISCHARGE SUMMARY
[facility]    Patient Demographics    Rosa Maria. Erica Gonsales  Date of Birth. 1955  MRN. [id number]     Primary care provider. [id number] [place], DO  (Tel: [phone])    Admit date: 1/19/2021    Discharge date (blank if same as Note Date): Note Date: 1/26/2021     Reason for Hospitalization. Chief Complaint   Patient presents with    Other     Sent by nephrologist d/t kidney failure, possibly needing dialysis. Elevated BUN and CR. Denies CP. c/o mid abdominal with vomiting         Significant Findings. Active Problems:  Adenocarcinoma  ESRD  Hpylori  Weight loss       Problems and results from this hospitalization that need follow up. 1. Adenocarcinoma  2. ESRD    Significant test results and incidental findings. CT chest and abd:  Redemonstration of 10 x 12 mm noncalcified right lower lobe pulmonary   nodule.  4 mm pleural-based spiculated nodule in the lingula laterally.  No   acute pulmonary infiltrate. 2. Diffuse hepatic metastatic disease.  No significant finding within the   abdomen or pelvis to indicate the primary source.  Consider PET-CT or needle   sampling for further evaluation. 3. Third-spacing with small to moderate amount of ascites, anasarca and   edematous changes throughout the abdomen.  This may be related to underlying   liver disease. 4. Postoperative clips in the retroperitoneum as well as inferior to the   liver with deformity of the lower pole of the left kidney, possibly   posttraumatic in etiology.         1/20/21:  Liver biopsy:     Adenocarcinoma-     A. Gastric biopsy:   - Chronic active gastritis, moderate. - Helicobacter microorganisms identified by immunostain for H. pylori,   marked. - Negative for intestinal metaplasia, dysplasia or malignancy. B. Colon polyp, cecum:   - Tubular adenoma.    - Negative for high-grade dysplasia will need oxaliplatin/5-FU per oncology. He will need an outpatient PET. 3. ESRD-she was seen by nephrology and started on dialysis during his stay. He was dialyzed multiple times. His outpatient dialysis will be Monday Wednesday and Friday at 3:15 PM [facility]  4. Hpylori-gastric biopsies were positive for H. pylori. He will continue twice daily Protonix. He will also take Flagyl and clarithromycin for 2 weeks. 5. T2DM: AIC 4.4. Sling has been discontinued  6. Hypothyroidism:  TSH elevated at 9,  May be due to euthyroid sick syndrome. Will recheck in 4 weeks     Consults. IP CONSULT TO ONCOLOGY  IP CONSULT TO HOSPITALIST  IP CONSULT TO GI  IP CONSULT TO HEM/ONC  IP CONSULT TO PULMONOLOGY  IP CONSULT TO SOCIAL WORK  IP CONSULT TO HOME CARE NEEDS    Physical examination on discharge day. /60   Pulse 62   Temp 97.6 °F (36.4 °C) (Oral)   Resp 16   Ht 5' 11\" (1.803 m)   Wt 143 lb 4.8 oz (65 kg)   SpO2 96%   BMI 19.99 kg/m²   General appearance. Alert. Looks comfortable. HEENT. Sclera clear. Moist mucus membranes. Cardiovascular. Regular rate and rhythm, normal S1, S2. No murmur. Respiratory. Not using accessory muscles. Clear to auscultation bilaterally, no wheeze. Gastrointestinal. Abdomen soft, non-tender, not distended, normal bowel sounds  Neurology. Facial symmetry. No speech deficits. Moving all extremities equally. Extremities. No edema in lower extremities. Skin. Warm, dry, normal turgor    Condition at time of discharge stable    Medication instructions provided to patient at discharge.      Medication List      START taking these medications    clarithromycin 500 MG tablet  Commonly known as: BIAXIN  Take 1 tablet by mouth 2 times daily for 14 days     losartan 25 MG tablet  Commonly known as: COZAAR  Take 1 tablet by mouth daily     metroNIDAZOLE 500 MG tablet  Commonly known as: FLAGYL  Take 1 tablet by mouth 3 times daily for 14 days     oxyCODONE-acetaminophen 7.5-325 MG per tablet  Commonly known as: PERCOCET  Take 1 tablet by mouth every 6 hours as needed for Pain for up to 5 days. CHANGE how you take these medications    carvedilol 6.25 MG tablet  Commonly known as: COREG  Take 1 tablet by mouth 2 times daily (with meals)  What changed:   · medication strength  · how much to take     metoclopramide 5 MG tablet  Commonly known as: REGLAN  Take 1 tablet by mouth 3 times daily (before meals)  What changed:   · medication strength  · how much to take  · when to take this        CONTINUE taking these medications    aspirin 81 MG chewable tablet     atorvastatin 20 MG tablet  Commonly known as: Lipitor  Take 1 tablet by mouth daily     * blood glucose test strips strip  Commonly known as: ONE TOUCH ULTRA TEST  1 each by Does not apply route 5 times daily. 4 times daily. * Accu-Chek Christie Plus strip  Generic drug: blood glucose test strips  1 each by In Vitro route 5 times daily As needed. dicyclomine 10 MG capsule  Commonly known as: Bentyl  Take 1 capsule by mouth every 6 hours as needed (cramps)     folic acid-pyridoxine-cyancobalamin 2.5-25-2 MG Tabs  Commonly known as: FOLTX     gentamicin 0.1 % cream  Commonly known as: GARAMYCIN  Apply topically  daily. levothyroxine 100 MCG tablet  Commonly known as: SYNTHROID  Take 1 tablet by mouth Daily     pantoprazole 40 MG tablet  Commonly known as: PROTONIX  Take 1 tablet by mouth 2 times daily (before meals)     sodium bicarbonate 650 MG tablet  Take 1 tablet by mouth 2 times daily     vitamin D 1000 UNIT Tabs tablet  Commonly known as: CHOLECALCIFEROL  Take 2 tablets by mouth daily         * This list has 2 medication(s) that are the same as other medications prescribed for you. Read the directions carefully, and ask your doctor or other care provider to review them with you.             STOP taking these medications    amLODIPine 10 MG tablet  Commonly known as: NORVASC     hydrALAZINE 50 MG tablet  Commonly known as: APRESOLINE     insulin glargine 100 UNIT/ML injection pen  Commonly known as: LANTUS;BASAGLAR     insulin lispro 100 UNIT/ML pen  Commonly known as: HUMALOG     ondansetron 4 MG disintegrating tablet  Commonly known as: Zofran ODT           Where to Get Your Medications      These medications were sent to [address] [place] [address]    Hours: 24-hours Phone: [phone]   · carvedilol 6.25 MG tablet  · clarithromycin 500 MG tablet  · losartan 25 MG tablet  · metoclopramide 5 MG tablet  · metroNIDAZOLE 500 MG tablet  · oxyCODONE-acetaminophen 7.5-325 MG per tablet         Discharge recommendations given to patient. Follow Up. Dr Tony Garner in 1 week, Dialysis M, W, F   Disposition. home  Activity. activity as tolerated  Diet: DIET RENAL; Carb Control: 5 carb choices (75 gms)/meal; Low Sodium (2 GM); Daily Fluid Restriction: 1500 ml; Renal      Spent 45 minutes in discharge process. Signed:   Jayme Broussard PA-C     1/26/2021 5:28 PM
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
## 2021-01-26 NOTE — PLAN OF CARE
Problem: Pain:  Goal: Pain level will decrease  Description: Pain level will decrease  Outcome: Ongoing  Goal: Control of acute pain  Description: Control of acute pain  Outcome: Ongoing  Goal: Control of chronic pain  Description: Control of chronic pain  Outcome: Ongoing     Problem: Falls - Risk of:  Goal: Will remain free from falls  Description: Will remain free from falls  Outcome: Ongoing  Goal: Absence of physical injury  Description: Absence of physical injury  Outcome: Ongoing     Problem: Fluid Volume:  Goal: Ability to achieve a balanced intake and output will improve  Description: Ability to achieve a balanced intake and output will improve  Outcome: Ongoing     Problem: Nutrition  Goal: Optimal nutrition therapy  Outcome: Ongoing

## 2021-01-26 NOTE — PRE SEDATION
Sedation Pre-Procedure Note    Patient Name: Mohsen Harris   YOB: 1955  Room/Bed: [facility]-0606/9289-44  Medical Record Number: [id number]  Date: 1/26/2021   Time: 12:22 PM       Indication:  Bone marrow    Consent: I have discussed with the patient and/or the patient representative the indication, alternatives, and the possible risks and/or complications of the planned procedure and the anesthesia methods. The patient and/or patient representative appear to understand and agree to proceed. Vital Signs:   Vitals:    01/26/21 1115   BP: 130/67   Pulse: 69   Resp: 18   Temp: 97.4 °F (36.3 °C)   SpO2: 97%       Past Medical History:   has a past medical history of Acute hematogenous osteomyelitis of left ankle ([address] Anemia due to stage 3 chronic kidney disease, Chronic kidney disease, stage III (moderate), Diabetes mellitus ([address] Diabetic foot ulcer with osteomyelitis ([address] Foot ulcer ([address] Hypercholesteremia, Hypertension, MRSA infection, Thyroid disease, and Type II or unspecified type diabetes mellitus with neurological manifestations, uncontrolled(250.62). Past Surgical History:   has a past surgical history that includes Cataract removal (1997); Abdomen surgery; Toe amputation (Right, 7/17/15); Foot Debridement (Left, 5/10/2019); Foot Debridement (Left, 5/10/2019); Upper gastrointestinal endoscopy (N/A, 12/26/2019); CT NEEDLE BIOPSY LIVER PERCUTANEOUS (1/20/2021); Upper gastrointestinal endoscopy (N/A, 1/21/2021); and Colonoscopy (N/A, 1/21/2021).     Medications:   Scheduled Meds:    carvedilol  6.25 mg Oral BID WC    losartan  25 mg Oral Daily    metoclopramide  5 mg Oral TID AC    aspirin  81 mg Oral Daily    folic acid-pyridoxine-cyanocobalamine  1 tablet Oral Daily    levothyroxine  100 mcg Oral Daily    pantoprazole  40 mg Oral BID AC    Vitamin D  2,000 Units Oral Daily    atorvastatin  20 mg Oral Daily    sodium chloride flush  10 mL Intravenous 2 times per day     Continuous Infusions:    dextrose Stopped (01/21/21 2024)     PRN Meds: albumin human, oxyCODONE-acetaminophen, promethazine, HYDROmorphone, lidocaine PF, dicyclomine, glucose, dextrose, glucagon (rDNA), dextrose, sodium chloride flush, promethazine **OR** ondansetron, polyethylene glycol, acetaminophen **OR** acetaminophen  Home Meds:   Prior to Admission medications    Medication Sig Start Date End Date Taking? Authorizing Provider   dicyclomine (BENTYL) 10 MG capsule Take 1 capsule by mouth every 6 hours as needed (cramps) 1/3/21  Yes King Kim PA-C   ondansetron (ZOFRAN ODT) 4 MG disintegrating tablet Take 1-2 tablets by mouth every 12 hours as needed for Nausea May Sub regular tablet (non-ODT) if insurance does not cover ODT. 1/3/21  Yes Nadine Sloan MD   sodium bicarbonate 650 MG tablet Take 1 tablet by mouth 2 times daily 12/30/19  Yes Jyashree Katz MD   insulin lispro (HUMALOG) 100 UNIT/ML pen Inject 3 Units into the skin 3 times daily (with meals) Plus correction 1:25 BS>140 for daily total of 72 units 12/30/19  Yes Jayshree Katz MD   hydrALAZINE (APRESOLINE) 50 MG tablet Take 1 tablet by mouth every 8 hours 12/30/19  Yes Jayshree Katz MD   carvedilol (COREG) 12.5 MG tablet Take 1 tablet by mouth 2 times daily (with meals) 12/30/19  Yes Jayshree Katz MD   amLODIPine (NORVASC) 10 MG tablet Take 1 tablet by mouth daily 12/31/19  Yes Jayshree Katz MD   metoclopramide (REGLAN) 10 MG tablet Take 1 tablet by mouth 4 times daily (before meals and nightly) 12/30/19  Yes Jayshree Katz MD   pantoprazole (PROTONIX) 40 MG tablet Take 1 tablet by mouth 2 times daily (before meals) 12/30/19  Yes Jayshree Katz MD   ondansetron (ZOFRAN ODT) 4 MG disintegrating tablet Take 1-2 tablets by mouth every 12 hours as needed for Nausea May Sub regular tablet (non-ODT) if insurance does not cover ODT. 11/4/19  Yes Lety Hardy MD   gentamicin (GARAMYCIN) 0.1 % cream Apply topically  daily.  10/23/19 Yes Liza Mathews DPM   aspirin 81 MG chewable tablet Take 1 tablet by mouth daily   Yes Historical Provider, MD   folic acid-pyridoxine-cyancobalamin (FOLTX) 2.5-25-2 MG TABS Take 1 tablet by mouth daily   Yes Historical Provider, MD   insulin glargine (LANTUS) 100 UNIT/ML injection pen Inject 13 Units into the skin nightly 5/14/19  Yes Ru Thomas MD   vitamin D (CHOLECALCIFEROL) 1000 UNIT TABS tablet Take 2 tablets by mouth daily 5/15/19  Yes Ru Thomas MD   levothyroxine (SYNTHROID) 100 MCG tablet Take 1 tablet by mouth Daily 12/5/16  Yes TEE Gibbs CNP   atorvastatin (LIPITOR) 20 MG tablet Take 1 tablet by mouth daily 9/8/16  Yes TEE Gibbs CNP   ACCU-CHEK VEE PLUS strip 1 each by In Vitro route 5 times daily As needed. 3/31/16  Yes TEE Gibbs CNP   glucose blood VI test strips (ONE TOUCH ULTRA TEST) strip 1 each by Does not apply route 5 times daily. 4 times daily. 2/20/15  Yes TEE Gibbs CNP     Coumadin Use Last 7 Days:  no  Antiplatelet drug therapy use last 7 days: no  Other anticoagulant use last 7 days: no  Additional Medication Information:  na      Pre-Sedation Documentation and Exam:   I have reviewed the patient's history and review of systems.     Mallampati Airway Assessment:  Mallampati Class I - (soft palate, fauces, uvula & anterior/posterior tonsillar pillars are visible)    Prior History of Anesthesia Complications:   none    ASA Classification:  Class 2 - A normal healthy patient with mild systemic disease    Sedation/ Anesthesia Plan:   intravenous sedation    Medications Planned:   midazolam (Versed) intravenously and fentanyl intravenously    Patient is an appropriate candidate for plan of sedation: yes    Electronically signed by Sumit Norman MD on 1/26/2021 at 12:22 PM

## 2021-01-26 NOTE — FLOWSHEET NOTE
01/26/21 0756 01/26/21 1028   Vital Signs   /60 132/65   Temp 97.2 °F (36.2 °C) 97 °F (36.1 °C)   Pulse 59 69   Resp 20 18     Treatment time: 2.5 hours    Net UF: 2 L    Pre weight: 67.3 kg (standing scale)  Post weight: 65 kg (standing scale)  EDW: TBD    Access used: SHANNAN AVF  Access function: No problems. Area eccymotic    Medications or blood products given: Albumin 50 Gm IVPB. Regular outpatient schedule: [facility]. Summary of response to treatment: Hypotension 84/49, 30 minutes into tx, Albumin 50 Gm IVPB given, improved SBP to 100's for remainder of tx. Copy of dialysis treatment record placed in chart, to be scanned into EMR. Report called to Ketty Tony RN.

## 2021-01-26 NOTE — PROGRESS NOTES
[facility]   Progress Note      1/26/2021 9:02 AM        Name: Joselito Loredo . Admitted: 1/19/2021    SUBJECTIVE:  He is doing ok, offers no new complaints, no evidence of external bleeding.     Reviewed interval ancillary notes    Current Medications      albumin human 25 % IV solution 50 g, Once      albumin human 25 % IV solution 12.5 g, PRN      carvedilol (COREG) tablet 6.25 mg, BID WC      oxyCODONE-acetaminophen (PERCOCET) 7.5-325 MG per tablet 1 tablet, Q4H PRN      promethazine (PHENERGAN) injection 12.5 mg, Q6H PRN      HYDROmorphone HCl PF (DILAUDID) injection 0.5 mg, Q3H PRN      lidocaine PF 1 % injection 0.2 mL, PRN      losartan (COZAAR) tablet 25 mg, Daily      metoclopramide (REGLAN) tablet 5 mg, TID AC      aspirin chewable tablet 81 mg, Daily      dicyclomine (BENTYL) capsule 10 mg, W7U PRN      folic acid-pyridoxine-cyanocobalamine (FOLTX) 2.5-25-1 MG tablet 1 tablet, Daily      levothyroxine (SYNTHROID) tablet 100 mcg, Daily      pantoprazole (PROTONIX) tablet 40 mg, BID AC      vitamin D (CHOLECALCIFEROL) tablet 2,000 Units, Daily      atorvastatin (LIPITOR) tablet 20 mg, Daily      glucose (GLUTOSE) 40 % oral gel 15 g, PRN      dextrose 50 % IV solution, PRN      glucagon (rDNA) injection 1 mg, PRN      dextrose 5 % solution, PRN      sodium chloride flush 0.9 % injection 10 mL, 2 times per day      sodium chloride flush 0.9 % injection 10 mL, PRN      promethazine (PHENERGAN) tablet 12.5 mg, Q6H PRN    Or      ondansetron (ZOFRAN) injection 4 mg, Q6H PRN      polyethylene glycol (GLYCOLAX) packet 17 g, Daily PRN      acetaminophen (TYLENOL) tablet 650 mg, Q6H PRN    Or      acetaminophen (TYLENOL) suppository 650 mg, Q6H PRN        Objective:  /63   Pulse 66   Temp 98 °F (36.7 °C) (Oral)   Resp 16   Ht 5' 11\" (1.803 m)   Wt 149 lb 14.6 oz (68 kg)   SpO2 96%   BMI 20.91 kg/m²   No intake or output data in the 24 hours ending 01/26/21 0902   Wt Readings from Last 3 Encounters:   01/25/21 149 lb 14.6 oz (68 kg)   01/02/21 200 lb (90.7 kg)   05/03/20 195 lb (88.5 kg)       General appearance:  Appears comfortable  Eyes: Sclera clear. Pupils equal.  ENT: Moist oral mucosa. Trachea midline, no adenopathy. Cardiovascular: Regular rhythm, normal S1, S2. No murmur. No edema in lower extremities  Respiratory: Not using accessory muscles. Good inspiratory effort. Clear to auscultation bilaterally, no wheeze or crackles. GI: Abdomen soft, no tenderness, not distended  Musculoskeletal: No cyanosis in digits, neck supple  Neurology: CN 2-12 grossly intact. No speech or motor deficits  Psych: Normal affect. Alert and oriented in time, place and person  Skin: Warm, dry, normal turgor    Labs and Tests:  CBC:   Recent Labs     01/24/21  0910 01/25/21  0424 01/26/21  0420   WBC 7.1 7.9 7.0   HGB 9.5* 9.1* 8.7*   PLT 67* 74* 81*     BMP:    Recent Labs     01/25/21  0424 01/26/21  0420   * 132*   K 4.7 4.3   CL 96* 97*   CO2 22 24   BUN 45* 31*   CREATININE 5.3* 4.2*   GLUCOSE 82 146*     Hepatic:   Recent Labs     01/26/21  0420   AST 59*   ALT 17   BILITOT 3.1*   ALKPHOS 243*       ASSESSMENT AND PLAN    Active Problems:    Weight loss    Moderate malnutrition (HCC)  Resolved Problems:    * No resolved hospital problems. *      Metastatic adenocarcinoma to liver unknown primary      - Liver biopsy was consistent with adenocarcinoma possibly pancreatobiliary origin.   - CT chest, abdomen and pelvis shows right lower lobe pulmonary nodule and diffuse hepatic metastatic disease.  - CEA is 259.9, CA 19-9 - 5  - EGD and colonoscopy 1/21/21, polyps removed, gastric erosions biopsied      -Plan would be to order next generation sequencing on the pathology specimen to see if he would be eligible for immunotherapy or any other molecularly targeted therapy.  -Otherwise treatment would be for carcinoma of unknown primary of GI origin oxaliplatin/5-FU

## 2021-01-26 NOTE — CARE COORDINATION
Per pt request Friday, HD schedule changed to MWF at 315pm [facility])-copy provided to pt-messaged PA to inform of change in schedule-stated nephro aware. Aware of pt's dc order for today. Met w/pt to discuss home care for pt-pt agreeable, no preference on [facility] agency. Referral made to [facility] who is able to accept pt. No further needs per CM.   Electronically signed by ZHEN Cormier on 1/26/2021 at 3:21 PM

## 2021-01-26 NOTE — PROGRESS NOTES
Nephrology Progress Note        [phone]        [email or web address]          Assessment/Plan:    Newly Declared ESRD on HD:  -Due to biopsy proved DGS  -Follows with Dr. Yessy Keys, but non-adherent with Nephrology follow up  -Started HD on 1/20/21  -S/p HD yesterday (UF 2.4L)  -Seen on isolated UF today  -TW set at 65kg  -OK to DC from Nephrology standpoint  -Approved Outpatient HDU: [address] T[address] @ 11:30am (arrive at 11am on 1st day)    Hypertension:  -At goal with reduced antihypertensive regimen  -TW set at [address]:  -Near goal  -Modulate with HD    Anemia of Malignancy/CKD:  -At goal  -Defer ARMEN therapy/transfusion threshold per Oncology    CKD-MBD:  -At goal  -Not on Phos binders    Metastatic Malignancy of Unknown Primary:  -Suspected GI source, but EGD/colonosocpy unremarkable  -Management per Oncology    Thrombocytopenia:  -Undergoing BM Bx today  -Management per Oncology        Tedi Aschoff, MD, MADHU  1/26/2021  [facility]                                                                                                                                                                                                                                                                                                    Consult: ESRD on HD  Brief HPI: 77 y.o. male admitted for metastatic malignancy of unclear source      Subjective:  -S/p HD yesterday (UF 2.4L)  -No acute events overnight  -Seen on isolated UF today  -BM Bx today      Review of Systems: No new/active complaints. All other ROS negative.   Social History: No visitors present at bedside      Medications:  Scheduled Meds:   carvedilol  6.25 mg Oral BID WC    losartan  25 mg Oral Daily    metoclopramide  5 mg Oral TID AC    aspirin  81 mg Oral Daily    folic acid-pyridoxine-cyanocobalamine  1 tablet Oral Daily    levothyroxine  100 mcg Oral Daily    pantoprazole  40 mg Oral BID AC    Vitamin D  2,000 Units Oral Daily    atorvastatin  20 mg Oral Daily    sodium chloride flush  10 mL Intravenous 2 times per day     Continuous Infusions:   dextrose Stopped (01/21/21 2024)     PRN Meds:.albumin human, oxyCODONE-acetaminophen, promethazine, HYDROmorphone, lidocaine PF, dicyclomine, glucose, dextrose, glucagon (rDNA), dextrose, sodium chloride flush, promethazine **OR** ondansetron, polyethylene glycol, acetaminophen **OR** acetaminophen        Vitals:  /60   Pulse 59   Temp 97.2 °F (36.2 °C)   Resp 20   Ht 5' 11\" (1.803 m)   Wt 148 lb 5.9 oz (67.3 kg)   SpO2 96%   BMI 20.69 kg/m²   No intake/output data recorded. No intake/output data recorded. Physical Exam:  Gen: NAD, Frail appearing  HEENT: Sclera anicteric, MMM  Neck: Supple. No JVD  CV: RRR, S1/S2  Pulm: CTAB/L  Abd: Soft, NT, ND  Ext: Trace-1+ pitting edema in B/L LE (improving)  Neuro: Awake/Alert, Answers questions appropriately  Skin: Warm.  No significant visible rashes appreciated  Psych: Normal affect/mood  Access: R upper arm AVF w/ (+) thrill/bruit        Labs:  CBC with Differential:    Lab Results   Component Value Date    WBC 7.0 01/26/2021    RBC 2.84 01/26/2021    HGB 8.7 01/26/2021    HCT 27.8 01/26/2021    PLT 81 01/26/2021    MCV 98.0 01/26/2021    MCH 30.8 01/26/2021    MCHC 31.4 01/26/2021    RDW 17.3 01/26/2021    SEGSPCT 79.3 07/06/2012    LYMPHOPCT 9.1 01/26/2021    MONOPCT 9.5 01/26/2021    EOSPCT 5 05/28/2019    BASOPCT 0.2 01/26/2021    MONOSABS 0.7 01/26/2021    LYMPHSABS 0.6 01/26/2021    EOSABS 0.1 01/26/2021    BASOSABS 0.0 01/26/2021    DIFFTYPE Auto 07/06/2012     BMP:    Lab Results   Component Value Date     01/26/2021    K 4.3 01/26/2021    K 5.2 01/02/2021    CL 97 01/26/2021    CO2 24 01/26/2021    BUN 31 01/26/2021    LABALBU 2.4 01/26/2021    LABALBU 2.5 01/26/2021    CREATININE 4.2 01/26/2021    CALCIUM 8.4 01/26/2021    GFRAA 17 01/26/2021    GFRAA 53 07/06/2012    LABGLOM 14 01/26/2021 GLUCOSE 146 01/26/2021     U/A:    Lab Results   Component Value Date    COLORU DK YELLOW 01/19/2021    PROTEINU 100 01/19/2021    PHUR 5.5 01/19/2021    WBCUA 1 01/19/2021    RBCUA 2 01/19/2021    MUCUS 3+ 07/06/2012    BACTERIA RARE 01/19/2021    CLARITYU Clear 01/19/2021    SPECGRAV 1.019 01/19/2021    LEUKOCYTESUR Negative 01/19/2021    UROBILINOGEN 2.0 01/19/2021    BILIRUBINUR SMALL 01/19/2021    BILIRUBINUR NEGATIVE 11/02/2011    BLOODU Negative 01/19/2021    GLUCOSEU Negative 01/19/2021    GLUCOSEU >=1000 11/02/2011

## 2021-02-02 LAB
Lab: NORMAL
REPORT: NORMAL
THIS TEST SENT TO: NORMAL

## 2021-02-02 NOTE — PRE SEDATION
tablet by mouth 3 times daily (before meals) 1/26/21  Yes Aurea Hernandez PA-C   clarithromycin (BIAXIN) 500 MG tablet Take 1 tablet by mouth 2 times daily for 14 days 1/26/21 2/9/21 Yes Aurea Hernandez PA-C   metroNIDAZOLE (FLAGYL) 500 MG tablet Take 1 tablet by mouth 3 times daily for 14 days 1/26/21 2/9/21 Yes Aurea Hernandez PA-C   dicyclomine (BENTYL) 10 MG capsule Take 1 capsule by mouth every 6 hours as needed (cramps) 1/3/21  Yes Thao Kemp PA-C   sodium bicarbonate 650 MG tablet Take 1 tablet by mouth 2 times daily 12/30/19  Yes Andrea Ordonez MD   pantoprazole (PROTONIX) 40 MG tablet Take 1 tablet by mouth 2 times daily (before meals) 12/30/19  Yes Andrea Ordonez MD   gentamicin (GARAMYCIN) 0.1 % cream Apply topically  daily. 10/23/19  Yes Jenise Louis DPM   aspirin 81 MG chewable tablet Take 1 tablet by mouth daily   Yes Historical Provider, MD   folic acid-pyridoxine-cyancobalamin (FOLTX) 2.5-25-2 MG TABS Take 1 tablet by mouth daily   Yes Historical Provider, MD   vitamin D (CHOLECALCIFEROL) 1000 UNIT TABS tablet Take 2 tablets by mouth daily 5/15/19  Yes Gregg Mi MD   levothyroxine (SYNTHROID) 100 MCG tablet Take 1 tablet by mouth Daily 12/5/16  Yes TEE Rollins CNP   atorvastatin (LIPITOR) 20 MG tablet Take 1 tablet by mouth daily 9/8/16  Yes TEE Rollins CNP   ACCU-CHEK VEE PLUS strip 1 each by In Vitro route 5 times daily As needed. 3/31/16  Yes TEE Rollnis CNP   glucose blood VI test strips (ONE TOUCH ULTRA TEST) strip 1 each by Does not apply route 5 times daily. 4 times daily. 2/20/15  Yes TEE Rollins CNP        Pre-Sedation Documentation and Exam:   I have reviewed the patient's history and review of systems. Mallampati Airway Assessment:  Mallampati Class II     Prior History of Anesthesia Complications:   none    ASA Classification:  Class 2     Sedation/ Anesthesia Plan:    Moderate Sedation, IV versed and fentanyl    Medications Planned:   Versed and fentanyl    Patient is an appropriate candidate for plan of sedation: yes

## 2021-02-12 ENCOUNTER — HOSPITAL ENCOUNTER (EMERGENCY)
Age: 66
Discharge: HOME | End: 2021-02-12
Attending: EMERGENCY MEDICINE
Payer: MEDICARE

## 2021-02-12 VITALS
HEART RATE: 82 BPM | OXYGEN SATURATION: 100 % | SYSTOLIC BLOOD PRESSURE: 165 MMHG | WEIGHT: 150 LBS | HEIGHT: 71 IN | TEMPERATURE: 97.1 F | BODY MASS INDEX: 21 KG/M2 | DIASTOLIC BLOOD PRESSURE: 83 MMHG

## 2021-02-12 DIAGNOSIS — R58 BLEEDING: ICD-10-CM

## 2021-02-12 DIAGNOSIS — T82.9XXA COMPLICATION OF ARTERIOVENOUS DIALYSIS FISTULA, INITIAL ENCOUNTER: Primary | ICD-10-CM

## 2021-02-12 LAB
A/G RATIO: 0.9 (ref 1.1–2.2)
ALBUMIN SERPL-MCNC: 2.7 G/DL (ref 3.4–5)
ALP BLD-CCNC: 280 U/L (ref 40–129)
ALT SERPL-CCNC: 31 U/L (ref 10–40)
ANION GAP SERPL CALCULATED.3IONS-SCNC: 16 MMOL/L (ref 3–16)
APTT: 36.9 SEC (ref 24.2–36.2)
AST SERPL-CCNC: 38 U/L (ref 15–37)
BASOPHILS ABSOLUTE: 0 K/UL (ref 0–0.2)
BASOPHILS RELATIVE PERCENT: 0.1 %
BILIRUB SERPL-MCNC: 2.2 MG/DL (ref 0–1)
BUN BLDV-MCNC: 51 MG/DL (ref 7–20)
CALCIUM SERPL-MCNC: 8.3 MG/DL (ref 8.3–10.6)
CHLORIDE BLD-SCNC: 91 MMOL/L (ref 99–110)
CO2: 25 MMOL/L (ref 21–32)
CREAT SERPL-MCNC: 4.9 MG/DL (ref 0.8–1.3)
EOSINOPHILS ABSOLUTE: 0 K/UL (ref 0–0.6)
EOSINOPHILS RELATIVE PERCENT: 0 %
GFR AFRICAN AMERICAN: 14
GFR NON-AFRICAN AMERICAN: 12
GLOBULIN: 3.1 G/DL
GLUCOSE BLD-MCNC: 286 MG/DL (ref 70–99)
HCT VFR BLD CALC: 25.8 % (ref 40.5–52.5)
HEMOGLOBIN: 8.2 G/DL (ref 13.5–17.5)
INR BLD: 1.57 (ref 0.86–1.14)
LYMPHOCYTES ABSOLUTE: 0.4 K/UL (ref 1–5.1)
LYMPHOCYTES RELATIVE PERCENT: 5.7 %
MAGNESIUM: 1.9 MG/DL (ref 1.8–2.4)
MCH RBC QN AUTO: 31.4 PG (ref 26–34)
MCHC RBC AUTO-ENTMCNC: 31.8 G/DL (ref 31–36)
MCV RBC AUTO: 98.7 FL (ref 80–100)
MONOCYTES ABSOLUTE: 0.2 K/UL (ref 0–1.3)
MONOCYTES RELATIVE PERCENT: 3.2 %
NEUTROPHILS ABSOLUTE: 6.9 K/UL (ref 1.7–7.7)
NEUTROPHILS RELATIVE PERCENT: 91 %
PDW BLD-RTO: 15.6 % (ref 12.4–15.4)
PHOSPHORUS: 4.5 MG/DL (ref 2.5–4.9)
PLATELET # BLD: 127 K/UL (ref 135–450)
PMV BLD AUTO: 9.6 FL (ref 5–10.5)
POTASSIUM SERPL-SCNC: 4.2 MMOL/L (ref 3.5–5.1)
PROTHROMBIN TIME: 18.3 SEC (ref 10–13.2)
RBC # BLD: 2.61 M/UL (ref 4.2–5.9)
SODIUM BLD-SCNC: 132 MMOL/L (ref 136–145)
TOTAL PROTEIN: 5.8 G/DL (ref 6.4–8.2)
WBC # BLD: 7.6 K/UL (ref 4–11)

## 2021-02-12 PROCEDURE — 93005 ELECTROCARDIOGRAM TRACING: CPT | Performed by: EMERGENCY MEDICINE

## 2021-02-12 PROCEDURE — 85610 PROTHROMBIN TIME: CPT

## 2021-02-12 PROCEDURE — 99284 EMERGENCY DEPT VISIT MOD MDM: CPT

## 2021-02-12 PROCEDURE — 85730 THROMBOPLASTIN TIME PARTIAL: CPT

## 2021-02-12 PROCEDURE — 80053 COMPREHEN METABOLIC PANEL: CPT

## 2021-02-12 PROCEDURE — 6370000000 HC RX 637 (ALT 250 FOR IP): Performed by: EMERGENCY MEDICINE

## 2021-02-12 PROCEDURE — 83735 ASSAY OF MAGNESIUM: CPT

## 2021-02-12 PROCEDURE — 85025 COMPLETE CBC W/AUTO DIFF WBC: CPT

## 2021-02-12 PROCEDURE — 84100 ASSAY OF PHOSPHORUS: CPT

## 2021-02-12 RX ADMIN — GELATIN ABSORBABLE SPONGE 12-7 MM 1 EACH: 12-7 MISC at 19:52

## 2021-02-12 RX ADMIN — Medication: at 19:52

## 2021-02-12 NOTE — ED NOTES
Pt's wife called this RN very upset yelling that he is supposed to be at [facility]. This RN calmly explained that he was transported by EMS and that I was sorry for the mix up but he was brought here emergently. Pt's wife called again to speak to the pt this RN transferred the call to a mobile and went to pt's room where he had his right arm in a trauma tourniquet because of his HD access bleeding and his left arm was being looked at for an IV and blood work. When I told the pt's wife we would have him call her back she became very upset and started yelling at this RN about the pt not consenting for treatment, RN explained that he did in fact give consent to be treated here. Pt's wife yelling in an increasingly angry manner, this RN explained that we will not sit on the phone and be yelled at and ended the harassing phone call.       Sharon Pham RN  02/12/21 [id number]

## 2021-02-13 LAB
EKG ATRIAL RATE: 71 BPM
EKG DIAGNOSIS: NORMAL
EKG P AXIS: 82 DEGREES
EKG P-R INTERVAL: 144 MS
EKG Q-T INTERVAL: 450 MS
EKG QRS DURATION: 100 MS
EKG QTC CALCULATION (BAZETT): 489 MS
EKG R AXIS: -1 DEGREES
EKG T AXIS: -20 DEGREES
EKG VENTRICULAR RATE: 71 BPM

## 2021-02-13 PROCEDURE — 93010 ELECTROCARDIOGRAM REPORT: CPT | Performed by: INTERNAL MEDICINE

## 2021-02-13 NOTE — ED NOTES
Katrina with Dialysis clinic called requesting update on patient and if patient was discharged or admitted.       Janine Davis RN  54/45/[phone]
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
## 2021-02-15 ENCOUNTER — APPOINTMENT (OUTPATIENT)
Dept: GENERAL RADIOLOGY | Age: 66
DRG: 312 | End: 2021-02-15
Payer: MEDICARE

## 2021-02-15 ENCOUNTER — HOSPITAL ENCOUNTER (INPATIENT)
Age: 66
LOS: 1 days | Discharge: HOME | DRG: 312 | End: 2021-02-16
Attending: EMERGENCY MEDICINE | Admitting: INTERNAL MEDICINE
Payer: MEDICARE

## 2021-02-15 DIAGNOSIS — E87.20 LACTIC ACIDOSIS: ICD-10-CM

## 2021-02-15 DIAGNOSIS — R77.8 ELEVATED TROPONIN: ICD-10-CM

## 2021-02-15 DIAGNOSIS — R79.89 ELEVATED BRAIN NATRIURETIC PEPTIDE (BNP) LEVEL: ICD-10-CM

## 2021-02-15 DIAGNOSIS — N18.6 END-STAGE RENAL DISEASE ON HEMODIALYSIS (HCC): ICD-10-CM

## 2021-02-15 DIAGNOSIS — Z99.2 END-STAGE RENAL DISEASE ON HEMODIALYSIS (HCC): ICD-10-CM

## 2021-02-15 DIAGNOSIS — C25.9 PRIMARY PANCREATIC CANCER WITH METASTASIS TO OTHER SITE (HCC): ICD-10-CM

## 2021-02-15 DIAGNOSIS — R55 SYNCOPE AND COLLAPSE: Primary | ICD-10-CM

## 2021-02-15 DIAGNOSIS — Z87.19 HISTORY OF SMALL BOWEL OBSTRUCTION: ICD-10-CM

## 2021-02-15 LAB
A/G RATIO: 0.9 (ref 1.1–2.2)
ALBUMIN SERPL-MCNC: 3 G/DL (ref 3.4–5)
ALP BLD-CCNC: 389 U/L (ref 40–129)
ALT SERPL-CCNC: 108 U/L (ref 10–40)
ANION GAP SERPL CALCULATED.3IONS-SCNC: 20 MMOL/L (ref 3–16)
ANISOCYTOSIS: ABNORMAL
AST SERPL-CCNC: 180 U/L (ref 15–37)
BASE EXCESS VENOUS: -2.1 MMOL/L (ref -3–3)
BASOPHILS ABSOLUTE: 0 K/UL (ref 0–0.2)
BASOPHILS RELATIVE PERCENT: 0 %
BILIRUB SERPL-MCNC: 2.6 MG/DL (ref 0–1)
BUN BLDV-MCNC: 73 MG/DL (ref 7–20)
CALCIUM SERPL-MCNC: 8.2 MG/DL (ref 8.3–10.6)
CARBOXYHEMOGLOBIN: 5.5 % (ref 0–1.5)
CHLORIDE BLD-SCNC: 89 MMOL/L (ref 99–110)
CO2: 22 MMOL/L (ref 21–32)
CREAT SERPL-MCNC: 5 MG/DL (ref 0.8–1.3)
EOSINOPHILS ABSOLUTE: 0 K/UL (ref 0–0.6)
EOSINOPHILS RELATIVE PERCENT: 0 %
GFR AFRICAN AMERICAN: 14
GFR NON-AFRICAN AMERICAN: 12
GLOBULIN: 3.3 G/DL
GLUCOSE BLD-MCNC: 320 MG/DL (ref 70–99)
HCO3 VENOUS: 22.8 MMOL/L (ref 23–29)
HCT VFR BLD CALC: 26.2 % (ref 40.5–52.5)
HEMOGLOBIN, VEN, REDUCED: 3 %
HEMOGLOBIN: 8.3 G/DL (ref 13.5–17.5)
INR BLD: 1.47 (ref 0.86–1.14)
LACTIC ACID, SEPSIS: 5 MMOL/L (ref 0.4–1.9)
LACTIC ACID, SEPSIS: 5.2 MMOL/L (ref 0.4–1.9)
LIPASE: 35 U/L (ref 13–60)
LYMPHOCYTES ABSOLUTE: 0.4 K/UL (ref 1–5.1)
LYMPHOCYTES RELATIVE PERCENT: 3 %
MACROCYTES: ABNORMAL
MCH RBC QN AUTO: 31.8 PG (ref 26–34)
MCHC RBC AUTO-ENTMCNC: 31.7 G/DL (ref 31–36)
MCV RBC AUTO: 100.3 FL (ref 80–100)
METHEMOGLOBIN VENOUS: 0 %
MONOCYTES ABSOLUTE: 0.1 K/UL (ref 0–1.3)
MONOCYTES RELATIVE PERCENT: 1 %
NEUTROPHILS ABSOLUTE: 12.1 K/UL (ref 1.7–7.7)
NEUTROPHILS RELATIVE PERCENT: 96 %
O2 CONTENT, VEN: 11 VOL %
O2 SAT, VEN: 97 %
O2 THERAPY: ABNORMAL
PCO2, VEN: 38.3 MMHG (ref 40–50)
PDW BLD-RTO: 16.5 % (ref 12.4–15.4)
PH VENOUS: 7.38 (ref 7.35–7.45)
PLATELET # BLD: 102 K/UL (ref 135–450)
PLATELET SLIDE REVIEW: ABNORMAL
PMV BLD AUTO: 10 FL (ref 5–10.5)
PO2, VEN: 83.6 MMHG (ref 25–40)
POTASSIUM REFLEX MAGNESIUM: 4.8 MMOL/L (ref 3.5–5.1)
PRO-BNP: ABNORMAL PG/ML (ref 0–124)
PROTHROMBIN TIME: 17.1 SEC (ref 10–13.2)
RBC # BLD: 2.61 M/UL (ref 4.2–5.9)
SARS-COV-2, NAAT: NOT DETECTED
SCHISTOCYTES: ABNORMAL
SLIDE REVIEW: ABNORMAL
SODIUM BLD-SCNC: 131 MMOL/L (ref 136–145)
TARGET CELLS: ABNORMAL
TCO2 CALC VENOUS: 54 MMOL/L
TOTAL PROTEIN: 6.3 G/DL (ref 6.4–8.2)
TROPONIN: 0.1 NG/ML
WBC # BLD: 12.6 K/UL (ref 4–11)

## 2021-02-15 PROCEDURE — 87040 BLOOD CULTURE FOR BACTERIA: CPT

## 2021-02-15 PROCEDURE — 80053 COMPREHEN METABOLIC PANEL: CPT

## 2021-02-15 PROCEDURE — 85025 COMPLETE CBC W/AUTO DIFF WBC: CPT

## 2021-02-15 PROCEDURE — 84484 ASSAY OF TROPONIN QUANT: CPT

## 2021-02-15 PROCEDURE — 1200000000 HC SEMI PRIVATE

## 2021-02-15 PROCEDURE — 99283 EMERGENCY DEPT VISIT LOW MDM: CPT

## 2021-02-15 PROCEDURE — U0002 COVID-19 LAB TEST NON-CDC: HCPCS

## 2021-02-15 PROCEDURE — 82803 BLOOD GASES ANY COMBINATION: CPT

## 2021-02-15 PROCEDURE — 85610 PROTHROMBIN TIME: CPT

## 2021-02-15 PROCEDURE — 83880 ASSAY OF NATRIURETIC PEPTIDE: CPT

## 2021-02-15 PROCEDURE — 36415 COLL VENOUS BLD VENIPUNCTURE: CPT

## 2021-02-15 PROCEDURE — 83605 ASSAY OF LACTIC ACID: CPT

## 2021-02-15 PROCEDURE — 71045 X-RAY EXAM CHEST 1 VIEW: CPT

## 2021-02-15 PROCEDURE — 83690 ASSAY OF LIPASE: CPT

## 2021-02-15 PROCEDURE — 93005 ELECTROCARDIOGRAM TRACING: CPT | Performed by: INTERNAL MEDICINE

## 2021-02-15 RX ORDER — SODIUM CHLORIDE 0.9 % (FLUSH) 0.9 %
10 SYRINGE (ML) INJECTION PRN
Status: DISCONTINUED | OUTPATIENT
Start: 2021-02-15 | End: 2021-02-16 | Stop reason: HOSPADM

## 2021-02-15 RX ORDER — ACETAMINOPHEN 650 MG/1
650 SUPPOSITORY RECTAL EVERY 6 HOURS PRN
Status: DISCONTINUED | OUTPATIENT
Start: 2021-02-15 | End: 2021-02-16 | Stop reason: HOSPADM

## 2021-02-15 RX ORDER — ACETAMINOPHEN 325 MG/1
650 TABLET ORAL EVERY 6 HOURS PRN
Status: DISCONTINUED | OUTPATIENT
Start: 2021-02-15 | End: 2021-02-16 | Stop reason: HOSPADM

## 2021-02-15 RX ORDER — POLYETHYLENE GLYCOL 3350 17 G/17G
17 POWDER, FOR SOLUTION ORAL DAILY PRN
Status: DISCONTINUED | OUTPATIENT
Start: 2021-02-15 | End: 2021-02-16 | Stop reason: HOSPADM

## 2021-02-15 RX ORDER — PROMETHAZINE HYDROCHLORIDE 25 MG/1
12.5 TABLET ORAL EVERY 6 HOURS PRN
Status: DISCONTINUED | OUTPATIENT
Start: 2021-02-15 | End: 2021-02-16 | Stop reason: HOSPADM

## 2021-02-15 RX ORDER — ONDANSETRON 2 MG/ML
4 INJECTION INTRAMUSCULAR; INTRAVENOUS EVERY 6 HOURS PRN
Status: DISCONTINUED | OUTPATIENT
Start: 2021-02-15 | End: 2021-02-16 | Stop reason: HOSPADM

## 2021-02-15 RX ORDER — SODIUM CHLORIDE 0.9 % (FLUSH) 0.9 %
10 SYRINGE (ML) INJECTION EVERY 12 HOURS SCHEDULED
Status: DISCONTINUED | OUTPATIENT
Start: 2021-02-15 | End: 2021-02-16 | Stop reason: HOSPADM

## 2021-02-15 ASSESSMENT — ENCOUNTER SYMPTOMS
ABDOMINAL PAIN: 0
SHORTNESS OF BREATH: 0
VOMITING: 0
CHEST TIGHTNESS: 0
NAUSEA: 0
DIARRHEA: 0

## 2021-02-15 NOTE — ED PROVIDER NOTES
well-nourished, normal speech and mentation without obvious facial droop, no obvious rash. No obvious cranial nerve deficits on my initial exam. Abd soft NTND on my examination. Frail appearing, dry MM, RRR, minimal rhonchi, no rales or wheezing. Fatigued. The 12 lead EKG was interpreted by me as follows:  Rate: normal with a rate of 72  Rhythm: sinus  Axis: normal  Intervals: normal TN, narrow QRS, normal QTc  ST segments: no ST elevations or depressions  T waves: no abnormal inversions  Non-specific T wave changes: present  Prior EKG comparison: EKG dated 2/12/21 is not significantly different    MDM:  Diagnostic considerations included seizure, stroke, TIA, intracranial bleed, trauma, vasovagal reaction, orthostatic reaction/dehydration, medication side effect, intoxication, metabolic/electrolyte disturbance, blood sugar disturbance, cardiac dysrhythmia    ED course was notable for syncopal event. Blood pressure is stable today. Patient is known to have ESRD on hemodialysis and has a lactic acidosis with elevated troponin and BNP with primary pancreatic cancer with liver metastasis. Abdominal exam is benign today on my exam and also has no abdominal pain acutely by history. He has not been vomiting. He tells me specifically that his goals of care are not purely comfort, he WOULD want CPR and WOULD want intubation if necessary, and understands that this pulls him out of hospice care. Given his recent admission to [facility], he has a strong preference to be transferred there and does not want to be admitted here. I tried multiple times to be the patient at [facility] but he is insistent that he go to [facility] where his most recent admission was. Rapid Covid negative. We will initiate transfer per patient request.  LFTs are abnormal but in keeping with previous test and known liver metastasis.   He has no symptoms to suggest an acute bowel obstruction, specifically no abdominal tenderness or active vomiting.    8:00 PM  Update - [facility] medicine team refused transfer citing bed capacity issues and no need for higher level of care. We informed them that this was a patient preference issue and they declined to accept for transfer. Updated pt who is now agreeable to admit at [facility]. CLINICAL IMPRESSION  1. Syncope and collapse    2. End-stage renal disease on hemodialysis ([facility]    3. Lactic acidosis    4. Elevated troponin    5. Elevated brain natriuretic peptide (BNP) level    6. Primary pancreatic cancer with metastasis to other site ([facility]    7. History of small bowel obstruction        DISPOSITION  Jose Tran was admitted after initial attempt to transfer to [facility] per patient request in fair condition. The total critical care time spent while evaluating and treating this patient was 34 minutes. This excludes time spent doing separately billable procedures. This includes time at the bedside, data interpretation, medication management, obtaining critical history from collateral sources if the patient is unable to provide it directly, and physician consultation. Specifics of interventions taken and potentially life-threatening diagnostic considerations are listed above in the medical decision making. This chart was created using Dragon dictation software. Efforts were made by me to ensure accuracy, however some errors may be present due to limitations of this technology.              Aurea Mahoney MD  02/15/21 1913       Aurea Mahoney MD  02/15/21 Ronnie Castañeda

## 2021-02-15 NOTE — ED NOTES
Pt into ER from [facility], patient supposedly was 35 minutes into treatment when he had an episode of loss of consciousness, where his bp dropped down to 70's/40's. Then the dialysis nurses gave a liter of fluids, once EMS arrived pt was awake and alert. Arrives here to ER alert, with fistula accessed still. EMS reporting pt had anesthesia today as well to have fistula worked on and [employer]" per pt. EMS reporting pt was recently placed on Hospice care 2 weeks ago for stage 4 liver cancer , pt is still a Full Code however. VSS at this time. Will continue to monitor.       John Walden RN  02/15/21 7600
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
## 2021-02-16 VITALS
HEIGHT: 71 IN | TEMPERATURE: 97.4 F | HEART RATE: 70 BPM | OXYGEN SATURATION: 99 % | DIASTOLIC BLOOD PRESSURE: 72 MMHG | RESPIRATION RATE: 16 BRPM | SYSTOLIC BLOOD PRESSURE: 142 MMHG | WEIGHT: 146.9 LBS | BODY MASS INDEX: 20.56 KG/M2

## 2021-02-16 PROBLEM — R55 SYNCOPE AND COLLAPSE: Status: ACTIVE | Noted: 2021-02-16

## 2021-02-16 PROBLEM — Z99.2 ESRD ON HEMODIALYSIS (HCC): Status: ACTIVE | Noted: 2021-02-16

## 2021-02-16 PROBLEM — N18.6 ESRD ON HEMODIALYSIS (HCC): Status: ACTIVE | Noted: 2021-02-16

## 2021-02-16 LAB
ALBUMIN SERPL-MCNC: 2.6 G/DL (ref 3.4–5)
ALP BLD-CCNC: 406 U/L (ref 40–129)
ALT SERPL-CCNC: 125 U/L (ref 10–40)
ANION GAP SERPL CALCULATED.3IONS-SCNC: 18 MMOL/L (ref 3–16)
ANISOCYTOSIS: ABNORMAL
AST SERPL-CCNC: 198 U/L (ref 15–37)
BASOPHILS ABSOLUTE: 0 K/UL (ref 0–0.2)
BASOPHILS RELATIVE PERCENT: 0.2 %
BILIRUB SERPL-MCNC: 2.6 MG/DL (ref 0–1)
BILIRUBIN DIRECT: 1.8 MG/DL (ref 0–0.3)
BILIRUBIN, INDIRECT: 0.8 MG/DL (ref 0–1)
BUN BLDV-MCNC: 89 MG/DL (ref 7–20)
CALCIUM SERPL-MCNC: 8.5 MG/DL (ref 8.3–10.6)
CHLORIDE BLD-SCNC: 90 MMOL/L (ref 99–110)
CO2: 24 MMOL/L (ref 21–32)
CREAT SERPL-MCNC: 6.3 MG/DL (ref 0.8–1.3)
EKG ATRIAL RATE: 72 BPM
EKG DIAGNOSIS: NORMAL
EKG P AXIS: 70 DEGREES
EKG P-R INTERVAL: 150 MS
EKG Q-T INTERVAL: 446 MS
EKG QRS DURATION: 104 MS
EKG QTC CALCULATION (BAZETT): 488 MS
EKG R AXIS: -24 DEGREES
EKG T AXIS: 171 DEGREES
EKG VENTRICULAR RATE: 72 BPM
EOSINOPHILS ABSOLUTE: 0 K/UL (ref 0–0.6)
EOSINOPHILS RELATIVE PERCENT: 0 %
GFR AFRICAN AMERICAN: 11
GFR NON-AFRICAN AMERICAN: 9
GLUCOSE BLD-MCNC: 308 MG/DL (ref 70–99)
HCT VFR BLD CALC: 26.2 % (ref 40.5–52.5)
HEMOGLOBIN: 8.2 G/DL (ref 13.5–17.5)
LACTIC ACID: 6.8 MMOL/L (ref 0.4–2)
LYMPHOCYTES ABSOLUTE: 0.6 K/UL (ref 1–5.1)
LYMPHOCYTES RELATIVE PERCENT: 4.4 %
MACROCYTES: ABNORMAL
MCH RBC QN AUTO: 31.1 PG (ref 26–34)
MCHC RBC AUTO-ENTMCNC: 31.2 G/DL (ref 31–36)
MCV RBC AUTO: 99.8 FL (ref 80–100)
MONOCYTES ABSOLUTE: 0.5 K/UL (ref 0–1.3)
MONOCYTES RELATIVE PERCENT: 3.4 %
NEUTROPHILS ABSOLUTE: 12.2 K/UL (ref 1.7–7.7)
NEUTROPHILS RELATIVE PERCENT: 92 %
PDW BLD-RTO: 16.5 % (ref 12.4–15.4)
PHOSPHORUS: 6 MG/DL (ref 2.5–4.9)
PLATELET # BLD: 65 K/UL (ref 135–450)
PLATELET SLIDE REVIEW: ABNORMAL
PMV BLD AUTO: 9.6 FL (ref 5–10.5)
POTASSIUM SERPL-SCNC: 5.2 MMOL/L (ref 3.5–5.1)
RBC # BLD: 2.62 M/UL (ref 4.2–5.9)
SCHISTOCYTES: ABNORMAL
SLIDE REVIEW: ABNORMAL
SODIUM BLD-SCNC: 132 MMOL/L (ref 136–145)
TARGET CELLS: ABNORMAL
TOTAL PROTEIN: 5.8 G/DL (ref 6.4–8.2)
WBC # BLD: 13.3 K/UL (ref 4–11)

## 2021-02-16 PROCEDURE — 90935 HEMODIALYSIS ONE EVALUATION: CPT

## 2021-02-16 PROCEDURE — 93010 ELECTROCARDIOGRAM REPORT: CPT | Performed by: INTERNAL MEDICINE

## 2021-02-16 PROCEDURE — 80076 HEPATIC FUNCTION PANEL: CPT

## 2021-02-16 PROCEDURE — 97530 THERAPEUTIC ACTIVITIES: CPT

## 2021-02-16 PROCEDURE — 97166 OT EVAL MOD COMPLEX 45 MIN: CPT

## 2021-02-16 PROCEDURE — 6370000000 HC RX 637 (ALT 250 FOR IP): Performed by: INTERNAL MEDICINE

## 2021-02-16 PROCEDURE — 36415 COLL VENOUS BLD VENIPUNCTURE: CPT

## 2021-02-16 PROCEDURE — 97161 PT EVAL LOW COMPLEX 20 MIN: CPT

## 2021-02-16 PROCEDURE — 5A1D70Z PERFORMANCE OF URINARY FILTRATION, INTERMITTENT, LESS THAN 6 HOURS PER DAY: ICD-10-PCS | Performed by: INTERNAL MEDICINE

## 2021-02-16 PROCEDURE — 80069 RENAL FUNCTION PANEL: CPT

## 2021-02-16 PROCEDURE — 2580000003 HC RX 258: Performed by: INTERNAL MEDICINE

## 2021-02-16 PROCEDURE — 83605 ASSAY OF LACTIC ACID: CPT

## 2021-02-16 PROCEDURE — 85025 COMPLETE CBC W/AUTO DIFF WBC: CPT

## 2021-02-16 PROCEDURE — 6360000002 HC RX W HCPCS: Performed by: INTERNAL MEDICINE

## 2021-02-16 RX ORDER — MIDODRINE HYDROCHLORIDE 2.5 MG/1
2.5 TABLET ORAL 3 TIMES DAILY
COMMUNITY

## 2021-02-16 RX ORDER — OXYCODONE HYDROCHLORIDE AND ACETAMINOPHEN 5; 325 MG/1; MG/1
1 TABLET ORAL EVERY 6 HOURS PRN
COMMUNITY

## 2021-02-16 RX ORDER — CARVEDILOL 6.25 MG/1
6.25 TABLET ORAL 2 TIMES DAILY WITH MEALS
Status: DISCONTINUED | OUTPATIENT
Start: 2021-02-16 | End: 2021-02-16

## 2021-02-16 RX ORDER — ATORVASTATIN CALCIUM 20 MG/1
20 TABLET, FILM COATED ORAL DAILY
Status: DISCONTINUED | OUTPATIENT
Start: 2021-02-16 | End: 2021-02-16 | Stop reason: ALTCHOICE

## 2021-02-16 RX ORDER — SODIUM BICARBONATE 650 MG/1
650 TABLET ORAL 2 TIMES DAILY
Status: DISCONTINUED | OUTPATIENT
Start: 2021-02-16 | End: 2021-02-16

## 2021-02-16 RX ORDER — CLONIDINE HYDROCHLORIDE 0.1 MG/1
0.1 TABLET ORAL EVERY 4 HOURS PRN
Status: DISCONTINUED | OUTPATIENT
Start: 2021-02-16 | End: 2021-02-16 | Stop reason: HOSPADM

## 2021-02-16 RX ORDER — HEPARIN SODIUM 5000 [USP'U]/ML
5000 INJECTION, SOLUTION INTRAVENOUS; SUBCUTANEOUS EVERY 8 HOURS SCHEDULED
Status: DISCONTINUED | OUTPATIENT
Start: 2021-02-17 | End: 2021-02-16 | Stop reason: HOSPADM

## 2021-02-16 RX ORDER — PANTOPRAZOLE SODIUM 40 MG/1
40 TABLET, DELAYED RELEASE ORAL
Status: DISCONTINUED | OUTPATIENT
Start: 2021-02-16 | End: 2021-02-16 | Stop reason: ALTCHOICE

## 2021-02-16 RX ORDER — ASPIRIN 81 MG/1
81 TABLET, CHEWABLE ORAL DAILY
Status: DISCONTINUED | OUTPATIENT
Start: 2021-02-16 | End: 2021-02-16 | Stop reason: HOSPADM

## 2021-02-16 RX ORDER — DICYCLOMINE HYDROCHLORIDE 10 MG/1
10 CAPSULE ORAL EVERY 6 HOURS PRN
Status: DISCONTINUED | OUTPATIENT
Start: 2021-02-16 | End: 2021-02-16 | Stop reason: ALTCHOICE

## 2021-02-16 RX ORDER — TORSEMIDE 20 MG/1
60 TABLET ORAL DAILY
Qty: 90 TABLET | Refills: 0 | Status: SHIPPED | OUTPATIENT
Start: 2021-02-16

## 2021-02-16 RX ORDER — METOCLOPRAMIDE 10 MG/1
5 TABLET ORAL
Status: DISCONTINUED | OUTPATIENT
Start: 2021-02-16 | End: 2021-02-16 | Stop reason: ALTCHOICE

## 2021-02-16 RX ORDER — LEVOTHYROXINE SODIUM 0.1 MG/1
100 TABLET ORAL DAILY
Status: DISCONTINUED | OUTPATIENT
Start: 2021-02-16 | End: 2021-02-16 | Stop reason: HOSPADM

## 2021-02-16 RX ORDER — OXYCODONE HYDROCHLORIDE AND ACETAMINOPHEN 5; 325 MG/1; MG/1
1 TABLET ORAL EVERY 6 HOURS PRN
Status: DISCONTINUED | OUTPATIENT
Start: 2021-02-16 | End: 2021-02-16 | Stop reason: HOSPADM

## 2021-02-16 RX ORDER — CARVEDILOL 3.12 MG/1
3.12 TABLET ORAL 2 TIMES DAILY WITH MEALS
Status: DISCONTINUED | OUTPATIENT
Start: 2021-02-16 | End: 2021-02-16 | Stop reason: HOSPADM

## 2021-02-16 RX ORDER — LOSARTAN POTASSIUM 25 MG/1
25 TABLET ORAL DAILY
Status: DISCONTINUED | OUTPATIENT
Start: 2021-02-16 | End: 2021-02-16 | Stop reason: HOSPADM

## 2021-02-16 RX ADMIN — CARVEDILOL 6.25 MG: 6.25 TABLET, FILM COATED ORAL at 00:58

## 2021-02-16 RX ADMIN — LOSARTAN POTASSIUM 25 MG: 25 TABLET, FILM COATED ORAL at 00:58

## 2021-02-16 RX ADMIN — CARVEDILOL 6.25 MG: 6.25 TABLET, FILM COATED ORAL at 08:15

## 2021-02-16 RX ADMIN — ASPIRIN 81 MG: 81 TABLET, CHEWABLE ORAL at 08:15

## 2021-02-16 RX ADMIN — OXYCODONE HYDROCHLORIDE AND ACETAMINOPHEN 1 TABLET: 5; 325 TABLET ORAL at 10:42

## 2021-02-16 RX ADMIN — Medication 10 ML: at 08:15

## 2021-02-16 RX ADMIN — LEVOTHYROXINE SODIUM 100 MCG: 0.1 TABLET ORAL at 06:28

## 2021-02-16 RX ADMIN — OXYCODONE HYDROCHLORIDE AND ACETAMINOPHEN 1 TABLET: 5; 325 TABLET ORAL at 18:23

## 2021-02-16 RX ADMIN — Medication 10 ML: at 00:59

## 2021-02-16 RX ADMIN — ENOXAPARIN SODIUM 30 MG: 30 INJECTION SUBCUTANEOUS at 08:15

## 2021-02-16 RX ADMIN — CARVEDILOL 3.12 MG: 3.12 TABLET, FILM COATED ORAL at 15:59

## 2021-02-16 RX ADMIN — SODIUM BICARBONATE 650 MG: 650 TABLET ORAL at 08:15

## 2021-02-16 ASSESSMENT — PAIN SCALES - GENERAL
PAINLEVEL_OUTOF10: 0
PAINLEVEL_OUTOF10: 0

## 2021-02-16 NOTE — H&P
Hospital Medicine History & Physical      PCP: Priscilla Farias DO    Date of Admission: 2/15/2021    Date of Service: Pt seen/examined on 02/15/21 and Admitted to Inpatient with expected LOS greater than two midnights due to medical therapy. Chief Complaint: Syncope and collapse      History Of Present Illness:  Humaira Mendoza is 74 y.o. male who presented with complaint of syncope and collapse. Symptom onset was acute for a time period of 1 day. The severity is described as moderate. The course of his symptoms over time is resolved. The symptoms improved with none and worsened with dialysis. The patient's symptom is associated with generalized weakness. Humaira Mendoza is 74 y.o. male with history of HTN, ESRD on dialysis, pancreatic cancer stage IV. Patient follows with his oncologist Dr. Barrett Nurse at the [facility]  He was recently placed on hospice at home but continues hemodialysis at [facility]    Patient presents today after he had syncope while he was on dialysis  He tells me he was left about 30 minutes before he finished his dialysis  At that time he was hypotensive with blood pressure 80s over 40s  He continues to have symptoms of fatigue, malaise and generalized weakness    He was still sitting on dialysis chair so he did not fall or hit his head.    He is now admitted for further evaluation and management      Past Medical History:          Diagnosis Date    Acute hematogenous osteomyelitis of left ankle ([address] 8/1/2019    Anemia due to stage 3 chronic kidney disease 6/7/2019    Chronic kidney disease, stage III (moderate) 6/7/2019    Diabetes mellitus ([address]     Diabetic foot ulcer with osteomyelitis ([address] 1/11/2015    Foot ulcer ([address]     Hypercholesteremia     Hypertension     MRSA infection 1/12/15    R gr toe ulcer 7/17/15 toe    Thyroid disease 2005    hypothyroidism    Type II or unspecified type diabetes mellitus with neurological manifestations, uncontrolled(250.62) Ayo Gupta DPM   aspirin 81 MG chewable tablet Take 1 tablet by mouth daily    Historical Provider, MD   folic acid-pyridoxine-cyancobalamin (FOLTX) 2.5-25-2 MG TABS Take 1 tablet by mouth daily    Historical Provider, MD   vitamin D (CHOLECALCIFEROL) 1000 UNIT TABS tablet Take 2 tablets by mouth daily 5/15/19   Eladio Venegas MD   levothyroxine (SYNTHROID) 100 MCG tablet Take 1 tablet by mouth Daily 12/5/16   TEE Jackson CNP   atorvastatin (LIPITOR) 20 MG tablet Take 1 tablet by mouth daily 9/8/16   TEE Jackson CNP   ACCU-CHEK VEE PLUS strip 1 each by In Vitro route 5 times daily As needed. 3/31/16   TEE Jackson CNP   glucose blood VI test strips (ONE TOUCH ULTRA TEST) strip 1 each by Does not apply route 5 times daily. 4 times daily. 2/20/15   TEE Jackson CNP       Allergies:  Patient has no known allergies. Social History:      The patient currently lives at home    TOBACCO:   reports that he has never smoked. He has never used smokeless tobacco.  ETOH:   reports no history of alcohol use. E-Cigarettes/Vaping Use     Questions Responses    E-Cigarette/Vaping Use     Start Date     Passive Exposure     Quit Date     Counseling Given     Comments             Family History:      Reviewed in detail and negative for DM, CAD, Cancer, CVA. Positive as follows:        Problem Relation Age of Onset    Cancer Mother     Diabetes Sister     Cancer Brother     Diabetes Brother     Diabetes Brother     Heart Disease Neg Hx     Stroke Neg Hx     Thyroid Disease Neg Hx        REVIEW OF SYSTEMS:   Pertinent positives as noted in the HPI. All other systems reviewed and negative. PHYSICAL EXAM PERFORMED:    BP (!) 158/76   Pulse 76   Temp 98.1 °F (36.7 °C)   Resp 14   SpO2 100%     General appearance:  No apparent distress, appears stated age and cooperative. HEENT:  Normal cephalic, atraumatic without obvious deformity.  Pupils equal, round, and widely metastatic  7. Peritoneal carcinomatosis which recently resulted in partial small bowel obstruction now resolved  8. Cancer related abdominal pain       PLAN:    1. Admit to telemetry unit  2. Syncope likely due to fluid shift while on dialysis or transient hypotension  3. Check orthostatic blood pressure and pulse  4. Consult nephrology to resume hemodialysis as indicated  5. Continue home BP meds  6. Per his oncologist Dr. Kalpesh Alves at the [facility], he is not a chemotherapy candidate  7. He is hospice appropriate, recently placed on home hospice  8. Continue palliative care  9. Pain meds as needed for abdominal pain  10. Diabetes mellitus type 2 - anticipate excellent control given his weight loss and poor intake. Insulin is not ordered      DVT Prophylaxis: Lovenox  Diet: Regular diet. Given his unfavorable life expectancy, I have not ordered renal diet. Code Status: Full code  PT/OT Eval Status: Ordered to assist with discharge [address] as inpatient       Najma Foy MD    Thank you [id number] [facility], DO for the opportunity to be involved in this patient's care. If you have any questions or concerns please feel free to contact me at [phone].

## 2021-02-16 NOTE — DISCHARGE SUMMARY
[facility]    Patient Demographics    Patient. Corin Hansen  Date of Birth. 1955  MRN. [id number]     Primary care provider. [id number] [place] [facility], DO  (Tel: [phone])    Admit date: 2/15/2021    Discharge date 2/16/2021  Note Date: 2/16/2021     Reason for Hospitalization. Chief Complaint   Patient presents with    Loss of Consciousness     in by [facility] from [facility], he was 35 minutes into treatment and passed out pressure dropped to80/40's, given 1 liter of fluid from diaylsis, had a procedure today for fistula, arrives today with fistula still accessed. pt alert and oriented now          Significant Findings. Principal Problem:    Syncope and collapse  Active Problems:    Diabetic foot ulcer (HCC)    Essential hypertension    Moderate malnutrition (HCC)    Pancreatic cancer ([facility]    ESRD on hemodialysis ([facility]  Resolved Problems:    * No resolved hospital problems. *       Problems and results from this hospitalization that need follow up. Significant test results and incidental findings. Invasive procedures and treatments. [facility] Course. The patient was admitted from the HD center after becoming hypotensive and having syncope. He required fluid resuscitation and was subsequently admitted , even though he was currently under hospice care for his advanced adenocarcinoma of the pancreas and liver. He was followed closely by Dr Marc Castro who is his primary nephrologist.  Initially he requested to be transferred to [facility] , however after many detailed conversations with Dr Gina Rivers and myself the patient agreed to be d/c home with hospice care. In the past 3 days he required re cannulation of his fistula. Today he was unable to have HD due to high venous access pressures and low arterial pressures.   Pt also noted to have prolonged bleeding following de cannulation. He has a very high tumor burden and is not a candidate for any chemo or immunotherapy. He understands that HD is futile and was agreeable to stopping dialysis. He was subsequently d/c home with hospice care. His brother and son are currently staying with him at his home.              Consults. IP CONSULT TO HOSPITALIST  IP CONSULT TO NEPHROLOGY  IP CONSULT TO PALLIATIVE CARE  IP CONSULT TO ONCOLOGY    Physical examination on discharge day. BP (!) 142/72   Pulse 70   Temp 97.4 °F (36.3 °C) (Oral)   Resp 16   Ht 5' 11\" (1.803 m)   Wt 146 lb 14.4 oz (66.6 kg)   SpO2 99%   BMI 20.49 kg/m²   General appearance. Alert. Looks frail and chronically ill   HEENT. Sclera clear. Moist mucus membranes. Cardiovascular. Regular rate and rhythm, normal S1, S2. No murmur. Respiratory. Not using accessory muscles. Clear to auscultation bilaterally, no wheeze. Gastrointestinal. Abdomen soft, mild tenderness in epigastric region. normal bowel sounds  Neurology. Facial symmetry. No speech deficits. Moving all extremities equally. Extremities. No edema in lower extremities. Skin. Warm, dry, normal turgor    Condition at time of discharge stable     Medication instructions provided to patient at discharge.      Medication List      START taking these medications    torsemide 20 MG tablet  Commonly known as: DEMADEX  Take 3 tablets by mouth daily        CONTINUE taking these medications    dicyclomine 10 MG capsule  Commonly known as: Bentyl  Take 1 capsule by mouth every 6 hours as needed (cramps)     levothyroxine 100 MCG tablet  Commonly known as: SYNTHROID  Take 1 tablet by mouth Daily     metoclopramide 5 MG tablet  Commonly known as: REGLAN  Take 1 tablet by mouth 3 times daily (before meals)     midodrine 2.5 MG tablet  Commonly known as: PROAMATINE     oxyCODONE-acetaminophen 5-325 MG per tablet  Commonly known as: PERCOCET     pantoprazole 40 MG tablet  Commonly known as: PROTONIX  Take 1 tablet by mouth 2 times daily (before meals)     sodium bicarbonate 650 MG tablet  Take 1 tablet by mouth 2 times daily        STOP taking these medications    Accu-Chek Christie Plus strip  Generic drug: blood glucose test strips     aspirin 81 MG chewable tablet     atorvastatin 20 MG tablet  Commonly known as: Lipitor     blood glucose test strips strip  Commonly known as: ONE TOUCH ULTRA TEST     carvedilol 6.25 MG tablet  Commonly known as: COREG     folic acid-pyridoxine-cyancobalamin 2.5-25-2 MG Tabs  Commonly known as: FOLTX     gentamicin 0.1 % cream  Commonly known as: GARAMYCIN     losartan 25 MG tablet  Commonly known as: COZAAR     vitamin D 1000 UNIT Tabs tablet  Commonly known as: CHOLECALCIFEROL           Where to Get Your Medications      These medications were sent to [facility], [address] [place] [address] 37, [address]    Phone: [phone]   · torsemide 20 MG tablet         Discharge recommendations given to patient. Follow Up. in 1 week   Disposition. Home with hospice care   Activity. As tolerated   Diet: DIET GENERAL;      Spent > 30  minutes in discharge process.     Signed:  TEE Bobo CNP     2/16/2021 4:36 PM

## 2021-02-16 NOTE — PROGRESS NOTES
Patients admission and head to toe assessment completed. Vital signs WNL. Bed alarm engaged, call light within reach. Patient has no scheduled medication. Patient denies any pain at the moment. Patient resting in bed. Will continue to monitor.

## 2021-02-16 NOTE — ACP (ADVANCE CARE PLANNING)
Advance Care Planning     Advance Care Planning Activator (Inpatient)  Conversation Note      Date of ACP Conversation: 2/15/2021    Conversation Conducted with: Patient with Decision Making Capacity    ACP Activator: KASSI  [facility]:     Current Designated Health Care Decision Maker:     Primary Decision Maker (Active): PeterNina gonzales - Spouse - [phone]    Secondary Decision Maker: Artem Kaiser - Child - [phone]      Care Preferences    Ventilation: \"If you were in your present state of health and suddenly became very ill and were unable to breathe on your own, what would your preference be about the use of a ventilator (breathing machine) if it were available to you? \"      Would the patient desire the use of ventilator (breathing machine)?: yes    \"If your health worsens and it becomes clear that your chance of recovery is unlikely, what would your preference be about the use of a ventilator (breathing machine) if it were available to you? \"     Would the patient desire the use of ventilator (breathing machine)?: No      Resuscitation  \"CPR works best to restart the heart when there is a sudden event, like a heart attack, in someone who is otherwise healthy. Unfortunately, CPR does not typically restart the heart for people who have serious health conditions or who are very sick. \"    \"In the event your heart stopped as a result of an underlying serious health condition, would you want attempts to be made to restart your heart (answer \"yes\" for attempt to resuscitate) or would you prefer a natural death (answer \"no\" for do not attempt to resuscitate)? \" yes       [] Yes   [] No   Educated Patient / Carmell Comes regarding differences between Advance Directives and portable DNR orders.     Length of ACP Conversation in minutes:      Conversation Outcomes:  [x] ACP discussion completed  [] Existing advance directive reviewed with patient; no changes to patient's previously recorded wishes  [] New Advance Directive completed  [] Portable Do Not Rescitate prepared for Provider review and signature  [] POLST/POST/MOLST/MOST prepared for Provider review and signature      Follow-up plan:    [] Schedule follow-up conversation to continue planning  [] Referred individual to Provider for additional questions/concerns   [] Advised patient/agent/surrogate to review completed ACP document and update if needed with changes in condition, patient preferences or care setting    [] This note routed to one or more involved healthcare providers

## 2021-02-16 NOTE — PROGRESS NOTES
Pt denies any pain VSS pt stated he wants to remain a full code. His daughter Lj Fowler is his POA. Pt sitting up eating breakfast call light in reach. No urine overnight. Urinal at bedside pt stated he will use it.

## 2021-02-16 NOTE — CARE COORDINATION
CM went and spoke to Dr Gina Rivers in pt's room and plan is still to go home with resumed hospice services. CM offered to call outpt HD clinic and inform but Dr Gina Rivers states she will call them. Cm told pt I will notify [facility] that no hc services will be needed. Pt verbalized understanding. CM notified Brianna Vergara with [facility] [phone] that pt will be discharge with hospice and hc not needed. CM called Jonn Toro with [facility] [phone] back to let her know that plan is to go home, CM just unsure what time pt will be going. She states she has already contacted her director and they will have pt's comfort medications for him tonight. Cm updated pt. He verbalized understanding. CM updated pt's RN Aneta Vaughan and she is aware to call Hospice when pt leaving.     Navi Martini, RN, BSN  [phone]
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
## 2021-02-16 NOTE — CONSULTS
Palliative Care:     74 y.o. male who presented with complaint of syncope and collapse. Symptom onset was acute for a time period of 1 day. The severity is described as moderate. The course of his symptoms over time is resolved. The symptoms improved with none and worsened with dialysis. The patient's symptom is associated with generalized weakness. History of HTN, ESRD on dialysis, pancreatic cancer stage IV. Patient follows with his oncologist Dr. Abdulkadir Borjas at the [facility]. He was recently placed on hospice at home but continues hemodialysis at [facility]. Patient presents today after he had syncope while he was on dialysis. He tells me he was left about 30 minutes before he finished his dialysis. At that time he was hypotensive with blood pressure 80s over 40s. He continues to have symptoms of fatigue, malaise and generalized weakness, He was still sitting on dialysis chair so he did not fall or hit his head. He is now admitted for further evaluation and management on 2/15/21. Past Medical History:   has a past medical history of Acute hematogenous osteomyelitis of left ankle ([address] Anemia due to stage 3 chronic kidney disease, Chronic kidney disease, stage III (moderate), Diabetes mellitus ([address] Diabetic foot ulcer with osteomyelitis ([address] Foot ulcer ([address] Hypercholesteremia, Hypertension, MRSA infection, Thyroid disease, and Type II or unspecified type diabetes mellitus with neurological manifestations, uncontrolled(250.62). Past Surgical History:   has a past surgical history that includes Cataract removal (1997); Abdomen surgery; Toe amputation (Right, 7/17/15); Foot Debridement (Left, 5/10/2019); Foot Debridement (Left, 5/10/2019); Upper gastrointestinal endoscopy (N/A, 12/26/2019); CT NEEDLE BIOPSY LIVER PERCUTANEOUS (1/20/2021); Upper gastrointestinal endoscopy (N/A, 1/21/2021); Colonoscopy (N/A, 1/21/2021); and CT BIOPSY BONE MARROW (1/26/2021).     Advance Directives:  Full        Problem Severity: Pain/Other Symptoms:  Fistula on RUE        Bed Mobility/Toileting/Transfer:  Dialysis MWF. Performance Status:        Palliative Performance Scale:  100% []Full Normal activity & work No evidence of disease  90%   [] Full Normal activity & work Some evidence of disease  80%   [] Full Normal activity with Effort Some evidence of disease  70%   [] Reduced Unable Normal Job/Work Significant disease Full Normal or reduced  60%   [] Ambulation reduced; Significant disease; Can't do hobbies/housework; intake normal   or reduced; occasional assist; LOC full/confusion  50%   [] Mainly sit/lie; Extensive disease; Can't do any work; Considerable assist; intake normal  Or reduced; LOC full/confusion  40%   [x] Mainly in bed; Extensive disease; Mainly assist; intake normal or reduced; occasional assist; LOC full/confusion  30%   [] Bed Bound; Extensive disease; Total care; intake reduced; LOC full/confusion  20%   [] Bed Bound; Extensive disease; Total care; intake minimal; Drowsy/coma  10%   [] Bed Bound; Extensive disease; Total care; Mouth care only; Drowsy/coma    PPS 40%    Symptom Assessment: Appetite/Nausea/Bowels/Fatigue:    lbs. Albumin 2.6        Social History:   reports that he has never smoked. He has never used smokeless tobacco. He reports that he does not drink alcohol or use drugs. Family History:  family history includes Cancer in his brother and mother; Diabetes in his brother, brother, and sister. Psychological/Spiritual: . Family Discussion:    Was placed in hospice care two weeks ago due to stage 4 Pancreatic cancer. Family want him transferred to [address] [place] [address] notified. They will provide a nurse to come in to hospital today to assess/support patient. Per information from Dr. Alejandra Vega and CM notes patient agrees to return to home setting. Pending DC to home tonight or AM.     Will continue to follow as needed.

## 2021-02-16 NOTE — PROGRESS NOTES
4 Eyes Skin Assessment     The patient is being assess for  Admission    I agree that 2 RN's have performed a thorough Head to Toe Skin Assessment on the patient. ALL assessment sites listed below have been assessed. Areas assessed by both nurses:   [x]   Head, Face, and Ears   [x]   Shoulders, Back, and Chest  [x]   Arms, Elbows, and Hands   [x]   Coccyx, Sacrum, and IschIum  [x]   Legs, Feet, and Heels        Does the Patient have Skin Breakdown?   No         Rakan Prevention initiated:  Yes   Wound Care Orders initiated:  NA      [facility] nurse consulted for Pressure Injury (Stage 3,4, Unstageable, DTI, NWPT, and Complex wounds), New and Established Ostomies:  NA      Nurse 1 eSignature: Electronically signed by Sudheer Baez RN on 2/16/21 at 12:34 AM EST    **SHARE this note so that the co-signing nurse is able to place an eSignature**    Nurse 2 eSignature: Electronically signed by Sabrina Andrews RN on 2/16/21 at 1:20 AM EST

## 2021-02-16 NOTE — PROGRESS NOTES
-pre dialysis RN, HD attempted, access infiltrated after gfwyxbqixjq-jn-[id number] however, high venous access pressure and low arterial pressure. Dialysis terminated at this point. Pt noted to have prolong bleeding from access site when de-cannulated. This is a recurrent problem despite angioplasty.    -long discussion regarding goals of care, at this time-he has not been able to safely dialyze in an outpatient setting. He has high tumor burden, aggressive adenoCa, not a candidate for chemo. At this point, continuing dialysis is futile, as well as risk of continued dialysis outweigh any benefit given access issues. He cannot safely continue dialysis in an outpatient setting. This was discussed with patient as well as multiple family members (ex wife/daughter). Patient has opted to terminate dialysis at this point and go home with full time hospice. This is reasonable given incurable aggressive cancer. He is already set up with [facility] services.

## 2021-02-16 NOTE — PROGRESS NOTES
Pt returned from dialysis, nurse stated fistual infiltrated so pt is on list for tomorrow.  Pt on unit resting eyes closed call light in reach

## 2021-02-16 NOTE — CARE COORDINATION
Discharge Planning Assessment  Discharge Planning Assessment  RN/SW discharge planner met with patient/ (and family member) to discuss reason for admission, current living situation, and potential needs at the time of discharge    Demographics/Insurance verified Yes     Current type of dwellin story home with one step to enter, pt does not go upstairs. Patient from ECF/SW confirmed with: n/a    Living arrangements: lives with son and brother and states his family is supportive    Level of function/Support:pt states his brother and son assist with bathing and dressing. Pt states he can feed himself    PCP:Virginia Cain     Last Visit to PCP:2021    DME: 4 wheeled walker     Active with any community resources/agencies/skilled home care:pt is active with [facility] for nursing. Patient states with [facility]     Patient goes to [facility] on , Wed,  at 3:15 pm    Medication compliance issues: states family assists by setting medications out for him     Financial issues that could impact healthcare:none         Tentative discharge plan: pt currently is wanting to have physician transfer him to [address] [place] [address] with hc vs snf - possible transfer   Discussed and provided facilities of choice if transition to a skilled nursing facility is required at the time of discharge  Therapy pending, pt requesting transfer to [facility]     Discussed with patient and/or family that on the day of discharge home tentative time of discharge will be between 10 AM and noon.     Transportation at the time of discharge:family vs transportation service    Akanksha Polanco, [facility], BSN  [phone]

## 2021-02-16 NOTE — PROGRESS NOTES
Care  REQUIRES PT FOLLOW UP: Yes  Activity Tolerance  Activity Tolerance: Patient limited by fatigue;Patient limited by pain; Patient limited by endurance       Patient Diagnosis(es): The primary encounter diagnosis was Syncope and collapse. Diagnoses of End-stage renal disease on hemodialysis ([address] Lactic acidosis, Elevated troponin, Elevated brain natriuretic peptide (BNP) level, Primary pancreatic cancer with metastasis to other site [facility]), and History of small bowel obstruction were also pertinent to this visit. has a past medical history of Acute hematogenous osteomyelitis of left ankle ([address] Anemia due to stage 3 chronic kidney disease, Chronic kidney disease, stage III (moderate), Diabetes mellitus ([address] Diabetic foot ulcer with osteomyelitis ([address] Foot ulcer ([address] Hypercholesteremia, Hypertension, MRSA infection, Thyroid disease, and Type II or unspecified type diabetes mellitus with neurological manifestations, uncontrolled(250.62). has a past surgical history that includes Cataract removal (1997); Abdomen surgery; Toe amputation (Right, 7/17/15); Foot Debridement (Left, 5/10/2019); Foot Debridement (Left, 5/10/2019); Upper gastrointestinal endoscopy (N/A, 12/26/2019); CT NEEDLE BIOPSY LIVER PERCUTANEOUS (1/20/2021); Upper gastrointestinal endoscopy (N/A, 1/21/2021); Colonoscopy (N/A, 1/21/2021); and CT BIOPSY BONE MARROW (1/26/2021). Restrictions  Restrictions/Precautions  Restrictions/Precautions: Fall Risk(HIGH FALL RISK; DIET GENERAL)  Required Braces or Orthoses?: No  Position Activity Restriction  Other position/activity restrictions: Briefly, Maria E Garcia is a 74 y.o. male  who presents to the ED complaining of syncopal event while getting dialysis with hypotension. He has his RUE AVF still accessed and was brought straight here. No abdominal pains. No fevers recently. He feels a little SOB but mostly feels generally weak and frail.   He is currently a hospice patient who lives at home.  He is hospice due to pancreatic cancer that has metastasized and ESRD on HD. He has known liver mets. No headaches. Vision/Hearing  Vision: Impaired  Vision Exceptions: Wears glasses for reading  Hearing: Within functional limits     Subjective  General  Chart Reviewed: Yes  Patient assessed for rehabilitation services?: Yes  Response To Previous Treatment: Not applicable  Family / Caregiver Present: No  Diagnosis: Syncope and collapse  Follows Commands: Within Functional Limits  General Comment  Comments: Pt supine in bed upon arrival.  Subjective  Subjective: Pt agreeable to PT/OT eval.  Pain Screening  Patient Currently in Pain: Yes(Simultaneous filing. User may not have seen previous data.)  Pain Assessment  Pain Assessment: 0-10(Simultaneous filing. User may not have seen previous data.)  Vital Signs  Patient Currently in Pain: Yes(Simultaneous filing. User may not have seen previous data.)       Orientation  Orientation  Overall Orientation Status: Within Functional Limits  Social/Functional History  Social/Functional History  Lives With: Family(brother and son)  Type of Home: House  Home Layout: Two level, Bed/Bath upstairs, Able to Live on Main level with bedroom/bathroom  Home Access: Stairs to enter without rails  Entrance Stairs - Number of Steps: 1 STEP to get inside  Bathroom Shower/Tub: Tub/Shower unit, Walk-in shower(pt has been recently sponge bathing)  Bathroom Toilet: Handicap height  Home Equipment: Rolling walker  ADL Assistance: Independent  Homemaking Responsibilities: No  Ambulation Assistance: Independent(with RW)  Transfer Assistance: Needs assistance  Active : No  Patient's  Info: family takes pt to doctors appointments  Additional Comments: pt reports no falls in the last 6 months; pt has recently got a RW and is now using that for ambulation. Pt also reports that he has been living on main level at home and sponge bathing.   Cognition        Objective Observation/Palpation  Posture: Fair    AROM RLE (degrees)  RLE AROM: WFL  AROM LLE (degrees)  LLE AROM : WFL  Strength RLE  Strength RLE: WFL  Strength LLE  Strength LLE: WFL  Tone RLE  RLE Tone: Normotonic  Motor Control  Gross Motor?: WFL  Sensation  Overall Sensation Status: WFL  Bed mobility  Rolling to Left: Stand by assistance  Rolling to Right: Stand by assistance  Supine to Sit: Stand by assistance(Simultaneous filing. User may not have seen previous data.)  Sit to Supine: Stand by assistance(Simultaneous filing. User may not have seen previous data.)  Scooting: Stand by assistance(Simultaneous filing. User may not have seen previous data.)     Ambulation  Ambulation?: Yes  Ambulation 1  Surface: level tile  Device: No Device  Assistance: Contact guard assistance  Quality of Gait: Pt coming to  flexed postition and stepping toward HOB. Pt with decreased step length, slow forrest, no LOB. Distance: ~2 ft toward [facility]  Comments: Pt refusing further ambulation, citing pain and fatigue  Stairs/Curb  Stairs?: No     Balance  Posture: Fair  Sitting - Static: Good  Sitting - Dynamic: Good  Standing - Static: Fair;+  Standing - Dynamic: [address]  Times per week: 3-5x  Times per day: Daily  Current Treatment Recommendations: Strengthening, ROM, Balance Training, Functional Mobility Training, Transfer Training, Endurance Training, Gait Training, Stair training, Safety Education & Training, Patient/Caregiver Education & Training, Equipment Evaluation, Education, & procurement  Safety Devices  Type of devices:  All fall risk precautions in place, Call light within reach, Chair alarm in place, Gait belt, Patient at risk for falls, Left in chair, Nurse notified  Restraints  Initially in place: No    G-Code       OutComes Score                                                  AM-PAC Score  AM-PAC Inpatient Mobility Raw Score : 17 (02/16/21 1506)  AM-PAC Inpatient T-Scale Score : 42.13 (02/16/21 1506)  Mobility Inpatient CMS 0-100% Score: 50.57 (02/16/21 1506)  Mobility Inpatient CMS G-Code Modifier : CK (02/16/21 1506)          Goals  Short term goals  Time Frame for Short term goals:  To be met prior to discharge  Short term goal 1: Pt will complete bed mobility with mod I  Short term goal 2: Pt will complete sit to/from stand with mod I  Short term goal 3: Pt will ambulate 50 ft with LRAD and mod I       Therapy Time   Individual Concurrent Group Co-treatment   Time In 1031         Time Out 1054         Minutes 23         Timed Code Treatment Minutes: 8 Minutes     Freya Desir, PT   Freya Desir, PT, DPT, [id number]

## 2021-02-16 NOTE — PROGRESS NOTES
Dr. Humphrey Concepcion notified of patient elevated BP. Awaiting on orders.  Will continue to monitor
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
## 2021-02-16 NOTE — PROGRESS NOTES
Occupational Therapy   Occupational Therapy Initial Assessment  Date: 2021   Patient Name: Jarad Espino  MRN: [id number]     : 1955    Date of Service: 2021    Discharge Recommendations:    Jarad Espino scored a 16/24 on the AM-PAC ADL Inpatient form. Current research shows that an AM-PAC score of 18 or greater is typically associated with a discharge to the patient's home setting. Based on the patient's AM-PAC score, and their current ADL deficits, it is recommended that the patient have 2-3 sessions per week of Occupational Therapy at d/c to increase the patient's independence. At this time, this patient demonstrates the endurance and safety to discharge home with home health OT services (home vs OP services) and a follow up treatment frequency of 2-3x/wk. Please see assessment section for further patient specific details. If patient discharges prior to next session this note will serve as a discharge summary. Please see below for the latest assessment towards goals. HOME HEALTH CARE: LEVEL 4 SICK     - Initial home health evaluation to occur within 24-48 hours, in patient home   - Therapy evaluations in home within 24-48 hours of discharge; including DME and home safety   - Frontload therapy 5 days, then 3x a week   - Therapy to evaluate if patient has [address] needs for personal care   -  evaluation within 24-48 hours, includes evaluation of resources and insurance to determine AL, [place], LTC, and Medicaid options    OT Equipment Recommendations  Equipment Needed: No  Other: continue to assess pending pt progress    Assessment   Performance deficits / Impairments: Decreased functional mobility ; Decreased ADL status; Decreased strength;Decreased endurance  Assessment: Pt is currently functioning below occupational baseline and demo the deficits listed above, pt would benefit from continued skilled OT services to address these deficits and increase independence, safety, and ease with all occupational pursuits  Prognosis: Good;Fair  Decision Making: Medium Complexity  OT Education: OT Role;Plan of Care;Transfer Training  Patient Education: aiyana baker/selene planning- pt verbalized understanding  REQUIRES OT FOLLOW UP: Yes  Activity Tolerance  Activity Tolerance: Patient limited by fatigue;Patient limited by pain  Safety Devices  Safety Devices in place: Yes  Type of devices: All fall risk precautions in place; Patient at risk for falls; Left in bed;Call light within reach; Bed alarm in place;Nurse notified           Patient Diagnosis(es): The primary encounter diagnosis was Syncope and collapse. Diagnoses of End-stage renal disease on hemodialysis ([facility] Lactic acidosis, Elevated troponin, Elevated brain natriuretic peptide (BNP) level, Primary pancreatic cancer with metastasis to other site [facility]), and History of small bowel obstruction were also pertinent to this visit. has a past medical history of Acute hematogenous osteomyelitis of left ankle ([address] Anemia due to stage 3 chronic kidney disease, Chronic kidney disease, stage III (moderate), Diabetes mellitus ([address] Diabetic foot ulcer with osteomyelitis ([address] Foot ulcer ([address] Hypercholesteremia, Hypertension, MRSA infection, Thyroid disease, and Type II or unspecified type diabetes mellitus with neurological manifestations, uncontrolled(250.62). has a past surgical history that includes Cataract removal (1997); Abdomen surgery; Toe amputation (Right, 7/17/15); Foot Debridement (Left, 5/10/2019); Foot Debridement (Left, 5/10/2019); Upper gastrointestinal endoscopy (N/A, 12/26/2019); CT NEEDLE BIOPSY LIVER PERCUTANEOUS (1/20/2021); Upper gastrointestinal endoscopy (N/A, 1/21/2021); Colonoscopy (N/A, 1/21/2021); and CT BIOPSY BONE MARROW (1/26/2021).            Restrictions  Restrictions/Precautions  Restrictions/Precautions: Fall Risk(HIGH FALL RISK; DIET GENERAL)  Required Braces or Orthoses?: No  Position Activity Restriction  Other position/activity restrictions: Briefly, Vito Salcedo is a 74 y.o. male  who presents to the ED complaining of syncopal event while getting dialysis with hypotension. He has his RUE AVF still accessed and was brought straight here. No abdominal pains. No fevers recently. He feels a little SOB but mostly feels generally weak and frail. He is currently a hospice patient who lives at home. He is hospice due to pancreatic cancer that has metastasized and ESRD on HD. He has known liver mets. No headaches. Subjective   General  Chart Reviewed: Yes  Patient assessed for rehabilitation services?: Yes  Additional Pertinent Hx: PMH: HTN, ESRD on dialysis, pancreatic cancer stage IV. Family / Caregiver Present: No  Referring Practitioner: Anais Ferrell MD  Diagnosis: Syncope and collapse  Subjective  Subjective: Pt supine in bed upon arrival, agreeable to OT/PT evaluation. General Comment  Comments: . Patient Currently in Pain: Yes(Simultaneous filing. User may not have seen previous data.)  Pain Assessment  Pain Assessment: 0-10(Simultaneous filing. User may not have seen previous data.)  Pain Level: 8  Pain Location: Abdomen  Pre Treatment Pain Screening  Intervention List: Nurse/Physician notified; Patient able to continue with treatment  Vital Signs  Patient Currently in Pain: Yes(Simultaneous filing.  User may not have seen previous data.)    Social/Functional History  Social/Functional History  Lives With: Family(brother and son)  Type of Home: House  Home Layout: Two level, Bed/Bath upstairs, Able to Live on Main level with bedroom/bathroom  Home Access: Stairs to enter without rails  Entrance Stairs - Number of Steps: 1 STEP to get inside  Bathroom Shower/Tub: Tub/Shower unit, Walk-in shower(pt has been recently sponge bathing)  Bathroom Toilet: Handicap height  Home Equipment: Rolling walker  ADL Assistance: Independent  Homemaking Responsibilities: No  Ambulation Assistance: Independent(with RW)  Transfer Assistance: Needs assistance  Active : No  Patient's  Info: family takes pt to doctors appointments  Additional Comments: pt reports no falls in the last 6 months; pt has recently got a RW and is now using that for ambulation. Pt also reports that he has been living on main level at home and sponge bathing.        Objective   Vision: Impaired  Vision Exceptions: Wears glasses for reading  Hearing: Within functional limits    Orientation  Overall Orientation Status: Within Normal Limits  Observation/Palpation  Posture: Fair  Balance  Sitting Balance: Stand by assistance  Standing Balance: Contact guard assistance  Standing Balance  Time: 1 minute in stance  Activity: lateral steps to L towards HOB  Comment: no device, CGA, pt flexed forward and unable to come to full upright stance d/t pain  Functional Mobility  Functional Mobility Comments: 3 lateral steps to L towards HOB, standing tolerance limited d/t pain and discomfort in abdomen, pt requesting to lay back supine  ADL  Additional Comments: Pt declined ADL participation this date  Transfers  Sit to stand: Contact guard assistance  Stand to sit: Contact guard assistance  Transfer Comments: Pt unable to come fully upright d/t pain in abdomen  Cognition  Overall Cognitive Status: WFL  Sensation  Overall Sensation Status: WFL  LUE AROM (degrees)  LUE AROM : WFL  Left Hand AROM (degrees)  Left Hand AROM: WFL  RUE AROM (degrees)  RUE AROM : WFL  Right Hand AROM (degrees)  Right Hand AROM: WFL       Plan   Plan  Times per week: 3-5x/wk  Times per day: Daily  Current Treatment Recommendations: Strengthening, Functional Mobility Training, Endurance Training, Safety Education & Training, Patient/Caregiver Education & Training, Self-Care / ADL, Equipment Evaluation, Education, & procurement    G-Code     OutComes Score                                                  AM-PAC Score        AM-PAC Inpatient Daily Activity Raw Score: 16 (02/16/21 1507)  AM-PAC Inpatient ADL T-Scale Score : 35.96 (02/16/21 1507)  ADL Inpatient CMS 0-100% Score: 53.32 (02/16/21 1507)  ADL Inpatient CMS G-Code Modifier : CK (02/16/21 1507)    Goals  Short term goals  Time Frame for Short term goals: d/c  Short term goal 1: Pt will complete toileting with supervision  Short term goal 2: Pt will complete functional transfer with supervision  Short term goal 3: pt will complete functional mobility to<>from bathroom with supervision and use of RW  Short term goal 4: Pt will complete bed mobility with supervision  Short term goal 5: Pt will complete UB ADLs with setup, LB ADLs with supervision  Long term goals  Time Frame for Long term goals : LTG=STG  Patient Goals   Patient goals : pt states he would like to keep recieving therapy to maintain strength and independence       Therapy Time   Individual Concurrent Group Co-treatment   Time In 1031         Time Out 1054         Minutes 23         Timed Code Treatment Minutes: [address] [facility], [address] [place] [address] [id number]
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
## 2021-02-16 NOTE — PROGRESS NOTES
Message sent to hosp  Patient admitted with Pancreatic cancer. Patient has a history of diabetes and takes insulin at home but patient has no order for blood glucose checks or insulin.

## 2021-02-16 NOTE — PROGRESS NOTES
Advanced Care Planning Note. Purpose of Encounter: Advanced care planning in light of advanced cancer   Parties In Attendance: Patient,  Decisional Capacity: Yes  Subjective: Patient understand that his condition is terminal and is agreeable to d/c home with hospice care. Objective: adenocarcinoma of pancrease,liver mets. ESRD on HD with access issues and bleeding from fistula  Goals of Care Determination: Patient wishes to focus on comfort and expressed the desired to pursue hospice. Plan:  Pt d/c home with hospice care   Code Status: DNR CC . Document was signed by patient and this note writer    Time spent on Advanced care Plannin minutes   Advanced Care Planning Documents: signed [facility] DNR form is in the chart.      TEE Mcguire - CNP  2021 4:30 PM

## 2021-02-16 NOTE — CARE COORDINATION
CM met with patient and completed dcpa/readmission. Pt is wanting to speak to doctor to be transferred to [facility] where all his doctors are. CM explained if pt does transfer could possibly have to pay cost of transport and he states \"that is fine. \"     Pt states that he is active with [address] [facility], CM verified with [facility] liaison that he is. Pt also states active with [facility]. There is a palliative care consult in place and CM called Alize Loya with palliative care and left vm of hospice agency. Patient is also a HD patient. CM called [facility] [phone] and verified with [employer] that pt gets his diaylsis on Mon/Wed/Fri at 3:30 pm chair time and normally runs for 3 1/2 hours. Clinic and numbers placed on [facility]. Therapy evaluations are pending at this time. CM will continue to follow for d/c plan.     Ethel Mejia RN, BSN  [phone]

## 2021-02-16 NOTE — ED NOTES
Report called to RN [place] at this time. VS as noted. Questions/concerns addressed at this time.  Tele confirmed     John Monday, RN  02/15/21 [id number]

## 2021-02-19 LAB
BLOOD CULTURE, ROUTINE: NORMAL
CULTURE, BLOOD 2: NORMAL

## 2022-12-14 NOTE — PLAN OF CARE
Problem: Pain:  Goal: Pain level will decrease  Description: Pain level will decrease  Outcome: Ongoing  Goal: Control of acute pain  Description: Control of acute pain  Outcome: Ongoing  Goal: Control of chronic pain  Description: Control of chronic pain  Outcome: Ongoing     Problem: Falls - Risk of:  Goal: Will remain free from falls  Description: Will remain free from falls  Outcome: Ongoing  Goal: Absence of physical injury  Description: Absence of physical injury  Outcome: Ongoing     Problem: Fluid Volume:  Goal: Ability to achieve a balanced intake and output will improve  Description: Ability to achieve a balanced intake and output will improve  Outcome: Ongoing You can access the FollowMyHealth Patient Portal offered by [facility] by registering at the following website: [email or web address]. By joining [email or web address]’s FollowMyHealth portal, you will also be able to view your health information using other applications (apps) compatible with our system.

## (undated) DEVICE — SOLUTION IV IRRIG WATER 500ML POUR BRL ST 2F7113

## (undated) DEVICE — TRAY PREP DRY W/ PREM GLV 2 APPL 6 SPNG 2 UNDPD 1 OVERWRAP

## (undated) DEVICE — DRESSING FOAM W10XL10CM ABSRB SFT CNFRM SAFETAC LAYR

## (undated) DEVICE — ELECTRODE PT RET AD L9FT HI MOIST COND ADH HYDRGEL CORDED

## (undated) DEVICE — PADDING CAST W4INXL4YD ST COT RAYON MICROPLEATED HIGHLY

## (undated) DEVICE — MAJOR SET UP PK

## (undated) DEVICE — PRECISION THIN (9.0 X 0.38 X 31.0MM)

## (undated) DEVICE — STERILE LATEX POWDER-FREE SURGICAL GLOVESWITH NITRILE COATING: Brand: PROTEXIS

## (undated) DEVICE — SET VLV 3 PC AWS DISPOSABLE GRDIAN SCOPEVALET

## (undated) DEVICE — CURITY NON-ADHERENT STRIPS: Brand: CURITY

## (undated) DEVICE — PROCEDURE KIT ENDOSCP CUST

## (undated) DEVICE — GOWN,SIRUS,NONRNF,RAGLAN,2XL,ST,28/CS: Brand: MEDLINE

## (undated) DEVICE — MEDICINE CUP, GRADUATED, STER: Brand: MEDLINE

## (undated) DEVICE — TRAP SPEC RETRV CLR PLAS POLYP IN LN SUCT QUIK CTCH

## (undated) DEVICE — MOUTHPIECE ENDOSCP L CTRL OPN AND SIDE PORTS DISP

## (undated) DEVICE — TOWEL,OR,DSP,ST,BLUE,STD,6/PK,12PK/CS: Brand: MEDLINE

## (undated) DEVICE — COLLECTOR SPEC RAYON LIQ STUART DBL FOR THRT VAG SKIN WND

## (undated) DEVICE — FORCEPS BX L240CM WRK CHN 2.8MM STD CAP W/ NDL MIC MESH

## (undated) DEVICE — BANDAGE COMPR W6INXL10YD ST M E WHITE/BEIGE

## (undated) DEVICE — X-RAY CASSETTE COVER: Brand: CONVERTORS

## (undated) DEVICE — BANDAGE COMPR W6INXL11YD E SGL LAYERED VELC CLSR SHUR-BAND

## (undated) DEVICE — SNARE ENDOSCP L240CM SHTH DIA24MM LOOP W10MM POLYP RND REINF

## (undated) DEVICE — BASIC SINGLE BASIN 1-LF: Brand: MEDLINE INDUSTRIES, INC.

## (undated) DEVICE — BW-412T DISP COMBO CLEANING BRUSH: Brand: SINGLE USE COMBINATION CLEANING BRUSH

## (undated) DEVICE — NEEDLE JAMSH BNE MAR 13G X 2

## (undated) DEVICE — SUTURE ETHLN SZ 3-0 L18IN NONABSORBABLE BLK L24MM PS-1 3/8 1663G

## (undated) DEVICE — IODOFORM PACKING STRIP: Brand: CURITY

## (undated) DEVICE — KIT OR ROOM TURNOVER W/STRAP

## (undated) DEVICE — Z INACTIVE USE 2660664 SOLUTION IRRIG 3000ML 0.9% SOD CHL USP UROMATIC PLAS CONT

## (undated) DEVICE — HYPODERMIC SAFETY NEEDLE: Brand: MAGELLAN

## (undated) DEVICE — SYRINGE MED 50ML LUERLOCK TIP

## (undated) DEVICE — SYRINGE, LUER LOCK, 10ML: Brand: MEDLINE

## (undated) DEVICE — STERILE VELCLOSE ELASTIC BANDAGE, 6IN: Brand: VELCLOSE

## (undated) DEVICE — STOCKINETTE,IMPERVIOUS,12X48,STERILE: Brand: MEDLINE

## (undated) DEVICE — 3M™ COBAN™ STERILE SELF-ADHERENT WRAP, 1584S, 4 IN X 5 YD (10 CM X 4,5 M), 18 ROLLS/CASE: Brand: 3M™ COBAN™

## (undated) DEVICE — BANDAGE,GAUZE,BULKEE II,4.5"X4.1YD,STRL: Brand: MEDLINE

## (undated) DEVICE — SOLUTION IV IRRIG 500ML 0.9% SODIUM CHL 2F7123

## (undated) DEVICE — BANDAGE COMPR ELASTIC 5 YDX4 IN LT

## (undated) DEVICE — PACK PROCEDURE SURG EXTREMITY MFFOP CUST

## (undated) DEVICE — INTENDED FOR TISSUE SEPARATION, AND OTHER PROCEDURES THAT REQUIRE A SHARP SURGICAL BLADE TO PUNCTURE OR CUT.: Brand: BARD-PARKER ® STAINLESS STEEL BLADES

## (undated) DEVICE — HANDPIECE SET WITH HIGH FLOW TIP AND SUCTION TUBE: Brand: INTERPULSE

## (undated) DEVICE — PAD,ABDOMINAL,8"X10",ST,LF: Brand: MEDLINE

## (undated) DEVICE — GAUZE,SPONGE,4"X4",8PLY,STRL,LF,10/TRAY: Brand: MEDLINE

## (undated) DEVICE — SWAB CULT SGL AMIES W/O CHAR FOR THRT VAG SKIN HRT CULTSWAB

## (undated) DEVICE — T-DRAPE,EXTREMITY,STERILE: Brand: MEDLINE

## (undated) DEVICE — SHEET,DRAPE,53X77,STERILE: Brand: MEDLINE